# Patient Record
Sex: FEMALE | Race: BLACK OR AFRICAN AMERICAN | NOT HISPANIC OR LATINO | Employment: FULL TIME | RURAL
[De-identification: names, ages, dates, MRNs, and addresses within clinical notes are randomized per-mention and may not be internally consistent; named-entity substitution may affect disease eponyms.]

---

## 2017-05-04 ENCOUNTER — HISTORICAL (OUTPATIENT)
Dept: ADMINISTRATIVE | Facility: HOSPITAL | Age: 22
End: 2017-05-04

## 2017-05-08 LAB
LAB AP CLINICAL INFORMATION: NORMAL
LAB AP DIAGNOSIS - HISTORICAL: NORMAL
LAB AP GROSS PATHOLOGY - HISTORICAL: NORMAL
LAB AP SPECIMEN SUBMITTED - HISTORICAL: NORMAL

## 2017-06-20 ENCOUNTER — HISTORICAL (OUTPATIENT)
Dept: ADMINISTRATIVE | Facility: HOSPITAL | Age: 22
End: 2017-06-20

## 2017-06-22 LAB
LAB AP CLINICAL INFORMATION: NORMAL
LAB AP GENERAL CAT - HISTORICAL: NORMAL
LAB AP INTERPRETATION/RESULT - HISTORICAL: NEGATIVE
LAB AP SPECIMEN ADEQUACY - HISTORICAL: NORMAL
LAB AP SPECIMEN SUBMITTED - HISTORICAL: NORMAL

## 2020-03-04 ENCOUNTER — HISTORICAL (OUTPATIENT)
Dept: ADMINISTRATIVE | Facility: HOSPITAL | Age: 25
End: 2020-03-04

## 2020-03-06 LAB
CHLORIDE SERPL-SCNC: NORMAL MMOL/L/24HR
ESTRADIOL SERPL-MCNC: 76 PG/ML
POTASSIUM SERPL-SCNC: NORMAL MMOL/L/24HR
SODIUM UR-SCNC: NORMAL MMOL/L/24HR
T4 FREE SERPL-MCNC: 0.88 NG/DL (ref 0.76–1.46)

## 2020-03-26 ENCOUNTER — HISTORICAL (OUTPATIENT)
Dept: ADMINISTRATIVE | Facility: HOSPITAL | Age: 25
End: 2020-03-26

## 2020-04-01 ENCOUNTER — HISTORICAL (OUTPATIENT)
Dept: ADMINISTRATIVE | Facility: HOSPITAL | Age: 25
End: 2020-04-01

## 2020-04-01 LAB
BASOPHILS # BLD AUTO: 0.01 X10E3/UL (ref 0–0.2)
BASOPHILS NFR BLD AUTO: 0.3 % (ref 0–1)
CANDIDA SPECIES: POSITIVE
EOSINOPHIL # BLD AUTO: 0.07 X10E3/UL (ref 0–0.5)
EOSINOPHIL NFR BLD AUTO: 2.2 % (ref 1–4)
EOSINOPHIL NFR BLD MANUAL: 2 % (ref 1–4)
ERYTHROCYTE [DISTWIDTH] IN BLOOD BY AUTOMATED COUNT: 14.7 % (ref 11.5–14.5)
GARDNERELLA: NEGATIVE
HCT VFR BLD AUTO: 45.1 % (ref 38–47)
HGB BLD-MCNC: 13.7 G/DL (ref 12–16)
IMM GRANULOCYTES # BLD AUTO: 0 X10E3/UL (ref 0–0.04)
IMM GRANULOCYTES NFR BLD: 0 % (ref 0–0.4)
LYMPHOCYTES # BLD AUTO: 2.01 X10E3/UL (ref 1–4.8)
LYMPHOCYTES NFR BLD AUTO: 62.2 % (ref 27–41)
LYMPHOCYTES NFR BLD MANUAL: 63 % (ref 27–41)
MCH RBC QN AUTO: 25.3 PG (ref 27–31)
MCHC RBC AUTO-ENTMCNC: 30.4 G/DL (ref 32–36)
MCV RBC AUTO: 83.4 FL (ref 80–96)
MONOCYTES # BLD AUTO: 0.46 X10E3/UL (ref 0–0.8)
MONOCYTES NFR BLD AUTO: 14.2 % (ref 2–6)
MONOCYTES NFR BLD MANUAL: 10 % (ref 2–6)
MPC BLD CALC-MCNC: 10.7 FL (ref 9.4–12.4)
NEUTROPHILS # BLD AUTO: 0.68 X10E3/UL (ref 1.8–7.7)
NEUTROPHILS NFR BLD AUTO: 21.1 % (ref 53–65)
NEUTS BAND NFR BLD MANUAL: 1 % (ref 1–5)
NEUTS SEG NFR BLD MANUAL: 24 % (ref 50–62)
NRBC # BLD AUTO: 0 X10E3/UL (ref 0–0)
NRBC, AUTO (.00): 0 /100 (ref 0–0)
PLATELET # BLD AUTO: 278 X10E3/UL (ref 150–400)
PLATELET MORPHOLOGY: ABNORMAL
RBC # BLD AUTO: 5.41 X10E6/UL (ref 4.2–5.4)
RBC MORPH BLD: NORMAL
TRICHOMONAS: NEGATIVE
WBC # BLD AUTO: 3.23 X10E3/UL (ref 4.5–11)

## 2020-05-19 ENCOUNTER — HISTORICAL (OUTPATIENT)
Dept: ADMINISTRATIVE | Facility: HOSPITAL | Age: 25
End: 2020-05-19

## 2020-05-19 LAB
BASOPHILS # BLD AUTO: 0.04 X10E3/UL (ref 0–0.2)
BASOPHILS NFR BLD AUTO: 0.7 % (ref 0–1)
CANDIDA SPECIES: NEGATIVE
EOSINOPHIL # BLD AUTO: 0.34 X10E3/UL (ref 0–0.5)
EOSINOPHIL NFR BLD AUTO: 6 % (ref 1–4)
ERYTHROCYTE [DISTWIDTH] IN BLOOD BY AUTOMATED COUNT: 15.4 % (ref 11.5–14.5)
GARDNERELLA: NEGATIVE
HCT VFR BLD AUTO: 42 % (ref 38–47)
HGB BLD-MCNC: 12.7 G/DL (ref 12–16)
IMM GRANULOCYTES # BLD AUTO: 0.01 X10E3/UL (ref 0–0.04)
IMM GRANULOCYTES NFR BLD: 0.2 % (ref 0–0.4)
LYMPHOCYTES # BLD AUTO: 2.88 X10E3/UL (ref 1–4.8)
LYMPHOCYTES NFR BLD AUTO: 51 % (ref 27–41)
MCH RBC QN AUTO: 25.5 PG (ref 27–31)
MCHC RBC AUTO-ENTMCNC: 30.2 G/DL (ref 32–36)
MCV RBC AUTO: 84.2 FL (ref 80–96)
MONOCYTES # BLD AUTO: 0.46 X10E3/UL (ref 0–0.8)
MONOCYTES NFR BLD AUTO: 8.1 % (ref 2–6)
MPC BLD CALC-MCNC: 10.9 FL (ref 9.4–12.4)
NEUTROPHILS # BLD AUTO: 1.92 X10E3/UL (ref 1.8–7.7)
NEUTROPHILS NFR BLD AUTO: 34 % (ref 53–65)
NRBC # BLD AUTO: 0 X10E3/UL (ref 0–0)
NRBC, AUTO (.00): 0 /100 (ref 0–0)
PLATELET # BLD AUTO: 365 X10E3/UL (ref 150–400)
RBC # BLD AUTO: 4.99 X10E6/UL (ref 4.2–5.4)
TRICHOMONAS: NEGATIVE
WBC # BLD AUTO: 5.65 X10E3/UL (ref 4.5–11)

## 2020-05-21 LAB — HGB S BLD QL SOLY: NEGATIVE

## 2020-05-27 ENCOUNTER — HISTORICAL (OUTPATIENT)
Dept: ADMINISTRATIVE | Facility: HOSPITAL | Age: 25
End: 2020-05-27

## 2020-06-23 ENCOUNTER — HISTORICAL (OUTPATIENT)
Dept: ADMINISTRATIVE | Facility: HOSPITAL | Age: 25
End: 2020-06-23

## 2020-06-23 LAB
ABO: NORMAL
ANTIBODY SCREEN: NORMAL
BASOPHILS # BLD AUTO: 0.05 X10E3/UL (ref 0–0.2)
BASOPHILS NFR BLD AUTO: 0.9 % (ref 0–1)
BUN SERPL-MCNC: 8 MG/DL (ref 7–18)
CALCIUM SERPL-MCNC: 9 MG/DL (ref 8.5–10.1)
CHLORIDE SERPL-SCNC: 107 MMOL/L (ref 98–107)
CO2 SERPL-SCNC: 26 MMOL/L (ref 21–32)
CREAT SERPL-MCNC: 0.63 MG/DL (ref 0.5–1.02)
EOSINOPHIL # BLD AUTO: 0.29 X10E3/UL (ref 0–0.5)
EOSINOPHIL NFR BLD AUTO: 5.1 % (ref 1–4)
ERYTHROCYTE [DISTWIDTH] IN BLOOD BY AUTOMATED COUNT: 15.2 % (ref 11.5–14.5)
GLUCOSE SERPL-MCNC: 73 MG/DL (ref 74–106)
HCT VFR BLD AUTO: 40.5 % (ref 38–47)
HGB BLD-MCNC: 12.5 G/DL (ref 12–16)
IMM GRANULOCYTES # BLD AUTO: 0.01 X10E3/UL (ref 0–0.04)
IMM GRANULOCYTES NFR BLD: 0.2 % (ref 0–0.4)
LYMPHOCYTES # BLD AUTO: 2.24 X10E3/UL (ref 1–4.8)
LYMPHOCYTES NFR BLD AUTO: 39.5 % (ref 27–41)
MCH RBC QN AUTO: 26.2 PG (ref 27–31)
MCHC RBC AUTO-ENTMCNC: 30.9 G/DL (ref 32–36)
MCV RBC AUTO: 84.7 FL (ref 80–96)
MONOCYTES # BLD AUTO: 0.5 X10E3/UL (ref 0–0.8)
MONOCYTES NFR BLD AUTO: 8.8 % (ref 2–6)
MPC BLD CALC-MCNC: 10.9 FL (ref 9.4–12.4)
NEUTROPHILS # BLD AUTO: 2.58 X10E3/UL (ref 1.8–7.7)
NEUTROPHILS NFR BLD AUTO: 45.5 % (ref 53–65)
NRBC # BLD AUTO: 0 X10E3/UL (ref 0–0)
NRBC, AUTO (.00): 0 /100 (ref 0–0)
PLATELET # BLD AUTO: 346 X10E3/UL (ref 150–400)
POTASSIUM SERPL-SCNC: 4.3 MMOL/L (ref 3.5–5.1)
RBC # BLD AUTO: 4.78 X10E6/UL (ref 4.2–5.4)
RH TYPE: NORMAL
SODIUM SERPL-SCNC: 139 MMOL/L (ref 136–145)
WBC # BLD AUTO: 5.67 X10E3/UL (ref 4.5–11)

## 2020-06-25 ENCOUNTER — HISTORICAL (OUTPATIENT)
Dept: ADMINISTRATIVE | Facility: HOSPITAL | Age: 25
End: 2020-06-25

## 2020-06-25 LAB — HCG SERUM QUALITATIVE: NEGATIVE

## 2020-06-26 LAB

## 2020-07-09 ENCOUNTER — HISTORICAL (OUTPATIENT)
Dept: ADMINISTRATIVE | Facility: HOSPITAL | Age: 25
End: 2020-07-09

## 2020-07-09 LAB
BACTERIA #/AREA URNS HPF: ABNORMAL /HPF
BASOPHILS # BLD AUTO: 0.04 X10E3/UL (ref 0–0.2)
BASOPHILS NFR BLD AUTO: 0.7 % (ref 0–1)
BILIRUB UR QL STRIP: NEGATIVE MG/DL
CLARITY UR: ABNORMAL
COLOR UR: YELLOW
EOSINOPHIL # BLD AUTO: 0.24 X10E3/UL (ref 0–0.5)
EOSINOPHIL NFR BLD AUTO: 4.4 % (ref 1–4)
ERYTHROCYTE [DISTWIDTH] IN BLOOD BY AUTOMATED COUNT: 15 % (ref 11.5–14.5)
GLUCOSE UR STRIP-MCNC: NEGATIVE MG/DL
HCT VFR BLD AUTO: 41.1 % (ref 38–47)
HGB BLD-MCNC: 12.5 G/DL (ref 12–16)
IMM GRANULOCYTES # BLD AUTO: 0.01 X10E3/UL (ref 0–0.04)
IMM GRANULOCYTES NFR BLD: 0.2 % (ref 0–0.4)
KETONES UR STRIP-SCNC: NEGATIVE MG/DL
LEUKOCYTE ESTERASE UR QL STRIP: NEGATIVE LEU/UL
LYMPHOCYTES # BLD AUTO: 2.57 X10E3/UL (ref 1–4.8)
LYMPHOCYTES NFR BLD AUTO: 46.6 % (ref 27–41)
MCH RBC QN AUTO: 25.8 PG (ref 27–31)
MCHC RBC AUTO-ENTMCNC: 30.4 G/DL (ref 32–36)
MCV RBC AUTO: 84.9 FL (ref 80–96)
MONOCYTES # BLD AUTO: 0.49 X10E3/UL (ref 0–0.8)
MONOCYTES NFR BLD AUTO: 8.9 % (ref 2–6)
MPC BLD CALC-MCNC: 10.8 FL (ref 9.4–12.4)
NEUTROPHILS # BLD AUTO: 2.16 X10E3/UL (ref 1.8–7.7)
NEUTROPHILS NFR BLD AUTO: 39.2 % (ref 53–65)
NITRITE UR QL STRIP: NEGATIVE
NRBC # BLD AUTO: 0 X10E3/UL (ref 0–0)
NRBC, AUTO (.00): 0 /100 (ref 0–0)
PH UR STRIP: 7.5 PH UNITS (ref 5–8)
PLATELET # BLD AUTO: 360 X10E3/UL (ref 150–400)
PROT UR QL STRIP: NEGATIVE MG/DL
RBC # BLD AUTO: 4.84 X10E6/UL (ref 4.2–5.4)
RBC # UR STRIP: NEGATIVE ERY/UL
RBC #/AREA URNS HPF: ABNORMAL /HPF (ref 0–3)
SP GR UR STRIP: 1.02 (ref 1–1.03)
SQUAMOUS #/AREA URNS LPF: ABNORMAL /LPF
UROBILINOGEN UR STRIP-ACNC: 1 EU/DL
WBC # BLD AUTO: 5.51 X10E3/UL (ref 4.5–11)
WBC #/AREA URNS HPF: ABNORMAL /HPF (ref 0–5)

## 2020-09-23 ENCOUNTER — HISTORICAL (OUTPATIENT)
Dept: ADMINISTRATIVE | Facility: HOSPITAL | Age: 25
End: 2020-09-23

## 2020-09-23 LAB
BACTERIA #/AREA URNS HPF: ABNORMAL /HPF
BASOPHILS # BLD AUTO: 0.04 X10E3/UL (ref 0–0.2)
BASOPHILS NFR BLD AUTO: 0.7 % (ref 0–1)
BILIRUB UR QL STRIP: NEGATIVE MG/DL
CANCER AG125 SERPL-ACNC: 4.3 U/ML (ref 0–35)
CLARITY UR: CLEAR
COLOR UR: YELLOW
EOSINOPHIL # BLD AUTO: 0.22 X10E3/UL (ref 0–0.5)
EOSINOPHIL NFR BLD AUTO: 3.7 % (ref 1–4)
ERYTHROCYTE [DISTWIDTH] IN BLOOD BY AUTOMATED COUNT: 16.4 % (ref 11.5–14.5)
ERYTHROCYTE [SEDIMENTATION RATE] IN BLOOD BY WESTERGREN METHOD: 5 MM/HR (ref 0–20)
GLUCOSE UR STRIP-MCNC: NEGATIVE MG/DL
HCT VFR BLD AUTO: 40.7 % (ref 38–47)
HGB BLD-MCNC: 12.4 G/DL (ref 12–16)
IMM GRANULOCYTES # BLD AUTO: 0.01 X10E3/UL (ref 0–0.04)
IMM GRANULOCYTES NFR BLD: 0.2 % (ref 0–0.4)
KETONES UR STRIP-SCNC: NEGATIVE MG/DL
LEUKOCYTE ESTERASE UR QL STRIP: NEGATIVE LEU/UL
LYMPHOCYTES # BLD AUTO: 2.48 X10E3/UL (ref 1–4.8)
LYMPHOCYTES NFR BLD AUTO: 41.5 % (ref 27–41)
MCH RBC QN AUTO: 25.2 PG (ref 27–31)
MCHC RBC AUTO-ENTMCNC: 30.5 G/DL (ref 32–36)
MCV RBC AUTO: 82.6 FL (ref 80–96)
MONOCYTES # BLD AUTO: 0.49 X10E3/UL (ref 0–0.8)
MONOCYTES NFR BLD AUTO: 8.2 % (ref 2–6)
MPC BLD CALC-MCNC: 10.7 FL (ref 9.4–12.4)
NEUTROPHILS # BLD AUTO: 2.73 X10E3/UL (ref 1.8–7.7)
NEUTROPHILS NFR BLD AUTO: 45.7 % (ref 53–65)
NITRITE UR QL STRIP: NEGATIVE
NRBC # BLD AUTO: 0 X10E3/UL (ref 0–0)
NRBC, AUTO (.00): 0 /100 (ref 0–0)
PH UR STRIP: 7.5 PH UNITS (ref 5–8)
PLATELET # BLD AUTO: 345 X10E3/UL (ref 150–400)
PROT UR QL STRIP: NEGATIVE MG/DL
RBC # BLD AUTO: 4.93 X10E6/UL (ref 4.2–5.4)
RBC # UR STRIP: NEGATIVE ERY/UL
RBC #/AREA URNS HPF: ABNORMAL /HPF (ref 0–3)
SP GR UR STRIP: 1.02 (ref 1–1.03)
SQUAMOUS #/AREA URNS LPF: ABNORMAL /LPF
UROBILINOGEN UR STRIP-ACNC: 0.2 EU/DL
WBC # BLD AUTO: 5.97 X10E3/UL (ref 4.5–11)
WBC #/AREA URNS HPF: ABNORMAL /HPF (ref 0–5)

## 2020-10-29 ENCOUNTER — HISTORICAL (OUTPATIENT)
Dept: ADMINISTRATIVE | Facility: HOSPITAL | Age: 25
End: 2020-10-29

## 2020-10-29 LAB
ANION GAP SERPL CALCULATED.3IONS-SCNC: 6 MMOL/L (ref 7–16)
BASOPHILS # BLD AUTO: 0.05 X10E3/UL (ref 0–0.2)
BASOPHILS NFR BLD AUTO: 0.7 % (ref 0–1)
CHLORIDE SERPL-SCNC: 108 MMOL/L (ref 98–107)
CO2 SERPL-SCNC: 29 MMOL/L (ref 21–32)
EOSINOPHIL # BLD AUTO: 0.24 X10E3/UL (ref 0–0.5)
EOSINOPHIL NFR BLD AUTO: 3.4 % (ref 1–4)
ERYTHROCYTE [DISTWIDTH] IN BLOOD BY AUTOMATED COUNT: 15.9 % (ref 11.5–14.5)
FSH SERPL-ACNC: 5.5 MIU/ML
HCG SERUM QUALITATIVE: NEGATIVE
HCT VFR BLD AUTO: 39.2 % (ref 38–47)
HGB BLD-MCNC: 12.3 G/DL (ref 12–16)
IMM GRANULOCYTES # BLD AUTO: 0.02 X10E3/UL (ref 0–0.04)
IMM GRANULOCYTES NFR BLD: 0.3 % (ref 0–0.4)
LH SERPL-ACNC: 6.3 MIU/ML
LYMPHOCYTES # BLD AUTO: 3.21 X10E3/UL (ref 1–4.8)
LYMPHOCYTES NFR BLD AUTO: 46.1 % (ref 27–41)
MCH RBC QN AUTO: 25.1 PG (ref 27–31)
MCHC RBC AUTO-ENTMCNC: 31.4 G/DL (ref 32–36)
MCV RBC AUTO: 80 FL (ref 80–96)
MONOCYTES # BLD AUTO: 0.5 X10E3/UL (ref 0–0.8)
MONOCYTES NFR BLD AUTO: 7.2 % (ref 2–6)
MPC BLD CALC-MCNC: 10.3 FL (ref 9.4–12.4)
NEUTROPHILS # BLD AUTO: 2.95 X10E3/UL (ref 1.8–7.7)
NEUTROPHILS NFR BLD AUTO: 42.3 % (ref 53–65)
NRBC # BLD AUTO: 0 X10E3/UL (ref 0–0)
NRBC, AUTO (.00): 0 /100 (ref 0–0)
PLATELET # BLD AUTO: 372 X10E3/UL (ref 150–400)
POTASSIUM SERPL-SCNC: 4 MMOL/L (ref 3.5–5.1)
RBC # BLD AUTO: 4.9 X10E6/UL (ref 4.2–5.4)
SODIUM SERPL-SCNC: 139 MMOL/L (ref 136–145)
WBC # BLD AUTO: 6.97 X10E3/UL (ref 4.5–11)

## 2021-03-24 ENCOUNTER — HOSPITAL ENCOUNTER (OUTPATIENT)
Dept: RADIOLOGY | Facility: HOSPITAL | Age: 26
Discharge: HOME | End: 2021-03-24
Attending: OBSTETRICS & GYNECOLOGY
Payer: COMMERCIAL

## 2021-03-24 DIAGNOSIS — N80.9 ENDOMETRIOSIS: ICD-10-CM

## 2021-03-24 PROCEDURE — 77080 DXA BONE DENSITY AXIAL: CPT | Mod: 26,,, | Performed by: RADIOLOGY

## 2021-03-24 PROCEDURE — 77080 DEXA BONE DENSITY SPINE HIP: ICD-10-PCS | Mod: 26,,, | Performed by: RADIOLOGY

## 2021-03-24 PROCEDURE — 77080 DXA BONE DENSITY AXIAL: CPT | Mod: TC

## 2021-04-20 RX ORDER — METRONIDAZOLE 500 MG/1
500 TABLET ORAL EVERY 12 HOURS
COMMUNITY
Start: 2020-03-04 | End: 2021-10-26

## 2021-04-20 RX ORDER — SYRING-NEEDL,DISP,INSUL,0.3 ML 29 G X1/2"
180 SYRINGE, EMPTY DISPOSABLE MISCELLANEOUS ONCE
COMMUNITY
Start: 2020-06-23 | End: 2021-10-26

## 2021-04-20 RX ORDER — PHENAZOPYRIDINE HYDROCHLORIDE 200 MG/1
200 TABLET, FILM COATED ORAL
COMMUNITY
Start: 2020-06-23 | End: 2021-10-26

## 2021-04-20 RX ORDER — FLUCONAZOLE 100 MG/1
100 TABLET ORAL DAILY
COMMUNITY
Start: 2020-04-03 | End: 2021-10-27 | Stop reason: SDUPTHER

## 2021-04-20 RX ORDER — HYDROCHLOROTHIAZIDE 25 MG/1
25 TABLET ORAL DAILY
COMMUNITY
End: 2021-10-26

## 2021-04-20 RX ORDER — DICLOFENAC POTASSIUM 50 MG/1
50 TABLET, FILM COATED ORAL 3 TIMES DAILY
COMMUNITY
Start: 2020-03-04 | End: 2021-10-26

## 2021-04-27 ENCOUNTER — OFFICE VISIT (OUTPATIENT)
Dept: OBSTETRICS AND GYNECOLOGY | Facility: CLINIC | Age: 26
End: 2021-04-27
Payer: COMMERCIAL

## 2021-04-27 VITALS
HEART RATE: 73 BPM | BODY MASS INDEX: 36.15 KG/M2 | DIASTOLIC BLOOD PRESSURE: 90 MMHG | HEIGHT: 65 IN | SYSTOLIC BLOOD PRESSURE: 133 MMHG | WEIGHT: 217 LBS | TEMPERATURE: 98 F | RESPIRATION RATE: 16 BRPM

## 2021-04-27 DIAGNOSIS — I10 ESSENTIAL HYPERTENSION: ICD-10-CM

## 2021-04-27 DIAGNOSIS — N80.30 ENDOMETRIOSIS OF PELVIC PERITONEUM: ICD-10-CM

## 2021-04-27 DIAGNOSIS — E66.9 OBESITY (BMI 35.0-39.9 WITHOUT COMORBIDITY): ICD-10-CM

## 2021-04-27 DIAGNOSIS — N94.6 DYSMENORRHEA: ICD-10-CM

## 2021-04-27 PROCEDURE — 99214 PR OFFICE/OUTPT VISIT, EST, LEVL IV, 30-39 MIN: ICD-10-PCS | Mod: ,,, | Performed by: OBSTETRICS & GYNECOLOGY

## 2021-04-27 PROCEDURE — 99214 OFFICE O/P EST MOD 30 MIN: CPT | Mod: ,,, | Performed by: OBSTETRICS & GYNECOLOGY

## 2021-04-27 RX ORDER — LOSARTAN POTASSIUM 50 MG/1
50 TABLET ORAL DAILY
COMMUNITY
End: 2023-03-01 | Stop reason: SDUPTHER

## 2021-05-10 ENCOUNTER — TELEPHONE (OUTPATIENT)
Dept: OBSTETRICS AND GYNECOLOGY | Facility: CLINIC | Age: 26
End: 2021-05-10

## 2021-06-03 ENCOUNTER — OFFICE VISIT (OUTPATIENT)
Dept: OBSTETRICS AND GYNECOLOGY | Facility: CLINIC | Age: 26
End: 2021-06-03
Payer: COMMERCIAL

## 2021-06-03 VITALS
TEMPERATURE: 98 F | HEIGHT: 63 IN | BODY MASS INDEX: 39.16 KG/M2 | DIASTOLIC BLOOD PRESSURE: 88 MMHG | SYSTOLIC BLOOD PRESSURE: 136 MMHG | HEART RATE: 80 BPM | WEIGHT: 221 LBS | RESPIRATION RATE: 16 BRPM

## 2021-06-03 DIAGNOSIS — F41.9 ANXIETY: ICD-10-CM

## 2021-06-03 DIAGNOSIS — N80.9 ENDOMETRIOSIS DETERMINED BY LAPAROSCOPY: ICD-10-CM

## 2021-06-03 DIAGNOSIS — N85.2 BULKY OR ENLARGED UTERUS: ICD-10-CM

## 2021-06-03 DIAGNOSIS — N93.9 ABNORMAL UTERINE BLEEDING (AUB): ICD-10-CM

## 2021-06-03 DIAGNOSIS — R45.86 MOOD SWINGS: ICD-10-CM

## 2021-06-03 DIAGNOSIS — F41.1 GAD (GENERALIZED ANXIETY DISORDER): Primary | ICD-10-CM

## 2021-06-03 LAB
BASOPHILS # BLD AUTO: 0.05 K/UL (ref 0–0.2)
BASOPHILS NFR BLD AUTO: 0.8 % (ref 0–1)
DIFFERENTIAL METHOD BLD: ABNORMAL
EOSINOPHIL # BLD AUTO: 0.34 K/UL (ref 0–0.5)
EOSINOPHIL NFR BLD AUTO: 5.5 % (ref 1–4)
ERYTHROCYTE [DISTWIDTH] IN BLOOD BY AUTOMATED COUNT: 16.4 % (ref 11.5–14.5)
HCG SERUM QUALITATIVE: NEGATIVE
HCT VFR BLD AUTO: 37.8 % (ref 38–47)
HGB BLD-MCNC: 11.8 G/DL (ref 12–16)
IMM GRANULOCYTES # BLD AUTO: 0.01 K/UL (ref 0–0.04)
IMM GRANULOCYTES NFR BLD: 0.2 % (ref 0–0.4)
LYMPHOCYTES # BLD AUTO: 3.2 K/UL (ref 1–4.8)
LYMPHOCYTES NFR BLD AUTO: 51.4 % (ref 27–41)
MCH RBC QN AUTO: 24.7 PG (ref 27–31)
MCHC RBC AUTO-ENTMCNC: 31.2 G/DL (ref 32–36)
MCV RBC AUTO: 79.2 FL (ref 80–96)
MONOCYTES # BLD AUTO: 0.51 K/UL (ref 0–0.8)
MONOCYTES NFR BLD AUTO: 8.2 % (ref 2–6)
MPC BLD CALC-MCNC: 10.8 FL (ref 9.4–12.4)
NEUTROPHILS # BLD AUTO: 2.12 K/UL (ref 1.8–7.7)
NEUTROPHILS NFR BLD AUTO: 33.9 % (ref 53–65)
NRBC # BLD AUTO: 0 X10E3/UL
NRBC, AUTO (.00): 0 %
PLATELET # BLD AUTO: 375 K/UL (ref 150–400)
RBC # BLD AUTO: 4.77 M/UL (ref 4.2–5.4)
T4 FREE SERPL-MCNC: 0.86 NG/DL (ref 0.76–1.46)
TSH SERPL DL<=0.005 MIU/L-ACNC: 1.88 UIU/ML (ref 0.36–3.74)
WBC # BLD AUTO: 6.23 K/UL (ref 4.5–11)

## 2021-06-03 PROCEDURE — 85025 CBC WITH DIFFERENTIAL: ICD-10-PCS | Mod: ,,, | Performed by: CLINICAL MEDICAL LABORATORY

## 2021-06-03 PROCEDURE — 99215 OFFICE O/P EST HI 40 MIN: CPT | Mod: ,,, | Performed by: OBSTETRICS & GYNECOLOGY

## 2021-06-03 PROCEDURE — 99215 PR OFFICE/OUTPT VISIT, EST, LEVL V, 40-54 MIN: ICD-10-PCS | Mod: ,,, | Performed by: OBSTETRICS & GYNECOLOGY

## 2021-06-03 PROCEDURE — 84703 CHORIONIC GONADOTROPIN ASSAY: CPT | Mod: ,,, | Performed by: CLINICAL MEDICAL LABORATORY

## 2021-06-03 PROCEDURE — 85025 COMPLETE CBC W/AUTO DIFF WBC: CPT | Mod: ,,, | Performed by: CLINICAL MEDICAL LABORATORY

## 2021-06-03 PROCEDURE — 84439 THYROID PANEL: ICD-10-PCS | Mod: ,,, | Performed by: CLINICAL MEDICAL LABORATORY

## 2021-06-03 PROCEDURE — 84443 ASSAY THYROID STIM HORMONE: CPT | Mod: ,,, | Performed by: CLINICAL MEDICAL LABORATORY

## 2021-06-03 PROCEDURE — 84439 ASSAY OF FREE THYROXINE: CPT | Mod: ,,, | Performed by: CLINICAL MEDICAL LABORATORY

## 2021-06-03 PROCEDURE — 36415 COLL VENOUS BLD VENIPUNCTURE: CPT | Mod: ,,, | Performed by: OBSTETRICS & GYNECOLOGY

## 2021-06-03 PROCEDURE — 36415 PR COLLECTION VENOUS BLOOD,VENIPUNCTURE: ICD-10-PCS | Mod: ,,, | Performed by: OBSTETRICS & GYNECOLOGY

## 2021-06-03 PROCEDURE — 84443 THYROID PANEL: ICD-10-PCS | Mod: ,,, | Performed by: CLINICAL MEDICAL LABORATORY

## 2021-06-03 PROCEDURE — 84703 HCG, SERUM, QUALITATIVE: ICD-10-PCS | Mod: ,,, | Performed by: CLINICAL MEDICAL LABORATORY

## 2021-06-03 RX ORDER — ESCITALOPRAM OXALATE 10 MG/1
10 TABLET ORAL DAILY
Qty: 90 TABLET | Refills: 3 | Status: SHIPPED | OUTPATIENT
Start: 2021-06-03 | End: 2021-10-26

## 2021-06-10 ENCOUNTER — TELEPHONE (OUTPATIENT)
Dept: OBSTETRICS AND GYNECOLOGY | Facility: CLINIC | Age: 26
End: 2021-06-10

## 2021-06-24 ENCOUNTER — OFFICE VISIT (OUTPATIENT)
Dept: OBSTETRICS AND GYNECOLOGY | Facility: CLINIC | Age: 26
End: 2021-06-24
Payer: COMMERCIAL

## 2021-06-24 VITALS
HEIGHT: 63 IN | RESPIRATION RATE: 16 BRPM | BODY MASS INDEX: 38.98 KG/M2 | SYSTOLIC BLOOD PRESSURE: 142 MMHG | DIASTOLIC BLOOD PRESSURE: 90 MMHG | WEIGHT: 220 LBS | HEART RATE: 71 BPM | TEMPERATURE: 98 F

## 2021-06-24 DIAGNOSIS — R10.2 CHRONIC PELVIC PAIN SYNDROME IN FEMALE: ICD-10-CM

## 2021-06-24 DIAGNOSIS — I10 ESSENTIAL HYPERTENSION: ICD-10-CM

## 2021-06-24 DIAGNOSIS — N80.30 ENDOMETRIOSIS, PELVIC PERITONEUM: ICD-10-CM

## 2021-06-24 DIAGNOSIS — D50.0 IRON DEFICIENCY ANEMIA DUE TO CHRONIC BLOOD LOSS: Primary | ICD-10-CM

## 2021-06-24 DIAGNOSIS — N94.6 DYSMENORRHEA: ICD-10-CM

## 2021-06-24 DIAGNOSIS — N93.8 DUB (DYSFUNCTIONAL UTERINE BLEEDING): ICD-10-CM

## 2021-06-24 DIAGNOSIS — G89.29 CHRONIC PELVIC PAIN SYNDROME IN FEMALE: ICD-10-CM

## 2021-06-24 DIAGNOSIS — E66.9 OBESITY (BMI 35.0-39.9 WITHOUT COMORBIDITY): ICD-10-CM

## 2021-06-24 PROCEDURE — 36415 COLL VENOUS BLD VENIPUNCTURE: CPT | Mod: ,,, | Performed by: OBSTETRICS & GYNECOLOGY

## 2021-06-24 PROCEDURE — 85660 RBC SICKLE CELL TEST: CPT | Mod: ,,, | Performed by: CLINICAL MEDICAL LABORATORY

## 2021-06-24 PROCEDURE — 99214 OFFICE O/P EST MOD 30 MIN: CPT | Mod: ,,, | Performed by: OBSTETRICS & GYNECOLOGY

## 2021-06-24 PROCEDURE — 36415 PR COLLECTION VENOUS BLOOD,VENIPUNCTURE: ICD-10-PCS | Mod: ,,, | Performed by: OBSTETRICS & GYNECOLOGY

## 2021-06-24 PROCEDURE — 99214 PR OFFICE/OUTPT VISIT, EST, LEVL IV, 30-39 MIN: ICD-10-PCS | Mod: ,,, | Performed by: OBSTETRICS & GYNECOLOGY

## 2021-06-24 PROCEDURE — 85660 SICKLE CELL SCREEN: ICD-10-PCS | Mod: ,,, | Performed by: CLINICAL MEDICAL LABORATORY

## 2021-06-29 LAB — HGB S BLD QL SOLY: NEGATIVE

## 2021-07-30 ENCOUNTER — OFFICE VISIT (OUTPATIENT)
Dept: FAMILY MEDICINE | Facility: CLINIC | Age: 26
End: 2021-07-30
Payer: COMMERCIAL

## 2021-07-30 VITALS
WEIGHT: 210 LBS | OXYGEN SATURATION: 100 % | BODY MASS INDEX: 37.21 KG/M2 | HEIGHT: 63 IN | RESPIRATION RATE: 20 BRPM | HEART RATE: 81 BPM

## 2021-07-30 DIAGNOSIS — Z20.822 COVID-19 RULED OUT BY LABORATORY TESTING: ICD-10-CM

## 2021-07-30 DIAGNOSIS — Z11.52 ENCOUNTER FOR SCREENING FOR COVID-19: Primary | ICD-10-CM

## 2021-07-30 LAB
CTP QC/QA: YES
SARS-COV-2 AG RESP QL IA.RAPID: NEGATIVE

## 2021-07-30 PROCEDURE — 87426 SARSCOV CORONAVIRUS AG IA: CPT | Mod: QW,,, | Performed by: FAMILY MEDICINE

## 2021-07-30 PROCEDURE — 87426 SARS CORONAVIRUS 2 ANTIGEN POCT: ICD-10-PCS | Mod: QW,,, | Performed by: FAMILY MEDICINE

## 2021-07-30 PROCEDURE — 99213 PR OFFICE/OUTPT VISIT, EST, LEVL III, 20-29 MIN: ICD-10-PCS | Mod: ,,, | Performed by: FAMILY MEDICINE

## 2021-07-30 PROCEDURE — 99213 OFFICE O/P EST LOW 20 MIN: CPT | Mod: ,,, | Performed by: FAMILY MEDICINE

## 2021-08-31 ENCOUNTER — OFFICE VISIT (OUTPATIENT)
Dept: FAMILY MEDICINE | Facility: CLINIC | Age: 26
End: 2021-08-31
Payer: COMMERCIAL

## 2021-08-31 VITALS
HEART RATE: 96 BPM | TEMPERATURE: 97 F | BODY MASS INDEX: 37.56 KG/M2 | SYSTOLIC BLOOD PRESSURE: 123 MMHG | RESPIRATION RATE: 19 BRPM | DIASTOLIC BLOOD PRESSURE: 83 MMHG | WEIGHT: 212 LBS | HEIGHT: 63 IN

## 2021-08-31 DIAGNOSIS — I10 HYPERTENSION, UNSPECIFIED TYPE: Primary | ICD-10-CM

## 2021-08-31 DIAGNOSIS — Z13.6 ENCOUNTER FOR SCREENING FOR CARDIOVASCULAR DISORDERS: ICD-10-CM

## 2021-08-31 PROCEDURE — 99395 PR PREVENTIVE VISIT,EST,18-39: ICD-10-PCS | Mod: ,,, | Performed by: NURSE PRACTITIONER

## 2021-08-31 PROCEDURE — 80053 COMPREHEN METABOLIC PANEL: CPT | Mod: ,,, | Performed by: CLINICAL MEDICAL LABORATORY

## 2021-08-31 PROCEDURE — 85025 CBC WITH DIFFERENTIAL: ICD-10-PCS | Mod: ,,, | Performed by: CLINICAL MEDICAL LABORATORY

## 2021-08-31 PROCEDURE — 80061 LIPID PANEL: ICD-10-PCS | Mod: ,,, | Performed by: CLINICAL MEDICAL LABORATORY

## 2021-08-31 PROCEDURE — 80061 LIPID PANEL: CPT | Mod: ,,, | Performed by: CLINICAL MEDICAL LABORATORY

## 2021-08-31 PROCEDURE — 80053 COMPREHENSIVE METABOLIC PANEL: ICD-10-PCS | Mod: ,,, | Performed by: CLINICAL MEDICAL LABORATORY

## 2021-08-31 PROCEDURE — 85025 COMPLETE CBC W/AUTO DIFF WBC: CPT | Mod: ,,, | Performed by: CLINICAL MEDICAL LABORATORY

## 2021-08-31 PROCEDURE — 99395 PREV VISIT EST AGE 18-39: CPT | Mod: ,,, | Performed by: NURSE PRACTITIONER

## 2021-09-01 LAB
ALBUMIN SERPL BCP-MCNC: 3.4 G/DL (ref 3.5–5)
ALBUMIN/GLOB SERPL: 0.9 {RATIO}
ALP SERPL-CCNC: 66 U/L (ref 37–98)
ALT SERPL W P-5'-P-CCNC: 14 U/L (ref 13–56)
ANION GAP SERPL CALCULATED.3IONS-SCNC: 9 MMOL/L (ref 7–16)
AST SERPL W P-5'-P-CCNC: 14 U/L (ref 15–37)
BASOPHILS # BLD AUTO: 0.07 K/UL (ref 0–0.2)
BASOPHILS NFR BLD AUTO: 1.4 % (ref 0–1)
BILIRUB SERPL-MCNC: 0.3 MG/DL (ref 0–1.2)
BUN SERPL-MCNC: 12 MG/DL (ref 7–18)
BUN/CREAT SERPL: 17 (ref 6–20)
CALCIUM SERPL-MCNC: 8.6 MG/DL (ref 8.5–10.1)
CHLORIDE SERPL-SCNC: 109 MMOL/L (ref 98–107)
CHOLEST SERPL-MCNC: 155 MG/DL (ref 0–200)
CHOLEST/HDLC SERPL: 2.4 {RATIO}
CO2 SERPL-SCNC: 26 MMOL/L (ref 21–32)
CREAT SERPL-MCNC: 0.72 MG/DL (ref 0.55–1.02)
DIFFERENTIAL METHOD BLD: ABNORMAL
EOSINOPHIL # BLD AUTO: 0.28 K/UL (ref 0–0.5)
EOSINOPHIL NFR BLD AUTO: 5.6 % (ref 1–4)
ERYTHROCYTE [DISTWIDTH] IN BLOOD BY AUTOMATED COUNT: 16 % (ref 11.5–14.5)
GLOBULIN SER-MCNC: 3.6 G/DL (ref 2–4)
GLUCOSE SERPL-MCNC: 81 MG/DL (ref 74–106)
HCT VFR BLD AUTO: 35.5 % (ref 38–47)
HDLC SERPL-MCNC: 64 MG/DL (ref 40–60)
HGB BLD-MCNC: 10.9 G/DL (ref 12–16)
IMM GRANULOCYTES # BLD AUTO: 0.06 K/UL (ref 0–0.04)
IMM GRANULOCYTES NFR BLD: 1.2 % (ref 0–0.4)
LDLC SERPL CALC-MCNC: 81 MG/DL
LDLC/HDLC SERPL: 1.3 {RATIO}
LYMPHOCYTES # BLD AUTO: 2.43 K/UL (ref 1–4.8)
LYMPHOCYTES NFR BLD AUTO: 48.7 % (ref 27–41)
MCH RBC QN AUTO: 24.6 PG (ref 27–31)
MCHC RBC AUTO-ENTMCNC: 30.7 G/DL (ref 32–36)
MCV RBC AUTO: 80.1 FL (ref 80–96)
MONOCYTES # BLD AUTO: 0.55 K/UL (ref 0–0.8)
MONOCYTES NFR BLD AUTO: 11 % (ref 2–6)
MPC BLD CALC-MCNC: 11.5 FL (ref 9.4–12.4)
NEUTROPHILS # BLD AUTO: 1.6 K/UL (ref 1.8–7.7)
NEUTROPHILS NFR BLD AUTO: 32.1 % (ref 53–65)
NONHDLC SERPL-MCNC: 91 MG/DL
NRBC # BLD AUTO: 0 X10E3/UL
NRBC, AUTO (.00): 0 %
PLATELET # BLD AUTO: 374 K/UL (ref 150–400)
POTASSIUM SERPL-SCNC: 4.4 MMOL/L (ref 3.5–5.1)
PROT SERPL-MCNC: 7 G/DL (ref 6.4–8.2)
RBC # BLD AUTO: 4.43 M/UL (ref 4.2–5.4)
SODIUM SERPL-SCNC: 140 MMOL/L (ref 136–145)
TRIGL SERPL-MCNC: 51 MG/DL (ref 35–150)
VLDLC SERPL-MCNC: 10 MG/DL
WBC # BLD AUTO: 4.99 K/UL (ref 4.5–11)

## 2021-09-09 ENCOUNTER — OFFICE VISIT (OUTPATIENT)
Dept: FAMILY MEDICINE | Facility: CLINIC | Age: 26
End: 2021-09-09
Payer: COMMERCIAL

## 2021-09-09 DIAGNOSIS — R51.9 HEAD ACHE: ICD-10-CM

## 2021-09-09 DIAGNOSIS — R06.02 SHORTNESS OF BREATH: ICD-10-CM

## 2021-09-09 DIAGNOSIS — J32.9 SINUSITIS, UNSPECIFIED CHRONICITY, UNSPECIFIED LOCATION: Primary | ICD-10-CM

## 2021-09-09 DIAGNOSIS — R05.9 COUGH: ICD-10-CM

## 2021-09-09 DIAGNOSIS — R43.9 PROBLEMS WITH SMELL AND TASTE: ICD-10-CM

## 2021-09-09 LAB
CTP QC/QA: YES
FLUAV AG NPH QL: NEGATIVE
FLUBV AG NPH QL: NEGATIVE
SARS-COV-2 AG RESP QL IA.RAPID: NEGATIVE

## 2021-09-09 PROCEDURE — 99212 PR OFFICE/OUTPT VISIT, EST, LEVL II, 10-19 MIN: ICD-10-PCS | Mod: 95,,, | Performed by: NURSE PRACTITIONER

## 2021-09-09 PROCEDURE — 99212 OFFICE O/P EST SF 10 MIN: CPT | Mod: 95,,, | Performed by: NURSE PRACTITIONER

## 2021-09-09 RX ORDER — METHYLPREDNISOLONE 4 MG/1
TABLET ORAL
Qty: 21 TABLET | Refills: 0 | Status: SHIPPED | OUTPATIENT
Start: 2021-09-09 | End: 2021-10-26

## 2021-09-09 RX ORDER — AZITHROMYCIN 250 MG/1
TABLET, FILM COATED ORAL
Qty: 6 TABLET | Refills: 0 | Status: SHIPPED | OUTPATIENT
Start: 2021-09-09 | End: 2021-10-26

## 2021-09-10 LAB — SARS-COV-2 RNA RESP QL NAA+PROBE: NEGATIVE

## 2021-09-10 PROCEDURE — 87635 SARS-COV-2 (COVID-19) QUALITATIVE PCR: ICD-10-PCS | Mod: ,,, | Performed by: CLINICAL MEDICAL LABORATORY

## 2021-09-10 PROCEDURE — 87635 SARS-COV-2 COVID-19 AMP PRB: CPT | Mod: ,,, | Performed by: CLINICAL MEDICAL LABORATORY

## 2021-10-14 ENCOUNTER — OFFICE VISIT (OUTPATIENT)
Dept: FAMILY MEDICINE | Facility: CLINIC | Age: 26
End: 2021-10-14
Payer: COMMERCIAL

## 2021-10-14 VITALS
BODY MASS INDEX: 37.74 KG/M2 | DIASTOLIC BLOOD PRESSURE: 84 MMHG | HEIGHT: 63 IN | WEIGHT: 213 LBS | RESPIRATION RATE: 18 BRPM | SYSTOLIC BLOOD PRESSURE: 121 MMHG | HEART RATE: 80 BPM | TEMPERATURE: 98 F

## 2021-10-14 DIAGNOSIS — N93.9 ABNORMAL UTERINE BLEEDING: Primary | ICD-10-CM

## 2021-10-14 PROCEDURE — 99213 OFFICE O/P EST LOW 20 MIN: CPT | Mod: ,,, | Performed by: NURSE PRACTITIONER

## 2021-10-14 PROCEDURE — 99213 PR OFFICE/OUTPT VISIT, EST, LEVL III, 20-29 MIN: ICD-10-PCS | Mod: ,,, | Performed by: NURSE PRACTITIONER

## 2021-10-15 PROBLEM — N93.9 ABNORMAL UTERINE BLEEDING: Status: ACTIVE | Noted: 2021-10-15

## 2021-10-26 ENCOUNTER — OFFICE VISIT (OUTPATIENT)
Dept: OBSTETRICS AND GYNECOLOGY | Facility: CLINIC | Age: 26
End: 2021-10-26
Payer: COMMERCIAL

## 2021-10-26 VITALS
DIASTOLIC BLOOD PRESSURE: 94 MMHG | HEIGHT: 63 IN | TEMPERATURE: 98 F | WEIGHT: 215 LBS | RESPIRATION RATE: 16 BRPM | HEART RATE: 77 BPM | SYSTOLIC BLOOD PRESSURE: 128 MMHG | BODY MASS INDEX: 38.09 KG/M2

## 2021-10-26 DIAGNOSIS — N92.0 MENORRHAGIA WITH REGULAR CYCLE: ICD-10-CM

## 2021-10-26 DIAGNOSIS — N94.6 DYSMENORRHEA: Primary | ICD-10-CM

## 2021-10-26 DIAGNOSIS — N80.9 ENDOMETRIOSIS: ICD-10-CM

## 2021-10-26 DIAGNOSIS — B37.31 YEAST VAGINITIS: ICD-10-CM

## 2021-10-26 PROCEDURE — 99214 PR OFFICE/OUTPT VISIT, EST, LEVL IV, 30-39 MIN: ICD-10-PCS | Mod: ,,, | Performed by: OBSTETRICS & GYNECOLOGY

## 2021-10-26 PROCEDURE — 99214 OFFICE O/P EST MOD 30 MIN: CPT | Mod: ,,, | Performed by: OBSTETRICS & GYNECOLOGY

## 2021-10-27 ENCOUNTER — TELEPHONE (OUTPATIENT)
Dept: OBSTETRICS AND GYNECOLOGY | Facility: CLINIC | Age: 26
End: 2021-10-27
Payer: COMMERCIAL

## 2021-10-27 LAB
BACTERIA #/AREA URNS HPF: ABNORMAL /HPF
BASOPHILS # BLD AUTO: 0.08 K/UL (ref 0–0.2)
BASOPHILS NFR BLD AUTO: 1.2 % (ref 0–1)
BILIRUB UR QL STRIP: NEGATIVE
CANDIDA SPECIES: POSITIVE
CLARITY UR: ABNORMAL
COLOR UR: YELLOW
DIFFERENTIAL METHOD BLD: ABNORMAL
EOSINOPHIL # BLD AUTO: 0.47 K/UL (ref 0–0.5)
EOSINOPHIL NFR BLD AUTO: 7.1 % (ref 1–4)
ERYTHROCYTE [DISTWIDTH] IN BLOOD BY AUTOMATED COUNT: 16.6 % (ref 11.5–14.5)
ERYTHROCYTE [SEDIMENTATION RATE] IN BLOOD BY WESTERGREN METHOD: 12 MM/HR (ref 0–20)
GARDNERELLA: NEGATIVE
GLUCOSE UR STRIP-MCNC: NEGATIVE MG/DL
HCG SERUM QUALITATIVE: NEGATIVE
HCT VFR BLD AUTO: 36 % (ref 38–47)
HGB BLD-MCNC: 11.3 G/DL (ref 12–16)
IMM GRANULOCYTES # BLD AUTO: 0.01 K/UL (ref 0–0.04)
IMM GRANULOCYTES NFR BLD: 0.2 % (ref 0–0.4)
KETONES UR STRIP-SCNC: ABNORMAL MG/DL
LEUKOCYTE ESTERASE UR QL STRIP: ABNORMAL
LYMPHOCYTES # BLD AUTO: 3.29 K/UL (ref 1–4.8)
LYMPHOCYTES NFR BLD AUTO: 49.8 % (ref 27–41)
MCH RBC QN AUTO: 25.1 PG (ref 27–31)
MCHC RBC AUTO-ENTMCNC: 31.4 G/DL (ref 32–36)
MCV RBC AUTO: 79.8 FL (ref 80–96)
MONOCYTES # BLD AUTO: 0.62 K/UL (ref 0–0.8)
MONOCYTES NFR BLD AUTO: 9.4 % (ref 2–6)
MPC BLD CALC-MCNC: 10.6 FL (ref 9.4–12.4)
MUCOUS THREADS #/AREA URNS HPF: ABNORMAL /HPF
NEUTROPHILS # BLD AUTO: 2.14 K/UL (ref 1.8–7.7)
NEUTROPHILS NFR BLD AUTO: 32.3 % (ref 53–65)
NITRITE UR QL STRIP: NEGATIVE
NRBC # BLD AUTO: 0 X10E3/UL
NRBC, AUTO (.00): 0 %
PH UR STRIP: 7.5 PH UNITS
PLATELET # BLD AUTO: 386 K/UL (ref 150–400)
PROT UR QL STRIP: ABNORMAL
RBC # BLD AUTO: 4.51 M/UL (ref 4.2–5.4)
RBC # UR STRIP: NEGATIVE /UL
RBC #/AREA URNS HPF: ABNORMAL /HPF
SP GR UR STRIP: 1.02
SQUAMOUS #/AREA URNS LPF: ABNORMAL /LPF
TRICHOMONAS: NEGATIVE
UROBILINOGEN UR STRIP-ACNC: 0.2 MG/DL
WBC # BLD AUTO: 6.61 K/UL (ref 4.5–11)
WBC #/AREA URNS HPF: ABNORMAL /HPF
YEAST #/AREA URNS HPF: ABNORMAL /HPF

## 2021-10-27 PROCEDURE — 85025 COMPLETE CBC W/AUTO DIFF WBC: CPT | Mod: ,,, | Performed by: CLINICAL MEDICAL LABORATORY

## 2021-10-27 PROCEDURE — 85025 CBC WITH DIFFERENTIAL: ICD-10-PCS | Mod: ,,, | Performed by: CLINICAL MEDICAL LABORATORY

## 2021-10-27 PROCEDURE — 87510 GARDNER VAG DNA DIR PROBE: CPT | Mod: ,,, | Performed by: CLINICAL MEDICAL LABORATORY

## 2021-10-27 PROCEDURE — 36415 COLL VENOUS BLD VENIPUNCTURE: CPT | Mod: ,,, | Performed by: OBSTETRICS & GYNECOLOGY

## 2021-10-27 PROCEDURE — 87510 BACTERIAL VAGINOSIS: ICD-10-PCS | Mod: ,,, | Performed by: CLINICAL MEDICAL LABORATORY

## 2021-10-27 PROCEDURE — 85651 SEDIMENTATION RATE, AUTOMATED: ICD-10-PCS | Mod: ,,, | Performed by: CLINICAL MEDICAL LABORATORY

## 2021-10-27 PROCEDURE — 36415 PR COLLECTION VENOUS BLOOD,VENIPUNCTURE: ICD-10-PCS | Mod: ,,, | Performed by: OBSTETRICS & GYNECOLOGY

## 2021-10-27 PROCEDURE — 87660 TRICHOMONAS VAGIN DIR PROBE: CPT | Mod: ,,, | Performed by: CLINICAL MEDICAL LABORATORY

## 2021-10-27 PROCEDURE — 87660 BACTERIAL VAGINOSIS: ICD-10-PCS | Mod: ,,, | Performed by: CLINICAL MEDICAL LABORATORY

## 2021-10-27 PROCEDURE — 84703 HCG, SERUM, QUALITATIVE: ICD-10-PCS | Mod: ,,, | Performed by: CLINICAL MEDICAL LABORATORY

## 2021-10-27 PROCEDURE — 85651 RBC SED RATE NONAUTOMATED: CPT | Mod: ,,, | Performed by: CLINICAL MEDICAL LABORATORY

## 2021-10-27 PROCEDURE — 81001 URINALYSIS, REFLEX TO URINE CULTURE: ICD-10-PCS | Mod: ,,, | Performed by: CLINICAL MEDICAL LABORATORY

## 2021-10-27 PROCEDURE — 87480 CANDIDA DNA DIR PROBE: CPT | Mod: ,,, | Performed by: CLINICAL MEDICAL LABORATORY

## 2021-10-27 PROCEDURE — 84703 CHORIONIC GONADOTROPIN ASSAY: CPT | Mod: ,,, | Performed by: CLINICAL MEDICAL LABORATORY

## 2021-10-27 PROCEDURE — 81001 URINALYSIS AUTO W/SCOPE: CPT | Mod: ,,, | Performed by: CLINICAL MEDICAL LABORATORY

## 2021-10-27 PROCEDURE — 87480 BACTERIAL VAGINOSIS: ICD-10-PCS | Mod: ,,, | Performed by: CLINICAL MEDICAL LABORATORY

## 2021-10-27 RX ORDER — FLUCONAZOLE 100 MG/1
100 TABLET ORAL DAILY
Qty: 10 TABLET | Refills: 0 | Status: SHIPPED | OUTPATIENT
Start: 2021-10-27 | End: 2021-11-06

## 2021-10-27 RX ORDER — FLUCONAZOLE 100 MG/1
100 TABLET ORAL DAILY
Qty: 3 TABLET | Refills: 0 | Status: SHIPPED | OUTPATIENT
Start: 2021-10-27 | End: 2021-10-30

## 2021-11-09 ENCOUNTER — PATIENT OUTREACH (OUTPATIENT)
Dept: ADMINISTRATIVE | Facility: HOSPITAL | Age: 26
End: 2021-11-09

## 2021-11-23 ENCOUNTER — HOSPITAL ENCOUNTER (OUTPATIENT)
Dept: RADIOLOGY | Facility: HOSPITAL | Age: 26
Discharge: HOME | End: 2021-11-23
Attending: NURSE PRACTITIONER
Payer: COMMERCIAL

## 2021-11-23 DIAGNOSIS — N93.9 ABNORMAL UTERINE BLEEDING: ICD-10-CM

## 2021-11-23 PROCEDURE — 76856 US EXAM PELVIC COMPLETE: CPT | Mod: 26,,, | Performed by: RADIOLOGY

## 2021-11-23 PROCEDURE — 76856 US PELVIS COMPLETE NON OB: ICD-10-PCS | Mod: 26,,, | Performed by: RADIOLOGY

## 2021-11-23 PROCEDURE — 76856 US EXAM PELVIC COMPLETE: CPT | Mod: TC

## 2021-11-28 ENCOUNTER — PATIENT MESSAGE (OUTPATIENT)
Dept: FAMILY MEDICINE | Facility: CLINIC | Age: 26
End: 2021-11-28
Payer: COMMERCIAL

## 2021-12-01 ENCOUNTER — OFFICE VISIT (OUTPATIENT)
Dept: OBSTETRICS AND GYNECOLOGY | Facility: CLINIC | Age: 26
End: 2021-12-01
Payer: COMMERCIAL

## 2021-12-01 VITALS
TEMPERATURE: 98 F | RESPIRATION RATE: 16 BRPM | DIASTOLIC BLOOD PRESSURE: 95 MMHG | HEIGHT: 63 IN | BODY MASS INDEX: 38.27 KG/M2 | HEART RATE: 88 BPM | SYSTOLIC BLOOD PRESSURE: 118 MMHG | WEIGHT: 216 LBS

## 2021-12-01 DIAGNOSIS — N94.6 DYSMENORRHEA: ICD-10-CM

## 2021-12-01 DIAGNOSIS — R93.89 ENDOMETRIAL STRIPE INCREASED: Primary | ICD-10-CM

## 2021-12-01 DIAGNOSIS — N91.5 HYPOMENORRHEA: ICD-10-CM

## 2021-12-01 DIAGNOSIS — I10 HYPERTENSION, UNSPECIFIED TYPE: ICD-10-CM

## 2021-12-01 DIAGNOSIS — N83.201 RIGHT OVARIAN CYST: ICD-10-CM

## 2021-12-01 PROCEDURE — 99214 OFFICE O/P EST MOD 30 MIN: CPT | Mod: ,,, | Performed by: OBSTETRICS & GYNECOLOGY

## 2021-12-01 PROCEDURE — 99214 PR OFFICE/OUTPT VISIT, EST, LEVL IV, 30-39 MIN: ICD-10-PCS | Mod: ,,, | Performed by: OBSTETRICS & GYNECOLOGY

## 2021-12-06 PROBLEM — Z13.6 ENCOUNTER FOR SCREENING FOR CARDIOVASCULAR DISORDERS: Status: RESOLVED | Noted: 2021-08-31 | Resolved: 2021-12-06

## 2021-12-09 ENCOUNTER — HOSPITAL ENCOUNTER (OUTPATIENT)
Dept: RADIOLOGY | Facility: HOSPITAL | Age: 26
Discharge: HOME | End: 2021-12-09
Attending: OBSTETRICS & GYNECOLOGY
Payer: COMMERCIAL

## 2021-12-09 DIAGNOSIS — R93.89 ENDOMETRIAL STRIPE INCREASED: ICD-10-CM

## 2021-12-09 PROCEDURE — 76856 US PELVIS COMPLETE NON OB: ICD-10-PCS | Mod: 26,,, | Performed by: RADIOLOGY

## 2021-12-09 PROCEDURE — 76856 US EXAM PELVIC COMPLETE: CPT | Mod: 26,,, | Performed by: RADIOLOGY

## 2021-12-09 PROCEDURE — 76856 US EXAM PELVIC COMPLETE: CPT | Mod: TC

## 2022-01-05 ENCOUNTER — OFFICE VISIT (OUTPATIENT)
Dept: FAMILY MEDICINE | Facility: CLINIC | Age: 27
End: 2022-01-05
Payer: COMMERCIAL

## 2022-01-05 VITALS
BODY MASS INDEX: 38.62 KG/M2 | WEIGHT: 218 LBS | DIASTOLIC BLOOD PRESSURE: 82 MMHG | HEART RATE: 90 BPM | TEMPERATURE: 98 F | HEIGHT: 63 IN | OXYGEN SATURATION: 100 % | SYSTOLIC BLOOD PRESSURE: 120 MMHG

## 2022-01-05 DIAGNOSIS — E66.9 OBESITY (BMI 35.0-39.9 WITHOUT COMORBIDITY): ICD-10-CM

## 2022-01-05 DIAGNOSIS — Z20.822 EXPOSURE TO COVID-19 VIRUS: ICD-10-CM

## 2022-01-05 DIAGNOSIS — J06.9 VIRAL UPPER RESPIRATORY TRACT INFECTION: Primary | ICD-10-CM

## 2022-01-05 DIAGNOSIS — R05.9 COUGH: ICD-10-CM

## 2022-01-05 DIAGNOSIS — J02.9 SORE THROAT: ICD-10-CM

## 2022-01-05 LAB
CTP QC/QA: YES
CTP QC/QA: YES
FLUAV AG NPH QL: NEGATIVE
FLUBV AG NPH QL: NEGATIVE
S PYO RRNA THROAT QL PROBE: NEGATIVE
SARS-COV-2 AG RESP QL IA.RAPID: NEGATIVE

## 2022-01-05 PROCEDURE — 1159F PR MEDICATION LIST DOCUMENTED IN MEDICAL RECORD: ICD-10-PCS | Mod: CPTII,,, | Performed by: NURSE PRACTITIONER

## 2022-01-05 PROCEDURE — 3074F SYST BP LT 130 MM HG: CPT | Mod: CPTII,,, | Performed by: NURSE PRACTITIONER

## 2022-01-05 PROCEDURE — 99213 PR OFFICE/OUTPT VISIT, EST, LEVL III, 20-29 MIN: ICD-10-PCS | Mod: ,,, | Performed by: NURSE PRACTITIONER

## 2022-01-05 PROCEDURE — 3079F PR MOST RECENT DIASTOLIC BLOOD PRESSURE 80-89 MM HG: ICD-10-PCS | Mod: CPTII,,, | Performed by: NURSE PRACTITIONER

## 2022-01-05 PROCEDURE — 3008F PR BODY MASS INDEX (BMI) DOCUMENTED: ICD-10-PCS | Mod: CPTII,,, | Performed by: NURSE PRACTITIONER

## 2022-01-05 PROCEDURE — 99213 OFFICE O/P EST LOW 20 MIN: CPT | Mod: ,,, | Performed by: NURSE PRACTITIONER

## 2022-01-05 PROCEDURE — 3008F BODY MASS INDEX DOCD: CPT | Mod: CPTII,,, | Performed by: NURSE PRACTITIONER

## 2022-01-05 PROCEDURE — 3079F DIAST BP 80-89 MM HG: CPT | Mod: CPTII,,, | Performed by: NURSE PRACTITIONER

## 2022-01-05 PROCEDURE — 87428 SARSCOV & INF VIR A&B AG IA: CPT | Mod: QW,,, | Performed by: NURSE PRACTITIONER

## 2022-01-05 PROCEDURE — 87880 POCT RAPID STREP A: ICD-10-PCS | Mod: QW,,, | Performed by: NURSE PRACTITIONER

## 2022-01-05 PROCEDURE — 1159F MED LIST DOCD IN RCRD: CPT | Mod: CPTII,,, | Performed by: NURSE PRACTITIONER

## 2022-01-05 PROCEDURE — 3074F PR MOST RECENT SYSTOLIC BLOOD PRESSURE < 130 MM HG: ICD-10-PCS | Mod: CPTII,,, | Performed by: NURSE PRACTITIONER

## 2022-01-05 PROCEDURE — 87880 STREP A ASSAY W/OPTIC: CPT | Mod: QW,,, | Performed by: NURSE PRACTITIONER

## 2022-01-05 PROCEDURE — 87428 POCT SARS-COV2 (COVID) WITH FLU ANTIGEN: ICD-10-PCS | Mod: QW,,, | Performed by: NURSE PRACTITIONER

## 2022-01-05 RX ORDER — AZITHROMYCIN 250 MG/1
TABLET, FILM COATED ORAL
Qty: 6 TABLET | Refills: 0 | Status: SHIPPED | OUTPATIENT
Start: 2022-01-05 | End: 2022-01-25

## 2022-01-05 RX ORDER — METHYLPREDNISOLONE 4 MG/1
TABLET ORAL
Qty: 21 TABLET | Refills: 0 | Status: SHIPPED | OUTPATIENT
Start: 2022-01-05 | End: 2022-01-25

## 2022-01-05 NOTE — PROGRESS NOTES
Jayde Cole DNP   [address] [place] [address]     PATIENT NAME: Gabriella Ulloa  : 1995  DATE: 22  MRN: [id number]      Billing Provider: Jayde Cole DNP  Level of Service:   Patient PCP Information     Provider PCP Type    Jayde Cole DNP General          Reason for Visit / Chief Complaint: URI (C/o headache, sore throat, sinus pressure, started yesterday )       Update PCP  Update Chief Complaint         History of Present Illness / Problem Focused Workflow     Gabriella Ulloa presents to the clinic with URI (C/o headache, sore throat, sinus pressure, started yesterday )     HPI    Review of Systems     Review of Systems     Medical / Social / Family History     Past Medical History:   Diagnosis Date    Anemia     Anxiety     Breakthrough bleeding on birth control pills 2015    Cancer     CHF (congestive heart failure)     Diabetes mellitus, type 2     Endometriosis of pelvic peritoneum 2020    cul-de-sac and sigmoid colon    Hypertension        Past Surgical History:   Procedure Laterality Date     SECTION      D&C/Hysteroscopy with robotic and operative Laparoscopy  2020    DILATION AND CURETTAGE OF UTERUS  2020       Social History  Ms.  reports that she has never smoked. She has never used smokeless tobacco. She reports that she does not drink alcohol and does not use drugs.    Family History  Ms.'s family history includes Breast cancer in her other; Diabetes in her mother and other; Heart disease in her mother; Hypertension in her other; Liver cancer in her other; Prostate cancer in her maternal grandfather; Stomach cancer in her other.    Medications and Allergies     Medications  No outpatient medications have been marked as taking for the 22 encounter (Office Visit) with Jayde Cole DNP.       Allergies  Review of patient's allergies indicates:  No Known Allergies    Physical Examination      Vitals:    01/05/22 1033   BP: 120/82   Pulse: 90   Temp: 97.8 °F (36.6 °C)     Physical Exam  Vitals and nursing note reviewed.   HENT:      Head: Normocephalic.      Nose: Congestion and rhinorrhea present.      Mouth/Throat:      Mouth: Mucous membranes are moist.      Pharynx: Posterior oropharyngeal erythema present.   Eyes:      Extraocular Movements: Extraocular movements intact.      Conjunctiva/sclera: Conjunctivae normal.      Pupils: Pupils are equal, round, and reactive to light.   Cardiovascular:      Rate and Rhythm: Normal rate and regular rhythm.      Pulses: Normal pulses.      Heart sounds: Normal heart sounds.   Pulmonary:      Effort: Pulmonary effort is normal.      Breath sounds: Normal breath sounds.   Musculoskeletal:      Cervical back: Normal range of motion.   Skin:     General: Skin is warm and dry.      Capillary Refill: Capillary refill takes less than 2 seconds.   Neurological:      General: No focal deficit present.      Mental Status: She is alert and oriented to person, place, and time. Mental status is at baseline.   Psychiatric:         Mood and Affect: Mood normal.         Behavior: Behavior normal.         Thought Content: Thought content normal.         Judgment: Judgment normal.          Assessment and Plan (including Health Maintenance)      Problem List  Smart Sets  Document Outside HM   :    Plan:         Health Maintenance Due   Topic Date Due    Hepatitis C Screening  Never done    COVID-19 Vaccine (1) Never done    HIV Screening  Never done    TETANUS VACCINE  Never done    Pap Smear  06/20/2020    Influenza Vaccine (1) Never done       Problem List Items Addressed This Visit        Pulmonary    Cough       Endocrine    Obesity (BMI 35.0-39.9 without comorbidity)      Other Visit Diagnoses     Sore throat    -  Primary    Relevant Orders    POCT SARS-COV2 (COVID) with Flu Antigen (Completed)    POCT rapid strep A (Completed)    Exposure to COVID-19 virus         Viral upper respiratory tract infection              The patient has no Health Maintenance topics of status Not Due    Future Appointments   Date Time Provider Department Center   1/5/2022  2:00 PM Otis Mccullough MD [facility]            Signature:  Jayde Cole, [facility]      [address] [place] [address]    Date of encounter: 1/5/22

## 2022-01-12 ENCOUNTER — OFFICE VISIT (OUTPATIENT)
Dept: FAMILY MEDICINE | Facility: CLINIC | Age: 27
End: 2022-01-12
Payer: COMMERCIAL

## 2022-01-12 VITALS
BODY MASS INDEX: 38.34 KG/M2 | SYSTOLIC BLOOD PRESSURE: 137 MMHG | RESPIRATION RATE: 18 BRPM | HEART RATE: 82 BPM | TEMPERATURE: 99 F | WEIGHT: 216.38 LBS | OXYGEN SATURATION: 100 % | HEIGHT: 63 IN | DIASTOLIC BLOOD PRESSURE: 81 MMHG

## 2022-01-12 DIAGNOSIS — R05.9 COUGH: ICD-10-CM

## 2022-01-12 DIAGNOSIS — U07.1 COVID-19: Primary | ICD-10-CM

## 2022-01-12 PROCEDURE — 3079F DIAST BP 80-89 MM HG: CPT | Mod: CPTII,,, | Performed by: NURSE PRACTITIONER

## 2022-01-12 PROCEDURE — 3079F PR MOST RECENT DIASTOLIC BLOOD PRESSURE 80-89 MM HG: ICD-10-PCS | Mod: CPTII,,, | Performed by: NURSE PRACTITIONER

## 2022-01-12 PROCEDURE — 99213 PR OFFICE/OUTPT VISIT, EST, LEVL III, 20-29 MIN: ICD-10-PCS | Mod: ,,, | Performed by: NURSE PRACTITIONER

## 2022-01-12 PROCEDURE — 3008F PR BODY MASS INDEX (BMI) DOCUMENTED: ICD-10-PCS | Mod: CPTII,,, | Performed by: NURSE PRACTITIONER

## 2022-01-12 PROCEDURE — 1159F PR MEDICATION LIST DOCUMENTED IN MEDICAL RECORD: ICD-10-PCS | Mod: CPTII,,, | Performed by: NURSE PRACTITIONER

## 2022-01-12 PROCEDURE — 1159F MED LIST DOCD IN RCRD: CPT | Mod: CPTII,,, | Performed by: NURSE PRACTITIONER

## 2022-01-12 PROCEDURE — 99213 OFFICE O/P EST LOW 20 MIN: CPT | Mod: ,,, | Performed by: NURSE PRACTITIONER

## 2022-01-12 PROCEDURE — 3075F PR MOST RECENT SYSTOLIC BLOOD PRESS GE 130-139MM HG: ICD-10-PCS | Mod: CPTII,,, | Performed by: NURSE PRACTITIONER

## 2022-01-12 PROCEDURE — 3008F BODY MASS INDEX DOCD: CPT | Mod: CPTII,,, | Performed by: NURSE PRACTITIONER

## 2022-01-12 PROCEDURE — 3075F SYST BP GE 130 - 139MM HG: CPT | Mod: CPTII,,, | Performed by: NURSE PRACTITIONER

## 2022-01-12 RX ORDER — CEFDINIR 300 MG/1
300 CAPSULE ORAL 2 TIMES DAILY
Qty: 14 CAPSULE | Refills: 0 | Status: SHIPPED | OUTPATIENT
Start: 2022-01-12 | End: 2022-01-19

## 2022-01-12 NOTE — PROGRESS NOTES
Jayde Cole DNP   [address] [place] [address]     PATIENT NAME: Gabriella Ulloa  : 1995  DATE: 22  MRN: [id number]      Billing Provider: Jayde Cole DNP  Level of Service:   Patient PCP Information     Provider PCP Type    Jayde Cole DNP General          Reason for Visit / Chief Complaint: Follow-up and covid        Update PCP  Update Chief Complaint         History of Present Illness / Problem Focused Workflow     Gabriella Ulloa presents to the clinic with Follow-up and covid      HPI    Review of Systems     Review of Systems     Medical / Social / Family History     Past Medical History:   Diagnosis Date    Anemia     Anxiety     Breakthrough bleeding on birth control pills 2015    Cancer     CHF (congestive heart failure)     Diabetes mellitus, type 2     Endometriosis of pelvic peritoneum 2020    cul-de-sac and sigmoid colon    Hypertension        Past Surgical History:   Procedure Laterality Date     SECTION      D&C/Hysteroscopy with robotic and operative Laparoscopy  2020    DILATION AND CURETTAGE OF UTERUS  2020       Social History  Ms.  reports that she has never smoked. She has never used smokeless tobacco. She reports that she does not drink alcohol and does not use drugs.    Family History  Ms.'s family history includes Breast cancer in her other; Diabetes in her mother and other; Heart disease in her mother; Hypertension in her other; Liver cancer in her other; Prostate cancer in her maternal grandfather; Stomach cancer in her other.    Medications and Allergies     Medications  No outpatient medications have been marked as taking for the 22 encounter (Office Visit) with Jayde Cole DNP.       Allergies  Review of patient's allergies indicates:  No Known Allergies    Physical Examination     Vitals:    22 1349   BP: 137/81   Pulse: 82   Resp: 18   Temp: 98.5 °F (36.9 °C)      Physical Exam  Vitals and nursing note reviewed.   HENT:      Head: Normocephalic.      Nose: Congestion and rhinorrhea present.      Mouth/Throat:      Mouth: Mucous membranes are moist.      Pharynx: Posterior oropharyngeal erythema present.   Eyes:      Extraocular Movements: Extraocular movements intact.      Conjunctiva/sclera: Conjunctivae normal.      Pupils: Pupils are equal, round, and reactive to light.   Cardiovascular:      Rate and Rhythm: Normal rate and regular rhythm.      Pulses: Normal pulses.      Heart sounds: Normal heart sounds.   Pulmonary:      Effort: Pulmonary effort is normal.      Breath sounds: Normal breath sounds.   Musculoskeletal:      Cervical back: Normal range of motion.   Skin:     General: Skin is warm and dry.      Capillary Refill: Capillary refill takes less than 2 seconds.   Neurological:      General: No focal deficit present.      Mental Status: She is alert and oriented to person, place, and time. Mental status is at baseline.   Psychiatric:         Mood and Affect: Mood normal.         Behavior: Behavior normal.         Thought Content: Thought content normal.         Judgment: Judgment normal.          Assessment and Plan (including Health Maintenance)      Problem List  Smart Sets  Document Outside HM   :    Plan:         Health Maintenance Due   Topic Date Due    Hepatitis C Screening  Never done    COVID-19 Vaccine (1) Never done    HIV Screening  Never done    TETANUS VACCINE  Never done    Pap Smear  06/20/2020    Influenza Vaccine (1) Never done       Problem List Items Addressed This Visit    None         The patient has no Health Maintenance topics of status Not Due    Future Appointments   Date Time Provider Department Center   2/9/2022  2:30 PM Otis Mccullough MD [facility]            Signature:  Jayde Cole, [facility]      [address] [place] [address]    Date of encounter: 1/12/22

## 2022-01-24 ENCOUNTER — PATIENT MESSAGE (OUTPATIENT)
Dept: FAMILY MEDICINE | Facility: CLINIC | Age: 27
End: 2022-01-24
Payer: COMMERCIAL

## 2022-01-25 ENCOUNTER — OFFICE VISIT (OUTPATIENT)
Dept: OBSTETRICS AND GYNECOLOGY | Facility: CLINIC | Age: 27
End: 2022-01-25
Payer: COMMERCIAL

## 2022-01-25 VITALS
RESPIRATION RATE: 16 BRPM | SYSTOLIC BLOOD PRESSURE: 133 MMHG | HEART RATE: 79 BPM | DIASTOLIC BLOOD PRESSURE: 78 MMHG | TEMPERATURE: 98 F | HEIGHT: 63 IN | BODY MASS INDEX: 38.45 KG/M2 | WEIGHT: 217 LBS

## 2022-01-25 DIAGNOSIS — I10 HYPERTENSION, UNSPECIFIED TYPE: ICD-10-CM

## 2022-01-25 DIAGNOSIS — R93.89 THICKENED ENDOMETRIUM: ICD-10-CM

## 2022-01-25 DIAGNOSIS — N80.30 ENDOMETRIOSIS OF PELVIC PERITONEUM: ICD-10-CM

## 2022-01-25 DIAGNOSIS — N94.6 DYSMENORRHEA: Primary | ICD-10-CM

## 2022-01-25 LAB
BASOPHILS # BLD AUTO: 0.05 K/UL (ref 0–0.2)
BASOPHILS NFR BLD AUTO: 0.9 % (ref 0–1)
CANCER AG125 SERPL-ACNC: 6 U/ML (ref 0–35)
DIFFERENTIAL METHOD BLD: ABNORMAL
EOSINOPHIL # BLD AUTO: 0.15 K/UL (ref 0–0.5)
EOSINOPHIL NFR BLD AUTO: 2.7 % (ref 1–4)
ERYTHROCYTE [DISTWIDTH] IN BLOOD BY AUTOMATED COUNT: 17.9 % (ref 11.5–14.5)
ERYTHROCYTE [SEDIMENTATION RATE] IN BLOOD BY WESTERGREN METHOD: 3 MM/HR (ref 0–20)
HCT VFR BLD AUTO: 39.6 % (ref 38–47)
HGB BLD-MCNC: 12.2 G/DL (ref 12–16)
IMM GRANULOCYTES # BLD AUTO: 0.01 K/UL (ref 0–0.04)
IMM GRANULOCYTES NFR BLD: 0.2 % (ref 0–0.4)
LYMPHOCYTES # BLD AUTO: 2.61 K/UL (ref 1–4.8)
LYMPHOCYTES NFR BLD AUTO: 47.6 % (ref 27–41)
MCH RBC QN AUTO: 24.2 PG (ref 27–31)
MCHC RBC AUTO-ENTMCNC: 30.8 G/DL (ref 32–36)
MCV RBC AUTO: 78.6 FL (ref 80–96)
MONOCYTES # BLD AUTO: 0.51 K/UL (ref 0–0.8)
MONOCYTES NFR BLD AUTO: 9.3 % (ref 2–6)
MPC BLD CALC-MCNC: 10.8 FL (ref 9.4–12.4)
NEUTROPHILS # BLD AUTO: 2.15 K/UL (ref 1.8–7.7)
NEUTROPHILS NFR BLD AUTO: 39.3 % (ref 53–65)
NRBC # BLD AUTO: 0 X10E3/UL
NRBC, AUTO (.00): 0 %
PLATELET # BLD AUTO: 358 K/UL (ref 150–400)
RBC # BLD AUTO: 5.04 M/UL (ref 4.2–5.4)
WBC # BLD AUTO: 5.48 K/UL (ref 4.5–11)

## 2022-01-25 PROCEDURE — 1159F MED LIST DOCD IN RCRD: CPT | Mod: ,,, | Performed by: OBSTETRICS & GYNECOLOGY

## 2022-01-25 PROCEDURE — 3078F PR MOST RECENT DIASTOLIC BLOOD PRESSURE < 80 MM HG: ICD-10-PCS | Mod: ,,, | Performed by: OBSTETRICS & GYNECOLOGY

## 2022-01-25 PROCEDURE — 3075F PR MOST RECENT SYSTOLIC BLOOD PRESS GE 130-139MM HG: ICD-10-PCS | Mod: ,,, | Performed by: OBSTETRICS & GYNECOLOGY

## 2022-01-25 PROCEDURE — 86304 IMMUNOASSAY TUMOR CA 125: CPT | Mod: ,,, | Performed by: CLINICAL MEDICAL LABORATORY

## 2022-01-25 PROCEDURE — 3078F DIAST BP <80 MM HG: CPT | Mod: ,,, | Performed by: OBSTETRICS & GYNECOLOGY

## 2022-01-25 PROCEDURE — 1159F PR MEDICATION LIST DOCUMENTED IN MEDICAL RECORD: ICD-10-PCS | Mod: ,,, | Performed by: OBSTETRICS & GYNECOLOGY

## 2022-01-25 PROCEDURE — 3008F BODY MASS INDEX DOCD: CPT | Mod: ,,, | Performed by: OBSTETRICS & GYNECOLOGY

## 2022-01-25 PROCEDURE — 99214 OFFICE O/P EST MOD 30 MIN: CPT | Mod: ,,, | Performed by: OBSTETRICS & GYNECOLOGY

## 2022-01-25 PROCEDURE — 85025 CBC WITH DIFFERENTIAL: ICD-10-PCS | Mod: ,,, | Performed by: CLINICAL MEDICAL LABORATORY

## 2022-01-25 PROCEDURE — 85651 RBC SED RATE NONAUTOMATED: CPT | Mod: ,,, | Performed by: CLINICAL MEDICAL LABORATORY

## 2022-01-25 PROCEDURE — 36415 PR COLLECTION VENOUS BLOOD,VENIPUNCTURE: ICD-10-PCS | Mod: ,,, | Performed by: OBSTETRICS & GYNECOLOGY

## 2022-01-25 PROCEDURE — 99214 PR OFFICE/OUTPT VISIT, EST, LEVL IV, 30-39 MIN: ICD-10-PCS | Mod: ,,, | Performed by: OBSTETRICS & GYNECOLOGY

## 2022-01-25 PROCEDURE — 3008F PR BODY MASS INDEX (BMI) DOCUMENTED: ICD-10-PCS | Mod: ,,, | Performed by: OBSTETRICS & GYNECOLOGY

## 2022-01-25 PROCEDURE — 85025 COMPLETE CBC W/AUTO DIFF WBC: CPT | Mod: ,,, | Performed by: CLINICAL MEDICAL LABORATORY

## 2022-01-25 PROCEDURE — 1160F RVW MEDS BY RX/DR IN RCRD: CPT | Mod: ,,, | Performed by: OBSTETRICS & GYNECOLOGY

## 2022-01-25 PROCEDURE — 86304 CANCER ANTIGEN 125: ICD-10-PCS | Mod: ,,, | Performed by: CLINICAL MEDICAL LABORATORY

## 2022-01-25 PROCEDURE — 36415 COLL VENOUS BLD VENIPUNCTURE: CPT | Mod: ,,, | Performed by: OBSTETRICS & GYNECOLOGY

## 2022-01-25 PROCEDURE — 1160F PR REVIEW ALL MEDS BY PRESCRIBER/CLIN PHARMACIST DOCUMENTED: ICD-10-PCS | Mod: ,,, | Performed by: OBSTETRICS & GYNECOLOGY

## 2022-01-25 PROCEDURE — 3075F SYST BP GE 130 - 139MM HG: CPT | Mod: ,,, | Performed by: OBSTETRICS & GYNECOLOGY

## 2022-01-25 PROCEDURE — 85651 SEDIMENTATION RATE, AUTOMATED: ICD-10-PCS | Mod: ,,, | Performed by: CLINICAL MEDICAL LABORATORY

## 2022-01-25 RX ORDER — DICLOFENAC POTASSIUM 50 MG/1
50 TABLET, FILM COATED ORAL 4 TIMES DAILY
Qty: 120 TABLET | Refills: 0 | Status: SHIPPED | OUTPATIENT
Start: 2022-01-25 | End: 2022-01-25 | Stop reason: SDUPTHER

## 2022-01-25 RX ORDER — DICLOFENAC POTASSIUM 50 MG/1
50 TABLET, FILM COATED ORAL 4 TIMES DAILY
Qty: 120 TABLET | Refills: 0 | Status: SHIPPED | OUTPATIENT
Start: 2022-01-25 | End: 2022-02-24

## 2022-01-25 RX ORDER — ONDANSETRON 4 MG/1
TABLET, FILM COATED ORAL
COMMUNITY
Start: 2021-12-05 | End: 2022-02-01 | Stop reason: SDUPTHER

## 2022-01-25 NOTE — PROGRESS NOTES
Gabriella Ulloa female  for   Chief Complaint   Patient presents with    Follow-up     Follow up on ultrasound results. Patient stated she have been feeling fatigue and having migraines.           PHI:  26-year-old  3, para 3 Afro-American female presents for follow-up.  The patient has had comparison of 2 ultrasound in November and December that indicated her endometrium slightly thickened at initially 10 mm and then after December ultrasound as her menses was ending 8 mm.  The ovarian cysts have resolved.    In  patient underwent a D&C/hysteroscopy/laparoscopy and was found to have adhesions which were removed and a possibility of sigmoid endometriosis.  She does relate that with bowel movements there is some slight tenderness to her menses.  She does deny dyspareunia.    Her main complaint now is increasing dysmenorrhea on a scale of 10 she rates as a 10 the 1st 4 days of her 7 day cycle.  There is no menorrhagia complaints.  She does use Toradol for relief of symptoms-incomplete.    Her main symptoms she states now is increasing dysmenorrhea over the last past several months.  She does want relief.  She wants conservative therapy since she is still trying to have 1 additional child with her new partner who has 2 children of his own.  She wants per preserve her reproductive ability.      Past Medical History:   Diagnosis Date    Anemia     Anxiety     Breakthrough bleeding on birth control pills 2015    Cancer     CHF (congestive heart failure)     Diabetes mellitus, type 2     Endometriosis of pelvic peritoneum 2020    cul-de-sac and sigmoid colon    Hypertension       Past Surgical History:   Procedure Laterality Date     SECTION      D&C/Hysteroscopy with robotic and operative Laparoscopy  2020    DILATION AND CURETTAGE OF UTERUS  2020      Review of patient's allergies indicates:  No Known Allergies     ROS:Pertinent items are noted in HPI.    Physical  "exam:    /78 (BP Location: Left arm, Patient Position: Sitting)   Pulse 79   Temp 98.1 °F (36.7 °C)   Resp 16   Ht 5' 3" (1.6 m)   Wt 98.4 kg (217 lb)   LMP 01/01/2022   BMI 38.44 kg/m²      General Appearance: healthy    Chest:           Lungs: clear to auscultation bilaterally           Heart: regular rate and rhythm, S1, S2 normal, no murmur, click, rub or gallop    Abdomen:not performed    Pelvic: not performed     Extremity: normal    Skin: normal exam        Assessment:   Problem List Items Addressed This Visit        Cardiac/Vascular    Hypertension       Renal/    Endometriosis of pelvic peritoneum    Relevant Orders        CBC Auto Differential    Sedimentation Rate    US Pelvis Complete Non OB    Dysmenorrhea - Primary    Relevant Orders        CBC Auto Differential    Sedimentation Rate    US Pelvis Complete Non OB      Other Visit Diagnoses     Thickened endometrium        Post menses December 8 mm slight decreased from previous ultrasound in November of endometrial stripe    Relevant Orders        CBC Auto Differential    Sedimentation Rate    US Pelvis Complete Non OB           Plan:  Ultrasound to check endometrial stripe;  in sedimentation rate +CBC today; scheduled for laparoscopy with D&C/hysteroscopy due to dysmenorrhea and thickened endometrial stripe.              Recommend using diclofenac potassium with Tylenol 1 g for dysmenorrhea relief.    "

## 2022-01-27 ENCOUNTER — HOSPITAL ENCOUNTER (OUTPATIENT)
Dept: RADIOLOGY | Facility: HOSPITAL | Age: 27
Discharge: HOME | End: 2022-01-27
Attending: OBSTETRICS & GYNECOLOGY
Payer: COMMERCIAL

## 2022-01-27 DIAGNOSIS — N94.6 DYSMENORRHEA: ICD-10-CM

## 2022-01-27 DIAGNOSIS — N80.30 ENDOMETRIOSIS OF PELVIC PERITONEUM: ICD-10-CM

## 2022-01-27 DIAGNOSIS — R93.89 THICKENED ENDOMETRIUM: ICD-10-CM

## 2022-01-27 PROCEDURE — 76856 US EXAM PELVIC COMPLETE: CPT | Mod: TC

## 2022-01-27 PROCEDURE — 76856 US EXAM PELVIC COMPLETE: CPT | Mod: 26,,, | Performed by: RADIOLOGY

## 2022-01-27 PROCEDURE — 76856 US PELVIS COMPLETE NON OB: ICD-10-PCS | Mod: 26,,, | Performed by: RADIOLOGY

## 2022-02-01 ENCOUNTER — OFFICE VISIT (OUTPATIENT)
Dept: OBSTETRICS AND GYNECOLOGY | Facility: CLINIC | Age: 27
End: 2022-02-01
Payer: COMMERCIAL

## 2022-02-01 VITALS
HEIGHT: 63 IN | SYSTOLIC BLOOD PRESSURE: 125 MMHG | HEART RATE: 78 BPM | TEMPERATURE: 98 F | WEIGHT: 219 LBS | BODY MASS INDEX: 38.8 KG/M2 | DIASTOLIC BLOOD PRESSURE: 88 MMHG | RESPIRATION RATE: 16 BRPM

## 2022-02-01 DIAGNOSIS — R93.89 THICKENED ENDOMETRIUM: ICD-10-CM

## 2022-02-01 DIAGNOSIS — Z87.42 HX OF ENDOMETRIOSIS: ICD-10-CM

## 2022-02-01 DIAGNOSIS — K58.9 IRRITABLE BOWEL SYNDROME, UNSPECIFIED TYPE: ICD-10-CM

## 2022-02-01 DIAGNOSIS — N94.6 DYSMENORRHEA: Primary | ICD-10-CM

## 2022-02-01 PROCEDURE — 3008F PR BODY MASS INDEX (BMI) DOCUMENTED: ICD-10-PCS | Mod: ,,, | Performed by: OBSTETRICS & GYNECOLOGY

## 2022-02-01 PROCEDURE — 3008F BODY MASS INDEX DOCD: CPT | Mod: ,,, | Performed by: OBSTETRICS & GYNECOLOGY

## 2022-02-01 PROCEDURE — 3074F SYST BP LT 130 MM HG: CPT | Mod: ,,, | Performed by: OBSTETRICS & GYNECOLOGY

## 2022-02-01 PROCEDURE — 3074F PR MOST RECENT SYSTOLIC BLOOD PRESSURE < 130 MM HG: ICD-10-PCS | Mod: ,,, | Performed by: OBSTETRICS & GYNECOLOGY

## 2022-02-01 PROCEDURE — 1160F PR REVIEW ALL MEDS BY PRESCRIBER/CLIN PHARMACIST DOCUMENTED: ICD-10-PCS | Mod: ,,, | Performed by: OBSTETRICS & GYNECOLOGY

## 2022-02-01 PROCEDURE — 99214 OFFICE O/P EST MOD 30 MIN: CPT | Mod: ,,, | Performed by: OBSTETRICS & GYNECOLOGY

## 2022-02-01 PROCEDURE — 3079F PR MOST RECENT DIASTOLIC BLOOD PRESSURE 80-89 MM HG: ICD-10-PCS | Mod: ,,, | Performed by: OBSTETRICS & GYNECOLOGY

## 2022-02-01 PROCEDURE — 1159F PR MEDICATION LIST DOCUMENTED IN MEDICAL RECORD: ICD-10-PCS | Mod: ,,, | Performed by: OBSTETRICS & GYNECOLOGY

## 2022-02-01 PROCEDURE — 3079F DIAST BP 80-89 MM HG: CPT | Mod: ,,, | Performed by: OBSTETRICS & GYNECOLOGY

## 2022-02-01 PROCEDURE — 99214 PR OFFICE/OUTPT VISIT, EST, LEVL IV, 30-39 MIN: ICD-10-PCS | Mod: ,,, | Performed by: OBSTETRICS & GYNECOLOGY

## 2022-02-01 PROCEDURE — 1159F MED LIST DOCD IN RCRD: CPT | Mod: ,,, | Performed by: OBSTETRICS & GYNECOLOGY

## 2022-02-01 PROCEDURE — 1160F RVW MEDS BY RX/DR IN RCRD: CPT | Mod: ,,, | Performed by: OBSTETRICS & GYNECOLOGY

## 2022-02-01 RX ORDER — ONDANSETRON 2 MG/ML
4 INJECTION INTRAMUSCULAR; INTRAVENOUS EVERY 12 HOURS PRN
Status: CANCELLED | OUTPATIENT
Start: 2022-02-01

## 2022-02-01 RX ORDER — MUPIROCIN 20 MG/G
OINTMENT TOPICAL
Status: CANCELLED | OUTPATIENT
Start: 2022-02-01

## 2022-02-01 RX ORDER — LIDOCAINE HYDROCHLORIDE 10 MG/ML
1 INJECTION, SOLUTION EPIDURAL; INFILTRATION; INTRACAUDAL; PERINEURAL ONCE
Status: CANCELLED | OUTPATIENT
Start: 2022-02-01 | End: 2022-02-01

## 2022-02-01 RX ORDER — SODIUM CHLORIDE, SODIUM LACTATE, POTASSIUM CHLORIDE, CALCIUM CHLORIDE 600; 310; 30; 20 MG/100ML; MG/100ML; MG/100ML; MG/100ML
INJECTION, SOLUTION INTRAVENOUS CONTINUOUS
Status: CANCELLED | OUTPATIENT
Start: 2022-02-01 | End: 2022-02-02

## 2022-02-01 NOTE — H&P
[facility] - [facility]  Obstetrics & Gynecology  History & Physical    Patient Name: Gabriella Ulloa  MRN: [id number]  Admission Date:  Gyn Surgeon: Otis Mccullough     Subjective:     Chief Complaint/Reason for Admission:  Increase painful periods, pain with defecation during menses and thickened endometrium on ultrasounds- most recent 2022 was 9 mm    History of Present Illness:  Gabriella Ulloa 26 y.o. female is scheduled for dilatation curettage with associated hysteroscopy and then exploratory robotic laparoscopy. Gabriella presents  for her preoperative exam at [facility].  Her gynecologic problem:  Chronic and slightly moderately worsening as regards to dysmenorrhea and pain with defecation during menses.   Her condition has been present for several years and slowly worsening.  Ultrasonography has revealed persistent increased thickening of the endometrium - 9 mm-on 2022.  She has a past history of endometriosis on the sigmoid colon was noted by laparoscopy.  Her last menstrual period was 2022 and is continuing today as noted by her history    Past History:  Past Medical History:   Diagnosis Date    Anemia     Anxiety     Breakthrough bleeding on birth control pills 2015    Cancer     CHF (congestive heart failure)     Diabetes mellitus, type 2     Endometriosis of pelvic peritoneum 2020    cul-de-sac and sigmoid colon    Hypertension     Irritable bowel syndrome Chronic    Increased pain with bowel movements with menses      Past Surgical History:   Procedure Laterality Date     SECTION      D&C/Hysteroscopy with robotic and operative Laparoscopy  2020    DILATION AND CURETTAGE OF UTERUS  2020      Family History   Problem Relation Age of Onset    Prostate cancer Maternal Grandfather     Hypertension Other     Liver cancer Other     Stomach cancer Other     Diabetes Other     Breast cancer Other     Heart disease  "Mother     Diabetes Mother         Allergy;  Review of patient's allergies indicates:  No Known Allergies    Review of Systems - Negative except GI and gyn complaint     Physical Exam:    /88   Pulse 78   Temp 98 °F (36.7 °C)   Resp 16   Ht 5' 3" (1.6 m)   Wt 99.3 kg (219 lb)   LMP 01/29/2022 Comment: STILL ON CYCLE  BMI 38.79 kg/m²     General Appearance: healthy, alert, no distress, smiling    Psych: normal mood, behavior, speech, dress, motor activity, and thought processes    Neuro: alert, oriented x3  speech normal in context and clarity  memory intact grossly  reflexes: full and symmetric    HEENT: Head: Normocephalic, no lesions, without obvious abnormality.  Eye: Normal external eye, conjunctiva, lids cornea, PASQUALE.  Ears: Normal TM's bilaterally. Normal auditory canals and external ears except for some minimal wax in each external auditory canal. Non-tender.  Pharynx: Dental Hygiene adequate. Normal buccal mucosa. Normal pharynx.  Neck / Thyroid: Supple, no masses, nodes, nodules or enlargement.    Chest:            Breast:  breasts appear normal, no suspicious masses, no skin or nipple changes or axillary nodes.             Lungs: clear to auscultation bilaterally            Heart:   regular rate and rhythm, S1, S2 normal, no murmur, click, rub or gallop    Abdomen:soft, non-tender, without masses or organomegaly, normal bowel sounds, without guarding, without rebound and Slight tenderness to deep palpation suprapubically in the midline  Pelvic:            Vulva: normal appearing vulva with no masses, tenderness or lesions, clinically shaved            Vagina:  Bloody menstrual discharge           Cervix: normal appearing cervix without discharge or lesions, multiparous os, bleeding from the cervix due to menses           Uterus::  Anteflexed uterus; tenderness near the left uterosacral ligament reproducing her symptomatology of dysmenorrhea and pain with defecation with no masses appreciated; " uterus slightly enlarged consistent with her parity and slightly tender uterus           Adenexa(e) nontender right adnexa; slightly tender left adnexa with no mass           Rectal: normal rectal, no masses, examination deep reveals no masses but tender on left uterosacral ligament reproduces her symptomatology.     Extremity:normal; no peripheral edema on the lower extremity; DVT is negative bilaterally; no CVA tenderness on back exam; good back alignment    Skin:Skin color, texture, turgor normal. No rashes or lesions      .  Assessment:     Sigmoid colon endometriosis?  Thickened endometrium-hyperplasia?  Increased dysmenorrhea  Problem List Items Addressed This Visit        Renal/    Dysmenorrhea - Primary      Other Visit Diagnoses     Hx of endometriosis        Past laparoscopy revealed lesion on sigmoid colon near the rectosigmoid reflection    Thickened endometrium        Persistent on ultrasounds; 9 mm on 01/27/2021 to    Irritable bowel syndrome, unspecified type        Increased pain a defecation at menses - past history of endometriosis near on the sigmoid does noted by prior laparoscopy          Plan:  D&C/hysteroscopy with exam under anesthesia followed by operative laparoscopy with initial left upper quadrant port due to prior laparoscopy  Consent for this Procedure(s): Plan: D&C/Hysteroscopy    Discussion of Surgical Consent for the procedure of D&C/Hysteroscopy:    This is a minimally invasive outpatient procedure.    Anesthesia: General Anesthesia  After the onset of general anesthesia, the position will be for a pelvic exam.position with surgical skin prep. After a pelvic exam, then a speculum (weighted) will be inserted. The cervix will be visualized and grasped with a tenaculum to hold the cervix for the procedure. Carefully using metal dialator, the cervical opening will be enlarged. Next a  scope will be inserted to view the uterine cavity. Following this phase of surgery, a scraping of the  lining of the uterus (the endometrium) will be performed. This process is called a curettage. After completion, the grasper on the cervix will be removed and a rectal exam will check for any additional findings. The weighted speculum will be removed. After completion of surgery, the position will be flat on your back and then upon awakening, there will be transfer to the PACU (post operative recovery) and then the outpatient room (that you started from on upon admission). T    Risks:  The minimal risks include rare chance for a vaginal,or uterine infection, deeper tissue infection or abscess ( very rare) or small hematoma or bleeding due to puncture of the uterus and tissue around the uterus - very rare. The puncture of the uterus is occurring on average (national) once every 300 - 600 cases. Very rarely such a complication could result in an emergency laparoscopy or laparotomy correct the complication.A bowel or bladder injury could occur as a result of the puncture or uterine perforation. An emergency hysterectomy could be the fuinal emergency therapy for this adverse complication.There is a small risk for a post operative bladder infection. The lower abdomen may be uncomfortable or slight back ache for several days after surgery. A bloody vaginal discharge may be present for 1 - 2 weeks after surgery.    Gabriella Ulloa has voiced understanding and she does consent. Gabriella Ulloa is aware that this conversation is impossible to include all possible items of risk.    This conversion with Dr. Otis Mccullough and Gabriella Ulloa occurred on 02/01/2022 at 6:07 PM as part of the completion of the pre-operative evaluation in preparation for surgery.      Otis Mccullough MD  Uro-Gynecology  [facility]

## 2022-02-01 NOTE — H&P (VIEW-ONLY)
[facility] - OB/GYN  Obstetrics & Gynecology  History & Physical    Patient Name: Gabriella Ulloa  MRN: [id number]  Admission Date:  Gyn Surgeon: Otis Mccullough     Subjective:     Chief Complaint/Reason for Admission:  Increase painful periods, pain with defecation during menses and thickened endometrium on ultrasounds- most recent 2022 was 9 mm    History of Present Illness:  Gabriella Ulloa 26 y.o. female is scheduled for dilatation curettage with associated hysteroscopy and then exploratory robotic laparoscopy. Gabriella presents  for her preoperative exam at [facility].  Her gynecologic problem:  Chronic and slightly moderately worsening as regards to dysmenorrhea and pain with defecation during menses.   Her condition has been present for several years and slowly worsening.  Ultrasonography has revealed persistent increased thickening of the endometrium - 9 mm-on 2022.  She has a past history of endometriosis on the sigmoid colon was noted by laparoscopy.  Her last menstrual period was 2022 and is continuing today as noted by her history    Past History:  Past Medical History:   Diagnosis Date    Anemia     Anxiety     Breakthrough bleeding on birth control pills 2015    Cancer     CHF (congestive heart failure)     Diabetes mellitus, type 2     Endometriosis of pelvic peritoneum 2020    cul-de-sac and sigmoid colon    Hypertension     Irritable bowel syndrome Chronic    Increased pain with bowel movements with menses      Past Surgical History:   Procedure Laterality Date     SECTION      D&C/Hysteroscopy with robotic and operative Laparoscopy  2020    DILATION AND CURETTAGE OF UTERUS  2020      Family History   Problem Relation Age of Onset    Prostate cancer Maternal Grandfather     Hypertension Other     Liver cancer Other     Stomach cancer Other     Diabetes Other     Breast cancer Other     Heart disease  "Mother     Diabetes Mother         Allergy;  Review of patient's allergies indicates:  No Known Allergies    Review of Systems - Negative except GI and gyn complaint     Physical Exam:    /88   Pulse 78   Temp 98 °F (36.7 °C)   Resp 16   Ht 5' 3" (1.6 m)   Wt 99.3 kg (219 lb)   LMP 01/29/2022 Comment: STILL ON CYCLE  BMI 38.79 kg/m²     General Appearance: healthy, alert, no distress, smiling    Psych: normal mood, behavior, speech, dress, motor activity, and thought processes    Neuro: alert, oriented x3  speech normal in context and clarity  memory intact grossly  reflexes: full and symmetric    HEENT: Head: Normocephalic, no lesions, without obvious abnormality.  Eye: Normal external eye, conjunctiva, lids cornea, PASQUALE.  Ears: Normal TM's bilaterally. Normal auditory canals and external ears except for some minimal wax in each external auditory canal. Non-tender.  Pharynx: Dental Hygiene adequate. Normal buccal mucosa. Normal pharynx.  Neck / Thyroid: Supple, no masses, nodes, nodules or enlargement.    Chest:            Breast:  breasts appear normal, no suspicious masses, no skin or nipple changes or axillary nodes.             Lungs: clear to auscultation bilaterally            Heart:   regular rate and rhythm, S1, S2 normal, no murmur, click, rub or gallop    Abdomen:soft, non-tender, without masses or organomegaly, normal bowel sounds, without guarding, without rebound and Slight tenderness to deep palpation suprapubically in the midline  Pelvic:            Vulva: normal appearing vulva with no masses, tenderness or lesions, clinically shaved            Vagina:  Bloody menstrual discharge           Cervix: normal appearing cervix without discharge or lesions, multiparous os, bleeding from the cervix due to menses           Uterus::  Anteflexed uterus; tenderness near the left uterosacral ligament reproducing her symptomatology of dysmenorrhea and pain with defecation with no masses appreciated; " uterus slightly enlarged consistent with her parity and slightly tender uterus           Adenexa(e) nontender right adnexa; slightly tender left adnexa with no mass           Rectal: normal rectal, no masses, examination deep reveals no masses but tender on left uterosacral ligament reproduces her symptomatology.     Extremity:normal; no peripheral edema on the lower extremity; DVT is negative bilaterally; no CVA tenderness on back exam; good back alignment    Skin:Skin color, texture, turgor normal. No rashes or lesions      .  Assessment:     Sigmoid colon endometriosis?  Thickened endometrium-hyperplasia?  Increased dysmenorrhea  Problem List Items Addressed This Visit        Renal/    Dysmenorrhea - Primary      Other Visit Diagnoses     Hx of endometriosis        Past laparoscopy revealed lesion on sigmoid colon near the rectosigmoid reflection    Thickened endometrium        Persistent on ultrasounds; 9 mm on 01/27/2021 to    Irritable bowel syndrome, unspecified type        Increased pain a defecation at menses - past history of endometriosis near on the sigmoid does noted by prior laparoscopy          Plan:  D&C/hysteroscopy with exam under anesthesia followed by operative laparoscopy with initial left upper quadrant port due to prior laparoscopy  Consent for this Procedure(s): Plan: D&C/Hysteroscopy    Discussion of Surgical Consent for the procedure of D&C/Hysteroscopy:    This is a minimally invasive outpatient procedure.    Anesthesia: General Anesthesia  After the onset of general anesthesia, the position will be for a pelvic exam.position with surgical skin prep. After a pelvic exam, then a speculum (weighted) will be inserted. The cervix will be visualized and grasped with a tenaculum to hold the cervix for the procedure. Carefully using metal dialator, the cervical opening will be enlarged. Next a  scope will be inserted to view the uterine cavity. Following this phase of surgery, a scraping of the  lining of the uterus (the endometrium) will be performed. This process is called a curettage. After completion, the grasper on the cervix will be removed and a rectal exam will check for any additional findings. The weighted speculum will be removed. After completion of surgery, the position will be flat on your back and then upon awakening, there will be transfer to the PACU (post operative recovery) and then the outpatient room (that you started from on upon admission). T    Risks:  The minimal risks include rare chance for a vaginal,or uterine infection, deeper tissue infection or abscess ( very rare) or small hematoma or bleeding due to puncture of the uterus and tissue around the uterus - very rare. The puncture of the uterus is occurring on average (national) once every 300 - 600 cases. Very rarely such a complication could result in an emergency laparoscopy or laparotomy correct the complication.A bowel or bladder injury could occur as a result of the puncture or uterine perforation. An emergency hysterectomy could be the fuinal emergency therapy for this adverse complication.There is a small risk for a post operative bladder infection. The lower abdomen may be uncomfortable or slight back ache for several days after surgery. A bloody vaginal discharge may be present for 1 - 2 weeks after surgery.    Gabriella Ulloa has voiced understanding and she does consent. Gabriella Ulloa is aware that this conversation is impossible to include all possible items of risk.    This conversion with Dr. Otis Mccullough and Gabriella Ulloa occurred on 02/01/2022 at 6:07 PM as part of the completion of the pre-operative evaluation in preparation for surgery.      Otis Mccullough MD  Uro-Gynecology  [facility] - OB/GYN

## 2022-02-02 NOTE — PATIENT INSTRUCTIONS
Dr. Otis Mccullough thanks you for this pre-operative visit at [facility].    Please follow the instructions before the planned surgery:  Examination Anesthesia common D&C/hysteroscopy and exploratory robotic laparoscopy with port entry left upper quadrant due to prior laparoscopy    1) Please go the [facility] as arranged by Dr. Otis Mccullough's staff for there following:                                   A) Preoperative scheduled labs                                   B) Additional studies such as chest x-ray / EKG                                       that have been scheduled at [facility] admission office.                                     The location is first floor of the Outpatient building (part of the parking structure).      2) Please:             No meal or fluids to eat/drink after midnight of the night before the planned surgery.                                Do not eat breakfast (if hungry) or drink any fluids (if thirsty) on the the day of surgery!    3) Please:             DO NOT use your normal home medications the day of surgery. (You may bring the medications with you to the hospital.)    4) Please:             DO NOT  bring valuables such as rings, jewelry or valuable personal items with you to the hospital.    5) Please:            Arrive at the [facility] at 5:30 AM unless advised to arrive at a latter hour.    6) If Dr. Otis Mccullough has requested the use of any additional medications or actions he will advise you at this time.    7) If your plans change before surgery please call the office or the hospital as soon as possible so other surgery schedules can be adjusted.    Thank You    Dr. Otis Mccullough    02/01/2022 6:01 PM

## 2022-02-03 ENCOUNTER — CLINICAL SUPPORT (OUTPATIENT)
Dept: FAMILY MEDICINE | Facility: CLINIC | Age: 27
End: 2022-02-03
Payer: COMMERCIAL

## 2022-02-03 DIAGNOSIS — N94.6 DYSMENORRHEA: ICD-10-CM

## 2022-02-03 DIAGNOSIS — K58.9 IRRITABLE BOWEL SYNDROME, UNSPECIFIED TYPE: ICD-10-CM

## 2022-02-03 DIAGNOSIS — Z87.42 HX OF ENDOMETRIOSIS: ICD-10-CM

## 2022-02-03 DIAGNOSIS — R93.89 THICKENED ENDOMETRIUM: ICD-10-CM

## 2022-02-03 DIAGNOSIS — Z01.818 PRE-OP TESTING: ICD-10-CM

## 2022-02-03 LAB
ALBUMIN SERPL BCP-MCNC: 3.7 G/DL (ref 3.5–5)
ALBUMIN/GLOB SERPL: 0.9 {RATIO}
ALP SERPL-CCNC: 76 U/L (ref 37–98)
ALT SERPL W P-5'-P-CCNC: 19 U/L (ref 13–56)
ANION GAP SERPL CALCULATED.3IONS-SCNC: 8 MMOL/L (ref 7–16)
AST SERPL W P-5'-P-CCNC: 14 U/L (ref 15–37)
BACTERIA #/AREA URNS HPF: ABNORMAL /HPF
BASOPHILS # BLD AUTO: 0.04 K/UL (ref 0–0.2)
BASOPHILS NFR BLD AUTO: 0.7 % (ref 0–1)
BILIRUB SERPL-MCNC: 0.4 MG/DL (ref 0–1.2)
BILIRUB UR QL STRIP: NEGATIVE
BUN SERPL-MCNC: 12 MG/DL (ref 7–18)
BUN/CREAT SERPL: 17 (ref 6–20)
CALCIUM SERPL-MCNC: 9.1 MG/DL (ref 8.5–10.1)
CHLORIDE SERPL-SCNC: 106 MMOL/L (ref 98–107)
CLARITY UR: CLEAR
CO2 SERPL-SCNC: 26 MMOL/L (ref 21–32)
COLOR UR: YELLOW
CREAT SERPL-MCNC: 0.71 MG/DL (ref 0.55–1.02)
DIFFERENTIAL METHOD BLD: ABNORMAL
EOSINOPHIL # BLD AUTO: 0.2 K/UL (ref 0–0.5)
EOSINOPHIL NFR BLD AUTO: 3.4 % (ref 1–4)
ERYTHROCYTE [DISTWIDTH] IN BLOOD BY AUTOMATED COUNT: 17.7 % (ref 11.5–14.5)
GLOBULIN SER-MCNC: 4 G/DL (ref 2–4)
GLUCOSE SERPL-MCNC: 84 MG/DL (ref 74–106)
GLUCOSE UR STRIP-MCNC: NEGATIVE MG/DL
HCG SERPL-ACNC: <1 MIU/ML
HCT VFR BLD AUTO: 37.2 % (ref 38–47)
HGB BLD-MCNC: 11.5 G/DL (ref 12–16)
IMM GRANULOCYTES # BLD AUTO: 0.01 K/UL (ref 0–0.04)
IMM GRANULOCYTES NFR BLD: 0.2 % (ref 0–0.4)
KETONES UR STRIP-SCNC: NEGATIVE MG/DL
LEUKOCYTE ESTERASE UR QL STRIP: NEGATIVE
LYMPHOCYTES # BLD AUTO: 3.28 K/UL (ref 1–4.8)
LYMPHOCYTES NFR BLD AUTO: 56 % (ref 27–41)
MCH RBC QN AUTO: 24.6 PG (ref 27–31)
MCHC RBC AUTO-ENTMCNC: 30.9 G/DL (ref 32–36)
MCV RBC AUTO: 79.7 FL (ref 80–96)
MONOCYTES # BLD AUTO: 0.49 K/UL (ref 0–0.8)
MONOCYTES NFR BLD AUTO: 8.4 % (ref 2–6)
MPC BLD CALC-MCNC: 10.1 FL (ref 9.4–12.4)
NEUTROPHILS # BLD AUTO: 1.84 K/UL (ref 1.8–7.7)
NEUTROPHILS NFR BLD AUTO: 31.3 % (ref 53–65)
NITRITE UR QL STRIP: NEGATIVE
NRBC # BLD AUTO: 0 X10E3/UL
NRBC, AUTO (.00): 0 %
PH UR STRIP: 6.5 PH UNITS
PLATELET # BLD AUTO: 405 K/UL (ref 150–400)
POTASSIUM SERPL-SCNC: 4 MMOL/L (ref 3.5–5.1)
PROT SERPL-MCNC: 7.7 G/DL (ref 6.4–8.2)
PROT UR QL STRIP: NEGATIVE
RBC # BLD AUTO: 4.67 M/UL (ref 4.2–5.4)
RBC # UR STRIP: ABNORMAL /UL
RBC #/AREA URNS HPF: ABNORMAL /HPF
SODIUM SERPL-SCNC: 136 MMOL/L (ref 136–145)
SP GR UR STRIP: 1.02
SQUAMOUS #/AREA URNS LPF: ABNORMAL /LPF
UROBILINOGEN UR STRIP-ACNC: 0.2 MG/DL
WBC # BLD AUTO: 5.86 K/UL (ref 4.5–11)
WBC #/AREA URNS HPF: ABNORMAL /HPF

## 2022-02-03 PROCEDURE — 87635 SARS-COV-2 COVID-19 AMP PRB: CPT | Mod: ,,, | Performed by: CLINICAL MEDICAL LABORATORY

## 2022-02-03 PROCEDURE — 80053 COMPREHENSIVE METABOLIC PANEL: ICD-10-PCS | Mod: ,,, | Performed by: CLINICAL MEDICAL LABORATORY

## 2022-02-03 PROCEDURE — 80053 COMPREHEN METABOLIC PANEL: CPT | Mod: ,,, | Performed by: CLINICAL MEDICAL LABORATORY

## 2022-02-03 PROCEDURE — 85025 COMPLETE CBC W/AUTO DIFF WBC: CPT | Mod: ,,, | Performed by: CLINICAL MEDICAL LABORATORY

## 2022-02-03 PROCEDURE — 84702 CHORIONIC GONADOTROPIN TEST: CPT | Mod: ,,, | Performed by: CLINICAL MEDICAL LABORATORY

## 2022-02-03 PROCEDURE — 87635 SARS-COV-2 (COVID-19) QUALITATIVE PCR: ICD-10-PCS | Mod: ,,, | Performed by: CLINICAL MEDICAL LABORATORY

## 2022-02-03 PROCEDURE — 81001 URINALYSIS AUTO W/SCOPE: CPT | Mod: ,,, | Performed by: CLINICAL MEDICAL LABORATORY

## 2022-02-03 PROCEDURE — 81001 URINALYSIS: ICD-10-PCS | Mod: ,,, | Performed by: CLINICAL MEDICAL LABORATORY

## 2022-02-03 PROCEDURE — 85025 CBC WITH DIFFERENTIAL: ICD-10-PCS | Mod: ,,, | Performed by: CLINICAL MEDICAL LABORATORY

## 2022-02-03 PROCEDURE — 84702 HCG, TOTAL, QUANTITATIVE: ICD-10-PCS | Mod: ,,, | Performed by: CLINICAL MEDICAL LABORATORY

## 2022-02-04 LAB — SARS-COV-2 RNA RESP QL NAA+PROBE: NEGATIVE

## 2022-02-08 ENCOUNTER — ANESTHESIA EVENT (OUTPATIENT)
Dept: SURGERY | Facility: HOSPITAL | Age: 27
End: 2022-02-08
Payer: COMMERCIAL

## 2022-02-08 ENCOUNTER — ANESTHESIA (OUTPATIENT)
Dept: SURGERY | Facility: HOSPITAL | Age: 27
End: 2022-02-08
Payer: COMMERCIAL

## 2022-02-08 ENCOUNTER — HOSPITAL ENCOUNTER (OUTPATIENT)
Facility: HOSPITAL | Age: 27
Discharge: HOME | End: 2022-02-08
Attending: OBSTETRICS & GYNECOLOGY | Admitting: OBSTETRICS & GYNECOLOGY
Payer: COMMERCIAL

## 2022-02-08 VITALS
OXYGEN SATURATION: 100 % | SYSTOLIC BLOOD PRESSURE: 111 MMHG | TEMPERATURE: 97 F | HEIGHT: 63 IN | HEART RATE: 99 BPM | BODY MASS INDEX: 38.8 KG/M2 | RESPIRATION RATE: 16 BRPM | DIASTOLIC BLOOD PRESSURE: 56 MMHG | WEIGHT: 219 LBS

## 2022-02-08 DIAGNOSIS — N94.6 DYSMENORRHEA: ICD-10-CM

## 2022-02-08 DIAGNOSIS — N80.30 ENDOMETRIOSIS OF PELVIC PERITONEUM: ICD-10-CM

## 2022-02-08 DIAGNOSIS — R93.89 THICKENED ENDOMETRIUM: ICD-10-CM

## 2022-02-08 DIAGNOSIS — K58.9 IRRITABLE BOWEL SYNDROME, UNSPECIFIED TYPE: ICD-10-CM

## 2022-02-08 DIAGNOSIS — Z87.42 HX OF ENDOMETRIOSIS: ICD-10-CM

## 2022-02-08 DIAGNOSIS — I10 HYPERTENSION: ICD-10-CM

## 2022-02-08 PROCEDURE — 71000016 HC POSTOP RECOV ADDL HR: Performed by: OBSTETRICS & GYNECOLOGY

## 2022-02-08 PROCEDURE — 71000033 HC RECOVERY, INTIAL HOUR: Performed by: OBSTETRICS & GYNECOLOGY

## 2022-02-08 PROCEDURE — 27000260 *HC AIRWAY ORAL: Performed by: ANESTHESIOLOGY

## 2022-02-08 PROCEDURE — 93010 ELECTROCARDIOGRAM REPORT: CPT | Mod: ,,, | Performed by: HOSPITALIST

## 2022-02-08 PROCEDURE — 93005 ELECTROCARDIOGRAM TRACING: CPT

## 2022-02-08 PROCEDURE — 36000709 HC OR TIME LEV III EA ADD 15 MIN: Performed by: OBSTETRICS & GYNECOLOGY

## 2022-02-08 PROCEDURE — 27000716 HC OXISENSOR PROBE, ANY SIZE: Performed by: ANESTHESIOLOGY

## 2022-02-08 PROCEDURE — 27000510 HC BLANKET BAIR HUGGER ANY SIZE: Performed by: ANESTHESIOLOGY

## 2022-02-08 PROCEDURE — D9220A PRA ANESTHESIA: Mod: ,,, | Performed by: NURSE ANESTHETIST, CERTIFIED REGISTERED

## 2022-02-08 PROCEDURE — 27000689 HC BLADE LARYNGOSCOPE ANY SIZE: Performed by: ANESTHESIOLOGY

## 2022-02-08 PROCEDURE — 88342 IMHCHEM/IMCYTCHM 1ST ANTB: CPT | Mod: 26,,, | Performed by: PATHOLOGY

## 2022-02-08 PROCEDURE — 27201423 OPTIME MED/SURG SUP & DEVICES STERILE SUPPLY: Performed by: OBSTETRICS & GYNECOLOGY

## 2022-02-08 PROCEDURE — 49321 LAPAROSCOPY BIOPSY: CPT | Mod: ,,, | Performed by: OBSTETRICS & GYNECOLOGY

## 2022-02-08 PROCEDURE — 27000165 HC TUBE, ETT CUFFED: Performed by: ANESTHESIOLOGY

## 2022-02-08 PROCEDURE — 63600175 PHARM REV CODE 636 W HCPCS: Performed by: OBSTETRICS & GYNECOLOGY

## 2022-02-08 PROCEDURE — 25000003 PHARM REV CODE 250: Performed by: NURSE ANESTHETIST, CERTIFIED REGISTERED

## 2022-02-08 PROCEDURE — 36000708 HC OR TIME LEV III 1ST 15 MIN: Performed by: OBSTETRICS & GYNECOLOGY

## 2022-02-08 PROCEDURE — D9220A PRA ANESTHESIA: Mod: ,,, | Performed by: ANESTHESIOLOGY

## 2022-02-08 PROCEDURE — 71000015 HC POSTOP RECOV 1ST HR: Performed by: OBSTETRICS & GYNECOLOGY

## 2022-02-08 PROCEDURE — 37000009 HC ANESTHESIA EA ADD 15 MINS: Performed by: OBSTETRICS & GYNECOLOGY

## 2022-02-08 PROCEDURE — D9220A PRA ANESTHESIA: ICD-10-PCS | Mod: ,,, | Performed by: NURSE ANESTHETIST, CERTIFIED REGISTERED

## 2022-02-08 PROCEDURE — 63600175 PHARM REV CODE 636 W HCPCS: Performed by: ANESTHESIOLOGY

## 2022-02-08 PROCEDURE — 88305 TISSUE EXAM BY PATHOLOGIST: CPT | Mod: SUR | Performed by: OBSTETRICS & GYNECOLOGY

## 2022-02-08 PROCEDURE — 37000008 HC ANESTHESIA 1ST 15 MINUTES: Performed by: OBSTETRICS & GYNECOLOGY

## 2022-02-08 PROCEDURE — 93010 EKG 12-LEAD: ICD-10-PCS | Mod: ,,, | Performed by: HOSPITALIST

## 2022-02-08 PROCEDURE — 58558 PR HYSTEROSCOPY,W/ENDO BX: ICD-10-PCS | Mod: 51,,, | Performed by: OBSTETRICS & GYNECOLOGY

## 2022-02-08 PROCEDURE — 63600175 PHARM REV CODE 636 W HCPCS: Performed by: NURSE ANESTHETIST, CERTIFIED REGISTERED

## 2022-02-08 PROCEDURE — 58558 HYSTEROSCOPY BIOPSY: CPT | Mod: 51,,, | Performed by: OBSTETRICS & GYNECOLOGY

## 2022-02-08 PROCEDURE — D9220A PRA ANESTHESIA: ICD-10-PCS | Mod: ,,, | Performed by: ANESTHESIOLOGY

## 2022-02-08 PROCEDURE — 25000003 PHARM REV CODE 250: Performed by: OBSTETRICS & GYNECOLOGY

## 2022-02-08 PROCEDURE — 88342 SURGICAL PATHOLOGY: ICD-10-PCS | Mod: 26,,, | Performed by: PATHOLOGY

## 2022-02-08 PROCEDURE — 27000655: Performed by: ANESTHESIOLOGY

## 2022-02-08 PROCEDURE — 88305 TISSUE EXAM BY PATHOLOGIST: CPT | Mod: 26,,, | Performed by: PATHOLOGY

## 2022-02-08 PROCEDURE — 88305 SURGICAL PATHOLOGY: ICD-10-PCS | Mod: 26,,, | Performed by: PATHOLOGY

## 2022-02-08 PROCEDURE — 27000509 HC TUBE SALEM SUMP ANY SIZE: Performed by: ANESTHESIOLOGY

## 2022-02-08 PROCEDURE — 49321 PR LAP,DX SURGICAL ABD W/BIOPSY: ICD-10-PCS | Mod: ,,, | Performed by: OBSTETRICS & GYNECOLOGY

## 2022-02-08 RX ORDER — CEFAZOLIN SODIUM 2 G/50ML
2 SOLUTION INTRAVENOUS
Status: DISCONTINUED | OUTPATIENT
Start: 2022-02-08 | End: 2022-02-08 | Stop reason: HOSPADM

## 2022-02-08 RX ORDER — ONDANSETRON 2 MG/ML
4 INJECTION INTRAMUSCULAR; INTRAVENOUS DAILY PRN
Status: DISCONTINUED | OUTPATIENT
Start: 2022-02-08 | End: 2022-02-08 | Stop reason: HOSPADM

## 2022-02-08 RX ORDER — HYDROMORPHONE HYDROCHLORIDE 2 MG/ML
0.5 INJECTION, SOLUTION INTRAMUSCULAR; INTRAVENOUS; SUBCUTANEOUS EVERY 5 MIN PRN
Status: DISCONTINUED | OUTPATIENT
Start: 2022-02-08 | End: 2022-02-08 | Stop reason: HOSPADM

## 2022-02-08 RX ORDER — OXYCODONE AND ACETAMINOPHEN 10; 325 MG/1; MG/1
1 TABLET ORAL EVERY 4 HOURS PRN
Status: DISCONTINUED | OUTPATIENT
Start: 2022-02-08 | End: 2022-02-08 | Stop reason: HOSPADM

## 2022-02-08 RX ORDER — LIDOCAINE HYDROCHLORIDE 20 MG/ML
INJECTION, SOLUTION EPIDURAL; INFILTRATION; INTRACAUDAL; PERINEURAL
Status: DISCONTINUED | OUTPATIENT
Start: 2022-02-08 | End: 2022-02-08

## 2022-02-08 RX ORDER — SODIUM CHLORIDE, SODIUM LACTATE, POTASSIUM CHLORIDE, CALCIUM CHLORIDE 600; 310; 30; 20 MG/100ML; MG/100ML; MG/100ML; MG/100ML
125 INJECTION, SOLUTION INTRAVENOUS CONTINUOUS
Status: DISCONTINUED | OUTPATIENT
Start: 2022-02-08 | End: 2022-02-08 | Stop reason: HOSPADM

## 2022-02-08 RX ORDER — ONDANSETRON 2 MG/ML
4 INJECTION INTRAMUSCULAR; INTRAVENOUS EVERY 12 HOURS PRN
Status: DISCONTINUED | OUTPATIENT
Start: 2022-02-08 | End: 2022-02-08 | Stop reason: HOSPADM

## 2022-02-08 RX ORDER — CEFAZOLIN SODIUM 1 G/3ML
INJECTION, POWDER, FOR SOLUTION INTRAMUSCULAR; INTRAVENOUS
Status: DISCONTINUED | OUTPATIENT
Start: 2022-02-08 | End: 2022-02-08

## 2022-02-08 RX ORDER — MORPHINE SULFATE 10 MG/ML
4 INJECTION INTRAMUSCULAR; INTRAVENOUS; SUBCUTANEOUS EVERY 5 MIN PRN
Status: DISCONTINUED | OUTPATIENT
Start: 2022-02-08 | End: 2022-02-08 | Stop reason: HOSPADM

## 2022-02-08 RX ORDER — MEPERIDINE HYDROCHLORIDE 25 MG/ML
25 INJECTION INTRAMUSCULAR; INTRAVENOUS; SUBCUTANEOUS EVERY 10 MIN PRN
Status: DISCONTINUED | OUTPATIENT
Start: 2022-02-08 | End: 2022-02-08 | Stop reason: HOSPADM

## 2022-02-08 RX ORDER — TRAMADOL HYDROCHLORIDE 50 MG/1
50 TABLET ORAL EVERY 6 HOURS PRN
Qty: 10 TABLET | Refills: 0 | Status: SHIPPED | OUTPATIENT
Start: 2022-02-08 | End: 2022-08-02

## 2022-02-08 RX ORDER — MUPIROCIN 20 MG/G
OINTMENT TOPICAL
Status: DISCONTINUED | OUTPATIENT
Start: 2022-02-08 | End: 2022-02-08 | Stop reason: HOSPADM

## 2022-02-08 RX ORDER — BUPIVACAINE HYDROCHLORIDE 2.5 MG/ML
INJECTION, SOLUTION EPIDURAL; INFILTRATION; INTRACAUDAL
Status: DISCONTINUED | OUTPATIENT
Start: 2022-02-08 | End: 2022-02-08 | Stop reason: HOSPADM

## 2022-02-08 RX ORDER — DIPHENHYDRAMINE HYDROCHLORIDE 50 MG/ML
25 INJECTION INTRAMUSCULAR; INTRAVENOUS EVERY 4 HOURS PRN
Status: DISCONTINUED | OUTPATIENT
Start: 2022-02-08 | End: 2022-02-08 | Stop reason: HOSPADM

## 2022-02-08 RX ORDER — DEXAMETHASONE SODIUM PHOSPHATE 4 MG/ML
INJECTION, SOLUTION INTRA-ARTICULAR; INTRALESIONAL; INTRAMUSCULAR; INTRAVENOUS; SOFT TISSUE
Status: DISCONTINUED | OUTPATIENT
Start: 2022-02-08 | End: 2022-02-08

## 2022-02-08 RX ORDER — ROCURONIUM BROMIDE 10 MG/ML
INJECTION, SOLUTION INTRAVENOUS
Status: DISCONTINUED | OUTPATIENT
Start: 2022-02-08 | End: 2022-02-08

## 2022-02-08 RX ORDER — MORPHINE SULFATE 8 MG/ML
INJECTION INTRAMUSCULAR; INTRAVENOUS; SUBCUTANEOUS
Status: DISCONTINUED | OUTPATIENT
Start: 2022-02-08 | End: 2022-02-08

## 2022-02-08 RX ORDER — SODIUM CHLORIDE 9 MG/ML
INJECTION, SOLUTION INTRAVENOUS CONTINUOUS
Status: DISCONTINUED | OUTPATIENT
Start: 2022-02-08 | End: 2022-02-08 | Stop reason: HOSPADM

## 2022-02-08 RX ORDER — SODIUM CHLORIDE, SODIUM LACTATE, POTASSIUM CHLORIDE, CALCIUM CHLORIDE 600; 310; 30; 20 MG/100ML; MG/100ML; MG/100ML; MG/100ML
INJECTION, SOLUTION INTRAVENOUS CONTINUOUS
Status: DISCONTINUED | OUTPATIENT
Start: 2022-02-08 | End: 2022-02-08 | Stop reason: HOSPADM

## 2022-02-08 RX ORDER — LIDOCAINE HYDROCHLORIDE 10 MG/ML
1 INJECTION INFILTRATION; PERINEURAL ONCE
Status: DISCONTINUED | OUTPATIENT
Start: 2022-02-08 | End: 2022-02-08 | Stop reason: HOSPADM

## 2022-02-08 RX ORDER — FENTANYL CITRATE 50 UG/ML
INJECTION, SOLUTION INTRAMUSCULAR; INTRAVENOUS
Status: DISCONTINUED | OUTPATIENT
Start: 2022-02-08 | End: 2022-02-08

## 2022-02-08 RX ORDER — MIDAZOLAM HYDROCHLORIDE 1 MG/ML
INJECTION INTRAMUSCULAR; INTRAVENOUS
Status: DISCONTINUED | OUTPATIENT
Start: 2022-02-08 | End: 2022-02-08

## 2022-02-08 RX ORDER — LIDOCAINE HYDROCHLORIDE 10 MG/ML
1 INJECTION, SOLUTION EPIDURAL; INFILTRATION; INTRACAUDAL; PERINEURAL ONCE
Status: CANCELLED | OUTPATIENT
Start: 2022-02-08 | End: 2022-02-08

## 2022-02-08 RX ORDER — DIPHENHYDRAMINE HYDROCHLORIDE 50 MG/ML
25 INJECTION INTRAMUSCULAR; INTRAVENOUS EVERY 6 HOURS PRN
Status: DISCONTINUED | OUTPATIENT
Start: 2022-02-08 | End: 2022-02-08 | Stop reason: HOSPADM

## 2022-02-08 RX ORDER — OXYCODONE HYDROCHLORIDE 5 MG/1
5 TABLET ORAL
Status: DISCONTINUED | OUTPATIENT
Start: 2022-02-08 | End: 2022-02-08 | Stop reason: HOSPADM

## 2022-02-08 RX ORDER — KETOROLAC TROMETHAMINE 30 MG/ML
30 INJECTION, SOLUTION INTRAMUSCULAR; INTRAVENOUS EVERY 6 HOURS PRN
Status: DISCONTINUED | OUTPATIENT
Start: 2022-02-08 | End: 2022-02-08 | Stop reason: HOSPADM

## 2022-02-08 RX ORDER — SODIUM CHLORIDE, SODIUM LACTATE, POTASSIUM CHLORIDE, CALCIUM CHLORIDE 600; 310; 30; 20 MG/100ML; MG/100ML; MG/100ML; MG/100ML
INJECTION, SOLUTION INTRAVENOUS CONTINUOUS
Status: CANCELLED | OUTPATIENT
Start: 2022-02-08

## 2022-02-08 RX ORDER — ONDANSETRON 2 MG/ML
INJECTION INTRAMUSCULAR; INTRAVENOUS
Status: DISCONTINUED | OUTPATIENT
Start: 2022-02-08 | End: 2022-02-08

## 2022-02-08 RX ORDER — DICLOFENAC POTASSIUM 50 MG/1
50 TABLET, FILM COATED ORAL 4 TIMES DAILY
Qty: 80 TABLET | Refills: 0 | Status: SHIPPED | OUTPATIENT
Start: 2022-02-08 | End: 2022-02-28

## 2022-02-08 RX ORDER — PROPOFOL 10 MG/ML
VIAL (ML) INTRAVENOUS
Status: DISCONTINUED | OUTPATIENT
Start: 2022-02-08 | End: 2022-02-08

## 2022-02-08 RX ADMIN — ROCURONIUM BROMIDE 30 MG: 10 INJECTION, SOLUTION INTRAVENOUS at 07:02

## 2022-02-08 RX ADMIN — SODIUM CHLORIDE, POTASSIUM CHLORIDE, SODIUM LACTATE AND CALCIUM CHLORIDE: 600; 310; 30; 20 INJECTION, SOLUTION INTRAVENOUS at 11:02

## 2022-02-08 RX ADMIN — MORPHINE SULFATE 4 MG: 8 INJECTION INTRAVENOUS at 08:02

## 2022-02-08 RX ADMIN — ONDANSETRON 8 MG: 2 INJECTION INTRAMUSCULAR; INTRAVENOUS at 07:02

## 2022-02-08 RX ADMIN — LIDOCAINE HYDROCHLORIDE 40 MG: 20 INJECTION, SOLUTION EPIDURAL; INFILTRATION; INTRACAUDAL; PERINEURAL at 07:02

## 2022-02-08 RX ADMIN — ONDANSETRON 4 MG: 2 INJECTION INTRAMUSCULAR; INTRAVENOUS at 11:02

## 2022-02-08 RX ADMIN — PROPOFOL 150 MG: 10 INJECTION, EMULSION INTRAVENOUS at 07:02

## 2022-02-08 RX ADMIN — MIDAZOLAM 2 MG: 1 INJECTION INTRAMUSCULAR; INTRAVENOUS at 07:02

## 2022-02-08 RX ADMIN — SODIUM CHLORIDE, POTASSIUM CHLORIDE, SODIUM LACTATE AND CALCIUM CHLORIDE: 600; 310; 30; 20 INJECTION, SOLUTION INTRAVENOUS at 07:02

## 2022-02-08 RX ADMIN — ROCURONIUM BROMIDE 20 MG: 10 INJECTION, SOLUTION INTRAVENOUS at 07:02

## 2022-02-08 RX ADMIN — SUGAMMADEX 200 MG: 100 INJECTION, SOLUTION INTRAVENOUS at 08:02

## 2022-02-08 RX ADMIN — CEFAZOLIN 2 G: 1 INJECTION, POWDER, FOR SOLUTION INTRAMUSCULAR; INTRAVENOUS; PARENTERAL at 07:02

## 2022-02-08 RX ADMIN — DEXAMETHASONE SODIUM PHOSPHATE 8 MG: 4 INJECTION, SOLUTION INTRA-ARTICULAR; INTRALESIONAL; INTRAMUSCULAR; INTRAVENOUS; SOFT TISSUE at 07:02

## 2022-02-08 RX ADMIN — FENTANYL CITRATE 100 MCG: 50 INJECTION INTRAMUSCULAR; INTRAVENOUS at 07:02

## 2022-02-08 NOTE — ANESTHESIA POSTPROCEDURE EVALUATION
Anesthesia Post Evaluation    Patient: Gabriella Ulloa    Procedure(s) Performed: Procedure(s) (LRB):  BIOPSY, ENDOMETRIUM (N/A)  HYSTEROSCOPY, WITH DILATION AND CURETTAGE OF UTERUS (N/A)  LAPAROSCOPY, DIAGNOSTIC (N/A)    Final Anesthesia Type: general      Patient location during evaluation: PACU  Patient participation: Yes- Able to Participate  Level of consciousness: awake and sedated  Post-procedure vital signs: reviewed and stable  Pain management: adequate  Airway patency: patent    PONV status at discharge: No PONV  Anesthetic complications: no      Cardiovascular status: blood pressure returned to baseline  Respiratory status: unassisted  Hydration status: euvolemic  Follow-up not needed.          Vitals Value Taken Time   /83 02/08/22 1128   Temp 36.3 °C (97.4 °F) 02/08/22 0858   Pulse 89 02/08/22 1142   Resp 16 02/08/22 1100   SpO2 100 % 02/08/22 1142   Vitals shown include unvalidated device data.      Event Time   Out of Recovery 09:26:29         Pain/Geovanna Score: Geovanna Score: 9 (2/8/2022  9:25 AM)

## 2022-02-08 NOTE — DISCHARGE SUMMARY
[facility] - Periop Services  Discharge Note  Short Stay    Procedure(s) (LRB):  BIOPSY, ENDOMETRIUM (N/A)  HYSTEROSCOPY, WITH DILATION AND CURETTAGE OF UTERUS (N/A)  LAPAROSCOPY, DIAGNOSTIC (N/A)    OUTCOME: Patient tolerated treatment/procedure well without complication and is now ready for discharge.    DISPOSITION: Home or Self Care when ambulatory criteria has been completed.    FINAL DIAGNOSIS:  Endometriosis of pelvic peritoneum    FOLLOWUP: In clinic    DISCHARGE INSTRUCTIONS:  No discharge procedures on file.     TIME SPENT ON DISCHARGE: 10 minutes      Post op instructions for discharge from the hospital ([place]) for Gabriella Ulloa    1) Shower for the next 2 weeks or until vaginal bleeding or spotting stops. Keep the surgical incisions dry.    2) Avoid sexual intercourse for 2 weeks weeks    3) Avoid driving a vehicle for 3 days due to effects of anesthesia    4) Resume other activities as tolerated    5) Resume your normal home diet    6) Resume your home medication that you used prior to admission to the hospital    7) Dr. Otis Mccullough may have prescribed new medication(s) -use as directed    Renewal of Cataflam for pelvic pain and also issuing of tramadol for postop significant pain-10 tablets dispensed she was advised not to drive a car will using tramadol.    8) Please keep your scheduled appointment for post operative checkup            Call the hospital outpatient department ([phone]) for any adverse (abnormal) problems or Dr. Otis Mccullough's office.    Adverse problem:    1) persistent nausea or vomiting  2) worsening abdominal or pelvic pains  3) incisional redness, incisional drainage or incision separation or incisional swelling  4) urinary symptoms  5) worsening vaginal bleeding     Best regards Dr. Otis Mccullough

## 2022-02-08 NOTE — BRIEF OP NOTE
[facility] - Periop Services  Gynecology  Immediate Post Operative Note      Patient Name: Gabriella Ulloa  MRN: [id number]  Admission Date: 2/8/2022    Pre op Diagnosis:  Severe dysmenorrhea and menorrhagia; prior history of endometriosis  Post op Diagnosis:  Severe dysmenorrhea and menorrhagia; probable endometrial scattered micro polyps; no atypical vasculature the endometrium; stage II or stage III pelvic endometriosis with significant endometriosis central cul-de-sac left uterosacral ligament as well as pelvic sidewalls-worse on the left and evidence of small endometriosis the left fallopian tube    Procedure:Exam under anesthesia, dilatation curettage with hysteroscopy followed by operative laparoscopy with biopsies    Surgeon: Dr Otis Mccullough    Assistant:  Ambika Lloyd as none physician assistant      Anesthesia:General    Findings:  Pelvic endometriosis involving the central cul-de-sac near the sigmoid colon and extending significantly onto the left with several large endometriomas as well as pelvic sidewall on the left scar vacation in endometrioma beneath the right ovary.  Endometriosis of left fallopian tube was noted as well.  Overall evaluation was either stage II or 3 pelvic endometriosis.  Large lesions removed by biopsy.    Complication(s): none    Condition:good    Estimated Blood loss: Minimal    Specimen:  Endometrial curettings as well as biopsies of endometriosis of the pelvis were sent to pathology for evaluation

## 2022-02-08 NOTE — OP NOTE
[facility] - Periop Services  Gynecology  Operative Report      Patient Name: Gabriella Ulloa  MRN: [id number]  Admission Date: 2/8/2022    Operative report:    Pre op Diagnosis:  Dysmenorrhea, menorrhagia; prior history of endometriosis; thickened endometrium on ultrasound    Post op Diagnosis:  Postop diagnosis the same; stage II or stage III pelvic endometriosis    Procedure: Dilation and Curettage with Hysteroscopy and Laparoscopy with biopsy of endometriomas    Surgeon: Dr Otis Mccullough     None physician assistant:  Ambika Lloyd    Anesthesia: General    Findings:  Endometriosis significant of the left fallopian tube; endometriosis of the cul-de-sac with emphasis more on the left uterosacral ligament as well as pelvic sidewall scarring and endometrioma beneath right ovary; normal appendix; large uterine cavity with some micro polyps    Complication(s): none    Condition:good    Estimated Blood loss: Minimal    Description::     After the onset of general anesthesia, the patient was positioned into dorsal lithotomy position.    The abdomen was prepped with surgical Chloroprep solution. The vulva was clipped of hair and then prepped with a Betadine solution    The patient was draped for surgery.    Pelvic revealed the following:external genitalia normal, rectovaginal septum normal, uterus normal size, shape, and consistency, vagina normal without discharge and Anteflexed uterus; moderate mobility and multiparous appearing cervix with slight prolapse    A Mancuso catheter was placed into the bladder and the bladder was drained throughout the procedures.      A weighted speculum was inserted. The cervix was grasped with a single tooth tenaculum.   A 12 degree lens hysteroscope was inserted into the endocervical canal. Under direct vision with saline infusion, the hysteroscope was passed into the endometrial canal. The exam revealed large endometrial cavity with some scattered micro polyps but no evidence  of atypical vasculature or any large scale polyps or submucous leiomyoma.     .  The hysteroscope was removed. A curette was inserted and a curettage was performed on the endometrial cavity. The hysteroscope was reinserted and there was no evidence of uterine perforation. The curettage appeared adequate. The hysteroscope was removed.    The uterine cavity before and after the procedures sounded to 10 cm.    Next a Humi canula was inserted into the uterine cavity via the endocervix. 3 cc of normal saline was used to inflate the endometrial canal.  The tenaculum was removed.  The patient was placed into a steep Trendelenburg position.     A Verres needle was inserted into the abdomen at this location left upper quadrant through the previous insertion site from prior laparoscopy. A pneumoperitoneum was established. The final volume was 2 liters of carbon dioxide with loss of liver edge dullness by manual percussion. The Verres needle was removed. The planned surgiport(s)  received injection  of 0. Two 5 percent marcaine without Epinephrine. A knife incision was made and a 5 mm [employer] surgiport was connected to the laparoscope was used to create and sustained a penetration into the abdomen. The trochar was removed.    The laparoscopic exam was re-inserted. The exam revealed:  Normal upper abdominal exam; no abdominal adhesions; normal appendix; pelvic examination revealed a mobile anteflexed uterus significant scarring on the pelvic sidewall on the left with active endometriomas that are purplish in color along the left uterosacral ligament as well as pelvic sidewall beneath left ovary and on along the left fallopian tube; small endometrioma beneath the right ovary and significant central cul-de-sac endometriosis; no lesions anteriorly but scar vacation normal from prior  section.    A secondary puncture was made and direct vision through the previous scar on the left lateral abdomen.  This area was  anesthetized 1st with 0.25% Marcaine.    This was a intuitive Surgiport 8 mm insertion under direct vision.    Biopsy forceps was inserted and there was biopsies the visible endometriomas on the left uterosacral ligament, central cul-de-sac as well as pelvic sidewall on the left and right.  There is no biopsy the endometrium was of the fallopian tube.  The left ovary may have had a small endometrioma or evidence of recent corpus luteum.    There was no active bleeding following the biopsies.  There was copious irrigation and then ultimately 200 cc of normal saline with 6 cc of 4% lidocaine was instilled within the pelvis.    Re-evaluate of the appendix was normal  There was no endometriosis of the appendix.  Following completion of the exam, the laparoscope was removed and the pneumoperitoneum.was removed    The incision were closed. The deeper fascia was closed with inverted  0 Vicryl suture. The upper fat / subcutaneus tissue layer was closed with a 4 0 Monocryl ligature. The skin edges were closed with a subcuticular running  of 4 0 Monocryl l. Tissue glue sealed the skin incision (s).    The Humi canula was removed . A vaginal and rectal exam revealed  no new findings. The Mancuso was also removed after the balloon was deflated.    The patient was placed supine; she was awakened and transferred to the PACU.      Operative report created by Otis Mccullough on 02/08/2022 at 8:56 AM .                .

## 2022-02-08 NOTE — PATIENT INSTRUCTIONS
Post op instructions for discharge from the hospital ([place]) for Gabriella Ulloa    1) Shower for the next 2 weeks or until vaginal bleeding or spotting stops. Keep the surgical incisions dry.    2) Avoid sexual intercourse for 2 weeks weeks    3) Avoid driving a vehicle for 3 days due to effects of anesthesia    4) Resume other activities as tolerated    5) Resume your normal home diet    6) Resume your home medication that you used prior to admission to the hospital    7) Dr. Otis Mccullough may have prescribed new medication(s) -use as directed    Renewal of Cataflam for pelvic pain and also issuing of tramadol for postop significant pain-10 tablets dispensed she was advised not to drive a car will using tramadol.    8) Please keep your scheduled appointment for post operative checkup            Call the hospital outpatient department ([phone]) for any adverse (abnormal) problems or Dr. Otis Mccullough's office.    Adverse problem:    1) persistent nausea or vomiting  2) worsening abdominal or pelvic pains  3) incisional redness, incisional drainage or incision separation or incisional swelling  4) urinary symptoms  5) worsening vaginal bleeding     Best regards Dr. Otis Mccullough

## 2022-02-08 NOTE — ANESTHESIA PREPROCEDURE EVALUATION
02/08/2022  Gabriella Ulloa is a 26 y.o., female.    Anesthesia Evaluation    I have reviewed the Patient Summary Reports.    I have reviewed the Nursing Notes. I have reviewed the NPO Status.   I have reviewed the Medications.     Review of Systems  Anesthesia Hx:  No problems with previous Anesthesia    Social:  Non-Smoker, No Alcohol Use    Hematology/Oncology:     Oncology Normal    -- Anemia:   EENT/Dental:EENT/Dental Normal   Cardiovascular:  Cardiovascular Normal     Pulmonary:  Pulmonary Normal    Renal/:  Renal/ Normal     Hepatic/GI:  Hepatic/GI Normal    Musculoskeletal:  Musculoskeletal Normal    Neurological:  Neurology Normal    Endocrine:  Endocrine Normal    Dermatological:  Skin Normal    Psych:  Psychiatric Normal           Physical Exam  General:  Well nourished    Airway/Jaw/Neck:  Airway Findings: Mouth Opening: Normal Mallampati: II     Eyes/Ears/Nose:  Eyes/Ears/Nose Findings:     Chest/Lungs:  Chest/Lungs Findings: Clear to auscultation     Heart/Vascular:  Heart Findings: Rate: Normal  Rhythm: Regular Rhythm        Mental Status:  Mental Status Findings:  Cooperative, Alert and Oriented         Anesthesia Plan  Type of Anesthesia, risks & benefits discussed:  Anesthesia Type:  general    Patient's Preference:   Plan Factors:          Intra-op Monitoring Plan: standard ASA monitors  Intra-op Monitoring Plan Comments:   Post Op Pain Control Plan: per primary service following discharge from PACU and multimodal analgesia  Post Op Pain Control Plan Comments:     Induction:   IV  Beta Blocker:  Patient is not currently on a Beta-Blocker (No further documentation required).       Informed Consent: Patient understands risks and agrees with Anesthesia plan.  Questions answered. Anesthesia consent signed with patient.  ASA Score: 2     Day of Surgery Review of History & Physical: I have  interviewed and examined the patient. I have reviewed the patient's H&P dated:  There are no significant changes.          Ready For Surgery From Anesthesia Perspective.

## 2022-02-08 NOTE — ANESTHESIA PROCEDURE NOTES
Intubation    Date/Time: 2/8/2022 7:33 AM  Performed by: Caryn Shoemaker CRNA  Authorized by: Yan Huerta MD     Intubation:     Induction:  Intravenous    Intubated:  Postinduction    Mask Ventilation:  Easy mask    Attempts:  1    Attempted By:  CRNA    Method of Intubation:  Direct    Blade:  Foley 2    Laryngeal View Grade: Laryngeal Manipulation      Laryngeal View Grade (2nd Attempt): Laryngeal Manipulation      Laryngeal View Grade (3rd Attempt): Laryngeal Manipulation      Difficult Airway Encountered?: No      Complications:  None    Airway Device:  Oral endotracheal tube    Airway Device Size:  7.0    Style/Cuff Inflation:  Cuffed (inflated to minimal occlusive pressure)    Inflation Amount (mL):  6    Tube secured:  22    Placement Verified By:  Capnometry    Findings Post-Intubation:  BS equal bilateral and atraumatic/condition of teeth unchanged

## 2022-02-08 NOTE — TRANSFER OF CARE
"Anesthesia Transfer of Care Note    Patient: Gabriella Ulloa    Procedure(s) Performed: Procedure(s) (LRB):  XI ROBOTIC LAPAROSCOPY,DIAGNOSTIC (N/A)  HYSTEROSCOPY, WITH DILATION AND CURETTAGE OF UTERUS (N/A)    Patient location: GI    Anesthesia Type: MAC and general    Transport from OR: Transported from OR on 6-10 L/min O2 by face mask with adequate spontaneous ventilation    Post pain: adequate analgesia    Post assessment: no apparent anesthetic complications and tolerated procedure well    Post vital signs: stable    Level of consciousness: responds to stimulation and awake    Nausea/Vomiting: no nausea/vomiting    Complications: none    Transfer of care protocol was followed      Last vitals:   Visit Vitals  /67 (BP Location: Right arm, Patient Position: Lying)   Pulse 73   Temp 36.3 °C (97.4 °F) (Oral)   Resp (!) 23   Ht 5' 3" (1.6 m)   Wt 99.3 kg (219 lb)   LMP 01/29/2022   SpO2 100%   Breastfeeding No   BMI 38.79 kg/m²     "

## 2022-02-09 LAB
DHEA SERPL-MCNC: NORMAL
ESTROGEN SERPL-MCNC: NORMAL PG/ML
INSULIN SERPL-ACNC: NORMAL U[IU]/ML
LAB AP GROSS DESCRIPTION: NORMAL
LAB AP LABORATORY NOTES: NORMAL
T3RU NFR SERPL: NORMAL %

## 2022-02-22 ENCOUNTER — OFFICE VISIT (OUTPATIENT)
Dept: OBSTETRICS AND GYNECOLOGY | Facility: CLINIC | Age: 27
End: 2022-02-22
Payer: COMMERCIAL

## 2022-02-22 VITALS
SYSTOLIC BLOOD PRESSURE: 117 MMHG | DIASTOLIC BLOOD PRESSURE: 79 MMHG | RESPIRATION RATE: 16 BRPM | HEIGHT: 63 IN | BODY MASS INDEX: 38.98 KG/M2 | TEMPERATURE: 99 F | HEART RATE: 78 BPM | WEIGHT: 220 LBS

## 2022-02-22 DIAGNOSIS — E66.9 CLASS 2 OBESITY WITH BODY MASS INDEX (BMI) OF 38.0 TO 38.9 IN ADULT, UNSPECIFIED OBESITY TYPE, UNSPECIFIED WHETHER SERIOUS COMORBIDITY PRESENT: ICD-10-CM

## 2022-02-22 DIAGNOSIS — N80.30 ENDOMETRIOSIS OF PELVIC PERITONEUM: Primary | ICD-10-CM

## 2022-02-22 DIAGNOSIS — Z09 FOLLOW-UP EXAMINATION AFTER GYNECOLOGICAL SURGERY: ICD-10-CM

## 2022-02-22 LAB
AMORPH PHOS CRY #/AREA URNS LPF: ABNORMAL /LPF
BACTERIA #/AREA URNS HPF: ABNORMAL /HPF
BASOPHILS # BLD AUTO: 0.06 K/UL (ref 0–0.2)
BASOPHILS NFR BLD AUTO: 0.8 % (ref 0–1)
BILIRUB UR QL STRIP: NEGATIVE
CLARITY UR: ABNORMAL
COLOR UR: YELLOW
DIFFERENTIAL METHOD BLD: ABNORMAL
EOSINOPHIL # BLD AUTO: 0.21 K/UL (ref 0–0.5)
EOSINOPHIL NFR BLD AUTO: 2.9 % (ref 1–4)
ERYTHROCYTE [DISTWIDTH] IN BLOOD BY AUTOMATED COUNT: 17.3 % (ref 11.5–14.5)
GLUCOSE UR STRIP-MCNC: NEGATIVE MG/DL
HCT VFR BLD AUTO: 37.3 % (ref 38–47)
HGB BLD-MCNC: 11.5 G/DL (ref 12–16)
IMM GRANULOCYTES # BLD AUTO: 0.01 K/UL (ref 0–0.04)
IMM GRANULOCYTES NFR BLD: 0.1 % (ref 0–0.4)
KETONES UR STRIP-SCNC: NEGATIVE MG/DL
LEUKOCYTE ESTERASE UR QL STRIP: ABNORMAL
LYMPHOCYTES # BLD AUTO: 2.77 K/UL (ref 1–4.8)
LYMPHOCYTES NFR BLD AUTO: 38.3 % (ref 27–41)
MCH RBC QN AUTO: 24.7 PG (ref 27–31)
MCHC RBC AUTO-ENTMCNC: 30.8 G/DL (ref 32–36)
MCV RBC AUTO: 80.2 FL (ref 80–96)
MONOCYTES # BLD AUTO: 0.5 K/UL (ref 0–0.8)
MONOCYTES NFR BLD AUTO: 6.9 % (ref 2–6)
MPC BLD CALC-MCNC: 9.9 FL (ref 9.4–12.4)
NEUTROPHILS # BLD AUTO: 3.69 K/UL (ref 1.8–7.7)
NEUTROPHILS NFR BLD AUTO: 51 % (ref 53–65)
NITRITE UR QL STRIP: NEGATIVE
NRBC # BLD AUTO: 0 X10E3/UL
NRBC, AUTO (.00): 0 %
PH UR STRIP: 6 PH UNITS
PLATELET # BLD AUTO: 417 K/UL (ref 150–400)
PROT UR QL STRIP: NEGATIVE
RBC # BLD AUTO: 4.65 M/UL (ref 4.2–5.4)
RBC # UR STRIP: NEGATIVE /UL
RBC #/AREA URNS HPF: ABNORMAL /HPF
SP GR UR STRIP: 1.01
SQUAMOUS #/AREA URNS LPF: ABNORMAL /LPF
UROBILINOGEN UR STRIP-ACNC: 0.2 MG/DL
WBC # BLD AUTO: 7.24 K/UL (ref 4.5–11)
WBC #/AREA URNS HPF: ABNORMAL /HPF

## 2022-02-22 PROCEDURE — 85025 COMPLETE CBC W/AUTO DIFF WBC: CPT | Mod: ,,, | Performed by: CLINICAL MEDICAL LABORATORY

## 2022-02-22 PROCEDURE — 36415 COLL VENOUS BLD VENIPUNCTURE: CPT | Mod: ,,, | Performed by: OBSTETRICS & GYNECOLOGY

## 2022-02-22 PROCEDURE — 36415 PR COLLECTION VENOUS BLOOD,VENIPUNCTURE: ICD-10-PCS | Mod: ,,, | Performed by: OBSTETRICS & GYNECOLOGY

## 2022-02-22 PROCEDURE — 1159F PR MEDICATION LIST DOCUMENTED IN MEDICAL RECORD: ICD-10-PCS | Mod: ,,, | Performed by: OBSTETRICS & GYNECOLOGY

## 2022-02-22 PROCEDURE — 3008F BODY MASS INDEX DOCD: CPT | Mod: ,,, | Performed by: OBSTETRICS & GYNECOLOGY

## 2022-02-22 PROCEDURE — 3008F PR BODY MASS INDEX (BMI) DOCUMENTED: ICD-10-PCS | Mod: ,,, | Performed by: OBSTETRICS & GYNECOLOGY

## 2022-02-22 PROCEDURE — 85025 CBC WITH DIFFERENTIAL: ICD-10-PCS | Mod: ,,, | Performed by: CLINICAL MEDICAL LABORATORY

## 2022-02-22 PROCEDURE — 3078F DIAST BP <80 MM HG: CPT | Mod: ,,, | Performed by: OBSTETRICS & GYNECOLOGY

## 2022-02-22 PROCEDURE — 99214 PR OFFICE/OUTPT VISIT, EST, LEVL IV, 30-39 MIN: ICD-10-PCS | Mod: ,,, | Performed by: OBSTETRICS & GYNECOLOGY

## 2022-02-22 PROCEDURE — 3074F PR MOST RECENT SYSTOLIC BLOOD PRESSURE < 130 MM HG: ICD-10-PCS | Mod: ,,, | Performed by: OBSTETRICS & GYNECOLOGY

## 2022-02-22 PROCEDURE — 1159F MED LIST DOCD IN RCRD: CPT | Mod: ,,, | Performed by: OBSTETRICS & GYNECOLOGY

## 2022-02-22 PROCEDURE — 99214 OFFICE O/P EST MOD 30 MIN: CPT | Mod: ,,, | Performed by: OBSTETRICS & GYNECOLOGY

## 2022-02-22 PROCEDURE — 3078F PR MOST RECENT DIASTOLIC BLOOD PRESSURE < 80 MM HG: ICD-10-PCS | Mod: ,,, | Performed by: OBSTETRICS & GYNECOLOGY

## 2022-02-22 PROCEDURE — 81001 URINALYSIS AUTO W/SCOPE: CPT | Mod: ,,, | Performed by: CLINICAL MEDICAL LABORATORY

## 2022-02-22 PROCEDURE — 3074F SYST BP LT 130 MM HG: CPT | Mod: ,,, | Performed by: OBSTETRICS & GYNECOLOGY

## 2022-02-22 PROCEDURE — 81001 URINALYSIS: ICD-10-PCS | Mod: ,,, | Performed by: CLINICAL MEDICAL LABORATORY

## 2022-02-22 PROCEDURE — 1160F RVW MEDS BY RX/DR IN RCRD: CPT | Mod: ,,, | Performed by: OBSTETRICS & GYNECOLOGY

## 2022-02-22 PROCEDURE — 1160F PR REVIEW ALL MEDS BY PRESCRIBER/CLIN PHARMACIST DOCUMENTED: ICD-10-PCS | Mod: ,,, | Performed by: OBSTETRICS & GYNECOLOGY

## 2022-02-22 NOTE — PROGRESS NOTES
Gabriella Ulloa female  for   Chief Complaint   Patient presents with    Post-op Evaluation     Post op evaluation      OB History        3    Para   3    Term                AB        Living   3       SAB        IAB        Ectopic        Multiple        Live Births                      HPI:  Gabriella Ulloa female presents for her post operative check up/exam.   Her procedure was Dilatation curettage/hysteroscopy with laparoscopy and biopsy. The procedure was performed 2022 at [facility].  Since the surgery she has significant postoperative problems.  Has been no significant bleeding vaginally.  Vaginal bleeding has stopped.    Patient is on no method of contraception.  Last menstrual period was 2022.        Past Medical History:   Diagnosis Date    Anemia     Anxiety     Breakthrough bleeding on birth control pills 2015    Endometriosis of pelvic peritoneum 2020    cul-de-sac and sigmoid colon    Hypertension     Irritable bowel syndrome Chronic    Increased pain with bowel movements with menses      Past Surgical History:   Procedure Laterality Date     SECTION      D&C/Hysteroscopy with robotic and operative Laparoscopy  2020    DIAGNOSTIC LAPAROSCOPY N/A 2022    Procedure: LAPAROSCOPY, DIAGNOSTIC;  Surgeon: Otis Mccullough MD;  Location: [facility] OR;  Service: OB/GYN;  Laterality: N/A;    DILATION AND CURETTAGE OF UTERUS  2020    ENDOMETRIAL BIOPSY N/A 2022    Procedure: BIOPSY, ENDOMETRIUM;  Surgeon: Otis Mccullough MD;  Location: [facility] OR;  Service: OB/GYN;  Laterality: N/A;    HYSTEROSCOPY WITH DILATION AND CURETTAGE OF UTERUS N/A 2022    Procedure: HYSTEROSCOPY, WITH DILATION AND CURETTAGE OF UTERUS;  Surgeon: Otis Mccullough MD;  Location: [facility] OR;  Service: OB/GYN;  Laterality: N/A;      Review of patient's allergies indicates:  No Known Allergies     Review of Systems:Pertinent  "items are noted in HPI.     Physical exam:    /79 (BP Location: Right arm, Patient Position: Sitting)   Pulse 78   Temp 98.5 °F (36.9 °C)   Resp 16   Ht 5' 3" (1.6 m)   Wt 99.8 kg (220 lb)   LMP 01/29/2022   BMI 38.97 kg/m²      General Appearance: healthy      Chest:           Lungs:clear to auscultation bilaterally           Heart: regular rate and rhythm, S1, S2 normal, no murmur, click, rub or gallop    Abdomen: soft, non-tender, without masses or organomegaly, without guarding, without rebound and Laparoscopic incisions left upper quadrant and left lateral quadrant are healing with slight suggestion keloid formation    Pelvic: normal external genitalia, vulva, vagina, cervix, uterus and adnexa -unchanged no evidence of infection    Rectal: rectal exam not indicated    Extremity:extremities normal, atraumatic, no cyanosis or edema and Homans sign is negative, no sign of DVT    Skin: normal exam      Assessment:   Problem List Items Addressed This Visit        Renal/    Endometriosis of pelvic peritoneum - Primary      Other Visit Diagnoses     Follow-up examination after gynecological surgery        Class 2 obesity with body mass index (BMI) of 38.0 to 38.9 in adult, unspecified obesity type, unspecified whether serious comorbidity present               Condition:  Stable    Plan:     CBC: Yes  U/A:  Yes  CMP:No    RTC:  1 month for further conversation on options.    Recommendations:  Options include    no change in just observe  Recommend Lupron therapy  Recommend robotic hips with BSO    Dr. Otis Mccullough does recommend vitamin E on her incisions to help prevent keloid formation.        "

## 2022-02-22 NOTE — PATIENT INSTRUCTIONS
Dr. Otis Mccullough thanks you for this office visit at [facility].    Diagnosis for this visit:   Problem List Items Addressed This Visit          Renal/    Endometriosis of pelvic peritoneum - Primary          Other Visit Diagnoses       Follow-up examination after gynecological surgery        Class 2 obesity with body mass index (BMI) of 38.0 to 38.9 in adult, unspecified obesity type, unspecified whether serious comorbidity present                 The Plan:  Dr. Otis Mccullough discussed options; she revisit next month to discuss the options.  Today a CBC and urinalysis.  Dr. Otis Mccullough recommends use of vitamin E on her incisions to help prevent keloids.      Please practice best food and exercise habits for your age.    Dr. Otis Mccullough recommends avoidance of smoking and illicit medications or habits.    Please call  or schedule for any change in your health.     Please keep the next scheduled appointment or call for any need to change the appointment.     Dr. Otis Mccullough recommends yearly exams for good health maintenance.      Thank you    Dr. Otis Mccullough  02/22/2022 11:30 AM

## 2022-04-16 ENCOUNTER — OFFICE VISIT (OUTPATIENT)
Dept: FAMILY MEDICINE | Facility: CLINIC | Age: 27
End: 2022-04-16
Payer: COMMERCIAL

## 2022-04-16 VITALS
BODY MASS INDEX: 38.98 KG/M2 | WEIGHT: 220 LBS | HEIGHT: 63 IN | HEART RATE: 76 BPM | DIASTOLIC BLOOD PRESSURE: 80 MMHG | TEMPERATURE: 99 F | RESPIRATION RATE: 18 BRPM | SYSTOLIC BLOOD PRESSURE: 140 MMHG | OXYGEN SATURATION: 99 %

## 2022-04-16 DIAGNOSIS — U07.1 COVID-19: Primary | ICD-10-CM

## 2022-04-16 DIAGNOSIS — Z20.828 EXPOSURE TO VIRAL DISEASE: ICD-10-CM

## 2022-04-16 PROCEDURE — 1160F RVW MEDS BY RX/DR IN RCRD: CPT | Mod: ,,, | Performed by: FAMILY MEDICINE

## 2022-04-16 PROCEDURE — 3079F PR MOST RECENT DIASTOLIC BLOOD PRESSURE 80-89 MM HG: ICD-10-PCS | Mod: ,,, | Performed by: FAMILY MEDICINE

## 2022-04-16 PROCEDURE — 99213 OFFICE O/P EST LOW 20 MIN: CPT | Mod: ,,, | Performed by: FAMILY MEDICINE

## 2022-04-16 PROCEDURE — 3008F PR BODY MASS INDEX (BMI) DOCUMENTED: ICD-10-PCS | Mod: ,,, | Performed by: FAMILY MEDICINE

## 2022-04-16 PROCEDURE — 1160F PR REVIEW ALL MEDS BY PRESCRIBER/CLIN PHARMACIST DOCUMENTED: ICD-10-PCS | Mod: ,,, | Performed by: FAMILY MEDICINE

## 2022-04-16 PROCEDURE — 3077F PR MOST RECENT SYSTOLIC BLOOD PRESSURE >= 140 MM HG: ICD-10-PCS | Mod: ,,, | Performed by: FAMILY MEDICINE

## 2022-04-16 PROCEDURE — 4010F PR ACE/ARB THEARPY RXD/TAKEN: ICD-10-PCS | Mod: ,,, | Performed by: FAMILY MEDICINE

## 2022-04-16 PROCEDURE — 1159F MED LIST DOCD IN RCRD: CPT | Mod: ,,, | Performed by: FAMILY MEDICINE

## 2022-04-16 PROCEDURE — 3008F BODY MASS INDEX DOCD: CPT | Mod: ,,, | Performed by: FAMILY MEDICINE

## 2022-04-16 PROCEDURE — 87428 POCT SARS-COV2 (COVID) WITH FLU ANTIGEN: ICD-10-PCS | Mod: QW,,, | Performed by: FAMILY MEDICINE

## 2022-04-16 PROCEDURE — 1159F PR MEDICATION LIST DOCUMENTED IN MEDICAL RECORD: ICD-10-PCS | Mod: ,,, | Performed by: FAMILY MEDICINE

## 2022-04-16 PROCEDURE — 4010F ACE/ARB THERAPY RXD/TAKEN: CPT | Mod: ,,, | Performed by: FAMILY MEDICINE

## 2022-04-16 PROCEDURE — 99051 MED SERV EVE/WKEND/HOLIDAY: CPT | Mod: ,,, | Performed by: FAMILY MEDICINE

## 2022-04-16 PROCEDURE — 3079F DIAST BP 80-89 MM HG: CPT | Mod: ,,, | Performed by: FAMILY MEDICINE

## 2022-04-16 PROCEDURE — 99051 PR MEDICAL SERVICES, EVE/WKEND/HOLIDAY: ICD-10-PCS | Mod: ,,, | Performed by: FAMILY MEDICINE

## 2022-04-16 PROCEDURE — 3077F SYST BP >= 140 MM HG: CPT | Mod: ,,, | Performed by: FAMILY MEDICINE

## 2022-04-16 PROCEDURE — 87428 SARSCOV & INF VIR A&B AG IA: CPT | Mod: QW,,, | Performed by: FAMILY MEDICINE

## 2022-04-16 PROCEDURE — 99213 PR OFFICE/OUTPT VISIT, EST, LEVL III, 20-29 MIN: ICD-10-PCS | Mod: ,,, | Performed by: FAMILY MEDICINE

## 2022-04-16 NOTE — PROGRESS NOTES
Subjective:       Patient ID: Gabriella Ulloa is a 27 y.o. female.    Chief Complaint: COVID-19 Concerns (Exposed to positive. No symptoms.)    HPI  Review of Systems   Constitutional: Negative for activity change, appetite change, chills, diaphoresis, fatigue, fever and unexpected weight change.   HENT: Negative for nasal congestion, dental problem, drooling, ear discharge, ear pain, facial swelling, hearing loss, mouth sores, nosebleeds, postnasal drip, rhinorrhea, sinus pressure/congestion, sneezing, sore throat, tinnitus, trouble swallowing, voice change and goiter.    Eyes: Negative for photophobia, discharge, itching and visual disturbance.   Respiratory: Negative for apnea, cough, choking, chest tightness, shortness of breath, wheezing and stridor.    Cardiovascular: Negative for chest pain, palpitations, leg swelling and claudication.   Gastrointestinal: Negative for abdominal distention, abdominal pain, anal bleeding, blood in stool, change in bowel habit, constipation, diarrhea, nausea, vomiting and change in bowel habit.   Endocrine: Negative for cold intolerance, heat intolerance, polydipsia, polyphagia and polyuria.   Genitourinary: Negative for bladder incontinence, decreased urine volume, difficulty urinating, dysuria, enuresis, flank pain, frequency, hematuria, nocturia, pelvic pain and urgency.   Musculoskeletal: Negative for arthralgias, back pain, gait problem, joint swelling, leg pain, myalgias, neck pain, neck stiffness and joint deformity.   Integumentary:  Negative for pallor, rash, wound, breast mass and breast tenderness.   Allergic/Immunologic: Negative for environmental allergies, food allergies and immunocompromised state.   Neurological: Negative for dizziness, vertigo, tremors, seizures, syncope, facial asymmetry, speech difficulty, weakness, light-headedness, numbness, headaches, disturbances in coordination, memory loss and coordination difficulties.   Hematological: Negative for  adenopathy. Does not bruise/bleed easily.   Psychiatric/Behavioral: Negative for agitation, behavioral problems, confusion, decreased concentration, dysphoric mood, hallucinations, self-injury, sleep disturbance and suicidal ideas. The patient is not nervous/anxious and is not hyperactive.    Breast: Negative for mass and tenderness        Objective:      Physical Exam  Vitals reviewed.   Constitutional:       Appearance: Normal appearance.   HENT:      Head: Normocephalic and atraumatic.      Right Ear: Tympanic membrane, ear canal and external ear normal.      Left Ear: Tympanic membrane, ear canal and external ear normal.      Nose: Nose normal.      Mouth/Throat:      Mouth: Mucous membranes are moist.      Pharynx: Oropharynx is clear.   Eyes:      Extraocular Movements: Extraocular movements intact.      Conjunctiva/sclera: Conjunctivae normal.      Pupils: Pupils are equal, round, and reactive to light.   Cardiovascular:      Rate and Rhythm: Normal rate and regular rhythm.      Pulses: Normal pulses.      Heart sounds: Normal heart sounds.   Pulmonary:      Effort: Pulmonary effort is normal.      Breath sounds: Normal breath sounds.   Abdominal:      General: Bowel sounds are normal.      Palpations: Abdomen is soft.   Musculoskeletal:         General: Normal range of motion.      Cervical back: Normal range of motion and neck supple.   Skin:     General: Skin is warm and dry.   Neurological:      General: No focal deficit present.      Mental Status: She is alert. Mental status is at baseline.   Psychiatric:         Mood and Affect: Mood normal.         Behavior: Behavior normal.         Thought Content: Thought content normal.         Judgment: Judgment normal.         Assessment:       1. COVID-19    2. Exposure to viral disease        Plan:     COVID-19    Exposure to viral disease  -     POCT SARS-COV2 (COVID) with Flu Antigen     Covid negative.

## 2022-06-01 ENCOUNTER — OFFICE VISIT (OUTPATIENT)
Dept: OBSTETRICS AND GYNECOLOGY | Facility: CLINIC | Age: 27
End: 2022-06-01
Payer: COMMERCIAL

## 2022-06-01 VITALS
TEMPERATURE: 99 F | HEART RATE: 84 BPM | RESPIRATION RATE: 16 BRPM | SYSTOLIC BLOOD PRESSURE: 115 MMHG | HEIGHT: 63 IN | BODY MASS INDEX: 39.69 KG/M2 | WEIGHT: 224 LBS | DIASTOLIC BLOOD PRESSURE: 79 MMHG

## 2022-06-01 DIAGNOSIS — N80.30 ENDOMETRIOSIS OF PELVIC PERITONEUM: ICD-10-CM

## 2022-06-01 DIAGNOSIS — N92.0 MENORRHAGIA WITH REGULAR CYCLE: Primary | ICD-10-CM

## 2022-06-01 LAB
ANISOCYTOSIS BLD QL SMEAR: ABNORMAL
BASOPHILS # BLD AUTO: 0.06 K/UL (ref 0–0.2)
BASOPHILS NFR BLD AUTO: 0.8 % (ref 0–1)
BILIRUB UR QL STRIP: NEGATIVE
CLARITY UR: ABNORMAL
COLOR UR: YELLOW
CRENATED CELLS: ABNORMAL
DIFFERENTIAL METHOD BLD: ABNORMAL
EOSINOPHIL # BLD AUTO: 0.29 K/UL (ref 0–0.5)
EOSINOPHIL NFR BLD AUTO: 3.8 % (ref 1–4)
ERYTHROCYTE [DISTWIDTH] IN BLOOD BY AUTOMATED COUNT: 18.2 % (ref 11.5–14.5)
GLUCOSE UR STRIP-MCNC: NEGATIVE MG/DL
HCT VFR BLD AUTO: 34.6 % (ref 38–47)
HGB BLD-MCNC: 10.7 G/DL (ref 12–16)
IMM GRANULOCYTES # BLD AUTO: 0.02 K/UL (ref 0–0.04)
IMM GRANULOCYTES NFR BLD: 0.3 % (ref 0–0.4)
KETONES UR STRIP-SCNC: NEGATIVE MG/DL
LEUKOCYTE ESTERASE UR QL STRIP: NEGATIVE
LYMPHOCYTES # BLD AUTO: 3.42 K/UL (ref 1–4.8)
LYMPHOCYTES NFR BLD AUTO: 45.1 % (ref 27–41)
MCH RBC QN AUTO: 23.1 PG (ref 27–31)
MCHC RBC AUTO-ENTMCNC: 30.9 G/DL (ref 32–36)
MCV RBC AUTO: 74.7 FL (ref 80–96)
MICROCYTES BLD QL SMEAR: ABNORMAL
MONOCYTES # BLD AUTO: 0.87 K/UL (ref 0–0.8)
MONOCYTES NFR BLD AUTO: 11.5 % (ref 2–6)
MPC BLD CALC-MCNC: 10.4 FL (ref 9.4–12.4)
NEUTROPHILS # BLD AUTO: 2.93 K/UL (ref 1.8–7.7)
NEUTROPHILS NFR BLD AUTO: 38.5 % (ref 53–65)
NITRITE UR QL STRIP: NEGATIVE
NRBC # BLD AUTO: 0 X10E3/UL
NRBC, AUTO (.00): 0 %
OVALOCYTES BLD QL SMEAR: ABNORMAL
PH UR STRIP: 6 PH UNITS
PLATELET # BLD AUTO: 399 K/UL (ref 150–400)
PLATELET MORPHOLOGY: ABNORMAL
POLYCHROMASIA BLD QL SMEAR: ABNORMAL
PROT UR QL STRIP: NEGATIVE
RBC # BLD AUTO: 4.63 M/UL (ref 4.2–5.4)
RBC # UR STRIP: NEGATIVE /UL
SP GR UR STRIP: 1.02
UROBILINOGEN UR STRIP-ACNC: 0.2 MG/DL
WBC # BLD AUTO: 7.59 K/UL (ref 4.5–11)

## 2022-06-01 PROCEDURE — 3008F PR BODY MASS INDEX (BMI) DOCUMENTED: ICD-10-PCS | Mod: ,,, | Performed by: OBSTETRICS & GYNECOLOGY

## 2022-06-01 PROCEDURE — 3074F PR MOST RECENT SYSTOLIC BLOOD PRESSURE < 130 MM HG: ICD-10-PCS | Mod: ,,, | Performed by: OBSTETRICS & GYNECOLOGY

## 2022-06-01 PROCEDURE — 81003 URINALYSIS AUTO W/O SCOPE: CPT | Mod: QW,,, | Performed by: CLINICAL MEDICAL LABORATORY

## 2022-06-01 PROCEDURE — 36415 PR COLLECTION VENOUS BLOOD,VENIPUNCTURE: ICD-10-PCS | Mod: ,,, | Performed by: OBSTETRICS & GYNECOLOGY

## 2022-06-01 PROCEDURE — 85025 COMPLETE CBC W/AUTO DIFF WBC: CPT | Mod: ,,, | Performed by: CLINICAL MEDICAL LABORATORY

## 2022-06-01 PROCEDURE — 36415 COLL VENOUS BLD VENIPUNCTURE: CPT | Mod: ,,, | Performed by: OBSTETRICS & GYNECOLOGY

## 2022-06-01 PROCEDURE — 3078F PR MOST RECENT DIASTOLIC BLOOD PRESSURE < 80 MM HG: ICD-10-PCS | Mod: ,,, | Performed by: OBSTETRICS & GYNECOLOGY

## 2022-06-01 PROCEDURE — 99214 OFFICE O/P EST MOD 30 MIN: CPT | Mod: ,,, | Performed by: OBSTETRICS & GYNECOLOGY

## 2022-06-01 PROCEDURE — 3074F SYST BP LT 130 MM HG: CPT | Mod: ,,, | Performed by: OBSTETRICS & GYNECOLOGY

## 2022-06-01 PROCEDURE — 1159F MED LIST DOCD IN RCRD: CPT | Mod: ,,, | Performed by: OBSTETRICS & GYNECOLOGY

## 2022-06-01 PROCEDURE — 1159F PR MEDICATION LIST DOCUMENTED IN MEDICAL RECORD: ICD-10-PCS | Mod: ,,, | Performed by: OBSTETRICS & GYNECOLOGY

## 2022-06-01 PROCEDURE — 3008F BODY MASS INDEX DOCD: CPT | Mod: ,,, | Performed by: OBSTETRICS & GYNECOLOGY

## 2022-06-01 PROCEDURE — 85025 CBC WITH DIFFERENTIAL: ICD-10-PCS | Mod: ,,, | Performed by: CLINICAL MEDICAL LABORATORY

## 2022-06-01 PROCEDURE — 3078F DIAST BP <80 MM HG: CPT | Mod: ,,, | Performed by: OBSTETRICS & GYNECOLOGY

## 2022-06-01 PROCEDURE — 81003 URINALYSIS: ICD-10-PCS | Mod: QW,,, | Performed by: CLINICAL MEDICAL LABORATORY

## 2022-06-01 PROCEDURE — 99214 PR OFFICE/OUTPT VISIT, EST, LEVL IV, 30-39 MIN: ICD-10-PCS | Mod: ,,, | Performed by: OBSTETRICS & GYNECOLOGY

## 2022-06-01 PROCEDURE — 4010F PR ACE/ARB THEARPY RXD/TAKEN: ICD-10-PCS | Mod: ,,, | Performed by: OBSTETRICS & GYNECOLOGY

## 2022-06-01 PROCEDURE — 4010F ACE/ARB THERAPY RXD/TAKEN: CPT | Mod: ,,, | Performed by: OBSTETRICS & GYNECOLOGY

## 2022-06-01 RX ORDER — NORETHINDRONE ACETATE AND ETHINYL ESTRADIOL 1.5-30(21)
1 KIT ORAL DAILY
Qty: 30 TABLET | Refills: 11 | Status: SHIPPED | OUTPATIENT
Start: 2022-06-01 | End: 2022-08-02

## 2022-06-01 NOTE — PATIENT INSTRUCTIONS
Dr. Otis Mccullough thanks you for this office visit at [facility].    Diagnosis for this visit:   Problem List Items Addressed This Visit          Renal/    Endometriosis of pelvic peritoneum          Other Visit Diagnoses       Menorrhagia with regular cycle    -  Primary    Relevant Orders    CBC Auto Differential    Urinalysis             The Plan:  Patient be placed on generic Loestrin 1.5/30 to control hypermenorrhea.  A CBC was ordered at her request urinalysis today.  She is return back in 3 months time.      Please practice best food and exercise habits for your age.    Dr. Otis Mccullough recommends avoidance of smoking and illicit medications or habits.    Please call  or schedule for any change in your health.     Please keep the next scheduled appointment or call for any need to change the appointment.     Dr. Otis Mccullough recommends yearly exams for good health maintenance.      Thank you    Dr. Otis Mccullough  06/01/2022 5:40 PM

## 2022-06-01 NOTE — PROGRESS NOTES
Gabriella Ulloa female  for   Chief Complaint   Patient presents with    Contraception     Wants to get started on birth control and patient stated she is having heavy cycles.          PHI:  Patient presents today as a  3, para 3 27-year-old Afro-American female with a complaint of heavy menstrual cycles and she is requesting oral contraception to control her cycles.      Her last menstrual period was 2022 for total 5 days with very heavy flow the 1st 3 days.  And the dysmenorrhea is not significant since the surgery.  The patient was found have endometriosis by operative laparoscopy in 2022. At that time she did undergo a D&C hysteroscopy as well.  Endometriosis was verified by tissue biopsy.    The last several months she has been attempting pregnancy without results.            Past Medical History:   Diagnosis Date    Anemia     Anxiety     Breakthrough bleeding on birth control pills 2015    Endometriosis of pelvic peritoneum 2020    cul-de-sac and sigmoid colon    Hypertension     Irritable bowel syndrome Chronic    Increased pain with bowel movements with menses      Past Surgical History:   Procedure Laterality Date     SECTION      D&C/Hysteroscopy with robotic and operative Laparoscopy  2020    DIAGNOSTIC LAPAROSCOPY N/A 2022    Procedure: LAPAROSCOPY, DIAGNOSTIC;  Surgeon: Otis Mccullough MD;  Location: [facility] OR;  Service: OB/GYN;  Laterality: N/A;    DILATION AND CURETTAGE OF UTERUS  2020    ENDOMETRIAL BIOPSY N/A 2022    Procedure: BIOPSY, ENDOMETRIUM;  Surgeon: Otis Mccullough MD;  Location: [facility] OR;  Service: OB/GYN;  Laterality: N/A;    HYSTEROSCOPY WITH DILATION AND CURETTAGE OF UTERUS N/A 2022    Procedure: HYSTEROSCOPY, WITH DILATION AND CURETTAGE OF UTERUS;  Surgeon: Otis Mccullough MD;  Location: [facility] OR;  Service: OB/GYN;  Laterality: N/A;      Review of patient's allergies indicates:  No Known  "Allergies     ROS:Pertinent items are noted in HPI.    Physical exam:    /79 (BP Location: Right arm, Patient Position: Sitting)   Pulse 84   Temp 98.5 °F (36.9 °C)   Resp 16   Ht 5' 3" (1.6 m)   Wt 101.6 kg (224 lb)   LMP 05/16/2022   BMI 39.68 kg/m²      General Appearance: healthy, alert, no distress, smiling, BMI of 39.6 a    Chest:           Lungs: clear to auscultation bilaterally           Heart: regular rate and rhythm, S1, S2 normal, no murmur, click, rub or gallop    Abdomen:  Soft and non tender    Pelvic:  Normal vulva; speculum is unremarkable; speculum exam does reveal slight discharge with the musty odor but not significant to suggest bacterial vaginosis; multiparous cervix with slight cervical erosion or eversion; uterus is nontender and appropriate size for multigravida; adnexal exam unremarkable    Rectal exam:  Unremarkable    Extremity: normal    Skin: normal exam        Assessment:   Problem List Items Addressed This Visit        Renal/    Endometriosis of pelvic peritoneum      Other Visit Diagnoses     Menorrhagia with regular cycle    -  Primary           Plan:  CBC             Loestrin 1.5/30 as a contraceptive control hypermenorrhea               Return to clinic in 3 months for follow-up of change of hypermenorrhea on contraception.  Note her blood pressure is normal.    Addendum on 06/02/2022:    Component Ref Range & Units 1 d ago   (6/1/22) 3 mo ago   (2/22/22) 3 mo ago   (2/3/22) 4 mo ago   (1/25/22) 7 mo ago   (10/27/21) 9 mo ago   (8/31/21) 12 mo ago   (6/3/21)           6.23            4.77    Hemoglobin 12.0 - 16.0 g/dL 10.7 Low   11.5 Low   11.5 Low   12.2  11.3 Low   10.9 Low   11.8 Low     Hematocrit 38.0 - 47.0 % 34.6 Low   37.3 Low   37.2 Low   39.6  36.0 Low   35.5 Low   37.8 Low     MCV 80.0 - 96.0 fL 74.7 Low   80.2  79.7 Low   78.6 Low   79.8 Low   80.1  79.2 Low     MCH 27.0 - 31.0 pg 23.1 Low   24.7 Low   24.6 Low   24.2 Low   25.1 Low   24.6 Low   " 24.7 Low       Recommendations:    Initiate oral iron b.i.d. for 1 month; her sickle cell status on chart review is negative.

## 2022-06-02 ENCOUNTER — TELEPHONE (OUTPATIENT)
Dept: OBSTETRICS AND GYNECOLOGY | Facility: CLINIC | Age: 27
End: 2022-06-02
Payer: COMMERCIAL

## 2022-06-02 NOTE — TELEPHONE ENCOUNTER
Per Dr. Mccullough, notified patient via phone re:  Sickle Cell Screen results have returned negative.  Patient voiced understanding.

## 2022-06-28 ENCOUNTER — TELEPHONE (OUTPATIENT)
Dept: OBSTETRICS AND GYNECOLOGY | Facility: CLINIC | Age: 27
End: 2022-06-28
Payer: COMMERCIAL

## 2022-06-28 NOTE — TELEPHONE ENCOUNTER
----- Message from Otis Mccullough MD sent at 6/2/2022  7:22 AM CDT -----  Call patient    Tell her that she is anemic with unchanged from 11.5 grams/deciliter to 10.7.  She has iron deficiency red cell changes.  She needs to take over-the-counter ferrous sulfate 325 mg b.i.d. for 1 month

## 2022-06-28 NOTE — TELEPHONE ENCOUNTER
Per Dr. Mccullough, notified patient via phone re:  Lab results have returned showing persistent anemia.  Dr. Mccullough recommends over the counter iron supplement 325 mg twice a day for one month.  Patient voiced agreement and understanding.      1- Month Follow Up Iron Therapy  8/02/2022 @ 10:30am at [facility]

## 2022-08-02 ENCOUNTER — OFFICE VISIT (OUTPATIENT)
Dept: OBSTETRICS AND GYNECOLOGY | Facility: CLINIC | Age: 27
End: 2022-08-02
Payer: COMMERCIAL

## 2022-08-02 VITALS
DIASTOLIC BLOOD PRESSURE: 86 MMHG | WEIGHT: 225 LBS | RESPIRATION RATE: 16 BRPM | SYSTOLIC BLOOD PRESSURE: 110 MMHG | HEIGHT: 63 IN | HEART RATE: 67 BPM | BODY MASS INDEX: 39.87 KG/M2 | TEMPERATURE: 98 F

## 2022-08-02 DIAGNOSIS — D50.0 IRON DEFICIENCY ANEMIA DUE TO CHRONIC BLOOD LOSS: ICD-10-CM

## 2022-08-02 DIAGNOSIS — N80.03 ADENOMYOSIS: ICD-10-CM

## 2022-08-02 DIAGNOSIS — R10.2 PELVIC PRESSURE IN FEMALE: ICD-10-CM

## 2022-08-02 DIAGNOSIS — N80.9 ENDOMETRIOSIS: ICD-10-CM

## 2022-08-02 DIAGNOSIS — N92.0 MENORRHAGIA WITH REGULAR CYCLE: Primary | ICD-10-CM

## 2022-08-02 LAB
BACTERIA #/AREA URNS HPF: ABNORMAL /HPF
BASOPHILS # BLD AUTO: 0.05 K/UL (ref 0–0.2)
BASOPHILS NFR BLD AUTO: 0.8 % (ref 0–1)
BILIRUB UR QL STRIP: NEGATIVE
CLARITY UR: CLEAR
COLOR UR: ABNORMAL
DIFFERENTIAL METHOD BLD: ABNORMAL
EOSINOPHIL # BLD AUTO: 0.29 K/UL (ref 0–0.5)
EOSINOPHIL NFR BLD AUTO: 4.7 % (ref 1–4)
ERYTHROCYTE [DISTWIDTH] IN BLOOD BY AUTOMATED COUNT: 20.4 % (ref 11.5–14.5)
GLUCOSE UR STRIP-MCNC: NORMAL MG/DL
HCT VFR BLD AUTO: 38.1 % (ref 38–47)
HGB BLD-MCNC: 11.6 G/DL (ref 12–16)
IMM GRANULOCYTES # BLD AUTO: 0.01 K/UL (ref 0–0.04)
IMM GRANULOCYTES NFR BLD: 0.2 % (ref 0–0.4)
KETONES UR STRIP-SCNC: NEGATIVE MG/DL
LEUKOCYTE ESTERASE UR QL STRIP: ABNORMAL
LYMPHOCYTES # BLD AUTO: 2.83 K/UL (ref 1–4.8)
LYMPHOCYTES NFR BLD AUTO: 46.1 % (ref 27–41)
MCH RBC QN AUTO: 23.4 PG (ref 27–31)
MCHC RBC AUTO-ENTMCNC: 30.4 G/DL (ref 32–36)
MCV RBC AUTO: 76.8 FL (ref 80–96)
MONOCYTES # BLD AUTO: 0.52 K/UL (ref 0–0.8)
MONOCYTES NFR BLD AUTO: 8.5 % (ref 2–6)
MPC BLD CALC-MCNC: 10.1 FL (ref 9.4–12.4)
NEUTROPHILS # BLD AUTO: 2.44 K/UL (ref 1.8–7.7)
NEUTROPHILS NFR BLD AUTO: 39.7 % (ref 53–65)
NITRITE UR QL STRIP: NEGATIVE
NRBC # BLD AUTO: 0 X10E3/UL
NRBC, AUTO (.00): 0 %
PH UR STRIP: 6.5 PH UNITS
PLATELET # BLD AUTO: 383 K/UL (ref 150–400)
PROT UR QL STRIP: NEGATIVE
RBC # BLD AUTO: 4.96 M/UL (ref 4.2–5.4)
RBC # UR STRIP: NEGATIVE /UL
SP GR UR STRIP: 1.01
SQUAMOUS #/AREA URNS LPF: ABNORMAL /HPF
UROBILINOGEN UR STRIP-ACNC: NORMAL MG/DL
WBC # BLD AUTO: 6.14 K/UL (ref 4.5–11)
WBC #/AREA URNS HPF: 5 /HPF

## 2022-08-02 PROCEDURE — 1159F MED LIST DOCD IN RCRD: CPT | Mod: CPTII,,, | Performed by: OBSTETRICS & GYNECOLOGY

## 2022-08-02 PROCEDURE — 3074F SYST BP LT 130 MM HG: CPT | Mod: CPTII,,, | Performed by: OBSTETRICS & GYNECOLOGY

## 2022-08-02 PROCEDURE — 3079F DIAST BP 80-89 MM HG: CPT | Mod: CPTII,,, | Performed by: OBSTETRICS & GYNECOLOGY

## 2022-08-02 PROCEDURE — 85025 CBC WITH DIFFERENTIAL: ICD-10-PCS | Mod: ,,, | Performed by: CLINICAL MEDICAL LABORATORY

## 2022-08-02 PROCEDURE — 3008F BODY MASS INDEX DOCD: CPT | Mod: CPTII,,, | Performed by: OBSTETRICS & GYNECOLOGY

## 2022-08-02 PROCEDURE — 3008F PR BODY MASS INDEX (BMI) DOCUMENTED: ICD-10-PCS | Mod: CPTII,,, | Performed by: OBSTETRICS & GYNECOLOGY

## 2022-08-02 PROCEDURE — 1160F PR REVIEW ALL MEDS BY PRESCRIBER/CLIN PHARMACIST DOCUMENTED: ICD-10-PCS | Mod: CPTII,,, | Performed by: OBSTETRICS & GYNECOLOGY

## 2022-08-02 PROCEDURE — 1160F RVW MEDS BY RX/DR IN RCRD: CPT | Mod: CPTII,,, | Performed by: OBSTETRICS & GYNECOLOGY

## 2022-08-02 PROCEDURE — 4010F ACE/ARB THERAPY RXD/TAKEN: CPT | Mod: CPTII,,, | Performed by: OBSTETRICS & GYNECOLOGY

## 2022-08-02 PROCEDURE — 3074F PR MOST RECENT SYSTOLIC BLOOD PRESSURE < 130 MM HG: ICD-10-PCS | Mod: CPTII,,, | Performed by: OBSTETRICS & GYNECOLOGY

## 2022-08-02 PROCEDURE — 81001 URINALYSIS AUTO W/SCOPE: CPT | Mod: ,,, | Performed by: CLINICAL MEDICAL LABORATORY

## 2022-08-02 PROCEDURE — 99214 OFFICE O/P EST MOD 30 MIN: CPT | Mod: ,,, | Performed by: OBSTETRICS & GYNECOLOGY

## 2022-08-02 PROCEDURE — 3079F PR MOST RECENT DIASTOLIC BLOOD PRESSURE 80-89 MM HG: ICD-10-PCS | Mod: CPTII,,, | Performed by: OBSTETRICS & GYNECOLOGY

## 2022-08-02 PROCEDURE — 36415 PR COLLECTION VENOUS BLOOD,VENIPUNCTURE: ICD-10-PCS | Mod: ,,, | Performed by: OBSTETRICS & GYNECOLOGY

## 2022-08-02 PROCEDURE — 85025 COMPLETE CBC W/AUTO DIFF WBC: CPT | Mod: ,,, | Performed by: CLINICAL MEDICAL LABORATORY

## 2022-08-02 PROCEDURE — 99214 PR OFFICE/OUTPT VISIT, EST, LEVL IV, 30-39 MIN: ICD-10-PCS | Mod: ,,, | Performed by: OBSTETRICS & GYNECOLOGY

## 2022-08-02 PROCEDURE — 1159F PR MEDICATION LIST DOCUMENTED IN MEDICAL RECORD: ICD-10-PCS | Mod: CPTII,,, | Performed by: OBSTETRICS & GYNECOLOGY

## 2022-08-02 PROCEDURE — 81001 URINALYSIS, REFLEX TO URINE CULTURE: ICD-10-PCS | Mod: ,,, | Performed by: CLINICAL MEDICAL LABORATORY

## 2022-08-02 PROCEDURE — 36415 COLL VENOUS BLD VENIPUNCTURE: CPT | Mod: ,,, | Performed by: OBSTETRICS & GYNECOLOGY

## 2022-08-02 PROCEDURE — 4010F PR ACE/ARB THEARPY RXD/TAKEN: ICD-10-PCS | Mod: CPTII,,, | Performed by: OBSTETRICS & GYNECOLOGY

## 2022-08-02 RX ORDER — CYCLOBENZAPRINE HCL 5 MG
5 TABLET ORAL 3 TIMES DAILY PRN
COMMUNITY
Start: 2022-07-19 | End: 2023-03-01

## 2022-08-02 NOTE — PATIENT INSTRUCTIONS
Dr. Otis Mccullough thanks you for this office visit at [facility].    Diagnosis for this visit:   Problem List Items Addressed This Visit    None       Visit Diagnoses       Menorrhagia with regular cycle    -  Primary    Relevant Orders    Urinalysis, Reflex to Urine Culture    CBC Auto Differential    Iron deficiency anemia due to chronic blood loss        Relevant Orders    Urinalysis, Reflex to Urine Culture    CBC Auto Differential    Pelvic pressure in female        Relevant Orders    Urinalysis, Reflex to Urine Culture    CBC Auto Differential    Adenomyosis        suspected    Relevant Orders    Urinalysis, Reflex to Urine Culture    CBC Auto Differential    Endometriosis        Relevant Orders    Urinalysis, Reflex to Urine Culture    CBC Auto Differential             The Plan: reopeat pelvic ultrasound; cbc and U/A with reflex urine culture      Please practice best food and exercise habits for your age.    Dr. Otis Mccullough recommends avoidance of smoking and illicit medications or habits.    Please call  or schedule for any change in your health.     Please keep the next scheduled appointment or call for any need to change the appointment.     Dr. Otis Mccullough recommends yearly exams for good health maintenance.      Thank you    Dr. Otis Mccullough  08/02/2022 12:14 PM

## 2022-08-02 NOTE — PROGRESS NOTES
Gabriella Ulloa female  for   Chief Complaint   Patient presents with    Follow-up     Follow up on iron therapy      PELVIC PRESSURE     Patient stated she is feeling pelvic pressure and said she can feel her uterus shift at times      OB History        3    Para   3    Term                AB        Living   3       SAB        IAB        Ectopic        Multiple        Live Births                      HPI:She is here for follow up on iron therapy for her anemia secondary top menorrhagia; she is not taking the OCP therapy for cycle control of her hypermenorrhea. She is now complaining of pelvic pressure - past week and no UTI symptoms.    She has diagnosed endometriosis.    LMP was 2022 - flow for 7 days. She still is cramping.    Past Medical History:   Diagnosis Date    Anemia     Anxiety     Breakthrough bleeding on birth control pills 2015    Endometriosis of pelvic peritoneum 2020    cul-de-sac and sigmoid colon    Hypertension     Irritable bowel syndrome Chronic    Increased pain with bowel movements with menses      Past Surgical History:   Procedure Laterality Date     SECTION      D&C/Hysteroscopy with robotic and operative Laparoscopy  2020    DIAGNOSTIC LAPAROSCOPY N/A 2022    Procedure: LAPAROSCOPY, DIAGNOSTIC;  Surgeon: Otis Mccullough MD;  Location: [facility] OR;  Service: OB/GYN;  Laterality: N/A;    DILATION AND CURETTAGE OF UTERUS  2020    ENDOMETRIAL BIOPSY N/A 2022    Procedure: BIOPSY, ENDOMETRIUM;  Surgeon: Otis Mccullough MD;  Location: [facility] OR;  Service: OB/GYN;  Laterality: N/A;    HYSTEROSCOPY WITH DILATION AND CURETTAGE OF UTERUS N/A 2022    Procedure: HYSTEROSCOPY, WITH DILATION AND CURETTAGE OF UTERUS;  Surgeon: Otis Mccullough MD;  Location: [facility] OR;  Service: OB/GYN;  Laterality: N/A;      Review of patient's allergies indicates:  No Known Allergies     Review of Systems:Pertinent items are noted in  "HPI.     Physical exam:    /86 (BP Location: Left arm, Patient Position: Sitting)   Pulse 67   Temp 98.4 °F (36.9 °C)   Resp 16   Ht 5' 3" (1.6 m)   Wt 102.1 kg (225 lb)   LMP 07/07/2022   BMI 39.86 kg/m²      General Appearance: healthy, no distress, smiling, BMI 39    HEENT: Normal exam    Lymphatic: no palpable lymphadenopathy, no hepatosplenomegaly    Chest:           Breasts:dweferred             Lungs:clear to auscultation bilaterally           Heart: regular rate and rhythm, S1, S2 normal, no murmur, click, rub or gallop    Abdomen: soft, non-tender, without masses or organomegaly, normal bowel sounds, without guarding, without rebound and obese    Pelvic:                    Vulva: normal appearing vulva with no masses, tenderness or lesions                    Cervix: normal appearing cervix without discharge or lesions, multiparous os                    Uterus uterus is normal size, shape, consistency and nontender, anteverted, mobile                    Annexa(e): normal adnexa in size, nontender and no masses                   Rectal: normal rectal, no masses, guaiac negative stool obtained.     Extremity: normal    Skin: normal exam        Assessment:   Problem List Items Addressed This Visit    None     Visit Diagnoses     Menorrhagia with regular cycle    -  Primary    Iron deficiency anemia due to chronic blood loss        Pelvic pressure in female        Adenomyosis        suspected    Endometriosis               Plan: CBC; repeat the ultrasound of the pelvis            She should start OCP therapy.             RTC in 4 weeks                "

## 2022-08-08 ENCOUNTER — HOSPITAL ENCOUNTER (OUTPATIENT)
Dept: RADIOLOGY | Facility: HOSPITAL | Age: 27
Discharge: HOME | End: 2022-08-08
Attending: OBSTETRICS & GYNECOLOGY
Payer: COMMERCIAL

## 2022-08-08 DIAGNOSIS — N92.0 MENORRHAGIA WITH REGULAR CYCLE: ICD-10-CM

## 2022-08-08 DIAGNOSIS — N80.9 ENDOMETRIOSIS: ICD-10-CM

## 2022-08-08 DIAGNOSIS — R10.2 PELVIC PRESSURE IN FEMALE: ICD-10-CM

## 2022-08-08 PROCEDURE — 76856 US EXAM PELVIC COMPLETE: CPT | Mod: 26,,, | Performed by: RADIOLOGY

## 2022-08-08 PROCEDURE — 76856 US PELVIS COMPLETE NON OB: ICD-10-PCS | Mod: 26,,, | Performed by: RADIOLOGY

## 2022-08-08 PROCEDURE — 76856 US EXAM PELVIC COMPLETE: CPT | Mod: TC

## 2022-09-21 ENCOUNTER — OFFICE VISIT (OUTPATIENT)
Dept: OBSTETRICS AND GYNECOLOGY | Facility: CLINIC | Age: 27
End: 2022-09-21
Payer: COMMERCIAL

## 2022-09-21 VITALS
BODY MASS INDEX: 40.04 KG/M2 | HEART RATE: 80 BPM | DIASTOLIC BLOOD PRESSURE: 85 MMHG | HEIGHT: 63 IN | WEIGHT: 226 LBS | TEMPERATURE: 98 F | RESPIRATION RATE: 16 BRPM | SYSTOLIC BLOOD PRESSURE: 111 MMHG

## 2022-09-21 DIAGNOSIS — I10 HYPERTENSION, UNSPECIFIED TYPE: ICD-10-CM

## 2022-09-21 DIAGNOSIS — B96.89 BACTERIAL VAGINOSIS: ICD-10-CM

## 2022-09-21 DIAGNOSIS — N80.30 ENDOMETRIOSIS OF PELVIC PERITONEUM: Primary | ICD-10-CM

## 2022-09-21 DIAGNOSIS — N89.8 VAGINAL LEUKORRHEA: ICD-10-CM

## 2022-09-21 DIAGNOSIS — N76.0 BACTERIAL VAGINOSIS: ICD-10-CM

## 2022-09-21 LAB
BASOPHILS # BLD AUTO: 0.05 K/UL (ref 0–0.2)
BASOPHILS NFR BLD AUTO: 0.9 % (ref 0–1)
BILIRUB UR QL STRIP: NEGATIVE
CANCER AG125 SERPL-ACNC: 6 U/ML (ref 0–35)
CANDIDA SPECIES: NEGATIVE
CLARITY UR: NORMAL
COLOR UR: YELLOW
DIFFERENTIAL METHOD BLD: ABNORMAL
EOSINOPHIL # BLD AUTO: 0.22 K/UL (ref 0–0.5)
EOSINOPHIL NFR BLD AUTO: 4.1 % (ref 1–4)
ERYTHROCYTE [DISTWIDTH] IN BLOOD BY AUTOMATED COUNT: 20.5 % (ref 11.5–14.5)
ERYTHROCYTE [SEDIMENTATION RATE] IN BLOOD BY WESTERGREN METHOD: 8 MM/HR (ref 0–20)
GARDNERELLA: POSITIVE
GLUCOSE UR STRIP-MCNC: NORMAL MG/DL
HCT VFR BLD AUTO: 39.6 % (ref 38–47)
HGB BLD-MCNC: 12 G/DL (ref 12–16)
IMM GRANULOCYTES # BLD AUTO: 0.01 K/UL (ref 0–0.04)
IMM GRANULOCYTES NFR BLD: 0.2 % (ref 0–0.4)
KETONES UR STRIP-SCNC: NEGATIVE MG/DL
LEUKOCYTE ESTERASE UR QL STRIP: NEGATIVE
LYMPHOCYTES # BLD AUTO: 2.31 K/UL (ref 1–4.8)
LYMPHOCYTES NFR BLD AUTO: 42.9 % (ref 27–41)
MCH RBC QN AUTO: 24 PG (ref 27–31)
MCHC RBC AUTO-ENTMCNC: 30.3 G/DL (ref 32–36)
MCV RBC AUTO: 79 FL (ref 80–96)
MONOCYTES # BLD AUTO: 0.41 K/UL (ref 0–0.8)
MONOCYTES NFR BLD AUTO: 7.6 % (ref 2–6)
MPC BLD CALC-MCNC: 10.3 FL (ref 9.4–12.4)
NEUTROPHILS # BLD AUTO: 2.38 K/UL (ref 1.8–7.7)
NEUTROPHILS NFR BLD AUTO: 44.3 % (ref 53–65)
NITRITE UR QL STRIP: NEGATIVE
NRBC # BLD AUTO: 0 X10E3/UL
NRBC, AUTO (.00): 0 %
PH UR STRIP: 6.5 PH UNITS
PLATELET # BLD AUTO: 402 K/UL (ref 150–400)
PROT UR QL STRIP: NEGATIVE
RBC # BLD AUTO: 5.01 M/UL (ref 4.2–5.4)
RBC # UR STRIP: NEGATIVE /UL
SP GR UR STRIP: 1.03
TRICHOMONAS: NEGATIVE
UROBILINOGEN UR STRIP-ACNC: NORMAL MG/DL
WBC # BLD AUTO: 5.38 K/UL (ref 4.5–11)

## 2022-09-21 PROCEDURE — 87510 BACTERIAL VAGINOSIS: ICD-10-PCS | Mod: ,,, | Performed by: CLINICAL MEDICAL LABORATORY

## 2022-09-21 PROCEDURE — 86304 IMMUNOASSAY TUMOR CA 125: CPT | Mod: ,,, | Performed by: CLINICAL MEDICAL LABORATORY

## 2022-09-21 PROCEDURE — 3008F PR BODY MASS INDEX (BMI) DOCUMENTED: ICD-10-PCS | Mod: CPTII,,, | Performed by: OBSTETRICS & GYNECOLOGY

## 2022-09-21 PROCEDURE — 4010F PR ACE/ARB THEARPY RXD/TAKEN: ICD-10-PCS | Mod: CPTII,,, | Performed by: OBSTETRICS & GYNECOLOGY

## 2022-09-21 PROCEDURE — 87480 BACTERIAL VAGINOSIS: ICD-10-PCS | Mod: ,,, | Performed by: CLINICAL MEDICAL LABORATORY

## 2022-09-21 PROCEDURE — 85025 CBC WITH DIFFERENTIAL: ICD-10-PCS | Mod: ,,, | Performed by: CLINICAL MEDICAL LABORATORY

## 2022-09-21 PROCEDURE — 81003 URINALYSIS: ICD-10-PCS | Mod: QW,,, | Performed by: CLINICAL MEDICAL LABORATORY

## 2022-09-21 PROCEDURE — 87480 CANDIDA DNA DIR PROBE: CPT | Mod: ,,, | Performed by: CLINICAL MEDICAL LABORATORY

## 2022-09-21 PROCEDURE — 87660 BACTERIAL VAGINOSIS: ICD-10-PCS | Mod: ,,, | Performed by: CLINICAL MEDICAL LABORATORY

## 2022-09-21 PROCEDURE — 4010F ACE/ARB THERAPY RXD/TAKEN: CPT | Mod: CPTII,,, | Performed by: OBSTETRICS & GYNECOLOGY

## 2022-09-21 PROCEDURE — 3074F PR MOST RECENT SYSTOLIC BLOOD PRESSURE < 130 MM HG: ICD-10-PCS | Mod: CPTII,,, | Performed by: OBSTETRICS & GYNECOLOGY

## 2022-09-21 PROCEDURE — 85025 COMPLETE CBC W/AUTO DIFF WBC: CPT | Mod: ,,, | Performed by: CLINICAL MEDICAL LABORATORY

## 2022-09-21 PROCEDURE — 1160F PR REVIEW ALL MEDS BY PRESCRIBER/CLIN PHARMACIST DOCUMENTED: ICD-10-PCS | Mod: CPTII,,, | Performed by: OBSTETRICS & GYNECOLOGY

## 2022-09-21 PROCEDURE — 36415 COLL VENOUS BLD VENIPUNCTURE: CPT | Mod: ,,, | Performed by: OBSTETRICS & GYNECOLOGY

## 2022-09-21 PROCEDURE — 81003 URINALYSIS AUTO W/O SCOPE: CPT | Mod: QW,,, | Performed by: CLINICAL MEDICAL LABORATORY

## 2022-09-21 PROCEDURE — 85651 RBC SED RATE NONAUTOMATED: CPT | Mod: ,,, | Performed by: CLINICAL MEDICAL LABORATORY

## 2022-09-21 PROCEDURE — 99214 PR OFFICE/OUTPT VISIT, EST, LEVL IV, 30-39 MIN: ICD-10-PCS | Mod: ,,, | Performed by: OBSTETRICS & GYNECOLOGY

## 2022-09-21 PROCEDURE — 87660 TRICHOMONAS VAGIN DIR PROBE: CPT | Mod: ,,, | Performed by: CLINICAL MEDICAL LABORATORY

## 2022-09-21 PROCEDURE — 87510 GARDNER VAG DNA DIR PROBE: CPT | Mod: ,,, | Performed by: CLINICAL MEDICAL LABORATORY

## 2022-09-21 PROCEDURE — 85651 SEDIMENTATION RATE, AUTOMATED: ICD-10-PCS | Mod: ,,, | Performed by: CLINICAL MEDICAL LABORATORY

## 2022-09-21 PROCEDURE — 3079F PR MOST RECENT DIASTOLIC BLOOD PRESSURE 80-89 MM HG: ICD-10-PCS | Mod: CPTII,,, | Performed by: OBSTETRICS & GYNECOLOGY

## 2022-09-21 PROCEDURE — 86304 CANCER ANTIGEN 125: ICD-10-PCS | Mod: ,,, | Performed by: CLINICAL MEDICAL LABORATORY

## 2022-09-21 PROCEDURE — 3008F BODY MASS INDEX DOCD: CPT | Mod: CPTII,,, | Performed by: OBSTETRICS & GYNECOLOGY

## 2022-09-21 PROCEDURE — 1160F RVW MEDS BY RX/DR IN RCRD: CPT | Mod: CPTII,,, | Performed by: OBSTETRICS & GYNECOLOGY

## 2022-09-21 PROCEDURE — 99214 OFFICE O/P EST MOD 30 MIN: CPT | Mod: ,,, | Performed by: OBSTETRICS & GYNECOLOGY

## 2022-09-21 PROCEDURE — 1159F MED LIST DOCD IN RCRD: CPT | Mod: CPTII,,, | Performed by: OBSTETRICS & GYNECOLOGY

## 2022-09-21 PROCEDURE — 36415 PR COLLECTION VENOUS BLOOD,VENIPUNCTURE: ICD-10-PCS | Mod: ,,, | Performed by: OBSTETRICS & GYNECOLOGY

## 2022-09-21 PROCEDURE — 3079F DIAST BP 80-89 MM HG: CPT | Mod: CPTII,,, | Performed by: OBSTETRICS & GYNECOLOGY

## 2022-09-21 PROCEDURE — 3074F SYST BP LT 130 MM HG: CPT | Mod: CPTII,,, | Performed by: OBSTETRICS & GYNECOLOGY

## 2022-09-21 PROCEDURE — 1159F PR MEDICATION LIST DOCUMENTED IN MEDICAL RECORD: ICD-10-PCS | Mod: CPTII,,, | Performed by: OBSTETRICS & GYNECOLOGY

## 2022-09-21 NOTE — PROGRESS NOTES
Gabriella Ulloa female  for   Chief Complaint   Patient presents with    Follow-up     Follow up on ultrasound results and pelvic pressure. Patient states she is having incision sharp pain from her surgery in February.           PHI:  27-year-old  3, para 3 Afro-American female returns for follow-up; patient is known pelvic endometriosis including lesions in the cul-de-sac pelvic sidewall and on the sigmoid colon.  Patient has severe dysmenorrhea.  She has menorrhagia.  Most recent menses was 2022.  Patient states the dysmenorrhea and the cycle with hypermenorrhea disabled her.  She denies dyspareunia.    She was at advised at her last appointment to use contraception for cycle control and attempt to decrease dysmenorrhea.  She never did fill the prescription.    Her pelvic ultrasound on 2022 was unremarkable.  This is typical findings of someone with peritoneal endometriosis.  She does relate that she does have painful bowel movements when in menses which is consistent with the sigmoid endometriosis.    She is relating for the past several days she has a left upper quadrant discomfort in this area of the left upper quadrant Surgiport.  Past Medical History:   Diagnosis Date    Anemia     Anxiety     Breakthrough bleeding on birth control pills 2015    Endometriosis of pelvic peritoneum 2020    cul-de-sac and sigmoid colon    Hypertension     Irritable bowel syndrome Chronic    Increased pain with bowel movements with menses      Past Surgical History:   Procedure Laterality Date     SECTION      D&C/Hysteroscopy with robotic and operative Laparoscopy  2020    DIAGNOSTIC LAPAROSCOPY N/A 2022    Procedure: LAPAROSCOPY, DIAGNOSTIC;  Surgeon: Otis Mccullough MD;  Location: [facility];  Service: OB/GYN;  Laterality: N/A;    DILATION AND CURETTAGE OF UTERUS  2020    ENDOMETRIAL BIOPSY N/A 2022    Procedure: BIOPSY, ENDOMETRIUM;  Surgeon: Otis Mccullough MD;   "Location: [facility] OR;  Service: OB/GYN;  Laterality: N/A;    HYSTEROSCOPY WITH DILATION AND CURETTAGE OF UTERUS N/A 2/8/2022    Procedure: HYSTEROSCOPY, WITH DILATION AND CURETTAGE OF UTERUS;  Surgeon: Otis Mccullough MD;  Location: [facility];  Service: OB/GYN;  Laterality: N/A;      Review of patient's allergies indicates:  No Known Allergies     ROS:Pertinent items are noted in HPI.    Physical exam:    /85 (BP Location: Left arm, Patient Position: Sitting)   Pulse 80   Temp 98.4 °F (36.9 °C)   Resp 16   Ht 5' 3" (1.6 m)   Wt 102.5 kg (226 lb)   LMP 08/31/2022   BMI 40.03 kg/m²      General Appearance: healthy, alert, no distress, smiling, BMI is 40.03    Chest:           Lungs: clear to auscultation bilaterally           Heart: regular rate and rhythm, S1, S2 normal, no murmur, click, rub or gallop    Abdomen:  Soft with normal bowel sounds; moderately obese; slightly tender left upper quadrant city shin without any evidence of ventral hernia.  Patient has lower suprapubic discomfort as well as adnexal region discomfort bilaterally.  There is no rebound tenderness.      Pelvic:  Normal vulva; speculum exam reveals of white slightly foam to discharge but no real odor noted.  Cervix is large.  The uterus is slightly tender around the uterosacral ligaments and the adnexa are tender.  Rectal exam agrees.    Extremity: normal    Skin: normal exam        Assessment:   Problem List Items Addressed This Visit          Cardiac/Vascular    Hypertension    Relevant Orders    CBC Auto Differential (Completed)    Sedimentation Rate (Completed)     (Completed)    Urinalysis (Completed)       Renal/    Endometriosis of pelvic peritoneum - Primary    Relevant Orders    CBC Auto Differential (Completed)    Sedimentation Rate (Completed)     (Completed)    Urinalysis (Completed)     Other Visit Diagnoses       Vaginal leukorrhea        White slightly foamy discharge without odor; affirm study " confirms BV.    Relevant Orders    CBC Auto Differential (Completed)    Sedimentation Rate (Completed)     (Completed)    Urinalysis (Completed)    Bacterial Vaginosis (Completed)    Bacterial vaginosis        Confirmed by affirm study date of service             Plan:  Affirm study             CBC, sed rate and  evaluate endometriosis             Recommendation is no change and observed; patient is a candidate for Lupron therapy; recommend robotic hysterectomy with possible BSO; recommend repeat initiation of contraception for cycle control hopefully decreased discomfort of menses - patient is undecided on options                 Addendum on 09/25/2022:  Dr. Otis Mccullough will treat with Melecio.

## 2022-09-21 NOTE — PATIENT INSTRUCTIONS
Dr. Otis Mccullough thanks you for this office visit at [facility].    Diagnosis for this visit:   Problem List Items Addressed This Visit          Cardiac/Vascular    Hypertension       Renal/    Endometriosis of pelvic peritoneum - Primary     Other Visit Diagnoses       Vaginal leukorrhea        White slightly foamy discharge without odor             The Plan:  Follow-up as needed      Please practice best food and exercise habits for your age.    Dr. Otis Mccullough recommends avoidance of smoking and illicit medications or habits.    Please call  or schedule for any change in your health.     Please keep the next scheduled appointment or call for any need to change the appointment.     Dr. Otis Mccullough recommends yearly exams for good health maintenance.      Thank you    Dr. Otis Mccullough  09/21/2022 9:29 AM

## 2022-09-26 ENCOUNTER — TELEPHONE (OUTPATIENT)
Dept: OBSTETRICS AND GYNECOLOGY | Facility: CLINIC | Age: 27
End: 2022-09-26
Payer: COMMERCIAL

## 2022-09-26 RX ORDER — METRONIDAZOLE 500 MG/1
500 TABLET ORAL 3 TIMES DAILY
Qty: 42 TABLET | Refills: 0 | Status: SHIPPED | OUTPATIENT
Start: 2022-09-26 | End: 2022-10-10

## 2022-09-26 NOTE — TELEPHONE ENCOUNTER
----- Message from Otis Mccullough MD sent at 9/26/2022  1:49 PM CDT -----  Call patient    Advise her that she has bacterial vaginosis on affirm study Dr. Mccullough has sent to her pharmacy Flagyl therapy.      9/26/2022  Per Dr. Mccullough, notified patient via phone re:  Affirm results have returned positive for BV.  Treatment with Flagyl three times a day for 14 days given.  Prescription has been sent to her pharmacy.  Patient to return to clinic, after completion of medication, for Follow Up and Repeat Affirm to test for cure.  Patient voiced agreement and understanding.    Follow Up and Repeat Affirm  10/11/2022 @ 10:15am at [facility]

## 2022-10-11 ENCOUNTER — OFFICE VISIT (OUTPATIENT)
Dept: OBSTETRICS AND GYNECOLOGY | Facility: CLINIC | Age: 27
End: 2022-10-11
Payer: COMMERCIAL

## 2022-10-11 VITALS
DIASTOLIC BLOOD PRESSURE: 83 MMHG | HEIGHT: 63 IN | HEART RATE: 81 BPM | TEMPERATURE: 98 F | SYSTOLIC BLOOD PRESSURE: 120 MMHG | BODY MASS INDEX: 39.51 KG/M2 | RESPIRATION RATE: 16 BRPM | WEIGHT: 223 LBS

## 2022-10-11 DIAGNOSIS — N80.30 ENDOMETRIOSIS OF PELVIC PERITONEUM: ICD-10-CM

## 2022-10-11 DIAGNOSIS — N76.0 BACTERIAL VAGINOSIS: Primary | ICD-10-CM

## 2022-10-11 DIAGNOSIS — B96.89 BACTERIAL VAGINOSIS: Primary | ICD-10-CM

## 2022-10-11 DIAGNOSIS — N94.6 DYSMENORRHEA: ICD-10-CM

## 2022-10-11 LAB
CANDIDA SPECIES: POSITIVE
GARDNERELLA: NEGATIVE
TRICHOMONAS: NEGATIVE

## 2022-10-11 PROCEDURE — 87510 GARDNER VAG DNA DIR PROBE: CPT | Mod: ,,, | Performed by: CLINICAL MEDICAL LABORATORY

## 2022-10-11 PROCEDURE — 99214 PR OFFICE/OUTPT VISIT, EST, LEVL IV, 30-39 MIN: ICD-10-PCS | Mod: ,,, | Performed by: OBSTETRICS & GYNECOLOGY

## 2022-10-11 PROCEDURE — 87480 BACTERIAL VAGINOSIS: ICD-10-PCS | Mod: ,,, | Performed by: CLINICAL MEDICAL LABORATORY

## 2022-10-11 PROCEDURE — 3008F BODY MASS INDEX DOCD: CPT | Mod: CPTII,,, | Performed by: OBSTETRICS & GYNECOLOGY

## 2022-10-11 PROCEDURE — 99214 OFFICE O/P EST MOD 30 MIN: CPT | Mod: ,,, | Performed by: OBSTETRICS & GYNECOLOGY

## 2022-10-11 PROCEDURE — 3008F PR BODY MASS INDEX (BMI) DOCUMENTED: ICD-10-PCS | Mod: CPTII,,, | Performed by: OBSTETRICS & GYNECOLOGY

## 2022-10-11 PROCEDURE — 87510 BACTERIAL VAGINOSIS: ICD-10-PCS | Mod: ,,, | Performed by: CLINICAL MEDICAL LABORATORY

## 2022-10-11 PROCEDURE — 1159F PR MEDICATION LIST DOCUMENTED IN MEDICAL RECORD: ICD-10-PCS | Mod: CPTII,,, | Performed by: OBSTETRICS & GYNECOLOGY

## 2022-10-11 PROCEDURE — 87660 BACTERIAL VAGINOSIS: ICD-10-PCS | Mod: ,,, | Performed by: CLINICAL MEDICAL LABORATORY

## 2022-10-11 PROCEDURE — 3079F DIAST BP 80-89 MM HG: CPT | Mod: CPTII,,, | Performed by: OBSTETRICS & GYNECOLOGY

## 2022-10-11 PROCEDURE — 4010F PR ACE/ARB THEARPY RXD/TAKEN: ICD-10-PCS | Mod: CPTII,,, | Performed by: OBSTETRICS & GYNECOLOGY

## 2022-10-11 PROCEDURE — 1160F PR REVIEW ALL MEDS BY PRESCRIBER/CLIN PHARMACIST DOCUMENTED: ICD-10-PCS | Mod: CPTII,,, | Performed by: OBSTETRICS & GYNECOLOGY

## 2022-10-11 PROCEDURE — 1160F RVW MEDS BY RX/DR IN RCRD: CPT | Mod: CPTII,,, | Performed by: OBSTETRICS & GYNECOLOGY

## 2022-10-11 PROCEDURE — 87480 CANDIDA DNA DIR PROBE: CPT | Mod: ,,, | Performed by: CLINICAL MEDICAL LABORATORY

## 2022-10-11 PROCEDURE — 4010F ACE/ARB THERAPY RXD/TAKEN: CPT | Mod: CPTII,,, | Performed by: OBSTETRICS & GYNECOLOGY

## 2022-10-11 PROCEDURE — 87660 TRICHOMONAS VAGIN DIR PROBE: CPT | Mod: ,,, | Performed by: CLINICAL MEDICAL LABORATORY

## 2022-10-11 PROCEDURE — 1159F MED LIST DOCD IN RCRD: CPT | Mod: CPTII,,, | Performed by: OBSTETRICS & GYNECOLOGY

## 2022-10-11 PROCEDURE — 3079F PR MOST RECENT DIASTOLIC BLOOD PRESSURE 80-89 MM HG: ICD-10-PCS | Mod: CPTII,,, | Performed by: OBSTETRICS & GYNECOLOGY

## 2022-10-11 PROCEDURE — 3074F PR MOST RECENT SYSTOLIC BLOOD PRESSURE < 130 MM HG: ICD-10-PCS | Mod: CPTII,,, | Performed by: OBSTETRICS & GYNECOLOGY

## 2022-10-11 PROCEDURE — 3074F SYST BP LT 130 MM HG: CPT | Mod: CPTII,,, | Performed by: OBSTETRICS & GYNECOLOGY

## 2022-10-11 RX ORDER — FLUCONAZOLE 100 MG/1
100 TABLET ORAL DAILY
Qty: 10 TABLET | Refills: 0 | Status: SHIPPED | OUTPATIENT
Start: 2022-10-11 | End: 2022-10-21

## 2022-10-11 NOTE — PROGRESS NOTES
"Gabriella Artemio female  for   Chief Complaint   Patient presents with    Follow-up     Follow up BV tx with Flagyl TID x14 days          PHI:  19-year-old  3, para 3 returns for follow-up.  She was found have bacterial vaginosis at her last visit was treated of for 2 weeks of Flagyl t.i.d..  Patient vaginal discharge with vaginal odor has resolved.  She is currently sexually inactive.    She has a past diagnosis of pelvic endometriosis as well as dysmenorrhea.  This menses was painful.  She is on no method of contraception.  Her last menstrual period was 2022.  She is trying to conceive.        Past Medical History:   Diagnosis Date    Anemia     Anxiety     Breakthrough bleeding on birth control pills 2015    Endometriosis of pelvic peritoneum 2020    cul-de-sac and sigmoid colon    Hypertension     Irritable bowel syndrome Chronic    Increased pain with bowel movements with menses      Past Surgical History:   Procedure Laterality Date     SECTION      D&C/Hysteroscopy with robotic and operative Laparoscopy  2020    DIAGNOSTIC LAPAROSCOPY N/A 2022    Procedure: LAPAROSCOPY, DIAGNOSTIC;  Surgeon: Otis Mccullough MD;  Location: [facility] OR;  Service: OB/GYN;  Laterality: N/A;    DILATION AND CURETTAGE OF UTERUS  2020    ENDOMETRIAL BIOPSY N/A 2022    Procedure: BIOPSY, ENDOMETRIUM;  Surgeon: Otis Mccullough MD;  Location: [facility] OR;  Service: OB/GYN;  Laterality: N/A;    HYSTEROSCOPY WITH DILATION AND CURETTAGE OF UTERUS N/A 2022    Procedure: HYSTEROSCOPY, WITH DILATION AND CURETTAGE OF UTERUS;  Surgeon: Otis Mccullough MD;  Location: [facility] OR;  Service: OB/GYN;  Laterality: N/A;      Review of patient's allergies indicates:  No Known Allergies     ROS:Pertinent items are noted in HPI.    Physical exam:    /83 (BP Location: Right arm, Patient Position: Sitting)   Pulse 81   Temp 98.4 °F (36.9 °C) (Temporal)   Resp 16   Ht 5' 3" (1.6 m)   " Wt 101.2 kg (223 lb)   BMI 39.50 kg/m²      General Appearance: healthy, alert, no distress    Chest:           Lungs: clear to auscultation bilaterally and normal percussion bilaterally           Heart: regular rate and rhythm, S1, S2 normal, no murmur, click, rub or gallop    Abdomen:  Unremarkable    Pelvic:  Vulva unremarkable; no vaginal discharge    Extremity: normal, extremities warm, no clubbing, no cyanosis, no edema, non-tender    Skin: normal exam        Assessment:   Problem List Items Addressed This Visit          Renal/    Endometriosis of pelvic peritoneum    Dysmenorrhea     Other Visit Diagnoses       Bacterial vaginosis    -  Primary    Currently asymptomatic after Flagyl therapy             Plan:  Affirm; monitor menstrual cycle; recommend avoidance of intercourse for several weeks and then condoms for wall to evaluate whether bacterial vaginosis is finally resolved.    Addendum on 10/11/2022:  Patient is asymptomatic and has a negative affirm study for bacterial vaginosis.  She was positive for yeast.  She will be sent prescription for Diflucan 100 mg daily for 10 days.

## 2022-10-11 NOTE — PATIENT INSTRUCTIONS
Dr. Otis Mccullough thanks you for this office visit at [facility].    Diagnosis for this visit:   Problem List Items Addressed This Visit          Renal/    Endometriosis of pelvic peritoneum    Dysmenorrhea     Other Visit Diagnoses       Bacterial vaginosis    -  Primary    Currently asymptomatic after Flagyl therapy             The Plan: Patient has clinically no evidence  of bacterial vaginosis.  She was advised to use use condoms or abstinence for several weeks to  whether bacterial vaginosis is finally resolved.    Please practice best food and exercise habits for your age.    Dr. Otis Mccullough recommends avoidance of smoking and illicit medications or habits.    Please call  or schedule for any change in your health.     Please keep the next scheduled appointment or call for any need to change the appointment.     Dr. Otis Mccullough recommends yearly exams for good health maintenance.      Thank you    Dr. Otis Mccullough  10/11/2022 11:51 AM

## 2022-10-13 ENCOUNTER — TELEPHONE (OUTPATIENT)
Dept: OBSTETRICS AND GYNECOLOGY | Facility: CLINIC | Age: 27
End: 2022-10-13
Payer: COMMERCIAL

## 2022-10-26 ENCOUNTER — TELEPHONE (OUTPATIENT)
Dept: OBSTETRICS AND GYNECOLOGY | Facility: CLINIC | Age: 27
End: 2022-10-26
Payer: COMMERCIAL

## 2022-11-07 ENCOUNTER — TELEPHONE (OUTPATIENT)
Dept: OBSTETRICS AND GYNECOLOGY | Facility: CLINIC | Age: 27
End: 2022-11-07
Payer: COMMERCIAL

## 2022-11-07 NOTE — TELEPHONE ENCOUNTER
Returned patient's call re:  patient was given prescription for birth control pill, in June 2022, but stated that she did not have it filled and the pharmacy cancelled it.  She stated that she now wants to have it filled.  She has been seen by Dr. Mccullough, recently, in September an October 2022.  Prescription to go to [facility] ([place], [place]).    11/08/2022:  Dr. Otis Mccullough has reviewed her past prescription for Loestrin 1.5-30.  According to the above note she never did fill this prescription and the pharmacy has discontinued.    Dr. Otis Mccullough will prescribe Loestrin 1.5-30 for 3 months with 1 refill and she should make another appointment for a follow-up in approximately 1 or 2 months.    The patient was advised not to trust the pill for contraception for 1 month and start the pill on the ending of a Sunday of her menses.

## 2022-11-08 RX ORDER — NORETHINDRONE ACETATE AND ETHINYL ESTRADIOL 1.5-30(21)
1 KIT ORAL DAILY
Qty: 90 TABLET | Refills: 1 | Status: SHIPPED | OUTPATIENT
Start: 2022-11-08 | End: 2023-05-22

## 2022-11-15 ENCOUNTER — OFFICE VISIT (OUTPATIENT)
Dept: FAMILY MEDICINE | Facility: CLINIC | Age: 27
End: 2022-11-15
Payer: COMMERCIAL

## 2022-11-15 VITALS
DIASTOLIC BLOOD PRESSURE: 82 MMHG | BODY MASS INDEX: 40.79 KG/M2 | OXYGEN SATURATION: 98 % | TEMPERATURE: 98 F | HEART RATE: 84 BPM | WEIGHT: 230.19 LBS | SYSTOLIC BLOOD PRESSURE: 135 MMHG | HEIGHT: 63 IN

## 2022-11-15 DIAGNOSIS — A08.4 VIRAL GASTROENTERITIS: Primary | ICD-10-CM

## 2022-11-15 DIAGNOSIS — R11.2 NAUSEA AND VOMITING, UNSPECIFIED VOMITING TYPE: ICD-10-CM

## 2022-11-15 DIAGNOSIS — R19.7 DIARRHEA, UNSPECIFIED TYPE: ICD-10-CM

## 2022-11-15 PROBLEM — E66.9 OBESE: Status: ACTIVE | Noted: 2022-11-15

## 2022-11-15 PROBLEM — G47.30 SLEEP APNEA: Status: ACTIVE | Noted: 2022-11-15

## 2022-11-15 PROBLEM — G47.10 HYPERSOMNIA: Status: ACTIVE | Noted: 2022-11-15

## 2022-11-15 PROCEDURE — 4010F PR ACE/ARB THEARPY RXD/TAKEN: ICD-10-PCS | Mod: CPTII,,, | Performed by: NURSE PRACTITIONER

## 2022-11-15 PROCEDURE — 99213 PR OFFICE/OUTPT VISIT, EST, LEVL III, 20-29 MIN: ICD-10-PCS | Mod: ,,, | Performed by: NURSE PRACTITIONER

## 2022-11-15 PROCEDURE — 3008F BODY MASS INDEX DOCD: CPT | Mod: CPTII,,, | Performed by: NURSE PRACTITIONER

## 2022-11-15 PROCEDURE — 3075F SYST BP GE 130 - 139MM HG: CPT | Mod: CPTII,,, | Performed by: NURSE PRACTITIONER

## 2022-11-15 PROCEDURE — 3008F PR BODY MASS INDEX (BMI) DOCUMENTED: ICD-10-PCS | Mod: CPTII,,, | Performed by: NURSE PRACTITIONER

## 2022-11-15 PROCEDURE — 4010F ACE/ARB THERAPY RXD/TAKEN: CPT | Mod: CPTII,,, | Performed by: NURSE PRACTITIONER

## 2022-11-15 PROCEDURE — 3075F PR MOST RECENT SYSTOLIC BLOOD PRESS GE 130-139MM HG: ICD-10-PCS | Mod: CPTII,,, | Performed by: NURSE PRACTITIONER

## 2022-11-15 PROCEDURE — 1159F PR MEDICATION LIST DOCUMENTED IN MEDICAL RECORD: ICD-10-PCS | Mod: CPTII,,, | Performed by: NURSE PRACTITIONER

## 2022-11-15 PROCEDURE — 99213 OFFICE O/P EST LOW 20 MIN: CPT | Mod: ,,, | Performed by: NURSE PRACTITIONER

## 2022-11-15 PROCEDURE — 1159F MED LIST DOCD IN RCRD: CPT | Mod: CPTII,,, | Performed by: NURSE PRACTITIONER

## 2022-11-15 PROCEDURE — 3079F PR MOST RECENT DIASTOLIC BLOOD PRESSURE 80-89 MM HG: ICD-10-PCS | Mod: CPTII,,, | Performed by: NURSE PRACTITIONER

## 2022-11-15 PROCEDURE — 3079F DIAST BP 80-89 MM HG: CPT | Mod: CPTII,,, | Performed by: NURSE PRACTITIONER

## 2022-11-15 RX ORDER — ONDANSETRON 4 MG/1
4 TABLET, ORALLY DISINTEGRATING ORAL 2 TIMES DAILY
Qty: 10 TABLET | Refills: 0 | Status: SHIPPED | OUTPATIENT
Start: 2022-11-15 | End: 2023-03-01

## 2022-11-15 RX ORDER — DICYCLOMINE HYDROCHLORIDE 20 MG/1
20 TABLET ORAL EVERY 6 HOURS
Qty: 60 TABLET | Refills: 0 | Status: SHIPPED | OUTPATIENT
Start: 2022-11-15 | End: 2022-12-15

## 2022-11-15 NOTE — PROGRESS NOTES
Jayde Cole DNP   [address] [place] [address]     PATIENT NAME: Gabriella Ulloa  : 1995  DATE: 11/15/22  MRN: [id number]      Billing Provider: Jayde Cole DNP  Level of Service:   Patient PCP Information       Provider PCP Type    Jayde Cole DNP General            Reason for Visit / Chief Complaint: Diarrhea and Headache       Update PCP  Update Chief Complaint         History of Present Illness / Problem Focused Workflow     Gabriella Ulloa presents to the clinic with Diarrhea and Headache     Diarrhea   Associated symptoms include headaches.   Headache     Review of Systems     Review of Systems   Gastrointestinal:  Positive for diarrhea.   Neurological:  Positive for headaches.      Medical / Social / Family History     Past Medical History:   Diagnosis Date    Anemia     Anxiety     Breakthrough bleeding on birth control pills 2015    Endometriosis of pelvic peritoneum 2020    cul-de-sac and sigmoid colon    Hypertension     Irritable bowel syndrome Chronic    Increased pain with bowel movements with menses       Past Surgical History:   Procedure Laterality Date     SECTION      D&C/Hysteroscopy with robotic and operative Laparoscopy  2020    DIAGNOSTIC LAPAROSCOPY N/A 2022    Procedure: LAPAROSCOPY, DIAGNOSTIC;  Surgeon: Otis Mccullough MD;  Location: [facility] OR;  Service: OB/GYN;  Laterality: N/A;    DILATION AND CURETTAGE OF UTERUS  2020    ENDOMETRIAL BIOPSY N/A 2022    Procedure: BIOPSY, ENDOMETRIUM;  Surgeon: Otis Mccullough MD;  Location: [facility] OR;  Service: OB/GYN;  Laterality: N/A;    HYSTEROSCOPY WITH DILATION AND CURETTAGE OF UTERUS N/A 2022    Procedure: HYSTEROSCOPY, WITH DILATION AND CURETTAGE OF UTERUS;  Surgeon: Otis Mccullough MD;  Location: [facility];  Service: OB/GYN;  Laterality: N/A;       Social History  Ms.  reports that she has never smoked. She has never used smokeless  "tobacco. She reports that she does not drink alcohol and does not use drugs.    Family History  Ms.'s family history includes Breast cancer in an other family member; Diabetes in her mother and another family member; Heart disease in her mother; Hypertension in an other family member; Liver cancer in an other family member; Prostate cancer in her maternal grandfather; Stomach cancer in an other family member.    Medications and Allergies     Medications  No outpatient medications have been marked as taking for the 11/15/22 encounter (Office Visit) with Jayde Cole DNP.       Allergies  Review of patient's allergies indicates:  No Known Allergies    Physical Examination   /82   Pulse 84   Temp 98 °F (36.7 °C)   Ht 5' 3" (1.6 m)   Wt 104.4 kg (230 lb 3.2 oz)   SpO2 98%   BMI 40.78 kg/m²    Physical Exam  Vitals and nursing note reviewed.   HENT:      Head: Normocephalic.      Nose: Nose normal.      Mouth/Throat:      Mouth: Mucous membranes are moist.   Eyes:      Extraocular Movements: Extraocular movements intact.      Conjunctiva/sclera: Conjunctivae normal.      Pupils: Pupils are equal, round, and reactive to light.   Cardiovascular:      Rate and Rhythm: Normal rate and regular rhythm.      Pulses: Normal pulses.      Heart sounds: Normal heart sounds.   Pulmonary:      Effort: Pulmonary effort is normal.      Breath sounds: Normal breath sounds.   Abdominal:      Tenderness: There is abdominal tenderness.   Musculoskeletal:      Cervical back: Normal range of motion.   Skin:     General: Skin is warm and dry.      Capillary Refill: Capillary refill takes less than 2 seconds.   Neurological:      General: No focal deficit present.      Mental Status: She is alert and oriented to person, place, and time. Mental status is at baseline.   Psychiatric:         Mood and Affect: Mood normal.         Behavior: Behavior normal.         Thought Content: Thought content normal.         Judgment: Judgment " normal.        Assessment and Plan (including Health Maintenance)      Problem List  Smart Sets  Document Outside HM   :    Plan:         Health Maintenance Due   Topic Date Due    Hepatitis C Screening  Never done    HIV Screening  Never done    TETANUS VACCINE  Never done    COVID-19 Vaccine (3 - Booster for Moderna series) 11/26/2021       Problem List Items Addressed This Visit          ID    Viral gastroenteritis - Primary       GI    Diarrhea    Nausea and vomiting       Health Maintenance Topics with due status: Not Due       Topic Last Completion Date    Pap Smear 11/09/2021       Future Appointments   Date Time Provider Department Center   1/11/2023 10:15 AM Otis Mccullough MD [facility]            Signature:  Jayde Cole, RERE      [address] [place] [address]    Date of encounter: 11/15/22

## 2022-11-15 NOTE — LETTER
November 15, 2022      [facility]  [address] [address]  Phone: [phone]  Fax: [phone]       Patient: Gabriella Ulloa   YOB: 1995  Date of Visit: 11/15/2022    To Whom It May Concern:    Elle Ulloa  was at [facility] on 11/15/2022. The patient may return to work/school on 11/17/2022 with no restrictions. If you have any questions or concerns, or if I can be of further assistance, please do not hesitate to contact me.    Sincerely,    Jayde Cole, DNP

## 2022-11-16 ENCOUNTER — CLINICAL SUPPORT (OUTPATIENT)
Dept: FAMILY MEDICINE | Facility: CLINIC | Age: 27
End: 2022-11-16
Payer: COMMERCIAL

## 2022-11-16 PROCEDURE — 90471 FLU VACCINE (QUAD) GREATER THAN OR EQUAL TO 3YO PRESERVATIVE FREE IM: ICD-10-PCS | Mod: ,,, | Performed by: NURSE PRACTITIONER

## 2022-11-16 PROCEDURE — 90471 IMMUNIZATION ADMIN: CPT | Mod: ,,, | Performed by: NURSE PRACTITIONER

## 2022-11-16 PROCEDURE — 90686 FLU VACCINE (QUAD) GREATER THAN OR EQUAL TO 3YO PRESERVATIVE FREE IM: ICD-10-PCS | Mod: ,,, | Performed by: NURSE PRACTITIONER

## 2022-11-16 PROCEDURE — 90686 IIV4 VACC NO PRSV 0.5 ML IM: CPT | Mod: ,,, | Performed by: NURSE PRACTITIONER

## 2023-01-11 ENCOUNTER — OFFICE VISIT (OUTPATIENT)
Dept: OBSTETRICS AND GYNECOLOGY | Facility: CLINIC | Age: 28
End: 2023-01-11
Payer: COMMERCIAL

## 2023-01-11 VITALS
TEMPERATURE: 98 F | WEIGHT: 231 LBS | RESPIRATION RATE: 16 BRPM | DIASTOLIC BLOOD PRESSURE: 88 MMHG | HEART RATE: 76 BPM | BODY MASS INDEX: 40.93 KG/M2 | HEIGHT: 63 IN | SYSTOLIC BLOOD PRESSURE: 113 MMHG

## 2023-01-11 DIAGNOSIS — Z30.41 ENCOUNTER FOR SURVEILLANCE OF CONTRACEPTIVE PILLS: ICD-10-CM

## 2023-01-11 DIAGNOSIS — N93.9 ABNORMAL UTERINE BLEEDING: ICD-10-CM

## 2023-01-11 DIAGNOSIS — N80.30 ENDOMETRIOSIS OF PELVIC PERITONEUM: Primary | ICD-10-CM

## 2023-01-11 DIAGNOSIS — E66.9 OBESITY (BMI 35.0-39.9 WITHOUT COMORBIDITY): ICD-10-CM

## 2023-01-11 DIAGNOSIS — N94.6 DYSMENORRHEA: ICD-10-CM

## 2023-01-11 LAB
BASOPHILS # BLD AUTO: 0.04 K/UL (ref 0–0.2)
BASOPHILS NFR BLD AUTO: 0.7 % (ref 0–1)
BILIRUB UR QL STRIP: NEGATIVE
CANDIDA SPECIES: NEGATIVE
CLARITY UR: CLEAR
COLOR UR: NORMAL
DIFFERENTIAL METHOD BLD: ABNORMAL
EOSINOPHIL # BLD AUTO: 0.13 K/UL (ref 0–0.5)
EOSINOPHIL NFR BLD AUTO: 2.4 % (ref 1–4)
ERYTHROCYTE [DISTWIDTH] IN BLOOD BY AUTOMATED COUNT: 17.6 % (ref 11.5–14.5)
GARDNERELLA: POSITIVE
GLUCOSE UR STRIP-MCNC: NORMAL MG/DL
HCG SERPL-ACNC: <1 MIU/ML
HCT VFR BLD AUTO: 38.3 % (ref 38–47)
HGB BLD-MCNC: 11.6 G/DL (ref 12–16)
IMM GRANULOCYTES # BLD AUTO: 0.01 K/UL (ref 0–0.04)
IMM GRANULOCYTES NFR BLD: 0.2 % (ref 0–0.4)
KETONES UR STRIP-SCNC: NEGATIVE MG/DL
LEUKOCYTE ESTERASE UR QL STRIP: NEGATIVE
LYMPHOCYTES # BLD AUTO: 2.19 K/UL (ref 1–4.8)
LYMPHOCYTES NFR BLD AUTO: 40.1 % (ref 27–41)
MCH RBC QN AUTO: 24.2 PG (ref 27–31)
MCHC RBC AUTO-ENTMCNC: 30.3 G/DL (ref 32–36)
MCV RBC AUTO: 79.8 FL (ref 80–96)
MONOCYTES # BLD AUTO: 0.45 K/UL (ref 0–0.8)
MONOCYTES NFR BLD AUTO: 8.2 % (ref 2–6)
MPC BLD CALC-MCNC: 9.6 FL (ref 9.4–12.4)
NEUTROPHILS # BLD AUTO: 2.64 K/UL (ref 1.8–7.7)
NEUTROPHILS NFR BLD AUTO: 48.4 % (ref 53–65)
NITRITE UR QL STRIP: NEGATIVE
NRBC # BLD AUTO: 0 X10E3/UL
NRBC, AUTO (.00): 0 %
PH UR STRIP: 6.5 PH UNITS
PLATELET # BLD AUTO: 387 K/UL (ref 150–400)
PROT UR QL STRIP: NEGATIVE
RBC # BLD AUTO: 4.8 M/UL (ref 4.2–5.4)
RBC # UR STRIP: NEGATIVE /UL
SP GR UR STRIP: 1.01
TRICHOMONAS: NEGATIVE
UROBILINOGEN UR STRIP-ACNC: NORMAL MG/DL
WBC # BLD AUTO: 5.46 K/UL (ref 4.5–11)

## 2023-01-11 PROCEDURE — 1160F PR REVIEW ALL MEDS BY PRESCRIBER/CLIN PHARMACIST DOCUMENTED: ICD-10-PCS | Mod: CPTII,,, | Performed by: OBSTETRICS & GYNECOLOGY

## 2023-01-11 PROCEDURE — 87660 BACTERIAL VAGINOSIS: ICD-10-PCS | Mod: ,,, | Performed by: CLINICAL MEDICAL LABORATORY

## 2023-01-11 PROCEDURE — 3008F PR BODY MASS INDEX (BMI) DOCUMENTED: ICD-10-PCS | Mod: CPTII,,, | Performed by: OBSTETRICS & GYNECOLOGY

## 2023-01-11 PROCEDURE — 84702 HCG, TOTAL, QUANTITATIVE: ICD-10-PCS | Mod: ,,, | Performed by: CLINICAL MEDICAL LABORATORY

## 2023-01-11 PROCEDURE — 4010F PR ACE/ARB THEARPY RXD/TAKEN: ICD-10-PCS | Mod: CPTII,,, | Performed by: OBSTETRICS & GYNECOLOGY

## 2023-01-11 PROCEDURE — 87510 GARDNER VAG DNA DIR PROBE: CPT | Mod: ,,, | Performed by: CLINICAL MEDICAL LABORATORY

## 2023-01-11 PROCEDURE — 4010F ACE/ARB THERAPY RXD/TAKEN: CPT | Mod: CPTII,,, | Performed by: OBSTETRICS & GYNECOLOGY

## 2023-01-11 PROCEDURE — 36415 COLL VENOUS BLD VENIPUNCTURE: CPT | Mod: ,,, | Performed by: OBSTETRICS & GYNECOLOGY

## 2023-01-11 PROCEDURE — 87660 TRICHOMONAS VAGIN DIR PROBE: CPT | Mod: ,,, | Performed by: CLINICAL MEDICAL LABORATORY

## 2023-01-11 PROCEDURE — 87510 BACTERIAL VAGINOSIS: ICD-10-PCS | Mod: ,,, | Performed by: CLINICAL MEDICAL LABORATORY

## 2023-01-11 PROCEDURE — 3074F SYST BP LT 130 MM HG: CPT | Mod: CPTII,,, | Performed by: OBSTETRICS & GYNECOLOGY

## 2023-01-11 PROCEDURE — 3008F BODY MASS INDEX DOCD: CPT | Mod: CPTII,,, | Performed by: OBSTETRICS & GYNECOLOGY

## 2023-01-11 PROCEDURE — 1159F MED LIST DOCD IN RCRD: CPT | Mod: CPTII,,, | Performed by: OBSTETRICS & GYNECOLOGY

## 2023-01-11 PROCEDURE — 3079F DIAST BP 80-89 MM HG: CPT | Mod: CPTII,,, | Performed by: OBSTETRICS & GYNECOLOGY

## 2023-01-11 PROCEDURE — 87480 BACTERIAL VAGINOSIS: ICD-10-PCS | Mod: ,,, | Performed by: CLINICAL MEDICAL LABORATORY

## 2023-01-11 PROCEDURE — 3074F PR MOST RECENT SYSTOLIC BLOOD PRESSURE < 130 MM HG: ICD-10-PCS | Mod: CPTII,,, | Performed by: OBSTETRICS & GYNECOLOGY

## 2023-01-11 PROCEDURE — 36415 PR COLLECTION VENOUS BLOOD,VENIPUNCTURE: ICD-10-PCS | Mod: ,,, | Performed by: OBSTETRICS & GYNECOLOGY

## 2023-01-11 PROCEDURE — 81003 URINALYSIS AUTO W/O SCOPE: CPT | Mod: QW,,, | Performed by: CLINICAL MEDICAL LABORATORY

## 2023-01-11 PROCEDURE — 85025 CBC WITH DIFFERENTIAL: ICD-10-PCS | Mod: ,,, | Performed by: CLINICAL MEDICAL LABORATORY

## 2023-01-11 PROCEDURE — 1159F PR MEDICATION LIST DOCUMENTED IN MEDICAL RECORD: ICD-10-PCS | Mod: CPTII,,, | Performed by: OBSTETRICS & GYNECOLOGY

## 2023-01-11 PROCEDURE — 85025 COMPLETE CBC W/AUTO DIFF WBC: CPT | Mod: ,,, | Performed by: CLINICAL MEDICAL LABORATORY

## 2023-01-11 PROCEDURE — 81003 URINALYSIS: ICD-10-PCS | Mod: QW,,, | Performed by: CLINICAL MEDICAL LABORATORY

## 2023-01-11 PROCEDURE — 3079F PR MOST RECENT DIASTOLIC BLOOD PRESSURE 80-89 MM HG: ICD-10-PCS | Mod: CPTII,,, | Performed by: OBSTETRICS & GYNECOLOGY

## 2023-01-11 PROCEDURE — 84702 CHORIONIC GONADOTROPIN TEST: CPT | Mod: ,,, | Performed by: CLINICAL MEDICAL LABORATORY

## 2023-01-11 PROCEDURE — 1160F RVW MEDS BY RX/DR IN RCRD: CPT | Mod: CPTII,,, | Performed by: OBSTETRICS & GYNECOLOGY

## 2023-01-11 PROCEDURE — 87480 CANDIDA DNA DIR PROBE: CPT | Mod: ,,, | Performed by: CLINICAL MEDICAL LABORATORY

## 2023-01-11 PROCEDURE — 99215 PR OFFICE/OUTPT VISIT, EST, LEVL V, 40-54 MIN: ICD-10-PCS | Mod: ,,, | Performed by: OBSTETRICS & GYNECOLOGY

## 2023-01-11 PROCEDURE — 99215 OFFICE O/P EST HI 40 MIN: CPT | Mod: ,,, | Performed by: OBSTETRICS & GYNECOLOGY

## 2023-01-11 RX ORDER — CLOTRIMAZOLE AND BETAMETHASONE DIPROPIONATE 10; .64 MG/G; MG/G
CREAM TOPICAL 2 TIMES DAILY
COMMUNITY
Start: 2022-12-30 | End: 2023-03-01

## 2023-01-11 NOTE — PATIENT INSTRUCTIONS
Dr. Otis Mccullough thanks you for this office visit at [facility].    Diagnosis for this visit:   Problem List Items Addressed This Visit          Renal/    Endometriosis of pelvic peritoneum - Primary    Relevant Orders    CBC Auto Differential    Urinalysis    Bacterial Vaginosis    Dysmenorrhea    Relevant Orders    CBC Auto Differential    Urinalysis    Bacterial Vaginosis    Abnormal uterine bleeding    Relevant Orders    CBC Auto Differential    Urinalysis    Bacterial Vaginosis       Endocrine    Obesity (BMI 35.0-39.9 without comorbidity)    Relevant Orders    CBC Auto Differential    Urinalysis    Bacterial Vaginosis     Other Visit Diagnoses       Encounter for surveillance of contraceptive pills                 The Plan: Quantitative hCG; affirm study; CBC; scheduled for pelvic ultrasound; patient relates that she is interested in a tubal sterilization.  She is aware that this option would not change the symptomatology of endometriosis or possibly make it worse if she is normal menstrual cycles.  Long-acting contraception was discussed such as Mirena and the Nexplanon and the patient definitely does not want these options.  Patient is considering stopping the birth control pill at the next cycle.    Dr. Otis Mccullough did advise the best option would be a DVH; patient is still interested in tubal sterilization as another option.  If she is interested she can call back for scheduling.    Follow-up the patient in about 6 weeks.      Please practice best food and exercise habits for your age.    Dr. Otis Mccullough recommends avoidance of smoking and illicit medications or habits.    Please call  or schedule for any change in your health.     Please keep the next scheduled appointment or call for any need to change the appointment.     Dr. Otis Mccullough recommends yearly exams for good health maintenance.      Thank you    Dr. Otis Mccullough  01/11/2023 11:07 AM

## 2023-01-11 NOTE — PROGRESS NOTES
Gabriella Ulloa female  for   Chief Complaint   Patient presents with    Follow-up     Repeat affirm study      Threatened Miscarriage     Patient states she may have had a miscarriage in 2022.       OB History          3    Para   3    Term                AB        Living   3         SAB        IAB        Ectopic        Multiple        Live Births                      HPI:  Patient is here for follow-up of history of yeast infections and also endometriosis.  She currently is 27 years of age  3, para 3 Afro-American female.    Patient is on oral contraception and she admits she has not missed any contraceptive pill.      As regardsto the cramps of endometriosis, patient feels that the pill has not been helpful and she plans to discontinue next month oral contraception.      Her most recent normal menstrual period was 2022.  Subsequently in December she developed breast tenderness and had a faint home positive urine test.  Patient then had loss of symptoms of pregnancy.  She had loss of nausea.  She experienced what she thought was a miscarriage around her expected menses and 2022.  Subsequently, she has no gynecologic complaints referable to a miscarriage.    Past Medical History:   Diagnosis Date    Anemia     Anxiety     Breakthrough bleeding on birth control pills 2015    Endometriosis of pelvic peritoneum 2020    cul-de-sac and sigmoid colon    Hypertension     Irritable bowel syndrome Chronic    Increased pain with bowel movements with menses      Past Surgical History:   Procedure Laterality Date     SECTION      D&C/Hysteroscopy with robotic and operative Laparoscopy  2020    DIAGNOSTIC LAPAROSCOPY N/A 2022    Procedure: LAPAROSCOPY, DIAGNOSTIC;  Surgeon: Otis Mccullough MD;  Location: [facility];  Service: OB/GYN;  Laterality: N/A;    DILATION AND CURETTAGE OF UTERUS  2020    ENDOMETRIAL BIOPSY N/A 2022    Procedure:  "BIOPSY, ENDOMETRIUM;  Surgeon: Otis Mccullough MD;  Location: [facility] OR;  Service: OB/GYN;  Laterality: N/A;    HYSTEROSCOPY WITH DILATION AND CURETTAGE OF UTERUS N/A 2/8/2022    Procedure: HYSTEROSCOPY, WITH DILATION AND CURETTAGE OF UTERUS;  Surgeon: Otis Mccullough MD;  Location: [facility] OR;  Service: OB/GYN;  Laterality: N/A;      Review of patient's allergies indicates:  No Known Allergies     Review of Systems:Pertinent items are noted in HPI.     Physical exam:    /88 (BP Location: Right arm, Patient Position: Sitting)   Pulse 76   Temp 98.4 °F (36.9 °C)   Resp 16   Ht 5' 3" (1.6 m)   Wt 104.8 kg (231 lb)   LMP 12/18/2022   BMI 40.92 kg/m²      General Appearance: healthy, alert, no distress, smiling    HEENT: Normal exam    Lymphatic: no palpable lymphadenopathy, no hepatosplenomegaly    Chest:           Breasts:No dimpling, nipple retraction or discharge. No masses or nodes., Normal to inspection, Normal breast tissue bilaterally, and no evidence on examination of either large breasts of Bland gland proliferation.           Lungs:clear to auscultation bilaterally           Heart: regular rate and rhythm, S1, S2 normal, no murmur, click, rub or gallop    Abdomen: soft, non-tender, without masses or organomegaly, without guarding, without rebound, and obese and nontender    Pelvic:                    Vulva: normal appearing vulva with no masses, tenderness or lesions; speculum exam reveals minimal slight nonodorous discharge and no clumping this.                   Cervix: normal appearing cervix without discharge or lesions, multiparous os, no significant vaginal discharge and nontender cervix that is firm with no discoloration or congested bluish discoloration                    Uterus:  Uterus is anteflexed and not enlarged be under parity and nontender.  Uterus is not boggy in sensation.                     Annexa(e): normal adnexa in size, nontender and no masses                   " Rectal: normal rectal, no masses.     Extremity: normal    Skin: normal exam    Psych and neurologic exam: WNL      Assessment:   Problem List Items Addressed This Visit          Renal/    Endometriosis of pelvic peritoneum - Primary    Dysmenorrhea    Abnormal uterine bleeding       Endocrine    Obesity (BMI 35.0-39.9 without comorbidity)        Plan:  Quantitative hCG; affirm study; CBC; scheduled for pelvic ultrasound; patient relates that she is interested in a tubal sterilization.  She is aware that this option would not change the symptomatology of endometriosis or possibly make it worse if she is normal menstrual cycles.  Long-acting contraception was discussed such as Mirena and the Nexplanon and the patient definitely does not want these options.  Patient is considering stopping the birth control pill at the next cycle.    Dr. Otis Mccullough did advise the best option would be a DVH; patient is still interested in tubal sterilization as another option.  If she is interested she can call back for scheduling.    Follow-up the patient in about 6 weeks.    Addendum on 01/12/2023:  Patient was found have bacterial vaginosis.  She will be treated with Flagyl.    Addendum on 01/17/2023:  Patient wants to scheduled for tubal sterilization.  This will be scheduled in February of 2023.

## 2023-01-12 ENCOUNTER — TELEPHONE (OUTPATIENT)
Dept: OBSTETRICS AND GYNECOLOGY | Facility: CLINIC | Age: 28
End: 2023-01-12
Payer: COMMERCIAL

## 2023-01-12 RX ORDER — METRONIDAZOLE 500 MG/1
500 TABLET ORAL 3 TIMES DAILY
Qty: 42 TABLET | Refills: 0 | Status: SHIPPED | OUTPATIENT
Start: 2023-01-12 | End: 2023-01-26

## 2023-01-13 NOTE — TELEPHONE ENCOUNTER
----- Message from Otis Mccullough MD   Addendum on 01/12/2023:  Patient was found have bacterial vaginosis.  She will be treated with Flagyl.    Flagyl was called into her pharmacy.    Per Dr. Mccullough informed pt of lab results and recommended treatment. Voiced understanding

## 2023-01-17 ENCOUNTER — OFFICE VISIT (OUTPATIENT)
Dept: FAMILY MEDICINE | Facility: CLINIC | Age: 28
End: 2023-01-17
Payer: COMMERCIAL

## 2023-01-17 VITALS
SYSTOLIC BLOOD PRESSURE: 117 MMHG | HEIGHT: 63 IN | WEIGHT: 228.63 LBS | TEMPERATURE: 98 F | HEART RATE: 80 BPM | DIASTOLIC BLOOD PRESSURE: 73 MMHG | RESPIRATION RATE: 18 BRPM | BODY MASS INDEX: 40.51 KG/M2

## 2023-01-17 DIAGNOSIS — J02.9 SORE THROAT: ICD-10-CM

## 2023-01-17 DIAGNOSIS — J01.90 ACUTE NON-RECURRENT SINUSITIS, UNSPECIFIED LOCATION: Primary | ICD-10-CM

## 2023-01-17 DIAGNOSIS — R05.1 ACUTE COUGH: ICD-10-CM

## 2023-01-17 PROCEDURE — 3078F PR MOST RECENT DIASTOLIC BLOOD PRESSURE < 80 MM HG: ICD-10-PCS | Mod: CPTII,,, | Performed by: NURSE PRACTITIONER

## 2023-01-17 PROCEDURE — 1159F MED LIST DOCD IN RCRD: CPT | Mod: CPTII,,, | Performed by: NURSE PRACTITIONER

## 2023-01-17 PROCEDURE — 3008F BODY MASS INDEX DOCD: CPT | Mod: CPTII,,, | Performed by: NURSE PRACTITIONER

## 2023-01-17 PROCEDURE — 87880 POCT RAPID STREP A: ICD-10-PCS | Mod: QW,,, | Performed by: NURSE PRACTITIONER

## 2023-01-17 PROCEDURE — 87428 POCT SARS-COV2 (COVID) WITH FLU ANTIGEN: ICD-10-PCS | Mod: QW,,, | Performed by: NURSE PRACTITIONER

## 2023-01-17 PROCEDURE — 99213 PR OFFICE/OUTPT VISIT, EST, LEVL III, 20-29 MIN: ICD-10-PCS | Mod: ,,, | Performed by: NURSE PRACTITIONER

## 2023-01-17 PROCEDURE — 87428 SARSCOV & INF VIR A&B AG IA: CPT | Mod: QW,,, | Performed by: NURSE PRACTITIONER

## 2023-01-17 PROCEDURE — 3074F SYST BP LT 130 MM HG: CPT | Mod: CPTII,,, | Performed by: NURSE PRACTITIONER

## 2023-01-17 PROCEDURE — 99213 OFFICE O/P EST LOW 20 MIN: CPT | Mod: ,,, | Performed by: NURSE PRACTITIONER

## 2023-01-17 PROCEDURE — 4010F PR ACE/ARB THEARPY RXD/TAKEN: ICD-10-PCS | Mod: CPTII,,, | Performed by: NURSE PRACTITIONER

## 2023-01-17 PROCEDURE — 1159F PR MEDICATION LIST DOCUMENTED IN MEDICAL RECORD: ICD-10-PCS | Mod: CPTII,,, | Performed by: NURSE PRACTITIONER

## 2023-01-17 PROCEDURE — 3074F PR MOST RECENT SYSTOLIC BLOOD PRESSURE < 130 MM HG: ICD-10-PCS | Mod: CPTII,,, | Performed by: NURSE PRACTITIONER

## 2023-01-17 PROCEDURE — 87880 STREP A ASSAY W/OPTIC: CPT | Mod: QW,,, | Performed by: NURSE PRACTITIONER

## 2023-01-17 PROCEDURE — 3078F DIAST BP <80 MM HG: CPT | Mod: CPTII,,, | Performed by: NURSE PRACTITIONER

## 2023-01-17 PROCEDURE — 4010F ACE/ARB THERAPY RXD/TAKEN: CPT | Mod: CPTII,,, | Performed by: NURSE PRACTITIONER

## 2023-01-17 PROCEDURE — 3008F PR BODY MASS INDEX (BMI) DOCUMENTED: ICD-10-PCS | Mod: CPTII,,, | Performed by: NURSE PRACTITIONER

## 2023-01-17 RX ORDER — AZITHROMYCIN 250 MG/1
250 TABLET, FILM COATED ORAL
COMMUNITY
Start: 2023-01-16 | End: 2023-03-01

## 2023-01-17 RX ORDER — CEFDINIR 300 MG/1
300 CAPSULE ORAL 2 TIMES DAILY
Qty: 14 CAPSULE | Refills: 0 | Status: SHIPPED | OUTPATIENT
Start: 2023-01-17 | End: 2023-01-24

## 2023-01-17 RX ORDER — FLUCONAZOLE 100 MG/1
100 TABLET ORAL
COMMUNITY
Start: 2023-01-13 | End: 2023-03-01

## 2023-01-17 RX ORDER — METHYLPREDNISOLONE 4 MG/1
TABLET ORAL
Qty: 21 TABLET | Refills: 0 | Status: SHIPPED | OUTPATIENT
Start: 2023-01-17 | End: 2023-03-01

## 2023-01-17 NOTE — PROGRESS NOTES
Jayde Cole DNP   [address] [place] [address]     PATIENT NAME: Gabriella Ulloa  : 1995  DATE: 23  MRN: [id number]      Billing Provider: Jayde Cole DNP  Level of Service:   Patient PCP Information       Provider PCP Type    Jayde Cole DNP General            Reason for Visit / Chief Complaint: Sore Throat, Otalgia, Nasal Congestion, watery eyes, and sneezing       Update PCP  Update Chief Complaint         History of Present Illness / Problem Focused Workflow     Gabriella Ulloa presents to the clinic with Sore Throat, Otalgia, Nasal Congestion, watery eyes, and sneezing     Sore Throat   Associated symptoms include ear pain.   Otalgia   Associated symptoms include a sore throat.     Review of Systems     Review of Systems   HENT:  Positive for ear pain and sore throat.       Medical / Social / Family History     Past Medical History:   Diagnosis Date    Anemia     Anxiety     Breakthrough bleeding on birth control pills 2015    Endometriosis of pelvic peritoneum 2020    cul-de-sac and sigmoid colon    Hypertension     Irritable bowel syndrome Chronic    Increased pain with bowel movements with menses       Past Surgical History:   Procedure Laterality Date     SECTION      D&C/Hysteroscopy with robotic and operative Laparoscopy  2020    DIAGNOSTIC LAPAROSCOPY N/A 2022    Procedure: LAPAROSCOPY, DIAGNOSTIC;  Surgeon: Otis Mccullough MD;  Location: [facility] OR;  Service: OB/GYN;  Laterality: N/A;    DILATION AND CURETTAGE OF UTERUS  2020    ENDOMETRIAL BIOPSY N/A 2022    Procedure: BIOPSY, ENDOMETRIUM;  Surgeon: Otis Mccullough MD;  Location: [facility] OR;  Service: OB/GYN;  Laterality: N/A;    HYSTEROSCOPY WITH DILATION AND CURETTAGE OF UTERUS N/A 2022    Procedure: HYSTEROSCOPY, WITH DILATION AND CURETTAGE OF UTERUS;  Surgeon: Otis Mccullough MD;  Location: [facility] OR;  Service: OB/GYN;   "Laterality: N/A;       Social History  Ms.  reports that she has never smoked. She has never used smokeless tobacco. She reports that she does not drink alcohol and does not use drugs.    Family History  Ms.'s family history includes Breast cancer in an other family member; Diabetes in her mother and another family member; Heart disease in her mother; Hypertension in an other family member; Liver cancer in an other family member; Prostate cancer in her maternal grandfather; Stomach cancer in an other family member.    Medications and Allergies     Medications  Outpatient Medications Marked as Taking for the 1/17/23 encounter (Office Visit) with Jayde Cole DNP   Medication Sig Dispense Refill    azithromycin (Z-LEONARD) 250 MG tablet Take 250 mg by mouth.      losartan (COZAAR) 50 MG tablet Take 50 mg by mouth once daily.      metroNIDAZOLE (FLAGYL) 500 MG tablet Take 1 tablet (500 mg total) by mouth 3 (three) times daily. for 14 days 42 tablet 0       Allergies  Review of patient's allergies indicates:  No Known Allergies    Physical Examination   /73 (BP Location: Left arm, Patient Position: Sitting, BP Method: Large (Automatic))   Pulse 80   Temp 98.2 °F (36.8 °C) (Oral)   Resp 18   Ht 5' 3" (1.6 m)   Wt 103.7 kg (228 lb 9.6 oz)   LMP 12/18/2022   BMI 40.49 kg/m²    Physical Exam  Vitals and nursing note reviewed.   Constitutional:       Appearance: She is ill-appearing.   HENT:      Head: Normocephalic.      Nose: Congestion and rhinorrhea present.      Mouth/Throat:      Mouth: Mucous membranes are moist.      Pharynx: Posterior oropharyngeal erythema present.   Eyes:      Extraocular Movements: Extraocular movements intact.      Conjunctiva/sclera: Conjunctivae normal.      Pupils: Pupils are equal, round, and reactive to light.   Cardiovascular:      Rate and Rhythm: Normal rate and regular rhythm.      Pulses: Normal pulses.      Heart sounds: Normal heart sounds.   Pulmonary:      Effort: " Pulmonary effort is normal.      Breath sounds: Normal breath sounds.   Musculoskeletal:      Cervical back: Normal range of motion.   Lymphadenopathy:      Cervical: Cervical adenopathy present.   Skin:     General: Skin is warm and dry.      Capillary Refill: Capillary refill takes less than 2 seconds.   Neurological:      General: No focal deficit present.      Mental Status: She is alert and oriented to person, place, and time. Mental status is at baseline.   Psychiatric:         Mood and Affect: Mood normal.         Behavior: Behavior normal.         Thought Content: Thought content normal.         Judgment: Judgment normal.        Assessment and Plan (including Health Maintenance)      Problem List  Smart Sets  Document Outside HM   :    Plan:         Health Maintenance Due   Topic Date Due    Hepatitis C Screening  Never done    HIV Screening  Never done    TETANUS VACCINE  Never done    COVID-19 Vaccine (3 - Booster for Moderna series) 11/26/2021       Problem List Items Addressed This Visit          ENT    Sinusitis - Primary    Sore throat    Relevant Orders    POCT SARS-COV2 (COVID) with Flu Antigen (Completed)    POCT rapid strep A (Completed)       Pulmonary    Cough       Health Maintenance Topics with due status: Not Due       Topic Last Completion Date    Pap Smear 11/09/2021       Future Appointments   Date Time Provider Department Center   1/19/2023  1:30 PM [facility]   1/27/2023 10:00 AM JAYLIN Carpenter [facility]   2/22/2023 10:00 AM Otis Mccullough MD [facility]   3/9/2023 11:15 AM Otis Mccullough MD [facility]            Signature:  Jayde Cole, RERE      [address] [place] [address]    Date of encounter: 1/17/23

## 2023-01-17 NOTE — LETTER
January 17, 2023      [facility]  [address] [address]  Phone: [phone]  Fax: [phone]       Patient: Gabriella Ulloa   YOB: 1995  Date of Visit: 01/17/2023    To Whom It May Concern:    Elle Ulloa  was at  on 01/17/2023. The patient may return to work/school on 1/19/2022 with no restrictions. If you have any questions or concerns, or if I can be of further assistance, please do not hesitate to contact me.    Sincerely,    Jayde Cole, DNP

## 2023-01-19 ENCOUNTER — HOSPITAL ENCOUNTER (OUTPATIENT)
Dept: RADIOLOGY | Facility: HOSPITAL | Age: 28
Discharge: HOME | End: 2023-01-19
Attending: OBSTETRICS & GYNECOLOGY
Payer: COMMERCIAL

## 2023-01-19 DIAGNOSIS — N80.30 ENDOMETRIOSIS OF PELVIC PERITONEUM: ICD-10-CM

## 2023-01-19 DIAGNOSIS — N93.9 ABNORMAL UTERINE BLEEDING: ICD-10-CM

## 2023-01-19 DIAGNOSIS — N94.6 DYSMENORRHEA: ICD-10-CM

## 2023-01-19 PROCEDURE — 76856 US EXAM PELVIC COMPLETE: CPT | Mod: 26,,, | Performed by: RADIOLOGY

## 2023-01-19 PROCEDURE — 76856 US EXAM PELVIC COMPLETE: CPT | Mod: TC

## 2023-01-19 PROCEDURE — 76856 US PELVIS COMPLETE NON OB: ICD-10-PCS | Mod: 26,,, | Performed by: RADIOLOGY

## 2023-01-27 ENCOUNTER — OFFICE VISIT (OUTPATIENT)
Dept: GASTROENTEROLOGY | Facility: CLINIC | Age: 28
End: 2023-01-27
Payer: COMMERCIAL

## 2023-01-27 VITALS
OXYGEN SATURATION: 98 % | WEIGHT: 230 LBS | DIASTOLIC BLOOD PRESSURE: 85 MMHG | HEIGHT: 63 IN | SYSTOLIC BLOOD PRESSURE: 146 MMHG | HEART RATE: 86 BPM | BODY MASS INDEX: 40.75 KG/M2

## 2023-01-27 DIAGNOSIS — D64.9 ANEMIA, UNSPECIFIED TYPE: ICD-10-CM

## 2023-01-27 DIAGNOSIS — K21.9 GASTROESOPHAGEAL REFLUX DISEASE, UNSPECIFIED WHETHER ESOPHAGITIS PRESENT: Primary | ICD-10-CM

## 2023-01-27 PROCEDURE — 99214 OFFICE O/P EST MOD 30 MIN: CPT | Mod: PBBFAC | Performed by: NURSE PRACTITIONER

## 2023-01-27 PROCEDURE — 1159F PR MEDICATION LIST DOCUMENTED IN MEDICAL RECORD: ICD-10-PCS | Mod: CPTII,,, | Performed by: NURSE PRACTITIONER

## 2023-01-27 PROCEDURE — 3077F SYST BP >= 140 MM HG: CPT | Mod: CPTII,,, | Performed by: NURSE PRACTITIONER

## 2023-01-27 PROCEDURE — 99204 PR OFFICE/OUTPT VISIT, NEW, LEVL IV, 45-59 MIN: ICD-10-PCS | Mod: S$PBB,,, | Performed by: NURSE PRACTITIONER

## 2023-01-27 PROCEDURE — 3079F PR MOST RECENT DIASTOLIC BLOOD PRESSURE 80-89 MM HG: ICD-10-PCS | Mod: CPTII,,, | Performed by: NURSE PRACTITIONER

## 2023-01-27 PROCEDURE — 3008F PR BODY MASS INDEX (BMI) DOCUMENTED: ICD-10-PCS | Mod: CPTII,,, | Performed by: NURSE PRACTITIONER

## 2023-01-27 PROCEDURE — 3008F BODY MASS INDEX DOCD: CPT | Mod: CPTII,,, | Performed by: NURSE PRACTITIONER

## 2023-01-27 PROCEDURE — 1159F MED LIST DOCD IN RCRD: CPT | Mod: CPTII,,, | Performed by: NURSE PRACTITIONER

## 2023-01-27 PROCEDURE — 4010F ACE/ARB THERAPY RXD/TAKEN: CPT | Mod: CPTII,,, | Performed by: NURSE PRACTITIONER

## 2023-01-27 PROCEDURE — 99204 OFFICE O/P NEW MOD 45 MIN: CPT | Mod: S$PBB,,, | Performed by: NURSE PRACTITIONER

## 2023-01-27 PROCEDURE — 4010F PR ACE/ARB THEARPY RXD/TAKEN: ICD-10-PCS | Mod: CPTII,,, | Performed by: NURSE PRACTITIONER

## 2023-01-27 PROCEDURE — 3079F DIAST BP 80-89 MM HG: CPT | Mod: CPTII,,, | Performed by: NURSE PRACTITIONER

## 2023-01-27 PROCEDURE — 3077F PR MOST RECENT SYSTOLIC BLOOD PRESSURE >= 140 MM HG: ICD-10-PCS | Mod: CPTII,,, | Performed by: NURSE PRACTITIONER

## 2023-01-27 RX ORDER — PANTOPRAZOLE SODIUM 40 MG/1
40 TABLET, DELAYED RELEASE ORAL DAILY
Qty: 30 TABLET | Refills: 2 | Status: SHIPPED | OUTPATIENT
Start: 2023-01-27 | End: 2023-06-22 | Stop reason: SDUPTHER

## 2023-01-27 NOTE — PROGRESS NOTES
Gabriella Ulloa is a 27 y.o. female here for Gastroesophageal Reflux        PCP: Jayde Cole  Referring Provider: No referring provider defined for this encounter.     HPI:  Presents for report of reflux and nausea. States that eating increases the reflux. Reports that she is also having nausea and vomiting. Is avoiding eating due to GI upset. Appetite is decreased. Does have epigastric pain. Denies dysphagia. No weight loss. She states that she has chronic anemia due to heavy menstrual cycles.    Gastroesophageal Reflux  She complains of abdominal pain and nausea. She reports no chest pain. Pertinent negatives include no fatigue.       ROS:  Review of Systems   Constitutional:  Positive for appetite change. Negative for fatigue, fever and unexpected weight change.   HENT:  Negative for trouble swallowing.    Respiratory:  Negative for shortness of breath.    Cardiovascular:  Negative for chest pain.   Gastrointestinal:  Positive for abdominal pain, nausea and vomiting. Negative for blood in stool, change in bowel habit, constipation, diarrhea, reflux and change in bowel habit.   Musculoskeletal:  Negative for gait problem.   Integumentary:  Negative for pallor.   Hematological:  Does not bruise/bleed easily.   Psychiatric/Behavioral:  The patient is not nervous/anxious.         PMHX:  has a past medical history of Anemia, Anxiety, Breakthrough bleeding on birth control pills (2015), Endometriosis of pelvic peritoneum (2020), Hypertension, and Irritable bowel syndrome (Chronic).    PSHX:  has a past surgical history that includes  section; D&C/Hysteroscopy with robotic and operative Laparoscopy (2020); Dilation and curettage of uterus (2020); Endometrial biopsy (N/A, 2022); Hysteroscopy with dilation and curettage of uterus (N/A, 2022); and Diagnostic laparoscopy (N/A, 2022).    PFHX: family history includes Breast cancer in an other family member; Diabetes in  "her mother and another family member; Heart disease in her mother; Hypertension in an other family member; Liver cancer in an other family member; Prostate cancer in her maternal grandfather; Stomach cancer in an other family member.    PSlHX:  reports that she has never smoked. She has never used smokeless tobacco. She reports that she does not drink alcohol and does not use drugs.        Review of patient's allergies indicates:  No Known Allergies    Medication List with Changes/Refills   New Medications    PANTOPRAZOLE (PROTONIX) 40 MG TABLET    Take 1 tablet (40 mg total) by mouth once daily.   Current Medications    AZITHROMYCIN (Z-LEONARD) 250 MG TABLET    Take 250 mg by mouth.    CLOTRIMAZOLE-BETAMETHASONE 1-0.05% (LOTRISONE) CREAM    Apply topically 2 (two) times daily.    CYCLOBENZAPRINE (FLEXERIL) 5 MG TABLET    Take 5 mg by mouth 3 (three) times daily as needed.    FLUCONAZOLE (DIFLUCAN) 100 MG TABLET    Take 100 mg by mouth.    LOSARTAN (COZAAR) 50 MG TABLET    Take 50 mg by mouth once daily.    METHYLPREDNISOLONE (MEDROL DOSEPACK) 4 MG TABLET    use as directed    NORETHINDRONE-ETHINYL ESTRADIOL-IRON (MICROGESTIN FE1.5/30) 1.5 MG-30 MCG (21)/75 MG (7) TABLET    Take 1 tablet by mouth once daily.    ONDANSETRON (ZOFRAN-ODT) 4 MG TBDL    Take 1 tablet (4 mg total) by mouth 2 (two) times daily.        Objective Findings:  Vital Signs:  BP (!) 146/85   Pulse 86   Ht 5' 3" (1.6 m)   Wt 104.3 kg (230 lb)   SpO2 98%   BMI 40.74 kg/m²  Body mass index is 40.74 kg/m².    Physical Exam:  Physical Exam  Vitals and nursing note reviewed.   Constitutional:       General: She is not in acute distress.     Appearance: Normal appearance. She is obese.   HENT:      Mouth/Throat:      Mouth: Mucous membranes are moist.   Cardiovascular:      Rate and Rhythm: Normal rate.   Pulmonary:      Effort: Pulmonary effort is normal.      Breath sounds: No wheezing, rhonchi or rales.   Abdominal:      General: Bowel sounds are " normal. There is no distension.      Palpations: Abdomen is soft. There is no mass.      Tenderness: There is generalized abdominal tenderness.   Skin:     General: Skin is warm and dry.      Coloration: Skin is not jaundiced or pale.      Findings: No bruising.   Neurological:      Mental Status: She is alert and oriented to person, place, and time.   Psychiatric:         Mood and Affect: Mood normal.        Labs:  Lab Results   Component Value Date    WBC 6.06 01/27/2023    HGB 12.2 01/27/2023    HCT 40.0 01/27/2023    MCV 79.2 (L) 01/27/2023    RDW 17.8 (H) 01/27/2023     01/27/2023    LYMPH 39.8 01/27/2023    LYMPH 2.41 01/27/2023    MONO 9.4 (H) 01/27/2023    EOS 0.37 01/27/2023    BASO 0.06 01/27/2023     Lab Results   Component Value Date     01/27/2023    K 4.6 01/27/2023     (H) 01/27/2023    CO2 32 01/27/2023    GLU 83 01/27/2023    BUN 6 (L) 01/27/2023    CREATININE 0.69 01/27/2023    CALCIUM 9.2 01/27/2023    PROT 7.7 02/03/2022    ALBUMIN 3.7 02/03/2022    BILITOT 0.4 02/03/2022    ALKPHOS 76 02/03/2022    AST 14 (L) 02/03/2022    ALT 19 02/03/2022         Imaging: US Pelvis Complete Non OB    Result Date: 1/19/2023  EXAMINATION: US PELVIS COMPLETE NON OB CLINICAL HISTORY: .  Endometriosis of pelvic peritoneum, unspecified COMPARISON: None TECHNIQUE: Real-time ultrasound images are captured and archived. FINDINGS: The anteverted uterus measures 84 x 50 by 63 mm.  Endometrial echo measures 6.5 mm.  No uterine mass is seen. Right ovary measures 28 x 23 x 16 mm; left ovary measures 31 x 25 by 22 mm.  No adnexal or ovarian mass is seen.  There is color Doppler flow to either ovary. There is no free fluid in the posterior cul-de-sac. Bladder is not well distended and therefore not well evaluated     No abnormality seen Electronically signed by: Markos Singh Date:    01/19/2023 Time:    14:18        Assessment:  Gabriella Ulloa is a 27 y.o. female here with:  1. Gastroesophageal  reflux disease, unspecified whether esophagitis present    2. Anemia, unspecified type          Recommendations:  1. CBC,BMP, Iron studies, Ferritin  2. Protonix 40 mg daily  3. EGD  4. Avoid spicy, greasy foods  Avoid caffeine, citric acid, chocolate, peppermint, and carbonated drinks  Do not lay down within 3 hours of eating  Exercise 150 minutes per week  Increase fluid to 64 ounces daily  Avoid antiinflammatory medications such as motrin, advil, aleve, ibuprofen, and BC powder      Follow up in about 2 months (around 3/27/2023).      Order summary:  Orders Placed This Encounter    CBC Auto Differential    Basic Metabolic Panel    Iron and TIBC    Ferritin    pantoprazole (PROTONIX) 40 MG tablet    EGD       Thank you for allowing me to participate in the care of Gabriella Artemio.      HERNESTO MccordC

## 2023-02-02 ENCOUNTER — ANESTHESIA EVENT (OUTPATIENT)
Dept: GASTROENTEROLOGY | Facility: HOSPITAL | Age: 28
End: 2023-02-02
Payer: COMMERCIAL

## 2023-02-02 ENCOUNTER — HOSPITAL ENCOUNTER (OUTPATIENT)
Dept: GASTROENTEROLOGY | Facility: HOSPITAL | Age: 28
Discharge: HOME | End: 2023-02-02
Attending: NURSE PRACTITIONER
Payer: COMMERCIAL

## 2023-02-02 ENCOUNTER — ANESTHESIA (OUTPATIENT)
Dept: GASTROENTEROLOGY | Facility: HOSPITAL | Age: 28
End: 2023-02-02
Payer: COMMERCIAL

## 2023-02-02 VITALS
TEMPERATURE: 98 F | RESPIRATION RATE: 26 BRPM | HEART RATE: 88 BPM | OXYGEN SATURATION: 100 % | DIASTOLIC BLOOD PRESSURE: 71 MMHG | SYSTOLIC BLOOD PRESSURE: 112 MMHG

## 2023-02-02 DIAGNOSIS — K21.9 GASTROESOPHAGEAL REFLUX DISEASE, UNSPECIFIED WHETHER ESOPHAGITIS PRESENT: ICD-10-CM

## 2023-02-02 DIAGNOSIS — E66.01 CLASS 3 SEVERE OBESITY DUE TO EXCESS CALORIES WITHOUT SERIOUS COMORBIDITY WITH BODY MASS INDEX (BMI) OF 40.0 TO 44.9 IN ADULT: Primary | ICD-10-CM

## 2023-02-02 LAB — POC URINE HCG: NEGATIVE

## 2023-02-02 PROCEDURE — 88305 TISSUE EXAM BY PATHOLOGIST: CPT | Mod: 26,,, | Performed by: PATHOLOGY

## 2023-02-02 PROCEDURE — 88342 SURGICAL PATHOLOGY: ICD-10-PCS | Mod: 26,,, | Performed by: PATHOLOGY

## 2023-02-02 PROCEDURE — 63600175 PHARM REV CODE 636 W HCPCS: Performed by: NURSE ANESTHETIST, CERTIFIED REGISTERED

## 2023-02-02 PROCEDURE — D9220A PRA ANESTHESIA: Mod: ,,, | Performed by: NURSE ANESTHETIST, CERTIFIED REGISTERED

## 2023-02-02 PROCEDURE — 27201423 OPTIME MED/SURG SUP & DEVICES STERILE SUPPLY

## 2023-02-02 PROCEDURE — 25000003 PHARM REV CODE 250: Performed by: NURSE ANESTHETIST, CERTIFIED REGISTERED

## 2023-02-02 PROCEDURE — 88305 SURGICAL PATHOLOGY: ICD-10-PCS | Mod: 26,,, | Performed by: PATHOLOGY

## 2023-02-02 PROCEDURE — 43239 EGD BIOPSY SINGLE/MULTIPLE: CPT | Performed by: INTERNAL MEDICINE

## 2023-02-02 PROCEDURE — 43239 EGD BIOPSY SINGLE/MULTIPLE: CPT | Mod: ,,, | Performed by: INTERNAL MEDICINE

## 2023-02-02 PROCEDURE — 88305 TISSUE EXAM BY PATHOLOGIST: CPT | Mod: TC,SUR | Performed by: INTERNAL MEDICINE

## 2023-02-02 PROCEDURE — 37000009 HC ANESTHESIA EA ADD 15 MINS

## 2023-02-02 PROCEDURE — 88342 IMHCHEM/IMCYTCHM 1ST ANTB: CPT | Mod: 26,,, | Performed by: PATHOLOGY

## 2023-02-02 PROCEDURE — D9220A PRA ANESTHESIA: ICD-10-PCS | Mod: ,,, | Performed by: NURSE ANESTHETIST, CERTIFIED REGISTERED

## 2023-02-02 PROCEDURE — 43239 PR EGD, FLEX, W/BIOPSY, SGL/MULTI: ICD-10-PCS | Mod: ,,, | Performed by: INTERNAL MEDICINE

## 2023-02-02 PROCEDURE — 37000008 HC ANESTHESIA 1ST 15 MINUTES

## 2023-02-02 RX ORDER — LIDOCAINE HYDROCHLORIDE 20 MG/ML
INJECTION, SOLUTION EPIDURAL; INFILTRATION; INTRACAUDAL; PERINEURAL
Status: DISCONTINUED | OUTPATIENT
Start: 2023-02-02 | End: 2023-02-02

## 2023-02-02 RX ORDER — METRONIDAZOLE 250 MG/1
500 TABLET ORAL 3 TIMES DAILY
COMMUNITY
End: 2023-03-01

## 2023-02-02 RX ORDER — PROPOFOL 10 MG/ML
VIAL (ML) INTRAVENOUS
Status: DISCONTINUED | OUTPATIENT
Start: 2023-02-02 | End: 2023-02-02

## 2023-02-02 RX ORDER — SODIUM CHLORIDE 0.9 % (FLUSH) 0.9 %
10 SYRINGE (ML) INJECTION
Status: CANCELLED | OUTPATIENT
Start: 2023-02-02

## 2023-02-02 RX ADMIN — LIDOCAINE HYDROCHLORIDE 60 MG: 20 INJECTION, SOLUTION INTRAVENOUS at 12:02

## 2023-02-02 RX ADMIN — SODIUM CHLORIDE: 9 INJECTION, SOLUTION INTRAVENOUS at 12:02

## 2023-02-02 RX ADMIN — PROPOFOL 140 MG: 10 INJECTION, EMULSION INTRAVENOUS at 12:02

## 2023-02-02 NOTE — DISCHARGE INSTRUCTIONS
Procedure Date  2/2/23     Impression  Overall Impression: Mucosa of the esophagus, stomach and duodenum is grossly normal. The z-line is at 35cm and biopsies were obtained from the distal esophagus and stomach.     Recommendation    Await pathology results      Avoid nsaids; continue ppi or H2RA as needed.     Indication  Gastroesophageal reflux disease, unspecified whether esophagitis present

## 2023-02-02 NOTE — H&P
[facility]  Gastroenterology  H&P    Patient Name: Gabriella Ulloa  MRN: [id number]  Admission Date: 2023  Code Status: No Order    Attending Provider: JAYLIN Carpenter   Primary Care Physician: Jayde Cole DNP  Principal Problem:<principal problem not specified>    Subjective:     History of Present Illness: Pt c/o gerd; for egd.    Past Medical History:   Diagnosis Date    Anemia     Anxiety     Breakthrough bleeding on birth control pills 2015    Endometriosis of pelvic peritoneum 2020    cul-de-sac and sigmoid colon    Hypertension     Irritable bowel syndrome Chronic    Increased pain with bowel movements with menses       Past Surgical History:   Procedure Laterality Date     SECTION      D&C/Hysteroscopy with robotic and operative Laparoscopy  2020    DIAGNOSTIC LAPAROSCOPY N/A 2022    Procedure: LAPAROSCOPY, DIAGNOSTIC;  Surgeon: Otis Mccullough MD;  Location: [facility];  Service: OB/GYN;  Laterality: N/A;    DILATION AND CURETTAGE OF UTERUS  2020    ENDOMETRIAL BIOPSY N/A 2022    Procedure: BIOPSY, ENDOMETRIUM;  Surgeon: Otis Mccullough MD;  Location: [facility];  Service: OB/GYN;  Laterality: N/A;    HYSTEROSCOPY WITH DILATION AND CURETTAGE OF UTERUS N/A 2022    Procedure: HYSTEROSCOPY, WITH DILATION AND CURETTAGE OF UTERUS;  Surgeon: Otis Mcculolugh MD;  Location: [facility];  Service: OB/GYN;  Laterality: N/A;       Review of patient's allergies indicates:  No Known Allergies  Family History       Problem Relation (Age of Onset)    Breast cancer Other    Diabetes Other, Mother    Heart disease Mother    Hypertension Other    Liver cancer Other    Prostate cancer Maternal Grandfather    Stomach cancer Other          Tobacco Use    Smoking status: Never    Smokeless tobacco: Never   Substance and Sexual Activity    Alcohol use: Never    Drug use: Never    Sexual activity: Yes     Partners: Male     Birth control/protection:  Abstinence, Condom     Review of Systems   Respiratory: Negative.     Cardiovascular: Negative.    Gastrointestinal: Negative.    Objective:     Vital Signs (Most Recent):  Pulse: 81 (02/02/23 1139)  Resp: (!) 24 (02/02/23 1139)  BP: 118/68 (02/02/23 1139)  SpO2: 100 % (02/02/23 1139)   Vital Signs (24h Range):  Pulse:  [81] 81  Resp:  [24] 24  SpO2:  [100 %] 100 %  BP: (118)/(68) 118/68        There is no height or weight on file to calculate BMI.    No intake or output data in the 24 hours ending 02/02/23 1221    Lines/Drains/Airways       Peripheral Intravenous Line  Duration                  Peripheral IV - Single Lumen 02/02/23 1142 22 G Right Antecubital <1 day                    Physical Exam  Vitals reviewed.   Constitutional:       General: She is not in acute distress.     Appearance: Normal appearance. She is well-developed. She is not ill-appearing.   HENT:      Head: Normocephalic and atraumatic.      Nose: Nose normal.   Eyes:      Pupils: Pupils are equal, round, and reactive to light.   Cardiovascular:      Rate and Rhythm: Normal rate and regular rhythm.   Pulmonary:      Effort: Pulmonary effort is normal.      Breath sounds: Normal breath sounds. No wheezing.   Abdominal:      General: Abdomen is flat. Bowel sounds are normal. There is no distension.      Palpations: Abdomen is soft.      Tenderness: There is no abdominal tenderness. There is no guarding.   Skin:     General: Skin is warm and dry.      Coloration: Skin is not jaundiced.   Neurological:      Mental Status: She is alert.   Psychiatric:         Attention and Perception: Attention normal.         Mood and Affect: Affect normal.         Speech: Speech normal.         Behavior: Behavior is cooperative.      Comments: Pt was calm while speaking.       Significant Labs:  CBC: No results for input(s): WBC, HGB, HCT, PLT in the last 48 hours.  CMP: No results for input(s): GLU, CALCIUM, ALBUMIN, PROT, NA, K, CO2, CL, BUN, CREATININE,  ALKPHOS, ALT, AST, BILITOT in the last 48 hours.    Significant Imaging:  Imaging results within the past 24 hours have been reviewed.    Assessment/Plan:     There are no hospital problems to display for this patient.        Imp: chronic gerd  Plan: egd    Bronson Wesley MD  Gastroenterology  [facility] - End[facility]

## 2023-02-02 NOTE — TRANSFER OF CARE
Anesthesia Transfer of Care Note    Patient: Gabriella Ulloa    Procedure(s) Performed: * EGD with biopsy*    Patient location: GI    Anesthesia Type: general    Transport from OR: Transported from OR on room air with adequate spontaneous ventilation. Continuous ECG monitoring in transport. Continuous SpO2 monitoring in transport    Post pain: adequate analgesia    Post assessment: no apparent anesthetic complications    Post vital signs: stable    Level of consciousness: sedated and responds to stimulation    Nausea/Vomiting: no nausea/vomiting    Complications: none    Transfer of care protocol was followedComments: Good SV continue, NAD, VSS, RTRN      Last vitals:   Visit Vitals  /67   Pulse 84   Temp 36.7 °C (98 °F)   Resp 20   SpO2 100%   Breastfeeding No

## 2023-02-02 NOTE — ANESTHESIA PREPROCEDURE EVALUATION
2023  Gabriella Ulloa is a 27 y.o., female.    Past Medical History:   Diagnosis Date    Anemia     Anxiety     Breakthrough bleeding on birth control pills 2015    Endometriosis of pelvic peritoneum 2020    cul-de-sac and sigmoid colon    Hypertension     Irritable bowel syndrome Chronic    Increased pain with bowel movements with menses       Past Surgical History:   Procedure Laterality Date     SECTION      D&C/Hysteroscopy with robotic and operative Laparoscopy  2020    DIAGNOSTIC LAPAROSCOPY N/A 2022    Procedure: LAPAROSCOPY, DIAGNOSTIC;  Surgeon: Otis Mccullough MD;  Location: [facility] OR;  Service: OB/GYN;  Laterality: N/A;    DILATION AND CURETTAGE OF UTERUS  2020    ENDOMETRIAL BIOPSY N/A 2022    Procedure: BIOPSY, ENDOMETRIUM;  Surgeon: Otis Mccullough MD;  Location: [facility] OR;  Service: OB/GYN;  Laterality: N/A;    HYSTEROSCOPY WITH DILATION AND CURETTAGE OF UTERUS N/A 2022    Procedure: HYSTEROSCOPY, WITH DILATION AND CURETTAGE OF UTERUS;  Surgeon: Otis Mccullough MD;  Location: [facility] OR;  Service: OB/GYN;  Laterality: N/A;       Family History   Problem Relation Age of Onset    Prostate cancer Maternal Grandfather     Hypertension Other     Liver cancer Other     Stomach cancer Other     Diabetes Other     Breast cancer Other     Heart disease Mother     Diabetes Mother        Social History     Socioeconomic History    Marital status: Single   Tobacco Use    Smoking status: Never    Smokeless tobacco: Never   Substance and Sexual Activity    Alcohol use: Never    Drug use: Never    Sexual activity: Yes     Partners: Male     Birth control/protection: Abstinence, Condom       Current Outpatient Medications   Medication Sig Dispense Refill    azithromycin (Z-LEONARD) 250 MG tablet Take 250 mg by mouth.       clotrimazole-betamethasone 1-0.05% (LOTRISONE) cream Apply topically 2 (two) times daily.      cyclobenzaprine (FLEXERIL) 5 MG tablet Take 5 mg by mouth 3 (three) times daily as needed.      fluconazole (DIFLUCAN) 100 MG tablet Take 100 mg by mouth.      losartan (COZAAR) 50 MG tablet Take 50 mg by mouth once daily.      methylPREDNISolone (MEDROL DOSEPACK) 4 mg tablet use as directed 21 tablet 0    norethindrone-ethinyl estradiol-iron (MICROGESTIN FE1.5/30) 1.5 mg-30 mcg (21)/75 mg (7) tablet Take 1 tablet by mouth once daily. (Patient not taking: Reported on 1/17/2023) 90 tablet 1    ondansetron (ZOFRAN-ODT) 4 MG TbDL Take 1 tablet (4 mg total) by mouth 2 (two) times daily. (Patient not taking: Reported on 1/17/2023) 10 tablet 0    pantoprazole (PROTONIX) 40 MG tablet Take 1 tablet (40 mg total) by mouth once daily. 30 tablet 2     No current facility-administered medications for this encounter.       Review of patient's allergies indicates:  No Known Allergies    Pre-op Assessment    I have reviewed the Patient Summary Reports.     I have reviewed the Nursing Notes. I have reviewed the NPO Status.   I have reviewed the Medications.     Review of Systems  Anesthesia Hx:  No problems with previous Anesthesia    Cardiovascular:   Hypertension    Pulmonary:   Sleep Apnea    Hepatic/GI:   GERD    Endocrine:  Morbid Obesity / BMI > 40      Physical Exam  General: Well nourished, Alert, Oriented and Cooperative    Airway:  Mallampati: II   Mouth Opening: Normal  Neck ROM: Normal ROM    Dental:  Intact    Chest/Lungs:  Normal Respiratory Rate    Heart:  Rate: Normal        Anesthesia Plan  Type of Anesthesia, risks & benefits discussed:    Anesthesia Type: Gen Natural Airway, MAC  Intra-op Monitoring Plan: Standard ASA Monitors  Post Op Pain Control Plan: multimodal analgesia and IV/PO Opioids PRN  Induction:  IV  Informed Consent: Informed consent signed with the Patient and all parties understand the risks and  agree with anesthesia plan.  All questions answered. Patient consented to blood products? Yes  ASA Score: 3  Day of Surgery Review of History & Physical: I have interviewed and examined the patient. I have reviewed the patient's H&P dated: There are no significant changes.     Ready For Surgery From Anesthesia Perspective.     .

## 2023-02-02 NOTE — ANESTHESIA POSTPROCEDURE EVALUATION
Anesthesia Post Evaluation    Patient: Gabriella Ulloa    Procedure(s) Performed: *EGD *    Final Anesthesia Type: general      Patient location during evaluation: GI PACU  Patient participation: Yes- Able to Participate  Level of consciousness: awake and alert  Post-procedure vital signs: reviewed and stable  Pain management: adequate  Airway patency: patent    PONV status at discharge: No PONV  Anesthetic complications: no      Cardiovascular status: blood pressure returned to baseline and hemodynamically stable  Respiratory status: spontaneous ventilation  Hydration status: euvolemic  Follow-up not needed.  Comments: Pt voices appreciation for care          Vitals Value Taken Time   /82 02/02/23 1252   Temp 98 02/02/23 1254   Pulse 74 02/02/23 1254   Resp 16 02/02/23 1254   SpO2 93 % 02/02/23 1254   Vitals shown include unvalidated device data.      No case tracking events are documented in the log.      Pain/Geovanna Score: Geovanna Score: 8 (2/2/2023 12:32 PM)

## 2023-02-03 LAB
ESTROGEN SERPL-MCNC: NORMAL PG/ML
INSULIN SERPL-ACNC: NORMAL U[IU]/ML
LAB AP GROSS DESCRIPTION: NORMAL
LAB AP LABORATORY NOTES: NORMAL
T3RU NFR SERPL: NORMAL %

## 2023-02-09 ENCOUNTER — TELEPHONE (OUTPATIENT)
Dept: GASTROENTEROLOGY | Facility: CLINIC | Age: 28
End: 2023-02-09
Payer: COMMERCIAL

## 2023-02-09 NOTE — TELEPHONE ENCOUNTER
Your egd bx shows minimal chronic gastritis, which is inflammation in the stomach lining without H. Pylori(bacteria infection). Dr. Wesley recommends for you to take protonix as needed. If you have any questions or concerns please give us a call at our office.      ----- Message from Bronson Wesley MD sent at 2/6/2023  5:51 PM CST -----  EGD bx shows  minimal chronic gastritis without H.pylori.

## 2023-03-01 ENCOUNTER — OFFICE VISIT (OUTPATIENT)
Dept: FAMILY MEDICINE | Facility: CLINIC | Age: 28
End: 2023-03-01
Payer: COMMERCIAL

## 2023-03-01 VITALS
DIASTOLIC BLOOD PRESSURE: 85 MMHG | WEIGHT: 227.63 LBS | HEIGHT: 63 IN | SYSTOLIC BLOOD PRESSURE: 130 MMHG | BODY MASS INDEX: 40.33 KG/M2 | HEART RATE: 71 BPM | OXYGEN SATURATION: 100 %

## 2023-03-01 DIAGNOSIS — E61.1 IRON DEFICIENCY: ICD-10-CM

## 2023-03-01 DIAGNOSIS — Z13.6 ENCOUNTER FOR SCREENING FOR CARDIOVASCULAR DISORDERS: ICD-10-CM

## 2023-03-01 DIAGNOSIS — R51.9 ACUTE INTRACTABLE HEADACHE, UNSPECIFIED HEADACHE TYPE: ICD-10-CM

## 2023-03-01 DIAGNOSIS — I10 HYPERTENSION, UNSPECIFIED TYPE: Primary | ICD-10-CM

## 2023-03-01 DIAGNOSIS — Z23 NEED FOR VACCINATION: ICD-10-CM

## 2023-03-01 DIAGNOSIS — R73.9 HYPERGLYCEMIA: ICD-10-CM

## 2023-03-01 DIAGNOSIS — Z79.899 LONG TERM CURRENT USE OF THERAPEUTIC DRUG: ICD-10-CM

## 2023-03-01 DIAGNOSIS — Z11.59 SCREENING FOR VIRAL DISEASE: ICD-10-CM

## 2023-03-01 LAB
25(OH)D3 SERPL-MCNC: 11.7 NG/ML
ALBUMIN SERPL BCP-MCNC: 3.3 G/DL (ref 3.5–5)
ALBUMIN/GLOB SERPL: 0.9 {RATIO}
ALP SERPL-CCNC: 69 U/L (ref 37–98)
ALT SERPL W P-5'-P-CCNC: 46 U/L (ref 13–56)
ANION GAP SERPL CALCULATED.3IONS-SCNC: 6 MMOL/L (ref 7–16)
AST SERPL W P-5'-P-CCNC: 34 U/L (ref 15–37)
BASOPHILS # BLD AUTO: 0.05 K/UL (ref 0–0.2)
BASOPHILS NFR BLD AUTO: 1 % (ref 0–1)
BILIRUB SERPL-MCNC: 0.4 MG/DL (ref ?–1.2)
BUN SERPL-MCNC: 8 MG/DL (ref 7–18)
BUN/CREAT SERPL: 13 (ref 6–20)
CALCIUM SERPL-MCNC: 9.1 MG/DL (ref 8.5–10.1)
CHLORIDE SERPL-SCNC: 109 MMOL/L (ref 98–107)
CHOLEST SERPL-MCNC: 185 MG/DL (ref 0–200)
CHOLEST/HDLC SERPL: 2.7 {RATIO}
CO2 SERPL-SCNC: 27 MMOL/L (ref 21–32)
CREAT SERPL-MCNC: 0.64 MG/DL (ref 0.55–1.02)
DIFFERENTIAL METHOD BLD: ABNORMAL
EGFR (NO RACE VARIABLE) (RUSH/TITUS): 124 ML/MIN/1.73M²
EOSINOPHIL # BLD AUTO: 0.2 K/UL (ref 0–0.5)
EOSINOPHIL NFR BLD AUTO: 3.9 % (ref 1–4)
ERYTHROCYTE [DISTWIDTH] IN BLOOD BY AUTOMATED COUNT: 17.8 % (ref 11.5–14.5)
EST. AVERAGE GLUCOSE BLD GHB EST-MCNC: 84 MG/DL
FOLATE SERPL-MCNC: 14.2 NG/ML (ref 3.1–17.5)
GLOBULIN SER-MCNC: 3.8 G/DL (ref 2–4)
GLUCOSE SERPL-MCNC: 83 MG/DL (ref 74–106)
HBA1C MFR BLD HPLC: 5.1 % (ref 4.5–6.6)
HCT VFR BLD AUTO: 39 % (ref 38–47)
HCV AB SER QL: NORMAL
HDLC SERPL-MCNC: 69 MG/DL (ref 40–60)
HGB BLD-MCNC: 11.8 G/DL (ref 12–16)
HIV 1+O+2 AB SERPL QL: NORMAL
IMM GRANULOCYTES # BLD AUTO: 0.01 K/UL (ref 0–0.04)
IMM GRANULOCYTES NFR BLD: 0.2 % (ref 0–0.4)
IRON SATN MFR SERPL: 8 % (ref 14–50)
IRON SERPL-MCNC: 29 ΜG/DL (ref 50–170)
LDLC SERPL CALC-MCNC: 102 MG/DL
LDLC/HDLC SERPL: 1.5 {RATIO}
LYMPHOCYTES # BLD AUTO: 2.36 K/UL (ref 1–4.8)
LYMPHOCYTES NFR BLD AUTO: 46.4 % (ref 27–41)
MCH RBC QN AUTO: 24.1 PG (ref 27–31)
MCHC RBC AUTO-ENTMCNC: 30.3 G/DL (ref 32–36)
MCV RBC AUTO: 79.8 FL (ref 80–96)
MONOCYTES # BLD AUTO: 0.53 K/UL (ref 0–0.8)
MONOCYTES NFR BLD AUTO: 10.4 % (ref 2–6)
MPC BLD CALC-MCNC: 10.3 FL (ref 9.4–12.4)
NEUTROPHILS # BLD AUTO: 1.94 K/UL (ref 1.8–7.7)
NEUTROPHILS NFR BLD AUTO: 38.1 % (ref 53–65)
NONHDLC SERPL-MCNC: 116 MG/DL
NRBC # BLD AUTO: 0 X10E3/UL
NRBC, AUTO (.00): 0 %
PLATELET # BLD AUTO: 366 K/UL (ref 150–400)
POTASSIUM SERPL-SCNC: 4.1 MMOL/L (ref 3.5–5.1)
PROT SERPL-MCNC: 7.1 G/DL (ref 6.4–8.2)
RBC # BLD AUTO: 4.89 M/UL (ref 4.2–5.4)
SODIUM SERPL-SCNC: 138 MMOL/L (ref 136–145)
T4 FREE SERPL-MCNC: 0.94 NG/DL (ref 0.76–1.46)
TIBC SERPL-MCNC: 386 ΜG/DL (ref 250–450)
TRIGL SERPL-MCNC: 68 MG/DL (ref 35–150)
TSH SERPL DL<=0.005 MIU/L-ACNC: 0.88 UIU/ML (ref 0.36–3.74)
VIT B12 SERPL-MCNC: >6000 PG/ML (ref 193–986)
VLDLC SERPL-MCNC: 14 MG/DL
WBC # BLD AUTO: 5.09 K/UL (ref 4.5–11)

## 2023-03-01 PROCEDURE — 80050 GENERAL HEALTH PANEL: CPT | Mod: ,,, | Performed by: CLINICAL MEDICAL LABORATORY

## 2023-03-01 PROCEDURE — 82746 VITAMIN B12/FOLATE, SERUM PANEL: ICD-10-PCS | Mod: ,,, | Performed by: CLINICAL MEDICAL LABORATORY

## 2023-03-01 PROCEDURE — 83550 IRON AND TIBC: ICD-10-PCS | Mod: ,,, | Performed by: CLINICAL MEDICAL LABORATORY

## 2023-03-01 PROCEDURE — 90471 IMMUNIZATION ADMIN: CPT | Mod: ,,, | Performed by: NURSE PRACTITIONER

## 2023-03-01 PROCEDURE — 82746 ASSAY OF FOLIC ACID SERUM: CPT | Mod: ,,, | Performed by: CLINICAL MEDICAL LABORATORY

## 2023-03-01 PROCEDURE — 99214 PR OFFICE/OUTPT VISIT, EST, LEVL IV, 30-39 MIN: ICD-10-PCS | Mod: 25,,, | Performed by: NURSE PRACTITIONER

## 2023-03-01 PROCEDURE — 84439 THYROID PANEL: ICD-10-PCS | Mod: ,,, | Performed by: CLINICAL MEDICAL LABORATORY

## 2023-03-01 PROCEDURE — 99214 OFFICE O/P EST MOD 30 MIN: CPT | Mod: 25,,, | Performed by: NURSE PRACTITIONER

## 2023-03-01 PROCEDURE — 1159F MED LIST DOCD IN RCRD: CPT | Mod: CPTII,,, | Performed by: NURSE PRACTITIONER

## 2023-03-01 PROCEDURE — 83036 HEMOGLOBIN A1C: ICD-10-PCS | Mod: ,,, | Performed by: CLINICAL MEDICAL LABORATORY

## 2023-03-01 PROCEDURE — 84439 ASSAY OF FREE THYROXINE: CPT | Mod: ,,, | Performed by: CLINICAL MEDICAL LABORATORY

## 2023-03-01 PROCEDURE — 83036 HEMOGLOBIN GLYCOSYLATED A1C: CPT | Mod: ,,, | Performed by: CLINICAL MEDICAL LABORATORY

## 2023-03-01 PROCEDURE — 3079F DIAST BP 80-89 MM HG: CPT | Mod: CPTII,,, | Performed by: NURSE PRACTITIONER

## 2023-03-01 PROCEDURE — 3075F SYST BP GE 130 - 139MM HG: CPT | Mod: CPTII,,, | Performed by: NURSE PRACTITIONER

## 2023-03-01 PROCEDURE — 80061 LIPID PANEL: CPT | Mod: ,,, | Performed by: CLINICAL MEDICAL LABORATORY

## 2023-03-01 PROCEDURE — 87389 HIV 1 / 2 ANTIBODY: ICD-10-PCS | Mod: ,,, | Performed by: CLINICAL MEDICAL LABORATORY

## 2023-03-01 PROCEDURE — 3008F PR BODY MASS INDEX (BMI) DOCUMENTED: ICD-10-PCS | Mod: CPTII,,, | Performed by: NURSE PRACTITIONER

## 2023-03-01 PROCEDURE — 87389 HIV-1 AG W/HIV-1&-2 AB AG IA: CPT | Mod: ,,, | Performed by: CLINICAL MEDICAL LABORATORY

## 2023-03-01 PROCEDURE — 1159F PR MEDICATION LIST DOCUMENTED IN MEDICAL RECORD: ICD-10-PCS | Mod: CPTII,,, | Performed by: NURSE PRACTITIONER

## 2023-03-01 PROCEDURE — 82607 VITAMIN B-12: CPT | Mod: ,,, | Performed by: CLINICAL MEDICAL LABORATORY

## 2023-03-01 PROCEDURE — 83550 IRON BINDING TEST: CPT | Mod: ,,, | Performed by: CLINICAL MEDICAL LABORATORY

## 2023-03-01 PROCEDURE — 86803 HEPATITIS C AB TEST: CPT | Mod: ,,, | Performed by: CLINICAL MEDICAL LABORATORY

## 2023-03-01 PROCEDURE — 90715 TDAP VACCINE 7 YRS/> IM: CPT | Mod: ,,, | Performed by: NURSE PRACTITIONER

## 2023-03-01 PROCEDURE — 82306 VITAMIN D 25 HYDROXY: CPT | Mod: ,,, | Performed by: CLINICAL MEDICAL LABORATORY

## 2023-03-01 PROCEDURE — 90715 TDAP VACCINE GREATER THAN OR EQUAL TO 7YO IM: ICD-10-PCS | Mod: ,,, | Performed by: NURSE PRACTITIONER

## 2023-03-01 PROCEDURE — 90471 TDAP VACCINE GREATER THAN OR EQUAL TO 7YO IM: ICD-10-PCS | Mod: ,,, | Performed by: NURSE PRACTITIONER

## 2023-03-01 PROCEDURE — 80061 LIPID PANEL: ICD-10-PCS | Mod: ,,, | Performed by: CLINICAL MEDICAL LABORATORY

## 2023-03-01 PROCEDURE — 3075F PR MOST RECENT SYSTOLIC BLOOD PRESS GE 130-139MM HG: ICD-10-PCS | Mod: CPTII,,, | Performed by: NURSE PRACTITIONER

## 2023-03-01 PROCEDURE — 86803 HEPATITIS C ANTIBODY: ICD-10-PCS | Mod: ,,, | Performed by: CLINICAL MEDICAL LABORATORY

## 2023-03-01 PROCEDURE — 4010F ACE/ARB THERAPY RXD/TAKEN: CPT | Mod: CPTII,,, | Performed by: NURSE PRACTITIONER

## 2023-03-01 PROCEDURE — 82607 VITAMIN B12/FOLATE, SERUM PANEL: ICD-10-PCS | Mod: ,,, | Performed by: CLINICAL MEDICAL LABORATORY

## 2023-03-01 PROCEDURE — 83540 IRON AND TIBC: ICD-10-PCS | Mod: ,,, | Performed by: CLINICAL MEDICAL LABORATORY

## 2023-03-01 PROCEDURE — 80050 PR  GENERAL HEALTH PANEL: ICD-10-PCS | Mod: ,,, | Performed by: CLINICAL MEDICAL LABORATORY

## 2023-03-01 PROCEDURE — 83540 ASSAY OF IRON: CPT | Mod: ,,, | Performed by: CLINICAL MEDICAL LABORATORY

## 2023-03-01 PROCEDURE — 4010F PR ACE/ARB THEARPY RXD/TAKEN: ICD-10-PCS | Mod: CPTII,,, | Performed by: NURSE PRACTITIONER

## 2023-03-01 PROCEDURE — 3079F PR MOST RECENT DIASTOLIC BLOOD PRESSURE 80-89 MM HG: ICD-10-PCS | Mod: CPTII,,, | Performed by: NURSE PRACTITIONER

## 2023-03-01 PROCEDURE — 3008F BODY MASS INDEX DOCD: CPT | Mod: CPTII,,, | Performed by: NURSE PRACTITIONER

## 2023-03-01 PROCEDURE — 82306 VITAMIN D: ICD-10-PCS | Mod: ,,, | Performed by: CLINICAL MEDICAL LABORATORY

## 2023-03-01 RX ORDER — TOPIRAMATE 25 MG/1
25 TABLET ORAL NIGHTLY
Qty: 30 TABLET | Refills: 5 | Status: SHIPPED | OUTPATIENT
Start: 2023-03-01 | End: 2023-05-22

## 2023-03-01 RX ORDER — LOSARTAN POTASSIUM 50 MG/1
50 TABLET ORAL DAILY
Qty: 90 TABLET | Refills: 3 | Status: SHIPPED | OUTPATIENT
Start: 2023-03-01 | End: 2024-04-01

## 2023-03-01 NOTE — PROGRESS NOTES
Jayde Cole DNP   [address] [place] [address]     PATIENT NAME: Gabriella Ulloa  : 1995  DATE: 3/1/23  MRN: [id number]      Billing Provider: Jayde Cole DNP  Level of Service:   Patient PCP Information       Provider PCP Type    Jayde Cole DNP General            Reason for Visit / Chief Complaint: Follow-up and Hypertension       Update PCP  Update Chief Complaint         History of Present Illness / Problem Focused Workflow     Gabriella Ulloa presents to the clinic with Follow-up and Hypertension     Follow-up    Hypertension    Review of Systems     Review of Systems     Medical / Social / Family History     Past Medical History:   Diagnosis Date    Anemia     Anxiety     Breakthrough bleeding on birth control pills 2015    Endometriosis of pelvic peritoneum 2020    cul-de-sac and sigmoid colon    Hypertension     Irritable bowel syndrome Chronic    Increased pain with bowel movements with menses       Past Surgical History:   Procedure Laterality Date     SECTION      D&C/Hysteroscopy with robotic and operative Laparoscopy  2020    DIAGNOSTIC LAPAROSCOPY N/A 2022    Procedure: LAPAROSCOPY, DIAGNOSTIC;  Surgeon: Otis Mccullough MD;  Location: [facility];  Service: OB/GYN;  Laterality: N/A;    DILATION AND CURETTAGE OF UTERUS  2020    ENDOMETRIAL BIOPSY N/A 2022    Procedure: BIOPSY, ENDOMETRIUM;  Surgeon: Otis Mccullough MD;  Location: [facility];  Service: OB/GYN;  Laterality: N/A;    HYSTEROSCOPY WITH DILATION AND CURETTAGE OF UTERUS N/A 2022    Procedure: HYSTEROSCOPY, WITH DILATION AND CURETTAGE OF UTERUS;  Surgeon: Otis Mccullough MD;  Location: [facility];  Service: OB/GYN;  Laterality: N/A;       Social History  Ms.  reports that she has never smoked. She has never used smokeless tobacco. She reports that she does not drink alcohol and does not use drugs.    Family History  Ms.'s family  "history includes Breast cancer in an other family member; Diabetes in her mother and another family member; Heart disease in her mother; Hypertension in an other family member; Liver cancer in an other family member; Prostate cancer in her maternal grandfather; Stomach cancer in an other family member.    Medications and Allergies     Medications  No outpatient medications have been marked as taking for the 3/1/23 encounter (Office Visit) with Jayde Cole DNP.       Allergies  Review of patient's allergies indicates:  No Known Allergies    Physical Examination   /85   Pulse 71   Ht 5' 3" (1.6 m)   Wt 103.2 kg (227 lb 9.6 oz)   SpO2 100%   BMI 40.32 kg/m²    Physical Exam  Vitals and nursing note reviewed.   Constitutional:       Appearance: Normal appearance. She is obese.   HENT:      Head: Normocephalic.      Mouth/Throat:      Mouth: Mucous membranes are moist.   Eyes:      Pupils: Pupils are equal, round, and reactive to light.   Cardiovascular:      Rate and Rhythm: Normal rate and regular rhythm.      Pulses: Normal pulses.      Heart sounds: Normal heart sounds.   Pulmonary:      Effort: Pulmonary effort is normal.      Breath sounds: Normal breath sounds.   Abdominal:      General: Abdomen is flat. Bowel sounds are normal.      Palpations: Abdomen is soft.   Musculoskeletal:         General: Normal range of motion.      Cervical back: Normal range of motion and neck supple.   Skin:     General: Skin is warm and dry.      Capillary Refill: Capillary refill takes less than 2 seconds.   Neurological:      General: No focal deficit present.      Mental Status: She is alert and oriented to person, place, and time. Mental status is at baseline.   Psychiatric:         Mood and Affect: Mood normal.         Behavior: Behavior normal.         Thought Content: Thought content normal.         Judgment: Judgment normal.        Assessment and Plan (including Health Maintenance)      Problem List  Smart " Sets  Document Outside HM   :    Plan:         Health Maintenance Due   Topic Date Due    Hepatitis C Screening  Never done    HIV Screening  Never done       Problem List Items Addressed This Visit          Neuro    Acute intractable headache       Cardiac/Vascular    Hypertension - Primary    Relevant Orders    CBC Auto Differential    Comprehensive Metabolic Panel    Encounter for screening for cardiovascular disorders    Relevant Orders    Lipid Panel       ID    Screening for viral disease    Relevant Orders    Hepatitis C Antibody    HIV 1/2 Ag/Ab (4th Gen)    Need for vaccination    Relevant Orders    Tdap Vaccine (Completed)       Oncology    Iron deficiency    Relevant Orders    Iron and TIBC       Endocrine    Hyperglycemia    Relevant Orders    Hemoglobin A1C       Palliative Care    Long term current use of therapeutic drug    Relevant Orders    Vitamin D    Vitamin B12 & Folate    Thyroid Panel       Health Maintenance Topics with due status: Not Due       Topic Last Completion Date    Pap Smear 11/09/2021    TETANUS VACCINE 03/01/2023       Future Appointments   Date Time Provider Department Center   3/27/2023 10[facility]00 [facility]   12/4/2023  8:00 AM Jayde Cole DNP [facility]            Signature:  Jayde Cole DNP      [address] [place] [address]    Date of encounter: 3/1/23

## 2023-03-02 RX ORDER — ERGOCALCIFEROL 1.25 MG/1
50000 CAPSULE ORAL
Qty: 12 CAPSULE | Refills: 3 | Status: SHIPPED | OUTPATIENT
Start: 2023-03-02 | End: 2023-04-05 | Stop reason: SDUPTHER

## 2023-03-02 NOTE — TELEPHONE ENCOUNTER
----- Message from Jayde Cole DNP sent at 3/2/2023 10:12 AM CST -----  Need iron tid and greens x3 months. Recheck in 1 month  Also vit d very low. Send in [id number] weekly.

## 2023-03-03 ENCOUNTER — PATIENT MESSAGE (OUTPATIENT)
Dept: FAMILY MEDICINE | Facility: CLINIC | Age: 28
End: 2023-03-03
Payer: COMMERCIAL

## 2023-03-06 RX ORDER — ERGOCALCIFEROL 1.25 MG/1
50000 CAPSULE ORAL
Qty: 12 CAPSULE | Refills: 3 | Status: CANCELLED | OUTPATIENT
Start: 2023-03-06

## 2023-03-27 ENCOUNTER — OFFICE VISIT (OUTPATIENT)
Dept: GASTROENTEROLOGY | Facility: CLINIC | Age: 28
End: 2023-03-27
Payer: COMMERCIAL

## 2023-03-27 VITALS
HEART RATE: 83 BPM | BODY MASS INDEX: 40.54 KG/M2 | DIASTOLIC BLOOD PRESSURE: 56 MMHG | HEIGHT: 63 IN | OXYGEN SATURATION: 98 % | WEIGHT: 228.81 LBS | SYSTOLIC BLOOD PRESSURE: 134 MMHG

## 2023-03-27 DIAGNOSIS — D50.9 IRON DEFICIENCY ANEMIA, UNSPECIFIED IRON DEFICIENCY ANEMIA TYPE: ICD-10-CM

## 2023-03-27 DIAGNOSIS — K59.00 CONSTIPATION, UNSPECIFIED CONSTIPATION TYPE: ICD-10-CM

## 2023-03-27 DIAGNOSIS — K21.9 GASTROESOPHAGEAL REFLUX DISEASE, UNSPECIFIED WHETHER ESOPHAGITIS PRESENT: Primary | ICD-10-CM

## 2023-03-27 PROCEDURE — 3075F SYST BP GE 130 - 139MM HG: CPT | Mod: CPTII,,, | Performed by: NURSE PRACTITIONER

## 2023-03-27 PROCEDURE — 3008F BODY MASS INDEX DOCD: CPT | Mod: CPTII,,, | Performed by: NURSE PRACTITIONER

## 2023-03-27 PROCEDURE — 3078F PR MOST RECENT DIASTOLIC BLOOD PRESSURE < 80 MM HG: ICD-10-PCS | Mod: CPTII,,, | Performed by: NURSE PRACTITIONER

## 2023-03-27 PROCEDURE — 3044F PR MOST RECENT HEMOGLOBIN A1C LEVEL <7.0%: ICD-10-PCS | Mod: CPTII,,, | Performed by: NURSE PRACTITIONER

## 2023-03-27 PROCEDURE — 99214 OFFICE O/P EST MOD 30 MIN: CPT | Mod: S$PBB,,, | Performed by: NURSE PRACTITIONER

## 2023-03-27 PROCEDURE — 3008F PR BODY MASS INDEX (BMI) DOCUMENTED: ICD-10-PCS | Mod: CPTII,,, | Performed by: NURSE PRACTITIONER

## 2023-03-27 PROCEDURE — 4010F ACE/ARB THERAPY RXD/TAKEN: CPT | Mod: CPTII,,, | Performed by: NURSE PRACTITIONER

## 2023-03-27 PROCEDURE — 3075F PR MOST RECENT SYSTOLIC BLOOD PRESS GE 130-139MM HG: ICD-10-PCS | Mod: CPTII,,, | Performed by: NURSE PRACTITIONER

## 2023-03-27 PROCEDURE — 99214 PR OFFICE/OUTPT VISIT, EST, LEVL IV, 30-39 MIN: ICD-10-PCS | Mod: S$PBB,,, | Performed by: NURSE PRACTITIONER

## 2023-03-27 PROCEDURE — 99214 OFFICE O/P EST MOD 30 MIN: CPT | Mod: PBBFAC | Performed by: NURSE PRACTITIONER

## 2023-03-27 PROCEDURE — 3044F HG A1C LEVEL LT 7.0%: CPT | Mod: CPTII,,, | Performed by: NURSE PRACTITIONER

## 2023-03-27 PROCEDURE — 4010F PR ACE/ARB THEARPY RXD/TAKEN: ICD-10-PCS | Mod: CPTII,,, | Performed by: NURSE PRACTITIONER

## 2023-03-27 PROCEDURE — 1159F PR MEDICATION LIST DOCUMENTED IN MEDICAL RECORD: ICD-10-PCS | Mod: CPTII,,, | Performed by: NURSE PRACTITIONER

## 2023-03-27 PROCEDURE — 1159F MED LIST DOCD IN RCRD: CPT | Mod: CPTII,,, | Performed by: NURSE PRACTITIONER

## 2023-03-27 PROCEDURE — 3078F DIAST BP <80 MM HG: CPT | Mod: CPTII,,, | Performed by: NURSE PRACTITIONER

## 2023-03-27 RX ORDER — FERROUS SULFATE 325(65) MG
325 TABLET ORAL DAILY
Qty: 30 TABLET | Refills: 2 | Status: SHIPPED | OUTPATIENT
Start: 2023-03-27 | End: 2023-06-25

## 2023-03-27 NOTE — PROGRESS NOTES
Gabriella Ulloa is a 28 y.o. female here for Follow-up        PCP: Jayde Cole  Referring Provider: No referring provider defined for this encounter.     HPI:  Presents for follow-up after EGD on 2023.  EGD report reviewed, normal to minimal gastritis, H pylori is negative.  Review of labs from 2023 from primary care reveals iron-deficiency.  Iron 29, saturation 8, TIBC is 386.  B12 is greater than 6000.  HIV negative, hepatitis-C negative.  The patient does have a history of colon cancer in her father.  Her father was diagnosed  in his early 50s, and is now .  She denies hematochezia or melena.  Does have a history of endometriosis.  States that she is no longer having heavy menstrual cycles. States that her last heavy cycle was in 2022. She does have chronic constipation.  She states that she is been using fiber gummies and that this has been helpful for her constipation.  She is previously failed multiple laxatives including Miralax and stool softeners.        ROS:  Review of Systems   Constitutional:  Negative for appetite change, fatigue, fever and unexpected weight change.   HENT:  Negative for trouble swallowing.    Respiratory:  Negative for shortness of breath.    Cardiovascular:  Negative for chest pain.   Gastrointestinal:  Positive for constipation. Negative for abdominal pain, blood in stool, change in bowel habit, diarrhea, nausea, vomiting, reflux and change in bowel habit.   Musculoskeletal:  Negative for gait problem.   Integumentary:  Negative for pallor.   Psychiatric/Behavioral:  The patient is not nervous/anxious.         PMHX:  has a past medical history of Anemia, Anxiety, Breakthrough bleeding on birth control pills (2015), Endometriosis of pelvic peritoneum (2020), Hypertension, and Irritable bowel syndrome (Chronic).    PSHX:  has a past surgical history that includes  section; D&C/Hysteroscopy with robotic and operative  "Laparoscopy (06/22/2020); Dilation and curettage of uterus (06/25/2020); Endometrial biopsy (N/A, 2/8/2022); Hysteroscopy with dilation and curettage of uterus (N/A, 2/8/2022); and Diagnostic laparoscopy (N/A, 2/8/2022).    PFHX: family history includes Breast cancer in an other family member; Diabetes in her mother and another family member; Heart disease in her mother; Hypertension in an other family member; Liver cancer in an other family member; Prostate cancer in her maternal grandfather; Stomach cancer in an other family member.    PSlHX:  reports that she has never smoked. She has never used smokeless tobacco. She reports that she does not drink alcohol and does not use drugs.        Review of patient's allergies indicates:  No Known Allergies    Medication List with Changes/Refills   Current Medications    ERGOCALCIFEROL (ERGOCALCIFEROL) 50,000 UNIT CAP    Take 1 capsule (50,000 Units total) by mouth every 7 days.    LOSARTAN (COZAAR) 50 MG TABLET    Take 1 tablet (50 mg total) by mouth once daily.    NORETHINDRONE-ETHINYL ESTRADIOL-IRON (MICROGESTIN FE1.5/30) 1.5 MG-30 MCG (21)/75 MG (7) TABLET    Take 1 tablet by mouth once daily.    PANTOPRAZOLE (PROTONIX) 40 MG TABLET    Take 1 tablet (40 mg total) by mouth once daily.    TOPIRAMATE (TOPAMAX) 25 MG TABLET    Take 1 tablet (25 mg total) by mouth every evening.        Objective Findings:  Vital Signs:  BP (!) 134/56   Pulse 83   Ht 5' 3" (1.6 m)   Wt 103.8 kg (228 lb 12.8 oz)   SpO2 98%   BMI 40.53 kg/m²  Body mass index is 40.53 kg/m².    Physical Exam:  Physical Exam  Vitals and nursing note reviewed.   Constitutional:       General: She is not in acute distress.     Appearance: Normal appearance.   HENT:      Mouth/Throat:      Mouth: Mucous membranes are moist.   Cardiovascular:      Rate and Rhythm: Normal rate.   Pulmonary:      Effort: Pulmonary effort is normal.      Breath sounds: No wheezing, rhonchi or rales.   Abdominal:      General: " Bowel sounds are normal. There is no distension.      Palpations: Abdomen is soft. There is no mass.      Tenderness: There is no abdominal tenderness. There is no guarding.   Musculoskeletal:      Right lower leg: No edema.      Left lower leg: No edema.   Skin:     General: Skin is warm and dry.      Coloration: Skin is not jaundiced or pale.   Neurological:      Mental Status: She is alert and oriented to person, place, and time.   Psychiatric:         Mood and Affect: Mood normal.        Labs:  Lab Results   Component Value Date    WBC 5.09 03/01/2023    HGB 11.8 (L) 03/01/2023    HCT 39.0 03/01/2023    MCV 79.8 (L) 03/01/2023    RDW 17.8 (H) 03/01/2023     03/01/2023    LYMPH 46.4 (H) 03/01/2023    LYMPH 2.36 03/01/2023    MONO 10.4 (H) 03/01/2023    EOS 0.20 03/01/2023    BASO 0.05 03/01/2023     Lab Results   Component Value Date     03/01/2023    K 4.1 03/01/2023     (H) 03/01/2023    CO2 27 03/01/2023    GLU 83 03/01/2023    BUN 8 03/01/2023    CREATININE 0.64 03/01/2023    CALCIUM 9.1 03/01/2023    PROT 7.1 03/01/2023    ALBUMIN 3.3 (L) 03/01/2023    BILITOT 0.4 03/01/2023    ALKPHOS 69 03/01/2023    AST 34 03/01/2023    ALT 46 03/01/2023         Imaging: No results found.      Assessment:  Gabriella Ulloa is a 28 y.o. female here with:  1. Gastroesophageal reflux disease, unspecified whether esophagitis present    2. Iron deficiency anemia, unspecified iron deficiency anemia type          Recommendations:  1. Protonix 40 mg daily  2. Avoid spicy, greasy foods  Avoid caffeine, citric acid, chocolate, peppermint, and carbonated drinks  Do not lay down within 3 hours of eating  Exercise 150 minutes per week  Increase fluid to 64 ounces daily  Avoid antiinflammatory medications such as motrin, advil, aleve, ibuprofen, and BC powder  3. Oral iron 325 mg daily  4. Schedule colonoscopy    Follow up in about 3 months (around 6/27/2023).      Order summary:  Orders Placed This Encounter     Colonoscopy       Thank you for allowing me to participate in the care of Gabriella Ulloa.      HERNESTO MccordC

## 2023-04-05 ENCOUNTER — OFFICE VISIT (OUTPATIENT)
Dept: FAMILY MEDICINE | Facility: CLINIC | Age: 28
End: 2023-04-05
Payer: COMMERCIAL

## 2023-04-05 VITALS
TEMPERATURE: 99 F | RESPIRATION RATE: 18 BRPM | HEART RATE: 83 BPM | SYSTOLIC BLOOD PRESSURE: 123 MMHG | HEIGHT: 63 IN | BODY MASS INDEX: 41.18 KG/M2 | WEIGHT: 232.38 LBS | DIASTOLIC BLOOD PRESSURE: 80 MMHG | OXYGEN SATURATION: 100 %

## 2023-04-05 DIAGNOSIS — M25.552 LEFT HIP PAIN: Primary | ICD-10-CM

## 2023-04-05 DIAGNOSIS — M54.32 SCIATICA OF LEFT SIDE: ICD-10-CM

## 2023-04-05 DIAGNOSIS — E66.9 OBESITY (BMI 35.0-39.9 WITHOUT COMORBIDITY): ICD-10-CM

## 2023-04-05 PROCEDURE — 4010F PR ACE/ARB THEARPY RXD/TAKEN: ICD-10-PCS | Mod: CPTII,,, | Performed by: NURSE PRACTITIONER

## 2023-04-05 PROCEDURE — 4010F ACE/ARB THERAPY RXD/TAKEN: CPT | Mod: CPTII,,, | Performed by: NURSE PRACTITIONER

## 2023-04-05 PROCEDURE — 3079F DIAST BP 80-89 MM HG: CPT | Mod: CPTII,,, | Performed by: NURSE PRACTITIONER

## 2023-04-05 PROCEDURE — 99212 OFFICE O/P EST SF 10 MIN: CPT | Mod: ,,, | Performed by: NURSE PRACTITIONER

## 2023-04-05 PROCEDURE — 3008F PR BODY MASS INDEX (BMI) DOCUMENTED: ICD-10-PCS | Mod: CPTII,,, | Performed by: NURSE PRACTITIONER

## 2023-04-05 PROCEDURE — 3074F SYST BP LT 130 MM HG: CPT | Mod: CPTII,,, | Performed by: NURSE PRACTITIONER

## 2023-04-05 PROCEDURE — 3074F PR MOST RECENT SYSTOLIC BLOOD PRESSURE < 130 MM HG: ICD-10-PCS | Mod: CPTII,,, | Performed by: NURSE PRACTITIONER

## 2023-04-05 PROCEDURE — 3044F PR MOST RECENT HEMOGLOBIN A1C LEVEL <7.0%: ICD-10-PCS | Mod: CPTII,,, | Performed by: NURSE PRACTITIONER

## 2023-04-05 PROCEDURE — 1159F MED LIST DOCD IN RCRD: CPT | Mod: CPTII,,, | Performed by: NURSE PRACTITIONER

## 2023-04-05 PROCEDURE — 1159F PR MEDICATION LIST DOCUMENTED IN MEDICAL RECORD: ICD-10-PCS | Mod: CPTII,,, | Performed by: NURSE PRACTITIONER

## 2023-04-05 PROCEDURE — 3008F BODY MASS INDEX DOCD: CPT | Mod: CPTII,,, | Performed by: NURSE PRACTITIONER

## 2023-04-05 PROCEDURE — 99212 PR OFFICE/OUTPT VISIT, EST, LEVL II, 10-19 MIN: ICD-10-PCS | Mod: ,,, | Performed by: NURSE PRACTITIONER

## 2023-04-05 PROCEDURE — 3044F HG A1C LEVEL LT 7.0%: CPT | Mod: CPTII,,, | Performed by: NURSE PRACTITIONER

## 2023-04-05 PROCEDURE — 3079F PR MOST RECENT DIASTOLIC BLOOD PRESSURE 80-89 MM HG: ICD-10-PCS | Mod: CPTII,,, | Performed by: NURSE PRACTITIONER

## 2023-04-05 RX ORDER — METHYLPREDNISOLONE 4 MG/1
TABLET ORAL
Qty: 21 TABLET | Refills: 0 | Status: SHIPPED | OUTPATIENT
Start: 2023-04-05 | End: 2023-04-05

## 2023-04-05 RX ORDER — TIZANIDINE 4 MG/1
4 TABLET ORAL NIGHTLY
Qty: 20 TABLET | Refills: 0 | Status: SHIPPED | OUTPATIENT
Start: 2023-04-05 | End: 2023-06-22

## 2023-04-05 RX ORDER — ERGOCALCIFEROL 1.25 MG/1
50000 CAPSULE ORAL
Qty: 12 CAPSULE | Refills: 3 | Status: SHIPPED | OUTPATIENT
Start: 2023-04-05 | End: 2023-09-11 | Stop reason: SDUPTHER

## 2023-04-05 NOTE — PROGRESS NOTES
Jayde Cole DNP   [address] [place] [address]     PATIENT NAME: Gabriella Ulloa  : 1995  DATE: 23  MRN: [id number]      Billing Provider: Jayde Cole DNP  Level of Service:   Patient PCP Information       Provider PCP Type    Jayde Cole DNP General            Reason for Visit / Chief Complaint: pain down left leg        Update PCP  Update Chief Complaint         History of Present Illness / Problem Focused Workflow     Gabriella Ulloa presents to the clinic with pain down left leg      HPI    Review of Systems     Review of Systems     Medical / Social / Family History     Past Medical History:   Diagnosis Date    Anemia     Anxiety     Breakthrough bleeding on birth control pills 2015    Endometriosis of pelvic peritoneum 2020    cul-de-sac and sigmoid colon    Hypertension     Irritable bowel syndrome Chronic    Increased pain with bowel movements with menses       Past Surgical History:   Procedure Laterality Date     SECTION      D&C/Hysteroscopy with robotic and operative Laparoscopy  2020    DIAGNOSTIC LAPAROSCOPY N/A 2022    Procedure: LAPAROSCOPY, DIAGNOSTIC;  Surgeon: Otis Mccullough MD;  Location: [facility];  Service: OB/GYN;  Laterality: N/A;    DILATION AND CURETTAGE OF UTERUS  2020    ENDOMETRIAL BIOPSY N/A 2022    Procedure: BIOPSY, ENDOMETRIUM;  Surgeon: Otis Mccullough MD;  Location: [facility];  Service: OB/GYN;  Laterality: N/A;    HYSTEROSCOPY WITH DILATION AND CURETTAGE OF UTERUS N/A 2022    Procedure: HYSTEROSCOPY, WITH DILATION AND CURETTAGE OF UTERUS;  Surgeon: Otis Mccullough MD;  Location: [facility];  Service: OB/GYN;  Laterality: N/A;       Social History  Ms.  reports that she has never smoked. She has never used smokeless tobacco. She reports that she does not drink alcohol and does not use drugs.    Family History  Ms.'s family history includes Breast cancer in an other family  "member; Diabetes in her mother and another family member; Heart disease in her mother; Hypertension in an other family member; Liver cancer in an other family member; Prostate cancer in her maternal grandfather; Stomach cancer in an other family member.    Medications and Allergies     Medications  Outpatient Medications Marked as Taking for the 4/5/23 encounter (Office Visit) with Jayde Cole DNP   Medication Sig Dispense Refill    ferrous sulfate (IRON, FERROUS SULFATE,) 325 mg (65 mg iron) Tab tablet Take 1 tablet (325 mg total) by mouth once daily. 30 tablet 2    losartan (COZAAR) 50 MG tablet Take 1 tablet (50 mg total) by mouth once daily. 90 tablet 3    pantoprazole (PROTONIX) 40 MG tablet Take 1 tablet (40 mg total) by mouth once daily. 30 tablet 2    topiramate (TOPAMAX) 25 MG tablet Take 1 tablet (25 mg total) by mouth every evening. 30 tablet 5       Allergies  Review of patient's allergies indicates:  No Known Allergies    Physical Examination   /80 (BP Location: Left arm, Patient Position: Sitting, BP Method: Large (Automatic))   Pulse 83   Temp 98.9 °F (37.2 °C) (Oral)   Resp 18   Ht 5' 3" (1.6 m)   Wt 105.4 kg (232 lb 6.4 oz)   LMP 03/11/2023 (Exact Date)   SpO2 100%   BMI 41.17 kg/m²    Physical Exam  Vitals and nursing note reviewed.   Constitutional:       Appearance: She is obese.   HENT:      Head: Normocephalic.      Nose: Nose normal.      Mouth/Throat:      Mouth: Mucous membranes are moist.   Eyes:      Extraocular Movements: Extraocular movements intact.      Conjunctiva/sclera: Conjunctivae normal.      Pupils: Pupils are equal, round, and reactive to light.   Cardiovascular:      Rate and Rhythm: Normal rate and regular rhythm.      Pulses: Normal pulses.      Heart sounds: Normal heart sounds.   Pulmonary:      Effort: Pulmonary effort is normal.      Breath sounds: Normal breath sounds.   Musculoskeletal:         General: Tenderness present.      Cervical back: Normal " range of motion.      Comments: Left hip pain radiating down leg.   Skin:     General: Skin is warm and dry.      Capillary Refill: Capillary refill takes less than 2 seconds.   Neurological:      General: No focal deficit present.      Mental Status: She is alert and oriented to person, place, and time. Mental status is at baseline.   Psychiatric:         Mood and Affect: Mood normal.         Behavior: Behavior normal.         Thought Content: Thought content normal.         Judgment: Judgment normal.        Assessment and Plan (including Health Maintenance)      Problem List  Smart Sets  Document Outside HM   :    Plan:   Demonstrated exercise techniques tolerated well.   Denies any pain upon dc.   Has diclofenac at home as needed      There are no preventive care reminders to display for this patient.    Problem List Items Addressed This Visit          Endocrine    Obesity (BMI 35.0-39.9 without comorbidity)       Orthopedic    Left hip pain - Primary    Sciatica of left side       Health Maintenance Topics with due status: Not Due       Topic Last Completion Date    Pap Smear 11/09/2021    TETANUS VACCINE 03/01/2023       Future Appointments   Date Time Provider Department Center   4/12/2023  8:50 AM Sanjay Palmer MD [facility] E[facility]   4/20/2023 12:30 PM [facility] GI ROOM 02 [facility]O [facility]   6/27/2023  9:30 AM JAYLIN Carpenter [facility] GASTR [facility]   12/4/2023  8:00 AM Jayde Cole DNP [facility]            Signature:  Jayde Cole DNP      [address] [place] [address]    Date of encounter: 4/5/23

## 2023-04-12 ENCOUNTER — OFFICE VISIT (OUTPATIENT)
Dept: OTOLARYNGOLOGY | Facility: CLINIC | Age: 28
End: 2023-04-12
Payer: COMMERCIAL

## 2023-04-12 VITALS — BODY MASS INDEX: 40.75 KG/M2 | HEIGHT: 63 IN | WEIGHT: 230 LBS

## 2023-04-12 DIAGNOSIS — J31.0 CHRONIC RHINITIS: Primary | ICD-10-CM

## 2023-04-12 DIAGNOSIS — L30.9 DERMATITIS: ICD-10-CM

## 2023-04-12 PROCEDURE — 99204 OFFICE O/P NEW MOD 45 MIN: CPT | Mod: S$PBB,,, | Performed by: OTOLARYNGOLOGY

## 2023-04-12 PROCEDURE — 3008F BODY MASS INDEX DOCD: CPT | Mod: CPTII,,, | Performed by: OTOLARYNGOLOGY

## 2023-04-12 PROCEDURE — 3044F PR MOST RECENT HEMOGLOBIN A1C LEVEL <7.0%: ICD-10-PCS | Mod: CPTII,,, | Performed by: OTOLARYNGOLOGY

## 2023-04-12 PROCEDURE — 4010F PR ACE/ARB THEARPY RXD/TAKEN: ICD-10-PCS | Mod: CPTII,,, | Performed by: OTOLARYNGOLOGY

## 2023-04-12 PROCEDURE — 99213 OFFICE O/P EST LOW 20 MIN: CPT | Mod: PBBFAC | Performed by: OTOLARYNGOLOGY

## 2023-04-12 PROCEDURE — 1159F MED LIST DOCD IN RCRD: CPT | Mod: CPTII,,, | Performed by: OTOLARYNGOLOGY

## 2023-04-12 PROCEDURE — 3044F HG A1C LEVEL LT 7.0%: CPT | Mod: CPTII,,, | Performed by: OTOLARYNGOLOGY

## 2023-04-12 PROCEDURE — 4010F ACE/ARB THERAPY RXD/TAKEN: CPT | Mod: CPTII,,, | Performed by: OTOLARYNGOLOGY

## 2023-04-12 PROCEDURE — 1160F RVW MEDS BY RX/DR IN RCRD: CPT | Mod: CPTII,,, | Performed by: OTOLARYNGOLOGY

## 2023-04-12 PROCEDURE — 1160F PR REVIEW ALL MEDS BY PRESCRIBER/CLIN PHARMACIST DOCUMENTED: ICD-10-PCS | Mod: CPTII,,, | Performed by: OTOLARYNGOLOGY

## 2023-04-12 PROCEDURE — 1159F PR MEDICATION LIST DOCUMENTED IN MEDICAL RECORD: ICD-10-PCS | Mod: CPTII,,, | Performed by: OTOLARYNGOLOGY

## 2023-04-12 PROCEDURE — 99204 PR OFFICE/OUTPT VISIT, NEW, LEVL IV, 45-59 MIN: ICD-10-PCS | Mod: S$PBB,,, | Performed by: OTOLARYNGOLOGY

## 2023-04-12 PROCEDURE — 3008F PR BODY MASS INDEX (BMI) DOCUMENTED: ICD-10-PCS | Mod: CPTII,,, | Performed by: OTOLARYNGOLOGY

## 2023-04-12 NOTE — PROGRESS NOTES
Subjective:       Patient ID: Gabriella Ulloa is a 28 y.o. female.    Chief Complaint: Other (Allergic reaction on fingertips and under nails in December 2022, with swollen, red, painful and peeling of skin. Currently has fungus under all toes, noticed on 4/11/23. Pt states she has had all nails done and reaction started afterwards. )    HPI  Review of Systems   Constitutional:  Negative for chills, fatigue and fever.   HENT:  Negative for ear discharge, ear pain, rhinorrhea and sore throat.    Skin:         Healed blisters to bilateral finger tips following acrylic nail application.     Objective:      Physical Exam  Constitutional:       Appearance: Normal appearance.   HENT:      Head: Normocephalic.      Right Ear: External ear normal.      Left Ear: External ear normal.      Nose: Nose normal.      Mouth/Throat:      Mouth: Mucous membranes are moist.      Pharynx: Oropharynx is clear.   Eyes:      Pupils: Pupils are equal, round, and reactive to light.   Pulmonary:      Effort: Pulmonary effort is normal.   Skin:     General: Skin is warm and dry.   Neurological:      Mental Status: She is alert and oriented to person, place, and time.   Psychiatric:         Mood and Affect: Mood normal.       Assessment:       1. Chronic rhinitis    2. Dermatitis      Discussed in great detail probably from nails will need to stop nail treatment for now  Plan:   RAST  Follow up after above.

## 2023-04-16 ENCOUNTER — PATIENT MESSAGE (OUTPATIENT)
Dept: FAMILY MEDICINE | Facility: CLINIC | Age: 28
End: 2023-04-16
Payer: COMMERCIAL

## 2023-04-16 DIAGNOSIS — R51.9 ACUTE INTRACTABLE HEADACHE, UNSPECIFIED HEADACHE TYPE: Primary | ICD-10-CM

## 2023-04-17 ENCOUNTER — OFFICE VISIT (OUTPATIENT)
Dept: FAMILY MEDICINE | Facility: CLINIC | Age: 28
End: 2023-04-17
Payer: COMMERCIAL

## 2023-04-17 VITALS
TEMPERATURE: 97 F | BODY MASS INDEX: 40.4 KG/M2 | SYSTOLIC BLOOD PRESSURE: 120 MMHG | WEIGHT: 228 LBS | DIASTOLIC BLOOD PRESSURE: 79 MMHG | HEIGHT: 63 IN | OXYGEN SATURATION: 100 % | HEART RATE: 71 BPM

## 2023-04-17 DIAGNOSIS — E66.9 OBESITY (BMI 35.0-39.9 WITHOUT COMORBIDITY): ICD-10-CM

## 2023-04-17 DIAGNOSIS — J01.90 ACUTE NON-RECURRENT SINUSITIS, UNSPECIFIED LOCATION: Primary | ICD-10-CM

## 2023-04-17 DIAGNOSIS — R05.1 ACUTE COUGH: ICD-10-CM

## 2023-04-17 DIAGNOSIS — Z20.828 EXPOSURE TO INFLUENZA: ICD-10-CM

## 2023-04-17 PROCEDURE — 3074F PR MOST RECENT SYSTOLIC BLOOD PRESSURE < 130 MM HG: ICD-10-PCS | Mod: CPTII,,, | Performed by: NURSE PRACTITIONER

## 2023-04-17 PROCEDURE — 4010F ACE/ARB THERAPY RXD/TAKEN: CPT | Mod: CPTII,,, | Performed by: NURSE PRACTITIONER

## 2023-04-17 PROCEDURE — 3078F PR MOST RECENT DIASTOLIC BLOOD PRESSURE < 80 MM HG: ICD-10-PCS | Mod: CPTII,,, | Performed by: NURSE PRACTITIONER

## 2023-04-17 PROCEDURE — 3078F DIAST BP <80 MM HG: CPT | Mod: CPTII,,, | Performed by: NURSE PRACTITIONER

## 2023-04-17 PROCEDURE — 3008F PR BODY MASS INDEX (BMI) DOCUMENTED: ICD-10-PCS | Mod: CPTII,,, | Performed by: NURSE PRACTITIONER

## 2023-04-17 PROCEDURE — 87428 SARSCOV & INF VIR A&B AG IA: CPT | Mod: QW,,, | Performed by: NURSE PRACTITIONER

## 2023-04-17 PROCEDURE — 4010F PR ACE/ARB THEARPY RXD/TAKEN: ICD-10-PCS | Mod: CPTII,,, | Performed by: NURSE PRACTITIONER

## 2023-04-17 PROCEDURE — 87428 POCT SARS-COV2 (COVID) WITH FLU ANTIGEN: ICD-10-PCS | Mod: QW,,, | Performed by: NURSE PRACTITIONER

## 2023-04-17 PROCEDURE — 3044F HG A1C LEVEL LT 7.0%: CPT | Mod: CPTII,,, | Performed by: NURSE PRACTITIONER

## 2023-04-17 PROCEDURE — 96372 PR INJECTION,THERAP/PROPH/DIAG2ST, IM OR SUBCUT: ICD-10-PCS | Mod: ,,, | Performed by: NURSE PRACTITIONER

## 2023-04-17 PROCEDURE — 1159F MED LIST DOCD IN RCRD: CPT | Mod: CPTII,,, | Performed by: NURSE PRACTITIONER

## 2023-04-17 PROCEDURE — 1159F PR MEDICATION LIST DOCUMENTED IN MEDICAL RECORD: ICD-10-PCS | Mod: CPTII,,, | Performed by: NURSE PRACTITIONER

## 2023-04-17 PROCEDURE — 99213 OFFICE O/P EST LOW 20 MIN: CPT | Mod: 25,,, | Performed by: NURSE PRACTITIONER

## 2023-04-17 PROCEDURE — 3044F PR MOST RECENT HEMOGLOBIN A1C LEVEL <7.0%: ICD-10-PCS | Mod: CPTII,,, | Performed by: NURSE PRACTITIONER

## 2023-04-17 PROCEDURE — 3074F SYST BP LT 130 MM HG: CPT | Mod: CPTII,,, | Performed by: NURSE PRACTITIONER

## 2023-04-17 PROCEDURE — 96372 THER/PROPH/DIAG INJ SC/IM: CPT | Mod: ,,, | Performed by: NURSE PRACTITIONER

## 2023-04-17 PROCEDURE — 99213 PR OFFICE/OUTPT VISIT, EST, LEVL III, 20-29 MIN: ICD-10-PCS | Mod: 25,,, | Performed by: NURSE PRACTITIONER

## 2023-04-17 PROCEDURE — 3008F BODY MASS INDEX DOCD: CPT | Mod: CPTII,,, | Performed by: NURSE PRACTITIONER

## 2023-04-17 RX ORDER — FLUTICASONE PROPIONATE 50 MCG
1 SPRAY, SUSPENSION (ML) NASAL DAILY
Qty: 11.1 ML | Refills: 0 | Status: SHIPPED | OUTPATIENT
Start: 2023-04-17

## 2023-04-17 RX ORDER — CEFTRIAXONE 1 G/1
1 INJECTION, POWDER, FOR SOLUTION INTRAMUSCULAR; INTRAVENOUS
Status: COMPLETED | OUTPATIENT
Start: 2023-04-17 | End: 2023-04-17

## 2023-04-17 RX ORDER — CETIRIZINE HYDROCHLORIDE 10 MG/1
10 TABLET ORAL DAILY
Qty: 30 TABLET | Refills: 0 | Status: SHIPPED | OUTPATIENT
Start: 2023-04-17

## 2023-04-17 RX ORDER — AZITHROMYCIN 250 MG/1
TABLET, FILM COATED ORAL
Qty: 6 TABLET | Refills: 0 | Status: SHIPPED | OUTPATIENT
Start: 2023-04-17 | End: 2023-05-22

## 2023-04-17 RX ORDER — BETAMETHASONE SODIUM PHOSPHATE AND BETAMETHASONE ACETATE 3; 3 MG/ML; MG/ML
6 INJECTION, SUSPENSION INTRA-ARTICULAR; INTRALESIONAL; INTRAMUSCULAR; SOFT TISSUE
Status: COMPLETED | OUTPATIENT
Start: 2023-04-17 | End: 2023-04-17

## 2023-04-17 RX ORDER — METHYLPREDNISOLONE 4 MG/1
TABLET ORAL
Qty: 21 TABLET | Refills: 0 | Status: SHIPPED | OUTPATIENT
Start: 2023-04-17 | End: 2023-05-22

## 2023-04-17 RX ADMIN — CEFTRIAXONE 1 G: 1 INJECTION, POWDER, FOR SOLUTION INTRAMUSCULAR; INTRAVENOUS at 09:04

## 2023-04-17 RX ADMIN — BETAMETHASONE SODIUM PHOSPHATE AND BETAMETHASONE ACETATE 6 MG: 3; 3 INJECTION, SUSPENSION INTRA-ARTICULAR; INTRALESIONAL; INTRAMUSCULAR; SOFT TISSUE at 09:04

## 2023-04-17 NOTE — PROGRESS NOTES
Jayde Cole DNP   [address] [place] [address]     PATIENT NAME: Gabriella Ulloa  : 1995  DATE: 23  MRN: [id number]      Billing Provider: Jayde Cole DNP  Level of Service:   Patient PCP Information       Provider PCP Type    Jayde Cole DNP General            Reason for Visit / Chief Complaint: Cough and Nasal Congestion       Update PCP  Update Chief Complaint         History of Present Illness / Problem Focused Workflow     Gabriella Ulloa presents to the clinic with Cough and Nasal Congestion     Cough    Review of Systems     Review of Systems   Respiratory:  Positive for cough.       Medical / Social / Family History     Past Medical History:   Diagnosis Date    Anemia     Anxiety     Breakthrough bleeding on birth control pills 2015    Endometriosis of pelvic peritoneum 2020    cul-de-sac and sigmoid colon    Hypertension     Irritable bowel syndrome Chronic    Increased pain with bowel movements with menses       Past Surgical History:   Procedure Laterality Date     SECTION      D&C/Hysteroscopy with robotic and operative Laparoscopy  2020    DIAGNOSTIC LAPAROSCOPY N/A 2022    Procedure: LAPAROSCOPY, DIAGNOSTIC;  Surgeon: Otis Mccullough MD;  Location: [facility];  Service: OB/GYN;  Laterality: N/A;    DILATION AND CURETTAGE OF UTERUS  2020    ENDOMETRIAL BIOPSY N/A 2022    Procedure: BIOPSY, ENDOMETRIUM;  Surgeon: Otis Mccullough MD;  Location: [facility];  Service: OB/GYN;  Laterality: N/A;    HYSTEROSCOPY WITH DILATION AND CURETTAGE OF UTERUS N/A 2022    Procedure: HYSTEROSCOPY, WITH DILATION AND CURETTAGE OF UTERUS;  Surgeon: Otis Mccullough MD;  Location: [facility];  Service: OB/GYN;  Laterality: N/A;       Social History  Ms.  reports that she has never smoked. She has never used smokeless tobacco. She reports that she does not drink alcohol and does not use drugs.    Family  "History  Ms.'s family history includes Breast cancer in an other family member; Diabetes in her mother and another family member; Heart disease in her mother; Hypertension in an other family member; Liver cancer in an other family member; Prostate cancer in her maternal grandfather; Stomach cancer in an other family member.    Medications and Allergies     Medications  No outpatient medications have been marked as taking for the 4/17/23 encounter (Office Visit) with Jayde Cole DNP.     Current Facility-Administered Medications for the 4/17/23 encounter (Office Visit) with Jayde Cole DNP   Medication Dose Route Frequency Provider Last Rate Last Admin    [COMPLETED] betamethasone acetate-betamethasone sodium phosphate injection 6 mg  6 mg Intramuscular 1 time in Clinic/HOD Jayde Cole DNP   6 mg at 04/17/23 0952    [COMPLETED] cefTRIAXone injection 1 g  1 g Intramuscular 1 time in Clinic/HOD Jayde Cole DNP   1 g at 04/17/23 0952       Allergies  Review of patient's allergies indicates:  No Known Allergies    Physical Examination   /79   Pulse 71   Temp 97.3 °F (36.3 °C)   Ht 5' 3" (1.6 m)   Wt 103.4 kg (228 lb)   LMP 03/11/2023 (Exact Date)   SpO2 100%   BMI 40.39 kg/m²    Physical Exam  Vitals and nursing note reviewed.   Constitutional:       Appearance: She is ill-appearing.   HENT:      Head: Normocephalic.      Ears:      Comments: Dull tM     Nose: Congestion and rhinorrhea present.      Mouth/Throat:      Mouth: Mucous membranes are moist.      Pharynx: Posterior oropharyngeal erythema present.   Eyes:      Extraocular Movements: Extraocular movements intact.      Conjunctiva/sclera: Conjunctivae normal.      Pupils: Pupils are equal, round, and reactive to light.   Cardiovascular:      Rate and Rhythm: Normal rate and regular rhythm.      Pulses: Normal pulses.      Heart sounds: Normal heart sounds.   Pulmonary:      Effort: Pulmonary effort is normal.      Breath " sounds: Normal breath sounds.   Musculoskeletal:      Cervical back: Normal range of motion.   Lymphadenopathy:      Cervical: Cervical adenopathy present.   Skin:     General: Skin is warm and dry.      Capillary Refill: Capillary refill takes less than 2 seconds.   Neurological:      General: No focal deficit present.      Mental Status: She is alert and oriented to person, place, and time. Mental status is at baseline.   Psychiatric:         Mood and Affect: Mood normal.         Behavior: Behavior normal.         Thought Content: Thought content normal.         Judgment: Judgment normal.        Assessment and Plan (including Health Maintenance)      Problem List  Smart Sets  Document Outside HM   :    Plan:         There are no preventive care reminders to display for this patient.    Problem List Items Addressed This Visit          ENT    Sinusitis - Primary       Pulmonary    Cough       ID    Exposure to influenza       Endocrine    Obesity (BMI 35.0-39.9 without comorbidity)       Health Maintenance Topics with due status: Not Due       Topic Last Completion Date    Pap Smear 11/09/2021    TETANUS VACCINE 03/01/2023       Future Appointments   Date Time Provider Department Center   4/20/2023 12:30 PM [facility] GI ROOM 02 [facility]   6/27/2023  9:30 AM JAYLIN Carpenter [facility] GASTR [place] [facility]   12/4/2023  8:00 AM Jayde Cole DNP [facility]            Signature:  Jayde Cole DNP      [address] [place] [address]    Date of encounter: 4/17/23

## 2023-04-17 NOTE — LETTER
April 17, 2023      [facility]  [address] [address]  Phone: [phone]  Fax: [phone]       Patient: Gabriella Ulloa   YOB: 1995  Date of Visit: 04/17/2023    To Whom It May Concern:    Elle Ulloa  was at [facility] on 04/17/2023. The patient may return to work/school on 4/19/2023 with no restrictions. If you have any questions or concerns, or if I can be of further assistance, please do not hesitate to contact me.    Sincerely,    Jayde Cole, DNP

## 2023-04-18 DIAGNOSIS — J31.0 CHRONIC RHINITIS: Primary | ICD-10-CM

## 2023-04-18 RX ORDER — TOPIRAMATE 50 MG/1
50 TABLET, FILM COATED ORAL 2 TIMES DAILY
Qty: 180 TABLET | Refills: 1 | Status: SHIPPED | OUTPATIENT
Start: 2023-04-18 | End: 2023-05-22

## 2023-04-18 RX ORDER — SERTRALINE HYDROCHLORIDE 25 MG/1
25 TABLET, FILM COATED ORAL DAILY
Qty: 30 TABLET | Refills: 11 | Status: SHIPPED | OUTPATIENT
Start: 2023-04-18 | End: 2023-06-22

## 2023-04-20 ENCOUNTER — ANESTHESIA (OUTPATIENT)
Dept: GASTROENTEROLOGY | Facility: HOSPITAL | Age: 28
End: 2023-04-20
Payer: COMMERCIAL

## 2023-04-20 ENCOUNTER — ANESTHESIA EVENT (OUTPATIENT)
Dept: GASTROENTEROLOGY | Facility: HOSPITAL | Age: 28
End: 2023-04-20
Payer: COMMERCIAL

## 2023-04-20 ENCOUNTER — OFFICE VISIT (OUTPATIENT)
Dept: FAMILY MEDICINE | Facility: CLINIC | Age: 28
End: 2023-04-20
Payer: COMMERCIAL

## 2023-04-20 ENCOUNTER — HOSPITAL ENCOUNTER (OUTPATIENT)
Dept: GASTROENTEROLOGY | Facility: HOSPITAL | Age: 28
Discharge: HOME | End: 2023-04-20
Attending: NURSE PRACTITIONER
Payer: COMMERCIAL

## 2023-04-20 VITALS
TEMPERATURE: 97 F | RESPIRATION RATE: 12 BRPM | TEMPERATURE: 97 F | HEART RATE: 66 BPM | DIASTOLIC BLOOD PRESSURE: 65 MMHG | OXYGEN SATURATION: 100 % | SYSTOLIC BLOOD PRESSURE: 108 MMHG | OXYGEN SATURATION: 100 % | HEART RATE: 77 BPM | DIASTOLIC BLOOD PRESSURE: 85 MMHG | SYSTOLIC BLOOD PRESSURE: 125 MMHG

## 2023-04-20 VITALS
WEIGHT: 225 LBS | HEIGHT: 63 IN | TEMPERATURE: 98 F | DIASTOLIC BLOOD PRESSURE: 76 MMHG | OXYGEN SATURATION: 99 % | BODY MASS INDEX: 39.87 KG/M2 | HEART RATE: 67 BPM | SYSTOLIC BLOOD PRESSURE: 112 MMHG

## 2023-04-20 DIAGNOSIS — R09.81 NASAL CONGESTION: ICD-10-CM

## 2023-04-20 DIAGNOSIS — D50.9 IRON DEFICIENCY ANEMIA, UNSPECIFIED IRON DEFICIENCY ANEMIA TYPE: ICD-10-CM

## 2023-04-20 DIAGNOSIS — R05.9 COUGH, UNSPECIFIED TYPE: ICD-10-CM

## 2023-04-20 DIAGNOSIS — Z20.828 EXPOSURE TO THE FLU: Primary | ICD-10-CM

## 2023-04-20 DIAGNOSIS — K57.30 DIVERTICULOSIS OF COLON: Primary | ICD-10-CM

## 2023-04-20 PROCEDURE — 4010F ACE/ARB THERAPY RXD/TAKEN: CPT | Mod: CPTII,,, | Performed by: NURSE PRACTITIONER

## 2023-04-20 PROCEDURE — 3008F PR BODY MASS INDEX (BMI) DOCUMENTED: ICD-10-PCS | Mod: CPTII,,, | Performed by: NURSE PRACTITIONER

## 2023-04-20 PROCEDURE — 99212 PR OFFICE/OUTPT VISIT, EST, LEVL II, 10-19 MIN: ICD-10-PCS | Mod: ,,, | Performed by: NURSE PRACTITIONER

## 2023-04-20 PROCEDURE — 25000003 PHARM REV CODE 250: Performed by: NURSE ANESTHETIST, CERTIFIED REGISTERED

## 2023-04-20 PROCEDURE — 45378 PR COLONOSCOPY,DIAGNOSTIC: ICD-10-PCS | Mod: ,,, | Performed by: INTERNAL MEDICINE

## 2023-04-20 PROCEDURE — 37000009 HC ANESTHESIA EA ADD 15 MINS

## 2023-04-20 PROCEDURE — 63600175 PHARM REV CODE 636 W HCPCS: Performed by: NURSE ANESTHETIST, CERTIFIED REGISTERED

## 2023-04-20 PROCEDURE — 3074F SYST BP LT 130 MM HG: CPT | Mod: CPTII,,, | Performed by: NURSE PRACTITIONER

## 2023-04-20 PROCEDURE — 45378 DIAGNOSTIC COLONOSCOPY: CPT | Performed by: INTERNAL MEDICINE

## 2023-04-20 PROCEDURE — 45378 DIAGNOSTIC COLONOSCOPY: CPT | Mod: ,,, | Performed by: INTERNAL MEDICINE

## 2023-04-20 PROCEDURE — 3008F BODY MASS INDEX DOCD: CPT | Mod: CPTII,,, | Performed by: NURSE PRACTITIONER

## 2023-04-20 PROCEDURE — 99212 OFFICE O/P EST SF 10 MIN: CPT | Mod: ,,, | Performed by: NURSE PRACTITIONER

## 2023-04-20 PROCEDURE — 27000716 HC OXISENSOR PROBE, ANY SIZE: Performed by: NURSE ANESTHETIST, CERTIFIED REGISTERED

## 2023-04-20 PROCEDURE — 3044F HG A1C LEVEL LT 7.0%: CPT | Mod: CPTII,,, | Performed by: NURSE PRACTITIONER

## 2023-04-20 PROCEDURE — 3074F PR MOST RECENT SYSTOLIC BLOOD PRESSURE < 130 MM HG: ICD-10-PCS | Mod: CPTII,,, | Performed by: NURSE PRACTITIONER

## 2023-04-20 PROCEDURE — 3078F DIAST BP <80 MM HG: CPT | Mod: CPTII,,, | Performed by: NURSE PRACTITIONER

## 2023-04-20 PROCEDURE — 37000008 HC ANESTHESIA 1ST 15 MINUTES

## 2023-04-20 PROCEDURE — D9220A PRA ANESTHESIA: Mod: ,,, | Performed by: NURSE ANESTHETIST, CERTIFIED REGISTERED

## 2023-04-20 PROCEDURE — D9220A PRA ANESTHESIA: ICD-10-PCS | Mod: ,,, | Performed by: NURSE ANESTHETIST, CERTIFIED REGISTERED

## 2023-04-20 PROCEDURE — 3078F PR MOST RECENT DIASTOLIC BLOOD PRESSURE < 80 MM HG: ICD-10-PCS | Mod: CPTII,,, | Performed by: NURSE PRACTITIONER

## 2023-04-20 PROCEDURE — 3044F PR MOST RECENT HEMOGLOBIN A1C LEVEL <7.0%: ICD-10-PCS | Mod: CPTII,,, | Performed by: NURSE PRACTITIONER

## 2023-04-20 PROCEDURE — 4010F PR ACE/ARB THEARPY RXD/TAKEN: ICD-10-PCS | Mod: CPTII,,, | Performed by: NURSE PRACTITIONER

## 2023-04-20 PROCEDURE — 27000284 HC CANNULA NASAL: Performed by: NURSE ANESTHETIST, CERTIFIED REGISTERED

## 2023-04-20 RX ORDER — LIDOCAINE HYDROCHLORIDE 20 MG/ML
INJECTION, SOLUTION EPIDURAL; INFILTRATION; INTRACAUDAL; PERINEURAL
Status: DISCONTINUED | OUTPATIENT
Start: 2023-04-20 | End: 2023-04-20

## 2023-04-20 RX ORDER — SODIUM CHLORIDE 0.9 % (FLUSH) 0.9 %
10 SYRINGE (ML) INJECTION
Status: CANCELLED | OUTPATIENT
Start: 2023-04-20

## 2023-04-20 RX ORDER — PROPOFOL 10 MG/ML
VIAL (ML) INTRAVENOUS CONTINUOUS PRN
Status: DISCONTINUED | OUTPATIENT
Start: 2023-04-20 | End: 2023-04-20

## 2023-04-20 RX ADMIN — LIDOCAINE HYDROCHLORIDE 40 MG: 20 INJECTION, SOLUTION INTRAVENOUS at 02:04

## 2023-04-20 RX ADMIN — SODIUM CHLORIDE: 9 INJECTION, SOLUTION INTRAVENOUS at 01:04

## 2023-04-20 RX ADMIN — PROPOFOL 250 MCG/KG/MIN: 10 INJECTION, EMULSION INTRAVENOUS at 02:04

## 2023-04-20 NOTE — ANESTHESIA POSTPROCEDURE EVALUATION
Anesthesia Post Evaluation    Patient: Gabriella Ulloa    Procedure(s) Performed: * No procedures listed *    Final Anesthesia Type: general      Patient location during evaluation: GI PACU  Patient participation: Yes- Able to Participate  Level of consciousness: awake and alert  Post-procedure vital signs: reviewed and stable  Pain management: adequate  Airway patency: patent    PONV status at discharge: No PONV  Anesthetic complications: no      Cardiovascular status: blood pressure returned to baseline and hemodynamically stable  Respiratory status: spontaneous ventilation  Hydration status: euvolemic  Follow-up not needed.  Comments: Pt voices appreciation for care          Vitals Value Taken Time   /85 04/20/23 1440   Temp 36.1 °C (97 °F) 04/20/23 1413   Pulse 68 04/20/23 1442   Resp 12 04/20/23 1442   SpO2 100 % 04/20/23 1440   Vitals shown include unvalidated device data.      Event Time   Out of Recovery 15:03:58         Pain/Geovanna Score: Geovanna Score: 9 (4/20/2023  2:16 PM)

## 2023-04-20 NOTE — PROGRESS NOTES
Jayde Cole DNP   [address] [place] [address]     PATIENT NAME: Gabriella Ulloa  : 1995  DATE: 23  MRN: [id number]      Billing Provider: Jayde Cole DNP  Level of Service:   Patient PCP Information       Provider PCP Type    Jayde Cole DNP General            Reason for Visit / Chief Complaint: Cough and Nasal Congestion       Update PCP  Update Chief Complaint         History of Present Illness / Problem Focused Workflow     Gabriella Ulloa presents to the clinic with Cough and Nasal Congestion     Cough      Review of Systems     Review of Systems   Respiratory:  Positive for cough.       Medical / Social / Family History     Past Medical History:   Diagnosis Date    Anemia     Anxiety     Breakthrough bleeding on birth control pills 2015    Endometriosis of pelvic peritoneum 2020    cul-de-sac and sigmoid colon    Hypertension     Irritable bowel syndrome Chronic    Increased pain with bowel movements with menses       Past Surgical History:   Procedure Laterality Date     SECTION      D&C/Hysteroscopy with robotic and operative Laparoscopy  2020    DIAGNOSTIC LAPAROSCOPY N/A 2022    Procedure: LAPAROSCOPY, DIAGNOSTIC;  Surgeon: Otis Mccullough MD;  Location: [facility] OR;  Service: OB/GYN;  Laterality: N/A;    DILATION AND CURETTAGE OF UTERUS  2020    ENDOMETRIAL BIOPSY N/A 2022    Procedure: BIOPSY, ENDOMETRIUM;  Surgeon: Otis Mccullough MD;  Location: [facility] OR;  Service: OB/GYN;  Laterality: N/A;    HYSTEROSCOPY WITH DILATION AND CURETTAGE OF UTERUS N/A 2022    Procedure: HYSTEROSCOPY, WITH DILATION AND CURETTAGE OF UTERUS;  Surgeon: Otis Mccullough MD;  Location: [facility] OR;  Service: OB/GYN;  Laterality: N/A;       Social History  Ms.  reports that she has never smoked. She has never used smokeless tobacco. She reports that she does not drink alcohol and does not use drugs.    Family History  Ms.'s  "family history includes Breast cancer in an other family member; Diabetes in her mother and another family member; Heart disease in her mother; Hypertension in an other family member; Liver cancer in an other family member; Prostate cancer in her maternal grandfather; Stomach cancer in an other family member.    Medications and Allergies     Medications  No outpatient medications have been marked as taking for the 4/20/23 encounter (Office Visit) with Jayde Cole DNP.       Allergies  Review of patient's allergies indicates:  No Known Allergies    Physical Examination   /76   Pulse 67   Temp 97.7 °F (36.5 °C)   Ht 5' 3" (1.6 m)   Wt 102.1 kg (225 lb)   LMP 03/11/2023 (Exact Date)   SpO2 99%   BMI 39.86 kg/m²    Physical Exam  Vitals and nursing note reviewed.   HENT:      Head: Normocephalic.      Right Ear: Tympanic membrane normal.      Left Ear: Tympanic membrane normal.      Nose: Congestion and rhinorrhea present.      Mouth/Throat:      Mouth: Mucous membranes are moist.   Eyes:      Extraocular Movements: Extraocular movements intact.      Conjunctiva/sclera: Conjunctivae normal.      Pupils: Pupils are equal, round, and reactive to light.   Cardiovascular:      Rate and Rhythm: Normal rate and regular rhythm.      Pulses: Normal pulses.      Heart sounds: Normal heart sounds.   Pulmonary:      Effort: Pulmonary effort is normal.      Breath sounds: Normal breath sounds.   Musculoskeletal:      Cervical back: Normal range of motion.   Lymphadenopathy:      Cervical: Cervical adenopathy present.   Skin:     General: Skin is warm and dry.      Capillary Refill: Capillary refill takes less than 2 seconds.   Neurological:      General: No focal deficit present.      Mental Status: She is alert and oriented to person, place, and time. Mental status is at baseline.   Psychiatric:         Mood and Affect: Mood normal.         Behavior: Behavior normal.         Thought Content: Thought content " normal.         Judgment: Judgment normal.        Assessment and Plan (including Health Maintenance)      Problem List  Smart Sets  Document Outside HM   :    Plan:     Finish meds -   Use neti rinse to flush sinus otc      There are no preventive care reminders to display for this patient.    Problem List Items Addressed This Visit          ENT    Nasal congestion       Pulmonary    Cough    Relevant Orders    POCT Influenza A/B       ID    Exposure to the flu - Primary       Health Maintenance Topics with due status: Not Due       Topic Last Completion Date    Pap Smear 11/09/2021    TETANUS VACCINE 03/01/2023       Future Appointments   Date Time Provider Department Center   4/20/2023 12:30 PM [facility] GI ROOM 02 [facility] ENDO [facility]   5/22/2023  2:30 PM Markos Genao MD [facility] NEURO [facility]   6/27/2023  9:30 AM JAYLIN Carpenter [facility] GASTR [facility]   10/18/2023  8:10 AM Sanjay Palmer MD [facility] ENT [facility]   12/4/2023  8:00 AM Jayde Cole DNP [facility] HECTOR [place] [place]            Signature:  Jayde Cole DNP      [address] [place] [address]    Date of encounter: 4/20/23

## 2023-04-20 NOTE — DISCHARGE INSTRUCTIONS
Procedure Date  4/20/23     Impression  Overall Impression: No polyps were seen. No bleeding was seen. Scattered diverticula are present in the transverse, descending and sigmoid colon.     Recommendation    Repeat screening colonoscopy in 10 years      High fiber diet.     Resume otc iron supplement two 325mg tablets per day for at least 4 weeks. F/U iron studies and cbc with PCP    Discharge:   Disp: DC to home in stable condition. Resume diet. No driving x 24 hours. F/U with PCP as scheduled.  Dx: iron deficiency anemia, colon diverticulosis    No driving today, no operating heavy machinery, no signing any legal documents until tomorrow.    Drink lots of fluids, resume regular diet.  Take your normal medications.

## 2023-04-20 NOTE — TRANSFER OF CARE
Anesthesia Transfer of Care Note    Patient: Gabriella Ulloa    Procedure(s) Performed: * No procedures listed *    Patient location: GI    Anesthesia Type: general    Transport from OR: Transported from OR on room air with adequate spontaneous ventilation. Continuous ECG monitoring in transport. Continuous SpO2 monitoring in transport    Post pain: adequate analgesia    Post assessment: no apparent anesthetic complications    Post vital signs: stable    Level of consciousness: sedated and responds to stimulation    Nausea/Vomiting: no nausea/vomiting    Complications: none    Transfer of care protocol was followedComments: Good SV continue, NAD, VSS, RTRN      Last vitals:   Visit Vitals  /61 (BP Location: Left arm, Patient Position: Lying)   Pulse 76   Temp 36.1 °C (97 °F) (Skin)   Resp 20   LMP 03/11/2023 (Exact Date)   SpO2 100%   Breastfeeding No

## 2023-04-20 NOTE — H&P
[facility]  Gastroenterology  H&P    Patient Name: Gabriella Ulloa  MRN: [id number]  Admission Date: 2023  Code Status: No Order    Attending Provider: JAYLIN Carpenter   Primary Care Physician: Jayde Cole DNP  Principal Problem:<principal problem not specified>    Subjective:     History of Present Illness: Pt has iron deficiency anemia and family hx of colon polyps in her mother.    Past Medical History:   Diagnosis Date    Anemia     Anxiety     Breakthrough bleeding on birth control pills 2015    Endometriosis of pelvic peritoneum 2020    cul-de-sac and sigmoid colon    Hypertension     Irritable bowel syndrome Chronic    Increased pain with bowel movements with menses       Past Surgical History:   Procedure Laterality Date     SECTION      D&C/Hysteroscopy with robotic and operative Laparoscopy  2020    DIAGNOSTIC LAPAROSCOPY N/A 2022    Procedure: LAPAROSCOPY, DIAGNOSTIC;  Surgeon: Otis Mccullough MD;  Location: [facility];  Service: OB/GYN;  Laterality: N/A;    DILATION AND CURETTAGE OF UTERUS  2020    ENDOMETRIAL BIOPSY N/A 2022    Procedure: BIOPSY, ENDOMETRIUM;  Surgeon: Otis Mccullough MD;  [facility] OR;  Service: OB/GYN;  Laterality: N/A;    HYSTEROSCOPY WITH DILATION AND CURETTAGE OF UTERUS N/A 2022    Procedure: HYSTEROSCOPY, WITH DILATION AND CURETTAGE OF UTERUS;  Surgeon: Otis Mccullough MD;  Location: [facility];  Service: OB/GYN;  Laterality: N/A;       Review of patient's allergies indicates:  No Known Allergies  Family History       Problem Relation (Age of Onset)    Breast cancer Other    Diabetes Other, Mother    Heart disease Mother    Hypertension Other    Liver cancer Other    Prostate cancer Maternal Grandfather    Stomach cancer Other          Tobacco Use    Smoking status: Never    Smokeless tobacco: Never   Substance and Sexual Activity    Alcohol use: Never    Drug use: Never    Sexual activity: Yes      Partners: Male     Birth control/protection: Abstinence, Condom     Review of Systems   Respiratory: Negative.     Cardiovascular: Negative.    Gastrointestinal: Negative.    Objective:     Vital Signs (Most Recent):  Pulse: 66 (04/20/23 1238)  Resp: 14 (04/20/23 1238)  BP: 124/75 (04/20/23 1238)  SpO2: 99 % (04/20/23 1238) Vital Signs (24h Range):  Temp:  [97.7 °F (36.5 °C)] 97.7 °F (36.5 °C)  Pulse:  [66-67] 66  Resp:  [14] 14  SpO2:  [99 %] 99 %  BP: (112-124)/(75-76) 124/75        There is no height or weight on file to calculate BMI.    No intake or output data in the 24 hours ending 04/20/23 1400    Lines/Drains/Airways       Peripheral Intravenous Line  Duration                  Peripheral IV - Single Lumen 04/20/23 1238 22 G Anterior;Proximal;Right Forearm <1 day                    Physical Exam  Vitals reviewed.   Constitutional:       General: She is not in acute distress.     Appearance: Normal appearance. She is well-developed. She is obese. She is not ill-appearing.   HENT:      Head: Normocephalic and atraumatic.      Nose: Nose normal.   Eyes:      Pupils: Pupils are equal, round, and reactive to light.   Cardiovascular:      Rate and Rhythm: Normal rate and regular rhythm.   Pulmonary:      Effort: Pulmonary effort is normal.      Breath sounds: Normal breath sounds. No wheezing.   Abdominal:      General: Abdomen is flat. Bowel sounds are normal. There is no distension.      Palpations: Abdomen is soft.      Tenderness: There is no abdominal tenderness. There is no guarding.   Skin:     General: Skin is warm and dry.      Coloration: Skin is not jaundiced.   Neurological:      Mental Status: She is alert.   Psychiatric:         Attention and Perception: Attention normal.         Mood and Affect: Affect normal.         Speech: Speech normal.         Behavior: Behavior is cooperative.      Comments: Pt was calm while speaking.       Significant Labs:  CBC: No results for input(s): WBC, HGB, HCT,  PLT in the last 48 hours.  CMP: No results for input(s): GLU, CALCIUM, ALBUMIN, PROT, NA, K, CO2, CL, BUN, CREATININE, ALKPHOS, ALT, AST, BILITOT in the last 48 hours.    Significant Imaging:  Imaging results within the past 24 hours have been reviewed.    Assessment/Plan:     There are no hospital problems to display for this patient.        Imp: iron def anemia; family hx of colon polyps  Plan: colonoscopy    Bronson Wesley MD  Gastroenterology  [facility] End[facility]

## 2023-04-20 NOTE — ANESTHESIA PREPROCEDURE EVALUATION
2023  Gabriella Ulloa is a 28 y.o., female.    Past Medical History:   Diagnosis Date    Anemia     Anxiety     Breakthrough bleeding on birth control pills 2015    Endometriosis of pelvic peritoneum 2020    cul-de-sac and sigmoid colon    Hypertension     Irritable bowel syndrome Chronic    Increased pain with bowel movements with menses       Past Surgical History:   Procedure Laterality Date     SECTION      D&C/Hysteroscopy with robotic and operative Laparoscopy  2020    DIAGNOSTIC LAPAROSCOPY N/A 2022    Procedure: LAPAROSCOPY, DIAGNOSTIC;  Surgeon: Otis Mccullough MD;  Location: [facility] OR;  Service: OB/GYN;  Laterality: N/A;    DILATION AND CURETTAGE OF UTERUS  2020    ENDOMETRIAL BIOPSY N/A 2022    Procedure: BIOPSY, ENDOMETRIUM;  Surgeon: Otis Mccullough MD;  Location: [facility] OR;  Service: OB/GYN;  Laterality: N/A;    HYSTEROSCOPY WITH DILATION AND CURETTAGE OF UTERUS N/A 2022    Procedure: HYSTEROSCOPY, WITH DILATION AND CURETTAGE OF UTERUS;  Surgeon: Otis Mccullough MD;  Location: [facility] OR;  Service: OB/GYN;  Laterality: N/A;       Family History   Problem Relation Age of Onset    Prostate cancer Maternal Grandfather     Hypertension Other     Liver cancer Other     Stomach cancer Other     Diabetes Other     Breast cancer Other     Heart disease Mother     Diabetes Mother        Social History     Socioeconomic History    Marital status: Single   Tobacco Use    Smoking status: Never    Smokeless tobacco: Never   Substance and Sexual Activity    Alcohol use: Never    Drug use: Never    Sexual activity: Yes     Partners: Male     Birth control/protection: Abstinence, Condom       Current Outpatient Medications   Medication Sig Dispense Refill    azithromycin (Z-LEONARD) 250 MG tablet Take 2 tablets by mouth on day 1;  Take 1 tablet by mouth on days 2-5 6 tablet 0    cetirizine (ZYRTEC) 10 MG tablet Take 1 tablet (10 mg total) by mouth once daily. 30 tablet 0    ergocalciferol (ERGOCALCIFEROL) 50,000 unit Cap Take 1 capsule (50,000 Units total) by mouth every 7 days. 12 capsule 3    ferrous sulfate (IRON, FERROUS SULFATE,) 325 mg (65 mg iron) Tab tablet Take 1 tablet (325 mg total) by mouth once daily. 30 tablet 2    fluticasone propionate (FLONASE) 50 mcg/actuation nasal spray 1 spray (50 mcg total) by Each Nostril route once daily. 11.1 mL 0    losartan (COZAAR) 50 MG tablet Take 1 tablet (50 mg total) by mouth once daily. 90 tablet 3    methylPREDNISolone (MEDROL DOSEPACK) 4 mg tablet use as directed 21 tablet 0    norethindrone-ethinyl estradiol-iron (MICROGESTIN FE1.5/30) 1.5 mg-30 mcg (21)/75 mg (7) tablet Take 1 tablet by mouth once daily. (Patient not taking: Reported on 1/17/2023) 90 tablet 1    pantoprazole (PROTONIX) 40 MG tablet Take 1 tablet (40 mg total) by mouth once daily. 30 tablet 2    sertraline (ZOLOFT) 25 MG tablet Take 1 tablet (25 mg total) by mouth once daily. 30 tablet 11    tiZANidine (ZANAFLEX) 4 MG tablet Take 1 tablet (4 mg total) by mouth every evening. 20 tablet 0    topiramate (TOPAMAX) 25 MG tablet Take 1 tablet (25 mg total) by mouth every evening. 30 tablet 5    topiramate (TOPAMAX) 50 MG tablet Take 1 tablet (50 mg total) by mouth 2 (two) times daily. 180 tablet 1     No current facility-administered medications for this encounter.       Review of patient's allergies indicates:  No Known Allergies    Pre-op Assessment    I have reviewed the Patient Summary Reports.     I have reviewed the Nursing Notes. I have reviewed the NPO Status.   I have reviewed the Medications.     Review of Systems  Anesthesia Hx:  No problems with previous Anesthesia  Denies Family Hx of Anesthesia complications.   Denies Personal Hx of Anesthesia complications.   Hematology/Oncology:     Oncology Normal      EENT/Dental:EENT/Dental Normal   Cardiovascular:   Hypertension hyperlipidemia    Pulmonary:   Sleep Apnea    Renal/:  Renal/ Normal     Hepatic/GI:   Bowel Prep. GERD    Musculoskeletal:  Musculoskeletal Normal    Neurological:   Neuromuscular Disease, Headaches    Endocrine:  Obesity / BMI > 30  Dermatological:  Skin Normal    Psych:  Psychiatric Normal           Physical Exam  General: Well nourished, Alert, Oriented and Cooperative    Airway:  Mouth Opening: Normal  Neck ROM: Normal ROM    Chest/Lungs:  Normal Respiratory Rate    Heart:  Rate: Normal        Anesthesia Plan  Type of Anesthesia, risks & benefits discussed:    Anesthesia Type: Gen Natural Airway, MAC  Intra-op Monitoring Plan: Standard ASA Monitors  Post Op Pain Control Plan: multimodal analgesia and IV/PO Opioids PRN  Induction:  IV  Informed Consent: Informed consent signed with the Patient and all parties understand the risks and agree with anesthesia plan.  All questions answered. Patient consented to blood products? Yes  ASA Score: 3  Day of Surgery Review of History & Physical: I have interviewed and examined the patient. I have reviewed the patient's H&P dated: There are no significant changes.     Ready For Surgery From Anesthesia Perspective.     .

## 2023-05-22 ENCOUNTER — OFFICE VISIT (OUTPATIENT)
Dept: NEUROLOGY | Facility: CLINIC | Age: 28
End: 2023-05-22
Payer: COMMERCIAL

## 2023-05-22 VITALS
DIASTOLIC BLOOD PRESSURE: 55 MMHG | HEIGHT: 63 IN | RESPIRATION RATE: 18 BRPM | SYSTOLIC BLOOD PRESSURE: 111 MMHG | BODY MASS INDEX: 39.87 KG/M2 | WEIGHT: 225 LBS | OXYGEN SATURATION: 99 % | HEART RATE: 84 BPM

## 2023-05-22 DIAGNOSIS — R51.9 ACUTE INTRACTABLE HEADACHE, UNSPECIFIED HEADACHE TYPE: Primary | ICD-10-CM

## 2023-05-22 PROCEDURE — 3078F DIAST BP <80 MM HG: CPT | Mod: CPTII,,, | Performed by: PSYCHIATRY & NEUROLOGY

## 2023-05-22 PROCEDURE — 99215 OFFICE O/P EST HI 40 MIN: CPT | Mod: PBBFAC | Performed by: PSYCHIATRY & NEUROLOGY

## 2023-05-22 PROCEDURE — 99204 PR OFFICE/OUTPT VISIT, NEW, LEVL IV, 45-59 MIN: ICD-10-PCS | Mod: S$PBB,,, | Performed by: PSYCHIATRY & NEUROLOGY

## 2023-05-22 PROCEDURE — 3078F PR MOST RECENT DIASTOLIC BLOOD PRESSURE < 80 MM HG: ICD-10-PCS | Mod: CPTII,,, | Performed by: PSYCHIATRY & NEUROLOGY

## 2023-05-22 PROCEDURE — 99204 OFFICE O/P NEW MOD 45 MIN: CPT | Mod: S$PBB,,, | Performed by: PSYCHIATRY & NEUROLOGY

## 2023-05-22 PROCEDURE — 3044F PR MOST RECENT HEMOGLOBIN A1C LEVEL <7.0%: ICD-10-PCS | Mod: CPTII,,, | Performed by: PSYCHIATRY & NEUROLOGY

## 2023-05-22 PROCEDURE — 4010F PR ACE/ARB THEARPY RXD/TAKEN: ICD-10-PCS | Mod: CPTII,,, | Performed by: PSYCHIATRY & NEUROLOGY

## 2023-05-22 PROCEDURE — 3008F BODY MASS INDEX DOCD: CPT | Mod: CPTII,,, | Performed by: PSYCHIATRY & NEUROLOGY

## 2023-05-22 PROCEDURE — 3074F PR MOST RECENT SYSTOLIC BLOOD PRESSURE < 130 MM HG: ICD-10-PCS | Mod: CPTII,,, | Performed by: PSYCHIATRY & NEUROLOGY

## 2023-05-22 PROCEDURE — 3074F SYST BP LT 130 MM HG: CPT | Mod: CPTII,,, | Performed by: PSYCHIATRY & NEUROLOGY

## 2023-05-22 PROCEDURE — 1159F PR MEDICATION LIST DOCUMENTED IN MEDICAL RECORD: ICD-10-PCS | Mod: CPTII,,, | Performed by: PSYCHIATRY & NEUROLOGY

## 2023-05-22 PROCEDURE — 3044F HG A1C LEVEL LT 7.0%: CPT | Mod: CPTII,,, | Performed by: PSYCHIATRY & NEUROLOGY

## 2023-05-22 PROCEDURE — 1159F MED LIST DOCD IN RCRD: CPT | Mod: CPTII,,, | Performed by: PSYCHIATRY & NEUROLOGY

## 2023-05-22 PROCEDURE — 4010F ACE/ARB THERAPY RXD/TAKEN: CPT | Mod: CPTII,,, | Performed by: PSYCHIATRY & NEUROLOGY

## 2023-05-22 PROCEDURE — 3008F PR BODY MASS INDEX (BMI) DOCUMENTED: ICD-10-PCS | Mod: CPTII,,, | Performed by: PSYCHIATRY & NEUROLOGY

## 2023-05-22 RX ORDER — AMITRIPTYLINE HYDROCHLORIDE 25 MG/1
25 TABLET, FILM COATED ORAL NIGHTLY PRN
Qty: 90 TABLET | Refills: 3 | Status: SHIPPED | OUTPATIENT
Start: 2023-05-22 | End: 2023-08-21 | Stop reason: SDUPTHER

## 2023-05-22 RX ORDER — RIZATRIPTAN BENZOATE 10 MG/1
10 TABLET ORAL
Qty: 6 TABLET | Refills: 3 | Status: SHIPPED | OUTPATIENT
Start: 2023-05-22 | End: 2023-09-28 | Stop reason: SDUPTHER

## 2023-05-22 NOTE — PATIENT INSTRUCTIONS
MRI brain w/contrast   Trial Elavil 25 mg hour or so before bedtime  Avoid any triggers and avoid daytime naps   Cont Maxalt onset of migraine  F/u 4 weeks

## 2023-05-22 NOTE — PROGRESS NOTES
Subjective:       Patient ID: Gabriella Ulloa is a 28 y.o. female     Chief Complaint:    Chief Complaint   Patient presents with    Headache        Allergies:  Patient has no known allergies.    Current Medications:    Outpatient Encounter Medications as of 5/22/2023   Medication Sig Dispense Refill    cetirizine (ZYRTEC) 10 MG tablet Take 1 tablet (10 mg total) by mouth once daily. 30 tablet 0    ergocalciferol (ERGOCALCIFEROL) 50,000 unit Cap Take 1 capsule (50,000 Units total) by mouth every 7 days. 12 capsule 3    ferrous sulfate (IRON, FERROUS SULFATE,) 325 mg (65 mg iron) Tab tablet Take 1 tablet (325 mg total) by mouth once daily. 30 tablet 2    fluticasone propionate (FLONASE) 50 mcg/actuation nasal spray 1 spray (50 mcg total) by Each Nostril route once daily. 11.1 mL 0    losartan (COZAAR) 50 MG tablet Take 1 tablet (50 mg total) by mouth once daily. 90 tablet 3    sertraline (ZOLOFT) 25 MG tablet Take 1 tablet (25 mg total) by mouth once daily. 30 tablet 11    tiZANidine (ZANAFLEX) 4 MG tablet Take 1 tablet (4 mg total) by mouth every evening. 20 tablet 0    [DISCONTINUED] topiramate (TOPAMAX) 25 MG tablet Take 1 tablet (25 mg total) by mouth every evening. 30 tablet 5    [DISCONTINUED] topiramate (TOPAMAX) 50 MG tablet Take 1 tablet (50 mg total) by mouth 2 (two) times daily. 180 tablet 1    pantoprazole (PROTONIX) 40 MG tablet Take 1 tablet (40 mg total) by mouth once daily. 30 tablet 2    [DISCONTINUED] azithromycin (Z-LEONARD) 250 MG tablet Take 2 tablets by mouth on day 1; Take 1 tablet by mouth on days 2-5 6 tablet 0    [DISCONTINUED] methylPREDNISolone (MEDROL DOSEPACK) 4 mg tablet use as directed 21 tablet 0    [DISCONTINUED] norethindrone-ethinyl estradiol-iron (MICROGESTIN FE1.5/30) 1.5 mg-30 mcg (21)/75 mg (7) tablet Take 1 tablet by mouth once daily. (Patient not taking: Reported on 1/17/2023) 90 tablet 1     No facility-administered encounter medications on file as of 5/22/2023.        History of Present Illness  27 yo BF referred for eval of HEADACHE's poss migraines that began couple years ago   Migraines described as allover and photosensitive and N/V  Freq weekly   Duration lasts hours   Also chronic HEADACHE's all over head and every day often  after poor sleep which is common for her, however she naps during day which throws her regular sleep patterns off  NO triggers she can recall ?  TOPAMAX has not reduced her freq but Maxalt if taken properly does abort her HA's       Past Medical History:   Diagnosis Date    Anemia     Anxiety     Breakthrough bleeding on birth control pills 2015    Endometriosis of pelvic peritoneum 2020    cul-de-sac and sigmoid colon    Hypertension     Irritable bowel syndrome Chronic    Increased pain with bowel movements with menses       Past Surgical History:   Procedure Laterality Date     SECTION      D&C/Hysteroscopy with robotic and operative Laparoscopy  2020    DIAGNOSTIC LAPAROSCOPY N/A 2022    Procedure: LAPAROSCOPY, DIAGNOSTIC;  Surgeon: Otis Mccullough MD;  Location: [facility] OR;  Service: OB/GYN;  Laterality: N/A;    DILATION AND CURETTAGE OF UTERUS  2020    ENDOMETRIAL BIOPSY N/A 2022    Procedure: BIOPSY, ENDOMETRIUM;  Surgeon: Otis Mccullough MD;  Location: [facility] OR;  Service: OB/GYN;  Laterality: N/A;    HYSTEROSCOPY WITH DILATION AND CURETTAGE OF UTERUS N/A 2022    Procedure: HYSTEROSCOPY, WITH DILATION AND CURETTAGE OF UTERUS;  Surgeon: Otis Mccullough MD;  Location: [facility] OR;  Service: OB/GYN;  Laterality: N/A;       Social History  Ms. Ulloa  reports that she has never smoked. She has never used smokeless tobacco. She reports that she does not drink alcohol and does not use drugs.    Family History  Ms.'s Ulloa family history includes Breast cancer in an other family member; Diabetes in her mother and another family member; Heart disease in her mother; Hypertension in an other  "family member; Liver cancer in an other family member; Prostate cancer in her maternal grandfather; Stomach cancer in an other family member.    Review of Systems  Review of Systems   Neurological:  Positive for headaches.   Psychiatric/Behavioral:  The patient is nervous/anxious and has insomnia.    All other systems reviewed and are negative.   Objective:   BP (!) 111/55 (BP Location: Right arm, Patient Position: Sitting, BP Method: Large (Manual))   Pulse 84   Resp 18   Ht 5' 3" (1.6 m)   Wt 102.1 kg (225 lb)   LMP 05/03/2023   SpO2 99%   BMI 39.86 kg/m²    NEUROLOGICAL EXAMINATION:     MENTAL STATUS   Oriented to person, place, and time.   Level of consciousness: alert  Knowledge: good.     CRANIAL NERVES   Cranial nerves II through XII intact.     MOTOR EXAM     Strength   Strength 5/5 throughout.     GAIT AND COORDINATION     Gait  Gait: normal     Physical Exam  Vitals reviewed.   Constitutional:       Appearance: She is normal weight.   Neurological:      General: No focal deficit present.      Mental Status: She is alert and oriented to person, place, and time. Mental status is at baseline.      Cranial Nerves: Cranial nerves 2-12 are intact.      Motor: Motor strength is normal.      Gait: Gait is intact.        Assessment:     Acute intractable headache, unspecified headache type  Comments:  poss secondary to sleep deprivation   Orders:  -     Ambulatory referral/consult to Neurology         Primary Diagnosis and ICD10  Acute intractable headache, unspecified headache type [R51.9]    Plan:     Patient Instructions   MRI brain w/contrast   Trial Elavil 25 mg hour or so before bedtime  Avoid any triggers   Cont Maxalt onset of migraine  F/u 4 weeks    Medications Discontinued During This Encounter   Medication Reason    azithromycin (Z-LEONARD) 250 MG tablet     methylPREDNISolone (MEDROL DOSEPACK) 4 mg tablet     norethindrone-ethinyl estradiol-iron (MICROGESTIN FE1.5/30) 1.5 mg-30 mcg (21)/75 mg (7) " tablet     topiramate (TOPAMAX) 25 MG tablet     topiramate (TOPAMAX) 50 MG tablet        Requested Prescriptions      No prescriptions requested or ordered in this encounter

## 2023-06-01 ENCOUNTER — HOSPITAL ENCOUNTER (OUTPATIENT)
Dept: RADIOLOGY | Facility: HOSPITAL | Age: 28
Discharge: HOME | End: 2023-06-01
Attending: PSYCHIATRY & NEUROLOGY
Payer: COMMERCIAL

## 2023-06-01 ENCOUNTER — CLINICAL SUPPORT (OUTPATIENT)
Dept: OTOLARYNGOLOGY | Facility: CLINIC | Age: 28
End: 2023-06-01
Payer: COMMERCIAL

## 2023-06-01 DIAGNOSIS — J31.0 CHRONIC RHINITIS: Primary | ICD-10-CM

## 2023-06-01 DIAGNOSIS — J30.1 ALLERGIC REACTION TO GRASS POLLEN: ICD-10-CM

## 2023-06-01 DIAGNOSIS — J30.1 ALLERGIC RHINITIS DUE TO GRASS POLLEN: ICD-10-CM

## 2023-06-01 DIAGNOSIS — J30.89 OTHER ALLERGIC RHINITIS: ICD-10-CM

## 2023-06-01 DIAGNOSIS — R51.9 ACUTE INTRACTABLE HEADACHE, UNSPECIFIED HEADACHE TYPE: ICD-10-CM

## 2023-06-01 DIAGNOSIS — J30.1 NON-SEASONAL ALLERGIC RHINITIS DUE TO POLLEN: ICD-10-CM

## 2023-06-01 PROCEDURE — 70553 MRI BRAIN STEM W/O & W/DYE: CPT | Mod: TC

## 2023-06-01 PROCEDURE — 25500020 PHARM REV CODE 255: Performed by: PSYCHIATRY & NEUROLOGY

## 2023-06-01 PROCEDURE — 95165 ANTIGEN THERAPY SERVICES: CPT | Mod: PBBFAC | Performed by: OTOLARYNGOLOGY

## 2023-06-01 PROCEDURE — 70553 MRI BRAIN W WO CONTRAST: ICD-10-PCS | Mod: 26,,, | Performed by: RADIOLOGY

## 2023-06-01 PROCEDURE — 95165 ANTIGEN THERAPY SERVICES: CPT | Mod: S$PBB,,, | Performed by: OTOLARYNGOLOGY

## 2023-06-01 PROCEDURE — 95165 PR PROFES SVC,IMMUNOTHER,SINGLE/MULT AGS: ICD-10-PCS | Mod: S$PBB,,, | Performed by: OTOLARYNGOLOGY

## 2023-06-01 PROCEDURE — 70553 MRI BRAIN STEM W/O & W/DYE: CPT | Mod: 26,,, | Performed by: RADIOLOGY

## 2023-06-01 PROCEDURE — 95115 IMMUNOTHERAPY ONE INJECTION: CPT | Mod: PBBFAC | Performed by: OTOLARYNGOLOGY

## 2023-06-01 PROCEDURE — A9577 INJ MULTIHANCE: HCPCS | Performed by: PSYCHIATRY & NEUROLOGY

## 2023-06-01 RX ORDER — EPINEPHRINE 0.3 MG/.3ML
1 INJECTION SUBCUTANEOUS ONCE
Qty: 0.3 ML | Refills: 0 | Status: SHIPPED | OUTPATIENT
Start: 2023-06-01 | End: 2024-02-20

## 2023-06-01 RX ADMIN — GADOBENATE DIMEGLUMINE 20 ML: 529 INJECTION, SOLUTION INTRAVENOUS at 10:06

## 2023-06-01 NOTE — PROGRESS NOTES
Established patient with Dr. Palmer. See previous note for written order. Allergy injections per Dr. Palmer.   Vial test administered from new vial A.  10 minute vial test mm reactions  Administered right arm at 10:17 AM  3mm observed.  First dose of 0.02ml given.  20 minute mm reaction  10:17 AM   right arm; observation 5mm.

## 2023-06-05 PROBLEM — Z13.6 ENCOUNTER FOR SCREENING FOR CARDIOVASCULAR DISORDERS: Status: RESOLVED | Noted: 2021-08-31 | Resolved: 2023-06-05

## 2023-06-07 ENCOUNTER — OFFICE VISIT (OUTPATIENT)
Dept: NEUROLOGY | Facility: CLINIC | Age: 28
End: 2023-06-07
Payer: COMMERCIAL

## 2023-06-07 ENCOUNTER — CLINICAL SUPPORT (OUTPATIENT)
Dept: OTOLARYNGOLOGY | Facility: CLINIC | Age: 28
End: 2023-06-07
Payer: COMMERCIAL

## 2023-06-07 VITALS
HEART RATE: 98 BPM | SYSTOLIC BLOOD PRESSURE: 118 MMHG | HEIGHT: 63 IN | WEIGHT: 225 LBS | OXYGEN SATURATION: 99 % | BODY MASS INDEX: 39.87 KG/M2 | RESPIRATION RATE: 18 BRPM | DIASTOLIC BLOOD PRESSURE: 78 MMHG

## 2023-06-07 DIAGNOSIS — J30.89 OTHER ALLERGIC RHINITIS: ICD-10-CM

## 2023-06-07 DIAGNOSIS — J30.1 ALLERGIC RHINITIS DUE TO GRASS POLLEN: ICD-10-CM

## 2023-06-07 DIAGNOSIS — J30.1 ALLERGIC REACTION TO GRASS POLLEN: ICD-10-CM

## 2023-06-07 DIAGNOSIS — R51.9 ACUTE INTRACTABLE HEADACHE, UNSPECIFIED HEADACHE TYPE: Primary | ICD-10-CM

## 2023-06-07 DIAGNOSIS — J30.1 NON-SEASONAL ALLERGIC RHINITIS DUE TO POLLEN: Primary | ICD-10-CM

## 2023-06-07 DIAGNOSIS — J31.0 CHRONIC RHINITIS: ICD-10-CM

## 2023-06-07 PROCEDURE — 1160F PR REVIEW ALL MEDS BY PRESCRIBER/CLIN PHARMACIST DOCUMENTED: ICD-10-PCS | Mod: CPTII,,, | Performed by: PSYCHIATRY & NEUROLOGY

## 2023-06-07 PROCEDURE — 3078F PR MOST RECENT DIASTOLIC BLOOD PRESSURE < 80 MM HG: ICD-10-PCS | Mod: CPTII,,, | Performed by: PSYCHIATRY & NEUROLOGY

## 2023-06-07 PROCEDURE — 1160F RVW MEDS BY RX/DR IN RCRD: CPT | Mod: CPTII,,, | Performed by: PSYCHIATRY & NEUROLOGY

## 2023-06-07 PROCEDURE — 3044F PR MOST RECENT HEMOGLOBIN A1C LEVEL <7.0%: ICD-10-PCS | Mod: CPTII,,, | Performed by: PSYCHIATRY & NEUROLOGY

## 2023-06-07 PROCEDURE — 99215 OFFICE O/P EST HI 40 MIN: CPT | Mod: PBBFAC | Performed by: PSYCHIATRY & NEUROLOGY

## 2023-06-07 PROCEDURE — 3074F SYST BP LT 130 MM HG: CPT | Mod: CPTII,,, | Performed by: PSYCHIATRY & NEUROLOGY

## 2023-06-07 PROCEDURE — 1159F MED LIST DOCD IN RCRD: CPT | Mod: CPTII,,, | Performed by: PSYCHIATRY & NEUROLOGY

## 2023-06-07 PROCEDURE — 3074F PR MOST RECENT SYSTOLIC BLOOD PRESSURE < 130 MM HG: ICD-10-PCS | Mod: CPTII,,, | Performed by: PSYCHIATRY & NEUROLOGY

## 2023-06-07 PROCEDURE — 3008F PR BODY MASS INDEX (BMI) DOCUMENTED: ICD-10-PCS | Mod: CPTII,,, | Performed by: PSYCHIATRY & NEUROLOGY

## 2023-06-07 PROCEDURE — 3044F HG A1C LEVEL LT 7.0%: CPT | Mod: CPTII,,, | Performed by: PSYCHIATRY & NEUROLOGY

## 2023-06-07 PROCEDURE — 4010F PR ACE/ARB THEARPY RXD/TAKEN: ICD-10-PCS | Mod: CPTII,,, | Performed by: PSYCHIATRY & NEUROLOGY

## 2023-06-07 PROCEDURE — 1159F PR MEDICATION LIST DOCUMENTED IN MEDICAL RECORD: ICD-10-PCS | Mod: CPTII,,, | Performed by: PSYCHIATRY & NEUROLOGY

## 2023-06-07 PROCEDURE — 95115 IMMUNOTHERAPY ONE INJECTION: CPT | Mod: PBBFAC | Performed by: OTOLARYNGOLOGY

## 2023-06-07 PROCEDURE — 4010F ACE/ARB THERAPY RXD/TAKEN: CPT | Mod: CPTII,,, | Performed by: PSYCHIATRY & NEUROLOGY

## 2023-06-07 PROCEDURE — 99214 OFFICE O/P EST MOD 30 MIN: CPT | Mod: S$PBB,,, | Performed by: PSYCHIATRY & NEUROLOGY

## 2023-06-07 PROCEDURE — 3008F BODY MASS INDEX DOCD: CPT | Mod: CPTII,,, | Performed by: PSYCHIATRY & NEUROLOGY

## 2023-06-07 PROCEDURE — 99214 PR OFFICE/OUTPT VISIT, EST, LEVL IV, 30-39 MIN: ICD-10-PCS | Mod: S$PBB,,, | Performed by: PSYCHIATRY & NEUROLOGY

## 2023-06-07 PROCEDURE — 3078F DIAST BP <80 MM HG: CPT | Mod: CPTII,,, | Performed by: PSYCHIATRY & NEUROLOGY

## 2023-06-07 NOTE — PATIENT INSTRUCTIONS
Continue Maxalt at onset of migraine   Continue Elavil 25 mg at bedtime if tolerated and improving chronic headaches and sleep.    Refrain from daytime napping   Improve proper sleep hygiene  Increase physical activity as tolerated  Follow-up 3 months

## 2023-06-07 NOTE — PROGRESS NOTES
Established patient with Dr. Palmer. See previous note for written order. Allergy injections per Dr. Palmer.   20 minute mm reaction  3:01 PM   right arm; observation 3mm

## 2023-06-07 NOTE — PROGRESS NOTES
Subjective:       Patient ID: Gabriella Ulloa is a 28 y.o. female     Chief Complaint:    Chief Complaint   Patient presents with    Follow-up     Migraines        Allergies:  Grass pollen-bermuda, standard and Grass pollen-natalia, standard    Current Medications:    Outpatient Encounter Medications as of 6/7/2023   Medication Sig Dispense Refill    amitriptyline (ELAVIL) 25 MG tablet Take 1 tablet (25 mg total) by mouth nightly as needed for Insomnia. 90 tablet 3    cetirizine (ZYRTEC) 10 MG tablet Take 1 tablet (10 mg total) by mouth once daily. 30 tablet 0    ergocalciferol (ERGOCALCIFEROL) 50,000 unit Cap Take 1 capsule (50,000 Units total) by mouth every 7 days. 12 capsule 3    ferrous sulfate (IRON, FERROUS SULFATE,) 325 mg (65 mg iron) Tab tablet Take 1 tablet (325 mg total) by mouth once daily. 30 tablet 2    fluticasone propionate (FLONASE) 50 mcg/actuation nasal spray 1 spray (50 mcg total) by Each Nostril route once daily. 11.1 mL 0    losartan (COZAAR) 50 MG tablet Take 1 tablet (50 mg total) by mouth once daily. 90 tablet 3    rizatriptan (MAXALT) 10 MG tablet Take 1 tablet (10 mg total) by mouth as needed for Migraine (take at onset of migraine). 6 tablet 3    sertraline (ZOLOFT) 25 MG tablet Take 1 tablet (25 mg total) by mouth once daily. 30 tablet 11    tiZANidine (ZANAFLEX) 4 MG tablet Take 1 tablet (4 mg total) by mouth every evening. 20 tablet 0    EPINEPHrine (EPIPEN) 0.3 mg/0.3 mL AtIn Inject 0.3 mLs (0.3 mg total) into the muscle once. for 1 dose 0.3 mL 0    pantoprazole (PROTONIX) 40 MG tablet Take 1 tablet (40 mg total) by mouth once daily. 30 tablet 2     No facility-administered encounter medications on file as of 6/7/2023.       History of Present Illness  28-year-old black female in clinic for follow-up evaluation of chronic migraines which are well abortive when she takes her Maxalt properly at the exact onset of headache.  Patient has known to have poor sleep hygiene and  routinely napping during the daytime which was throwing off her natural normal circadian rhythms for proper sleep which can be exacerbating these headaches.  Recent MRI of the brain showed no acute abnormality and was essentially normal.  Has only had two migraines in last month   Elavil helps her sleep and chronic daily HEADACHE's   Maxalt 10 mg onset works as abortive        Past Medical History:   Diagnosis Date    Anemia     Anxiety     Breakthrough bleeding on birth control pills 2015    Endometriosis of pelvic peritoneum 2020    cul-de-sac and sigmoid colon    Hypertension     Irritable bowel syndrome Chronic    Increased pain with bowel movements with menses       Past Surgical History:   Procedure Laterality Date     SECTION      D&C/Hysteroscopy with robotic and operative Laparoscopy  2020    DIAGNOSTIC LAPAROSCOPY N/A 2022    Procedure: LAPAROSCOPY, DIAGNOSTIC;  Surgeon: Otis Mccullough MD;  Location: [facility];  Service: OB/GYN;  Laterality: N/A;    DILATION AND CURETTAGE OF UTERUS  2020    ENDOMETRIAL BIOPSY N/A 2022    Procedure: BIOPSY, ENDOMETRIUM;  Surgeon: Otis Mccullough MD;  Location: [facility] OR;  Service: OB/GYN;  Laterality: N/A;    HYSTEROSCOPY WITH DILATION AND CURETTAGE OF UTERUS N/A 2022    Procedure: HYSTEROSCOPY, WITH DILATION AND CURETTAGE OF UTERUS;  Surgeon: Otis Mccullough MD;  Location: [facility];  Service: OB/GYN;  Laterality: N/A;       Social History  Ms. Ulloa  reports that she has never smoked. She has never used smokeless tobacco. She reports that she does not drink alcohol and does not use drugs.    Family History  Ms.'s Ulloa family history includes Breast cancer in an other family member; Diabetes in her mother and another family member; Heart disease in her mother; Hypertension in an other family member; Liver cancer in an other family member; Prostate cancer in her maternal grandfather; Stomach cancer in an other  "family member.    Review of Systems  Review of Systems   Neurological:  Positive for headaches.   Psychiatric/Behavioral:  Positive for depression.    All other systems reviewed and are negative.   Objective:   /78 (BP Location: Left arm, Patient Position: Sitting, BP Method: Large (Manual))   Pulse 98   Resp 18   Ht 5' 3" (1.6 m)   Wt 102.1 kg (225 lb)   LMP 05/03/2023   SpO2 99%   BMI 39.86 kg/m²    NEUROLOGICAL EXAMINATION:     MENTAL STATUS   Oriented to person, place, and time.   Level of consciousness: alert  Knowledge: good.     CRANIAL NERVES   Cranial nerves II through XII intact.     MOTOR EXAM     Strength   Strength 5/5 throughout.     GAIT AND COORDINATION     Gait  Gait: normal     Physical Exam  Vitals reviewed.   Constitutional:       Appearance: She is normal weight.   Neurological:      General: No focal deficit present.      Mental Status: She is alert and oriented to person, place, and time. Mental status is at baseline.      Cranial Nerves: Cranial nerves 2-12 are intact.      Motor: Motor strength is normal.      Gait: Gait is intact.        Assessment:     Acute intractable headache, unspecified headache type         Primary Diagnosis and ICD10  Acute intractable headache, unspecified headache type [R51.9]    Plan:     Patient Instructions   Continue Maxalt at onset of migraine   Continue Elavil 25 mg at bedtime if tolerated and improving chronic headaches and sleep.    Refrain from daytime napping   Improve proper sleep hygiene  Increase physical activity as tolerated  Follow-up 6 months    There are no discontinued medications.    Requested Prescriptions      No prescriptions requested or ordered in this encounter       "

## 2023-06-14 ENCOUNTER — CLINICAL SUPPORT (OUTPATIENT)
Dept: OTOLARYNGOLOGY | Facility: CLINIC | Age: 28
End: 2023-06-14
Payer: COMMERCIAL

## 2023-06-14 DIAGNOSIS — J30.1 NON-SEASONAL ALLERGIC RHINITIS DUE TO POLLEN: Primary | ICD-10-CM

## 2023-06-14 DIAGNOSIS — J30.1 ALLERGIC RHINITIS DUE TO GRASS POLLEN: ICD-10-CM

## 2023-06-14 DIAGNOSIS — J30.1 ALLERGIC REACTION TO GRASS POLLEN: ICD-10-CM

## 2023-06-14 DIAGNOSIS — J31.0 CHRONIC RHINITIS: ICD-10-CM

## 2023-06-14 DIAGNOSIS — J30.89 OTHER ALLERGIC RHINITIS: ICD-10-CM

## 2023-06-14 PROCEDURE — 95115 IMMUNOTHERAPY ONE INJECTION: CPT | Mod: PBBFAC | Performed by: OTOLARYNGOLOGY

## 2023-06-14 NOTE — PROGRESS NOTES
Established patient with Dr. Palmer. See previous note for written order. Allergy injections per Dr. Palmer.   20 minute mm reaction  8:51 AM   right arm; observation 3mm

## 2023-06-21 ENCOUNTER — CLINICAL SUPPORT (OUTPATIENT)
Dept: OTOLARYNGOLOGY | Facility: CLINIC | Age: 28
End: 2023-06-21
Payer: COMMERCIAL

## 2023-06-21 DIAGNOSIS — J30.1 ALLERGIC REACTION TO GRASS POLLEN: ICD-10-CM

## 2023-06-21 DIAGNOSIS — J31.0 CHRONIC RHINITIS: ICD-10-CM

## 2023-06-21 DIAGNOSIS — J30.89 OTHER ALLERGIC RHINITIS: ICD-10-CM

## 2023-06-21 DIAGNOSIS — J30.1 NON-SEASONAL ALLERGIC RHINITIS DUE TO POLLEN: Primary | ICD-10-CM

## 2023-06-21 DIAGNOSIS — J30.1 ALLERGIC RHINITIS DUE TO GRASS POLLEN: ICD-10-CM

## 2023-06-21 PROCEDURE — 95115 IMMUNOTHERAPY ONE INJECTION: CPT | Mod: PBBFAC | Performed by: OTOLARYNGOLOGY

## 2023-06-21 NOTE — PROGRESS NOTES
Established patient with Dr. Palmer. See previous note for written order. Allergy injections per Dr. Palmer.   20 minute mm reaction  3:45 PM   right arm; observation 5mm

## 2023-06-22 ENCOUNTER — OFFICE VISIT (OUTPATIENT)
Dept: FAMILY MEDICINE | Facility: CLINIC | Age: 28
End: 2023-06-22
Payer: COMMERCIAL

## 2023-06-22 VITALS
RESPIRATION RATE: 18 BRPM | HEART RATE: 78 BPM | BODY MASS INDEX: 41.64 KG/M2 | OXYGEN SATURATION: 99 % | WEIGHT: 235 LBS | TEMPERATURE: 98 F | HEIGHT: 63 IN | SYSTOLIC BLOOD PRESSURE: 121 MMHG | DIASTOLIC BLOOD PRESSURE: 79 MMHG

## 2023-06-22 DIAGNOSIS — I10 PRIMARY HYPERTENSION: Primary | ICD-10-CM

## 2023-06-22 DIAGNOSIS — N94.6 DYSMENORRHEA: ICD-10-CM

## 2023-06-22 DIAGNOSIS — R73.9 HYPERGLYCEMIA: ICD-10-CM

## 2023-06-22 DIAGNOSIS — Z13.6 ENCOUNTER FOR SCREENING FOR CARDIOVASCULAR DISORDERS: ICD-10-CM

## 2023-06-22 DIAGNOSIS — E61.1 IRON DEFICIENCY: ICD-10-CM

## 2023-06-22 DIAGNOSIS — N80.30 ENDOMETRIOSIS OF PELVIC PERITONEUM: ICD-10-CM

## 2023-06-22 DIAGNOSIS — K21.9 GASTROESOPHAGEAL REFLUX DISEASE, UNSPECIFIED WHETHER ESOPHAGITIS PRESENT: ICD-10-CM

## 2023-06-22 DIAGNOSIS — N93.9 ABNORMAL UTERINE BLEEDING: ICD-10-CM

## 2023-06-22 LAB
ALBUMIN SERPL BCP-MCNC: 3.3 G/DL (ref 3.5–5)
ALBUMIN/GLOB SERPL: 0.8 {RATIO}
ALP SERPL-CCNC: 78 U/L (ref 37–98)
ALT SERPL W P-5'-P-CCNC: 25 U/L (ref 13–56)
ANION GAP SERPL CALCULATED.3IONS-SCNC: 7 MMOL/L (ref 7–16)
AST SERPL W P-5'-P-CCNC: 13 U/L (ref 15–37)
BASOPHILS # BLD AUTO: 0.05 K/UL (ref 0–0.2)
BASOPHILS NFR BLD AUTO: 0.8 % (ref 0–1)
BILIRUB SERPL-MCNC: 0.4 MG/DL (ref ?–1.2)
BUN SERPL-MCNC: 8 MG/DL (ref 7–18)
BUN/CREAT SERPL: 11 (ref 6–20)
CALCIUM SERPL-MCNC: 9.7 MG/DL (ref 8.5–10.1)
CHLORIDE SERPL-SCNC: 107 MMOL/L (ref 98–107)
CHOLEST SERPL-MCNC: 171 MG/DL (ref 0–200)
CHOLEST/HDLC SERPL: 2.8 {RATIO}
CO2 SERPL-SCNC: 28 MMOL/L (ref 21–32)
CREAT SERPL-MCNC: 0.75 MG/DL (ref 0.55–1.02)
DIFFERENTIAL METHOD BLD: ABNORMAL
EGFR (NO RACE VARIABLE) (RUSH/TITUS): 111 ML/MIN/1.73M2
EOSINOPHIL # BLD AUTO: 0.25 K/UL (ref 0–0.5)
EOSINOPHIL NFR BLD AUTO: 4 % (ref 1–4)
ERYTHROCYTE [DISTWIDTH] IN BLOOD BY AUTOMATED COUNT: 16.1 % (ref 11.5–14.5)
GLOBULIN SER-MCNC: 4.1 G/DL (ref 2–4)
GLUCOSE SERPL-MCNC: 76 MG/DL (ref 74–106)
HCT VFR BLD AUTO: 38.8 % (ref 38–47)
HDLC SERPL-MCNC: 62 MG/DL (ref 40–60)
HGB BLD-MCNC: 11.5 G/DL (ref 12–16)
IMM GRANULOCYTES # BLD AUTO: 0.01 K/UL (ref 0–0.04)
IMM GRANULOCYTES NFR BLD: 0.2 % (ref 0–0.4)
IRON SATN MFR SERPL: 18 % (ref 14–50)
IRON SERPL-MCNC: 73 ΜG/DL (ref 50–170)
LDLC SERPL CALC-MCNC: 89 MG/DL
LDLC/HDLC SERPL: 1.4 {RATIO}
LYMPHOCYTES # BLD AUTO: 2.75 K/UL (ref 1–4.8)
LYMPHOCYTES NFR BLD AUTO: 43.7 % (ref 27–41)
MCH RBC QN AUTO: 24 PG (ref 27–31)
MCHC RBC AUTO-ENTMCNC: 29.6 G/DL (ref 32–36)
MCV RBC AUTO: 80.8 FL (ref 80–96)
MONOCYTES # BLD AUTO: 0.46 K/UL (ref 0–0.8)
MONOCYTES NFR BLD AUTO: 7.3 % (ref 2–6)
MPC BLD CALC-MCNC: 10.1 FL (ref 9.4–12.4)
NEUTROPHILS # BLD AUTO: 2.77 K/UL (ref 1.8–7.7)
NEUTROPHILS NFR BLD AUTO: 44 % (ref 53–65)
NONHDLC SERPL-MCNC: 109 MG/DL
NRBC # BLD AUTO: 0 X10E3/UL
NRBC, AUTO (.00): 0 %
PLATELET # BLD AUTO: 374 K/UL (ref 150–400)
POTASSIUM SERPL-SCNC: 3.9 MMOL/L (ref 3.5–5.1)
PROT SERPL-MCNC: 7.4 G/DL (ref 6.4–8.2)
RBC # BLD AUTO: 4.8 M/UL (ref 4.2–5.4)
SODIUM SERPL-SCNC: 138 MMOL/L (ref 136–145)
TIBC SERPL-MCNC: 406 ΜG/DL (ref 250–450)
TRIGL SERPL-MCNC: 99 MG/DL (ref 35–150)
VLDLC SERPL-MCNC: 20 MG/DL
WBC # BLD AUTO: 6.29 K/UL (ref 4.5–11)

## 2023-06-22 PROCEDURE — 1159F PR MEDICATION LIST DOCUMENTED IN MEDICAL RECORD: ICD-10-PCS | Mod: CPTII,,, | Performed by: NURSE PRACTITIONER

## 2023-06-22 PROCEDURE — 3074F SYST BP LT 130 MM HG: CPT | Mod: CPTII,,, | Performed by: NURSE PRACTITIONER

## 2023-06-22 PROCEDURE — 3044F HG A1C LEVEL LT 7.0%: CPT | Mod: CPTII,,, | Performed by: NURSE PRACTITIONER

## 2023-06-22 PROCEDURE — 83550 IRON AND TIBC: ICD-10-PCS | Mod: ,,, | Performed by: CLINICAL MEDICAL LABORATORY

## 2023-06-22 PROCEDURE — 99214 OFFICE O/P EST MOD 30 MIN: CPT | Mod: ,,, | Performed by: NURSE PRACTITIONER

## 2023-06-22 PROCEDURE — 80061 LIPID PANEL: ICD-10-PCS | Mod: ,,, | Performed by: CLINICAL MEDICAL LABORATORY

## 2023-06-22 PROCEDURE — 85025 CBC WITH DIFFERENTIAL: ICD-10-PCS | Mod: ,,, | Performed by: CLINICAL MEDICAL LABORATORY

## 2023-06-22 PROCEDURE — 4010F ACE/ARB THERAPY RXD/TAKEN: CPT | Mod: CPTII,,, | Performed by: NURSE PRACTITIONER

## 2023-06-22 PROCEDURE — 83550 IRON BINDING TEST: CPT | Mod: ,,, | Performed by: CLINICAL MEDICAL LABORATORY

## 2023-06-22 PROCEDURE — 99214 PR OFFICE/OUTPT VISIT, EST, LEVL IV, 30-39 MIN: ICD-10-PCS | Mod: ,,, | Performed by: NURSE PRACTITIONER

## 2023-06-22 PROCEDURE — 3044F PR MOST RECENT HEMOGLOBIN A1C LEVEL <7.0%: ICD-10-PCS | Mod: CPTII,,, | Performed by: NURSE PRACTITIONER

## 2023-06-22 PROCEDURE — 80053 COMPREHENSIVE METABOLIC PANEL: ICD-10-PCS | Mod: ,,, | Performed by: CLINICAL MEDICAL LABORATORY

## 2023-06-22 PROCEDURE — 80061 LIPID PANEL: CPT | Mod: ,,, | Performed by: CLINICAL MEDICAL LABORATORY

## 2023-06-22 PROCEDURE — 83540 IRON AND TIBC: ICD-10-PCS | Mod: ,,, | Performed by: CLINICAL MEDICAL LABORATORY

## 2023-06-22 PROCEDURE — 3078F DIAST BP <80 MM HG: CPT | Mod: CPTII,,, | Performed by: NURSE PRACTITIONER

## 2023-06-22 PROCEDURE — 85025 COMPLETE CBC W/AUTO DIFF WBC: CPT | Mod: ,,, | Performed by: CLINICAL MEDICAL LABORATORY

## 2023-06-22 PROCEDURE — 82570 MICROALBUMIN / CREATININE RATIO URINE: ICD-10-PCS | Mod: ,,, | Performed by: CLINICAL MEDICAL LABORATORY

## 2023-06-22 PROCEDURE — 3008F BODY MASS INDEX DOCD: CPT | Mod: CPTII,,, | Performed by: NURSE PRACTITIONER

## 2023-06-22 PROCEDURE — 82043 UR ALBUMIN QUANTITATIVE: CPT | Mod: ,,, | Performed by: CLINICAL MEDICAL LABORATORY

## 2023-06-22 PROCEDURE — 82570 ASSAY OF URINE CREATININE: CPT | Mod: ,,, | Performed by: CLINICAL MEDICAL LABORATORY

## 2023-06-22 PROCEDURE — 3008F PR BODY MASS INDEX (BMI) DOCUMENTED: ICD-10-PCS | Mod: CPTII,,, | Performed by: NURSE PRACTITIONER

## 2023-06-22 PROCEDURE — 1159F MED LIST DOCD IN RCRD: CPT | Mod: CPTII,,, | Performed by: NURSE PRACTITIONER

## 2023-06-22 PROCEDURE — 3078F PR MOST RECENT DIASTOLIC BLOOD PRESSURE < 80 MM HG: ICD-10-PCS | Mod: CPTII,,, | Performed by: NURSE PRACTITIONER

## 2023-06-22 PROCEDURE — 83540 ASSAY OF IRON: CPT | Mod: ,,, | Performed by: CLINICAL MEDICAL LABORATORY

## 2023-06-22 PROCEDURE — 80053 COMPREHEN METABOLIC PANEL: CPT | Mod: ,,, | Performed by: CLINICAL MEDICAL LABORATORY

## 2023-06-22 PROCEDURE — 82043 MICROALBUMIN / CREATININE RATIO URINE: ICD-10-PCS | Mod: ,,, | Performed by: CLINICAL MEDICAL LABORATORY

## 2023-06-22 PROCEDURE — 3074F PR MOST RECENT SYSTOLIC BLOOD PRESSURE < 130 MM HG: ICD-10-PCS | Mod: CPTII,,, | Performed by: NURSE PRACTITIONER

## 2023-06-22 PROCEDURE — 4010F PR ACE/ARB THEARPY RXD/TAKEN: ICD-10-PCS | Mod: CPTII,,, | Performed by: NURSE PRACTITIONER

## 2023-06-22 RX ORDER — PANTOPRAZOLE SODIUM 40 MG/1
40 TABLET, DELAYED RELEASE ORAL DAILY
Qty: 30 TABLET | Refills: 6 | Status: SHIPPED | OUTPATIENT
Start: 2023-06-22 | End: 2024-02-20

## 2023-06-22 NOTE — PROGRESS NOTES
Jayde Cole DNP   [address] [place] [address]     PATIENT NAME: Gabriella Ulloa  : 1995  DATE: 23  MRN: [id number]      Billing Provider: Jayde Cole DNP  Level of Service:   Patient PCP Information       Provider PCP Type    Jayde Cole DNP General            Reason for Visit / Chief Complaint: Follow-up       Update PCP  Update Chief Complaint         History of Present Illness / Problem Focused Workflow     Gabriella Ulloa presents to the clinic with Follow-up     HPI    Review of Systems     Review of Systems     Medical / Social / Family History     Past Medical History:   Diagnosis Date    Anemia     Anxiety     Breakthrough bleeding on birth control pills 2015    Endometriosis of pelvic peritoneum 2020    cul-de-sac and sigmoid colon    Hypertension     Irritable bowel syndrome Chronic    Increased pain with bowel movements with menses       Past Surgical History:   Procedure Laterality Date     SECTION      D&C/Hysteroscopy with robotic and operative Laparoscopy  2020    DIAGNOSTIC LAPAROSCOPY N/A 2022    Procedure: LAPAROSCOPY, DIAGNOSTIC;  Surgeon: Otis Mccullough MD;  Location: [facility];  Service: OB/GYN;  Laterality: N/A;    DILATION AND CURETTAGE OF UTERUS  2020    ENDOMETRIAL BIOPSY N/A 2022    Procedure: BIOPSY, ENDOMETRIUM;  Surgeon: Otis Mccullough MD;  Location: [facility];  Service: OB/GYN;  Laterality: N/A;    HYSTEROSCOPY WITH DILATION AND CURETTAGE OF UTERUS N/A 2022    Procedure: HYSTEROSCOPY, WITH DILATION AND CURETTAGE OF UTERUS;  Surgeon: Otis Mccullough MD;  Location: [facility];  Service: OB/GYN;  Laterality: N/A;       Social History  Ms.  reports that she has never smoked. She has never used smokeless tobacco. She reports that she does not drink alcohol and does not use drugs.    Family History  Ms.'s family history includes Breast cancer in an other family member;  "Diabetes in her mother and another family member; Heart disease in her mother; Hypertension in an other family member; Liver cancer in an other family member; Prostate cancer in her maternal grandfather; Stomach cancer in an other family member.    Medications and Allergies     Medications  No outpatient medications have been marked as taking for the 6/22/23 encounter (Office Visit) with Jayde Cole DNP.     Current Facility-Administered Medications for the 6/22/23 encounter (Office Visit) with Jayde Cole DNP   Medication Dose Route Frequency Provider Last Rate Last Admin    [COMPLETED] Allergy Mix   Subcutaneous 1 time in Clinic/HOD Sanjay Palmer MD   0.1 mL at 06/21/23 1546       Allergies  Review of patient's allergies indicates:   Allergen Reactions    Grass pollen-bermuda, standard     Grass pollen-natalia, standard        Physical Examination   /79 (BP Location: Left arm, Patient Position: Sitting, BP Method: Large (Automatic))   Pulse 78   Temp 98.4 °F (36.9 °C) (Oral)   Resp 18   Ht 5' 3" (1.6 m)   Wt 106.6 kg (235 lb)   SpO2 99%   BMI 41.63 kg/m²    Physical Exam  Vitals and nursing note reviewed.   Constitutional:       Appearance: Normal appearance. She is obese.   HENT:      Head: Normocephalic.      Mouth/Throat:      Mouth: Mucous membranes are moist.   Eyes:      Pupils: Pupils are equal, round, and reactive to light.   Cardiovascular:      Rate and Rhythm: Normal rate and regular rhythm.      Pulses: Normal pulses.      Heart sounds: Normal heart sounds.   Pulmonary:      Effort: Pulmonary effort is normal.      Breath sounds: Normal breath sounds.   Abdominal:      General: Abdomen is flat. Bowel sounds are normal.      Palpations: Abdomen is soft.   Musculoskeletal:         General: Normal range of motion.      Cervical back: Normal range of motion and neck supple.   Skin:     General: Skin is warm and dry.      Capillary Refill: Capillary refill takes less than 2 " seconds.   Neurological:      General: No focal deficit present.      Mental Status: She is alert and oriented to person, place, and time. Mental status is at baseline.   Psychiatric:         Mood and Affect: Mood normal.         Behavior: Behavior normal.         Thought Content: Thought content normal.         Judgment: Judgment normal.        Assessment and Plan (including Health Maintenance)      Problem List  Smart Sets  Document Outside HM   :    Plan:     Here for fu appt  Continue iron   Labs today   Pap 11/2021  Requesting to see a different ob gyn for another eval   Tdap 3/2023    There are no preventive care reminders to display for this patient.    Problem List Items Addressed This Visit          Cardiac/Vascular    Hypertension - Primary    Relevant Orders    Comprehensive Metabolic Panel    CBC Auto Differential    Microalbumin/Creatinine Ratio, Urine       Renal/    Endometriosis of pelvic peritoneum    Relevant Orders    Ambulatory referral/consult to Obstetrics / Gynecology    Dysmenorrhea    Overview     worsening         Relevant Orders    Ambulatory referral/consult to Obstetrics / Gynecology    Abnormal uterine bleeding    Relevant Orders    Ambulatory referral/consult to Obstetrics / Gynecology       Oncology    Iron deficiency    Relevant Orders    Iron and TIBC       Endocrine    Hyperglycemia     Other Visit Diagnoses       Encounter for screening for cardiovascular disorders        Relevant Orders    Lipid Panel            Health Maintenance Topics with due status: Not Due       Topic Last Completion Date    Pap Smear 11/09/2021    TETANUS VACCINE 03/01/2023       Future Appointments   Date Time Provider Department Center   6/22/2023 11:30 AM Jayde Cole DNP [facility]   7/3/2023  2:00 PM JAYLIN Carpenter [facility]   9/12/2023  2:00 PM Markos Genao MD [facility] NEURO [facility]   10/18/2023  8:10 AM Sanjay Palmer MD [facility] ENT [facility]             Signature:  Jayde Cole, DNP      [address] [place] [address]    Date of encounter: 6/22/23

## 2023-06-23 LAB
CREAT UR-MCNC: 84 MG/DL (ref 28–219)
MICROALBUMIN UR-MCNC: <0.5 MG/DL (ref 0–2.8)
MICROALBUMIN/CREAT RATIO PNL UR: <6 MG/G (ref 0–30)

## 2023-06-28 ENCOUNTER — CLINICAL SUPPORT (OUTPATIENT)
Dept: FAMILY MEDICINE | Facility: CLINIC | Age: 28
End: 2023-06-28
Payer: COMMERCIAL

## 2023-06-28 DIAGNOSIS — J31.0 CHRONIC RHINITIS: ICD-10-CM

## 2023-06-28 DIAGNOSIS — R30.0 DYSURIA: Primary | ICD-10-CM

## 2023-06-28 LAB
BILIRUB SERPL-MCNC: ABNORMAL MG/DL
BLOOD URINE, POC: ABNORMAL
COLOR, POC UA: YELLOW
GLUCOSE UR QL STRIP: ABNORMAL
KETONES UR QL STRIP: ABNORMAL
LEUKOCYTE ESTERASE URINE, POC: ABNORMAL
NITRITE, POC UA: ABNORMAL
PH, POC UA: 6.5
PROTEIN, POC: ABNORMAL
SPECIFIC GRAVITY, POC UA: 1.03
UROBILINOGEN, POC UA: 0.2

## 2023-06-28 PROCEDURE — 87086 CULTURE, URINE: ICD-10-PCS | Mod: ,,, | Performed by: CLINICAL MEDICAL LABORATORY

## 2023-06-28 PROCEDURE — 81003 URINALYSIS AUTO W/O SCOPE: CPT | Mod: QW,,, | Performed by: NURSE PRACTITIONER

## 2023-06-28 PROCEDURE — 81003 POCT URINALYSIS W/O SCOPE: ICD-10-PCS | Mod: QW,,, | Performed by: NURSE PRACTITIONER

## 2023-06-28 PROCEDURE — 87086 URINE CULTURE/COLONY COUNT: CPT | Mod: ,,, | Performed by: CLINICAL MEDICAL LABORATORY

## 2023-06-28 RX ORDER — METRONIDAZOLE 500 MG/1
500 TABLET ORAL 3 TIMES DAILY
Qty: 42 TABLET | Refills: 0 | OUTPATIENT
Start: 2023-06-28 | End: 2023-07-12

## 2023-06-28 RX ORDER — FLUCONAZOLE 100 MG/1
100 TABLET ORAL DAILY
Qty: 5 TABLET | Refills: 0 | Status: SHIPPED | OUTPATIENT
Start: 2023-06-28 | End: 2023-07-03

## 2023-06-28 RX ORDER — CEFUROXIME AXETIL 500 MG/1
500 TABLET ORAL EVERY 12 HOURS
Qty: 14 TABLET | Refills: 0 | Status: SHIPPED | OUTPATIENT
Start: 2023-06-28 | End: 2023-07-01 | Stop reason: SDUPTHER

## 2023-06-30 LAB — UA COMPLETE W REFLEX CULTURE PNL UR: NORMAL

## 2023-07-03 RX ORDER — CEFUROXIME AXETIL 500 MG/1
500 TABLET ORAL EVERY 12 HOURS
Qty: 14 TABLET | Refills: 0 | Status: SHIPPED | OUTPATIENT
Start: 2023-07-03 | End: 2023-07-10

## 2023-07-03 RX ORDER — SULFAMETHOXAZOLE AND TRIMETHOPRIM 800; 160 MG/1; MG/1
1 TABLET ORAL 2 TIMES DAILY
Qty: 14 TABLET | Refills: 0 | Status: SHIPPED | OUTPATIENT
Start: 2023-07-03 | End: 2023-07-10

## 2023-07-05 ENCOUNTER — CLINICAL SUPPORT (OUTPATIENT)
Dept: FAMILY MEDICINE | Facility: CLINIC | Age: 28
End: 2023-07-05
Payer: COMMERCIAL

## 2023-07-05 DIAGNOSIS — J31.0 CHRONIC RHINITIS: Primary | ICD-10-CM

## 2023-07-12 ENCOUNTER — OFFICE VISIT (OUTPATIENT)
Dept: FAMILY MEDICINE | Facility: CLINIC | Age: 28
End: 2023-07-12
Payer: COMMERCIAL

## 2023-07-12 VITALS
BODY MASS INDEX: 40.04 KG/M2 | SYSTOLIC BLOOD PRESSURE: 132 MMHG | HEART RATE: 83 BPM | OXYGEN SATURATION: 100 % | DIASTOLIC BLOOD PRESSURE: 80 MMHG | WEIGHT: 226 LBS | TEMPERATURE: 98 F | RESPIRATION RATE: 16 BRPM | HEIGHT: 63 IN

## 2023-07-12 DIAGNOSIS — R00.2 HEART PALPITATIONS: Primary | ICD-10-CM

## 2023-07-12 DIAGNOSIS — J31.0 CHRONIC RHINITIS: ICD-10-CM

## 2023-07-12 PROBLEM — R09.81 NASAL CONGESTION: Status: RESOLVED | Noted: 2023-04-20 | Resolved: 2023-07-12

## 2023-07-12 PROBLEM — U07.1 COVID-19: Status: RESOLVED | Noted: 2022-01-12 | Resolved: 2023-07-12

## 2023-07-12 PROBLEM — J06.9 VIRAL UPPER RESPIRATORY TRACT INFECTION: Status: RESOLVED | Noted: 2022-01-05 | Resolved: 2023-07-12

## 2023-07-12 PROBLEM — J32.9 SINUSITIS: Status: RESOLVED | Noted: 2021-09-09 | Resolved: 2023-07-12

## 2023-07-12 PROBLEM — Z20.828 EXPOSURE TO THE FLU: Status: RESOLVED | Noted: 2023-04-17 | Resolved: 2023-07-12

## 2023-07-12 PROBLEM — Z20.822 EXPOSURE TO COVID-19 VIRUS: Status: RESOLVED | Noted: 2022-01-05 | Resolved: 2023-07-12

## 2023-07-12 PROBLEM — J02.9 SORE THROAT: Status: RESOLVED | Noted: 2022-01-05 | Resolved: 2023-07-12

## 2023-07-12 PROBLEM — R05.9 COUGH: Status: RESOLVED | Noted: 2021-09-09 | Resolved: 2023-07-12

## 2023-07-12 PROCEDURE — 3044F HG A1C LEVEL LT 7.0%: CPT | Mod: CPTII,,, | Performed by: NURSE PRACTITIONER

## 2023-07-12 PROCEDURE — 3061F PR NEG MICROALBUMINURIA RESULT DOCUMENTED/REVIEW: ICD-10-PCS | Mod: CPTII,,, | Performed by: NURSE PRACTITIONER

## 2023-07-12 PROCEDURE — 3066F NEPHROPATHY DOC TX: CPT | Mod: CPTII,,, | Performed by: NURSE PRACTITIONER

## 2023-07-12 PROCEDURE — 3066F PR DOCUMENTATION OF TREATMENT FOR NEPHROPATHY: ICD-10-PCS | Mod: CPTII,,, | Performed by: NURSE PRACTITIONER

## 2023-07-12 PROCEDURE — 3061F NEG MICROALBUMINURIA REV: CPT | Mod: CPTII,,, | Performed by: NURSE PRACTITIONER

## 2023-07-12 PROCEDURE — 3008F PR BODY MASS INDEX (BMI) DOCUMENTED: ICD-10-PCS | Mod: CPTII,,, | Performed by: NURSE PRACTITIONER

## 2023-07-12 PROCEDURE — 93000 ELECTROCARDIOGRAM COMPLETE: CPT | Mod: ,,, | Performed by: NURSE PRACTITIONER

## 2023-07-12 PROCEDURE — 3079F DIAST BP 80-89 MM HG: CPT | Mod: CPTII,,, | Performed by: NURSE PRACTITIONER

## 2023-07-12 PROCEDURE — 3075F SYST BP GE 130 - 139MM HG: CPT | Mod: CPTII,,, | Performed by: NURSE PRACTITIONER

## 2023-07-12 PROCEDURE — 3075F PR MOST RECENT SYSTOLIC BLOOD PRESS GE 130-139MM HG: ICD-10-PCS | Mod: CPTII,,, | Performed by: NURSE PRACTITIONER

## 2023-07-12 PROCEDURE — 99213 PR OFFICE/OUTPT VISIT, EST, LEVL III, 20-29 MIN: ICD-10-PCS | Mod: ,,, | Performed by: NURSE PRACTITIONER

## 2023-07-12 PROCEDURE — 1160F PR REVIEW ALL MEDS BY PRESCRIBER/CLIN PHARMACIST DOCUMENTED: ICD-10-PCS | Mod: CPTII,,, | Performed by: NURSE PRACTITIONER

## 2023-07-12 PROCEDURE — 93000 PR ELECTROCARDIOGRAM, COMPLETE: ICD-10-PCS | Mod: ,,, | Performed by: NURSE PRACTITIONER

## 2023-07-12 PROCEDURE — 4010F ACE/ARB THERAPY RXD/TAKEN: CPT | Mod: CPTII,,, | Performed by: NURSE PRACTITIONER

## 2023-07-12 PROCEDURE — 99213 OFFICE O/P EST LOW 20 MIN: CPT | Mod: ,,, | Performed by: NURSE PRACTITIONER

## 2023-07-12 PROCEDURE — 1159F MED LIST DOCD IN RCRD: CPT | Mod: CPTII,,, | Performed by: NURSE PRACTITIONER

## 2023-07-12 PROCEDURE — 1159F PR MEDICATION LIST DOCUMENTED IN MEDICAL RECORD: ICD-10-PCS | Mod: CPTII,,, | Performed by: NURSE PRACTITIONER

## 2023-07-12 PROCEDURE — 3079F PR MOST RECENT DIASTOLIC BLOOD PRESSURE 80-89 MM HG: ICD-10-PCS | Mod: CPTII,,, | Performed by: NURSE PRACTITIONER

## 2023-07-12 PROCEDURE — 3044F PR MOST RECENT HEMOGLOBIN A1C LEVEL <7.0%: ICD-10-PCS | Mod: CPTII,,, | Performed by: NURSE PRACTITIONER

## 2023-07-12 PROCEDURE — 3008F BODY MASS INDEX DOCD: CPT | Mod: CPTII,,, | Performed by: NURSE PRACTITIONER

## 2023-07-12 PROCEDURE — 4010F PR ACE/ARB THEARPY RXD/TAKEN: ICD-10-PCS | Mod: CPTII,,, | Performed by: NURSE PRACTITIONER

## 2023-07-12 PROCEDURE — 1160F RVW MEDS BY RX/DR IN RCRD: CPT | Mod: CPTII,,, | Performed by: NURSE PRACTITIONER

## 2023-07-12 NOTE — PROGRESS NOTES
"   MICHELLE Shafer   [facility] [address] [place] [address]  [phone]      PATIENT NAME: Gabriella Ulloa  : 1995  DATE: 23  MRN: [id number]      Billing Provider: MICHELLE Shafer  Level of Service:   Patient PCP Information       Provider PCP Type    Jayde Cole DNP General            Reason for Visit / Chief Complaint: Heart Problem (Having heart palpitations-states this happened once before about a year or so ago and she saw cardiologist in [place] and he said she was having premature heart beat-he stopped HCTZ and started her on Losartan)       Update PCP  Update Chief Complaint         History of Present Illness / Problem Focused Workflow     Gabriella Ulloa presents to the clinic with Heart Problem (Having heart palpitations-states this happened once before about a year or so ago and she saw cardiologist in [place] and he said she was having premature heart beat-he stopped HCTZ and started her on Losartan)     Patient presents for evaluation taking palpitations.  She reports the last several weeks she has noticed feeling as if her heart is fluttering in her chest.  She had some mild shortness of breath and states that she feels as if she has to "clear her throat".  She denies any chest pressure or tightness no exertional chest pain.  There are no known triggers that palpitations there self limiting to her last knee were from 30 seconds to a couple of minutes.  She denies any syncopal or near syncopal episodes.    She reports previous episodes and was seen by Cardiology.  Patient reports she was told that she is having "early beats".  EKG in office today shows sinus rhythm, however patient is asymptomatic in clinic as well.  She does have an EKG tracker on her from which was reviewed and showed PACs.  Due to recurrent symptoms will schedule patient to follow-up with cardiology " for further evaluation.  Patient was instructed that if symptoms last longer, more frequent or do not resolve to go to the emergency room for evaluation.      Review of Systems     Review of Systems   Constitutional:  Negative for fatigue and fever.   HENT:  Negative for nasal congestion and sore throat.    Eyes:  Negative for visual disturbance.   Respiratory:  Negative for chest tightness and shortness of breath.    Cardiovascular:  Positive for palpitations. Negative for chest pain and leg swelling.   Gastrointestinal:  Negative for abdominal pain, change in bowel habit and change in bowel habit.   Endocrine: Negative for polydipsia, polyphagia and polyuria.   Genitourinary:  Negative for dysuria and hematuria.   Musculoskeletal:  Negative for back pain and leg pain.   Integumentary:  Negative for rash.   Neurological:  Negative for dizziness, syncope, weakness and light-headedness.      Medical / Social / Family History     Past Medical History:   Diagnosis Date    Anemia     Anxiety     Breakthrough bleeding on birth control pills 2015    Endometriosis of pelvic peritoneum 2020    cul-de-sac and sigmoid colon    Hypertension     Irritable bowel syndrome Chronic    Increased pain with bowel movements with menses       Past Surgical History:   Procedure Laterality Date     SECTION      D&C/Hysteroscopy with robotic and operative Laparoscopy  2020    DIAGNOSTIC LAPAROSCOPY N/A 2022    Procedure: LAPAROSCOPY, DIAGNOSTIC;  Surgeon: Otis Mccullough MD;  Location: [facility] OR;  Service: OB/GYN;  Laterality: N/A;    DILATION AND CURETTAGE OF UTERUS  2020    ENDOMETRIAL BIOPSY N/A 2022    Procedure: BIOPSY, ENDOMETRIUM;  Surgeon: Otis Mccullough MD;  Location: [facility] OR;  Service: OB/GYN;  Laterality: N/A;    HYSTEROSCOPY WITH DILATION AND CURETTAGE OF UTERUS N/A 2022    Procedure: HYSTEROSCOPY, WITH DILATION AND CURETTAGE OF UTERUS;  Surgeon: Otis Mccullough MD;   Location: [facility];  Service: OB/GYN;  Laterality: N/A;       Social History  Ms. Ulloa  reports that she has never smoked. She has never used smokeless tobacco. She reports that she does not drink alcohol and does not use drugs.    Family History  Ms. Ulloa's family history includes Breast cancer in an other family member; Diabetes in her mother and another family member; Heart disease in her mother; Hypertension in an other family member; Liver cancer in an other family member; Prostate cancer in her maternal grandfather; Stomach cancer in an other family member.    Medications and Allergies     Medications  Outpatient Medications Marked as Taking for the 7/12/23 encounter (Office Visit) with MICHELLE Shafer   Medication Sig Dispense Refill    amitriptyline (ELAVIL) 25 MG tablet Take 1 tablet (25 mg total) by mouth nightly as needed for Insomnia. 90 tablet 3    ergocalciferol (ERGOCALCIFEROL) 50,000 unit Cap Take 1 capsule (50,000 Units total) by mouth every 7 days. 12 capsule 3    losartan (COZAAR) 50 MG tablet Take 1 tablet (50 mg total) by mouth once daily. 90 tablet 3    pantoprazole (PROTONIX) 40 MG tablet Take 1 tablet (40 mg total) by mouth once daily. 30 tablet 6    rizatriptan (MAXALT) 10 MG tablet Take 1 tablet (10 mg total) by mouth as needed for Migraine (take at onset of migraine). 6 tablet 3     Current Facility-Administered Medications for the 7/12/23 encounter (Office Visit) with MICHELLE Shafer   Medication Dose Route Frequency Provider Last Rate Last Admin    [COMPLETED] Allergy Mix   Subcutaneous 1 time in Clinic/HOD MICHELLE Shafer   Given at 07/12/23 0956       Allergies  Review of patient's allergies indicates:   Allergen Reactions    Grass pollen-bermuda, standard     Grass pollen-natalia, standard        Physical Examination     Vitals:    07/12/23 0833   BP: 132/80   Pulse: 83   Resp: 16   Temp: 98 °F (36.7 °C)     Physical Exam  Eyes:      Pupils: Pupils  are equal, round, and reactive to light.   Cardiovascular:      Rate and Rhythm: Normal rate and regular rhythm.      Heart sounds: Normal heart sounds. No murmur heard.  Pulmonary:      Breath sounds: Normal breath sounds. No wheezing, rhonchi or rales.   Abdominal:      General: Bowel sounds are normal.   Musculoskeletal:         General: No swelling.      Cervical back: Normal range of motion and neck supple.   Skin:     General: Skin is warm and dry.   Neurological:      Mental Status: She is alert and oriented to person, place, and time.        Lab Results   Component Value Date    WBC 6.29 06/22/2023    HGB 11.5 (L) 06/22/2023    HCT 38.8 06/22/2023    MCV 80.8 06/22/2023     06/22/2023        Sodium   Date Value Ref Range Status   06/22/2023 138 136 - 145 mmol/L Final     Potassium   Date Value Ref Range Status   06/22/2023 3.9 3.5 - 5.1 mmol/L Final     Chloride   Date Value Ref Range Status   06/22/2023 107 98 - 107 mmol/L Final     CO2   Date Value Ref Range Status   06/22/2023 28 21 - 32 mmol/L Final     Glucose   Date Value Ref Range Status   06/22/2023 76 74 - 106 mg/dL Final     BUN   Date Value Ref Range Status   06/22/2023 8 7 - 18 mg/dL Final     Creatinine   Date Value Ref Range Status   06/22/2023 0.75 0.55 - 1.02 mg/dL Final     Calcium   Date Value Ref Range Status   06/22/2023 9.7 8.5 - 10.1 mg/dL Final     Total Protein   Date Value Ref Range Status   06/22/2023 7.4 6.4 - 8.2 g/dL Final     Albumin   Date Value Ref Range Status   06/22/2023 3.3 (L) 3.5 - 5.0 g/dL Final     Bilirubin, Total   Date Value Ref Range Status   06/22/2023 0.4 >0.0 - 1.2 mg/dL Final     Alk Phos   Date Value Ref Range Status   06/22/2023 78 37 - 98 U/L Final     AST   Date Value Ref Range Status   06/22/2023 13 (L) 15 - 37 U/L Final     ALT   Date Value Ref Range Status   06/22/2023 25 13 - 56 U/L Final     Anion Gap   Date Value Ref Range Status   06/22/2023 7 7 - 16 mmol/L Final     eGFR   Date Value Ref  Range Status   06/22/2023 111 >=60 mL/min/1.73m2 Final      Lab Results   Component Value Date    HGBA1C 5.1 03/01/2023      Lab Results   Component Value Date    CHOL 171 06/22/2023    CHOL 185 03/01/2023    CHOL 155 08/31/2021     Lab Results   Component Value Date    HDL 62 (H) 06/22/2023    HDL 69 (H) 03/01/2023    HDL 64 (H) 08/31/2021     Lab Results   Component Value Date    LDLCALC 89 06/22/2023    LDLCALC 102 03/01/2023    LDLCALC 81 08/31/2021     No results found for: DLDL  Lab Results   Component Value Date    TRIG 99 06/22/2023    TRIG 68 03/01/2023    TRIG 51 08/31/2021     Lab Results   Component Value Date    CHOLHDL 2.8 06/22/2023    CHOLHDL 2.7 03/01/2023    CHOLHDL 2.4 08/31/2021      Lab Results   Component Value Date    TSH 0.884 03/01/2023    FREET4 0.94 03/01/2023        Assessment and Plan (including Health Maintenance)      Problem List  Smart Sets  Document Outside HM   :    Plan:     1. Heart palpitations  -     POCT EKG 12-LEAD (Manually Resulted by Ordering Provider)  -     Ambulatory referral/consult to Cardiology; Future; Expected date: 07/19/2023    2. Chronic rhinitis  -     Allergy Mix         There are no Patient Instructions on file for this visit.     There are no preventive care reminders to display for this patient.      Health Maintenance Topics with due status: Not Due       Topic Last Completion Date    Pap Smear 11/09/2021    Influenza Vaccine 11/16/2022    TETANUS VACCINE 03/01/2023       Future Appointments   Date Time Provider Department Center   9/12/2023  2:00 PM Markos Genao MD [facility] NEURO [place] [facility]   10/18/2023  8:10 AM Sanjay Palmer MD [facility] ENT [place] [facility]   6/24/2024  9:00 AM Jayde Cole DNP [facility] HECT[facility] [place]            Signature:  MICHELLE Shafer [facility] [address] [place] [address]  [phone]    Date of encounter: 7/12/23

## 2023-07-12 NOTE — PROGRESS NOTES
Having heart palpitations-states this happened once before about a year or so ago and she saw cardiologist in [place] and he said she was having premature heart beat-he stopped HCTZ and started her on Losartan

## 2023-07-20 ENCOUNTER — CLINICAL SUPPORT (OUTPATIENT)
Dept: OTOLARYNGOLOGY | Facility: CLINIC | Age: 28
End: 2023-07-20
Payer: COMMERCIAL

## 2023-07-20 DIAGNOSIS — J30.89 OTHER ALLERGIC RHINITIS: ICD-10-CM

## 2023-07-20 DIAGNOSIS — J31.0 CHRONIC RHINITIS: Primary | ICD-10-CM

## 2023-07-20 DIAGNOSIS — J30.1 ALLERGIC REACTION TO GRASS POLLEN: ICD-10-CM

## 2023-07-20 DIAGNOSIS — J30.1 NON-SEASONAL ALLERGIC RHINITIS DUE TO POLLEN: ICD-10-CM

## 2023-07-20 DIAGNOSIS — J30.1 ALLERGIC RHINITIS DUE TO GRASS POLLEN: ICD-10-CM

## 2023-07-20 PROCEDURE — 95165 PR PROFES SVC,IMMUNOTHER,SINGLE/MULT AGS: ICD-10-PCS | Mod: S$PBB,,, | Performed by: OTOLARYNGOLOGY

## 2023-07-20 PROCEDURE — 95165 ANTIGEN THERAPY SERVICES: CPT | Mod: S$PBB,,, | Performed by: OTOLARYNGOLOGY

## 2023-07-20 PROCEDURE — 95115 IMMUNOTHERAPY ONE INJECTION: CPT | Mod: PBBFAC | Performed by: OTOLARYNGOLOGY

## 2023-07-20 PROCEDURE — 95165 ANTIGEN THERAPY SERVICES: CPT | Mod: PBBFAC | Performed by: OTOLARYNGOLOGY

## 2023-07-20 NOTE — PROGRESS NOTES
Established patient with Dr. Palmer. See previous note for written order. Allergy injections per Dr. Palmer.   Vial test administered from new vial B.  10 minute vial test mm reactions  Administered right arm at 4:10 PM  3mm observed.  First dose of 0.02ml given.  20 minute mm reaction  4:11 PM   right arm; observation 4mm.

## 2023-07-26 PROCEDURE — 95115 IMMUNOTHERAPY ONE INJECTION: CPT | Mod: ,,, | Performed by: NURSE PRACTITIONER

## 2023-07-26 PROCEDURE — 95115 PR IMMUNOTHERAPY, ONE INJECTION: ICD-10-PCS | Mod: ,,, | Performed by: NURSE PRACTITIONER

## 2023-07-27 ENCOUNTER — CLINICAL SUPPORT (OUTPATIENT)
Dept: FAMILY MEDICINE | Facility: CLINIC | Age: 28
End: 2023-07-27
Payer: COMMERCIAL

## 2023-07-27 DIAGNOSIS — J31.0 CHRONIC RHINITIS: Primary | ICD-10-CM

## 2023-08-01 ENCOUNTER — PATIENT MESSAGE (OUTPATIENT)
Dept: OBSTETRICS AND GYNECOLOGY | Facility: CLINIC | Age: 28
End: 2023-08-01
Payer: COMMERCIAL

## 2023-08-03 ENCOUNTER — CLINICAL SUPPORT (OUTPATIENT)
Dept: FAMILY MEDICINE | Facility: CLINIC | Age: 28
End: 2023-08-03
Payer: COMMERCIAL

## 2023-08-03 DIAGNOSIS — Z76.89 ENCOUNTER FOR ALLERGY INJECTION: Primary | ICD-10-CM

## 2023-08-03 PROCEDURE — 95115 PR IMMUNOTHERAPY, ONE INJECTION: ICD-10-PCS | Mod: ,,, | Performed by: NURSE PRACTITIONER

## 2023-08-03 PROCEDURE — 95115 IMMUNOTHERAPY ONE INJECTION: CPT | Mod: ,,, | Performed by: NURSE PRACTITIONER

## 2023-08-03 NOTE — PROGRESS NOTES
Allergy injection given in right arm 0.10ml of dilution instructed to wait 20 mins for s/s of reaction

## 2023-08-04 RX ORDER — DICLOFENAC SODIUM 50 MG/1
50 TABLET, DELAYED RELEASE ORAL EVERY 6 HOURS PRN
Qty: 20 TABLET | Refills: 1 | Status: SHIPPED | OUTPATIENT
Start: 2023-08-04

## 2023-08-11 ENCOUNTER — CLINICAL SUPPORT (OUTPATIENT)
Dept: FAMILY MEDICINE | Facility: CLINIC | Age: 28
End: 2023-08-11
Payer: COMMERCIAL

## 2023-08-11 DIAGNOSIS — J31.0 CHRONIC RHINITIS: Primary | ICD-10-CM

## 2023-08-17 ENCOUNTER — OFFICE VISIT (OUTPATIENT)
Dept: FAMILY MEDICINE | Facility: CLINIC | Age: 28
End: 2023-08-17
Payer: COMMERCIAL

## 2023-08-17 VITALS
OXYGEN SATURATION: 98 % | RESPIRATION RATE: 18 BRPM | BODY MASS INDEX: 41.53 KG/M2 | DIASTOLIC BLOOD PRESSURE: 74 MMHG | TEMPERATURE: 98 F | HEART RATE: 68 BPM | HEIGHT: 63 IN | SYSTOLIC BLOOD PRESSURE: 133 MMHG | WEIGHT: 234.38 LBS

## 2023-08-17 DIAGNOSIS — R50.9 FEVER, UNSPECIFIED FEVER CAUSE: ICD-10-CM

## 2023-08-17 DIAGNOSIS — R51.9 ACUTE INTRACTABLE HEADACHE, UNSPECIFIED HEADACHE TYPE: ICD-10-CM

## 2023-08-17 DIAGNOSIS — Z20.822 EXPOSURE TO COVID-19 VIRUS: Primary | ICD-10-CM

## 2023-08-17 PROCEDURE — 87428 SARSCOV & INF VIR A&B AG IA: CPT | Mod: QW,,, | Performed by: NURSE PRACTITIONER

## 2023-08-17 PROCEDURE — 4010F ACE/ARB THERAPY RXD/TAKEN: CPT | Mod: CPTII,,, | Performed by: NURSE PRACTITIONER

## 2023-08-17 PROCEDURE — 3078F DIAST BP <80 MM HG: CPT | Mod: CPTII,,, | Performed by: NURSE PRACTITIONER

## 2023-08-17 PROCEDURE — 1159F MED LIST DOCD IN RCRD: CPT | Mod: CPTII,,, | Performed by: NURSE PRACTITIONER

## 2023-08-17 PROCEDURE — 1159F PR MEDICATION LIST DOCUMENTED IN MEDICAL RECORD: ICD-10-PCS | Mod: CPTII,,, | Performed by: NURSE PRACTITIONER

## 2023-08-17 PROCEDURE — 87428 POCT SARS-COV2 (COVID) WITH FLU ANTIGEN: ICD-10-PCS | Mod: QW,,, | Performed by: NURSE PRACTITIONER

## 2023-08-17 PROCEDURE — 3044F PR MOST RECENT HEMOGLOBIN A1C LEVEL <7.0%: ICD-10-PCS | Mod: CPTII,,, | Performed by: NURSE PRACTITIONER

## 2023-08-17 PROCEDURE — 3061F NEG MICROALBUMINURIA REV: CPT | Mod: CPTII,,, | Performed by: NURSE PRACTITIONER

## 2023-08-17 PROCEDURE — 99213 PR OFFICE/OUTPT VISIT, EST, LEVL III, 20-29 MIN: ICD-10-PCS | Mod: ,,, | Performed by: NURSE PRACTITIONER

## 2023-08-17 PROCEDURE — 3008F PR BODY MASS INDEX (BMI) DOCUMENTED: ICD-10-PCS | Mod: CPTII,,, | Performed by: NURSE PRACTITIONER

## 2023-08-17 PROCEDURE — 87880 STREP A ASSAY W/OPTIC: CPT | Mod: QW,,, | Performed by: NURSE PRACTITIONER

## 2023-08-17 PROCEDURE — 3044F HG A1C LEVEL LT 7.0%: CPT | Mod: CPTII,,, | Performed by: NURSE PRACTITIONER

## 2023-08-17 PROCEDURE — 3008F BODY MASS INDEX DOCD: CPT | Mod: CPTII,,, | Performed by: NURSE PRACTITIONER

## 2023-08-17 PROCEDURE — 3066F PR DOCUMENTATION OF TREATMENT FOR NEPHROPATHY: ICD-10-PCS | Mod: CPTII,,, | Performed by: NURSE PRACTITIONER

## 2023-08-17 PROCEDURE — 3075F PR MOST RECENT SYSTOLIC BLOOD PRESS GE 130-139MM HG: ICD-10-PCS | Mod: CPTII,,, | Performed by: NURSE PRACTITIONER

## 2023-08-17 PROCEDURE — 3075F SYST BP GE 130 - 139MM HG: CPT | Mod: CPTII,,, | Performed by: NURSE PRACTITIONER

## 2023-08-17 PROCEDURE — 99213 OFFICE O/P EST LOW 20 MIN: CPT | Mod: ,,, | Performed by: NURSE PRACTITIONER

## 2023-08-17 PROCEDURE — 3078F PR MOST RECENT DIASTOLIC BLOOD PRESSURE < 80 MM HG: ICD-10-PCS | Mod: CPTII,,, | Performed by: NURSE PRACTITIONER

## 2023-08-17 PROCEDURE — 4010F PR ACE/ARB THEARPY RXD/TAKEN: ICD-10-PCS | Mod: CPTII,,, | Performed by: NURSE PRACTITIONER

## 2023-08-17 PROCEDURE — 3061F PR NEG MICROALBUMINURIA RESULT DOCUMENTED/REVIEW: ICD-10-PCS | Mod: CPTII,,, | Performed by: NURSE PRACTITIONER

## 2023-08-17 PROCEDURE — 3066F NEPHROPATHY DOC TX: CPT | Mod: CPTII,,, | Performed by: NURSE PRACTITIONER

## 2023-08-17 PROCEDURE — 87880 POCT RAPID STREP A: ICD-10-PCS | Mod: QW,,, | Performed by: NURSE PRACTITIONER

## 2023-08-17 RX ORDER — ACETAMINOPHEN AND CODEINE PHOSPHATE 120; 12 MG/5ML; MG/5ML
SOLUTION ORAL
COMMUNITY
Start: 2023-07-18

## 2023-08-17 RX ORDER — LETROZOLE 2.5 MG/1
TABLET, FILM COATED ORAL
COMMUNITY
Start: 2023-07-18

## 2023-08-17 NOTE — PROGRESS NOTES
Jayde Cole DNP   [address] [place] [address]     PATIENT NAME: Gabriella Ulloa  : 1995  DATE: 23  MRN: [id number]      Billing Provider: Jayde Cole DNP  Level of Service:   Patient PCP Information       Provider PCP Type    Jayde Cole DNP General            Reason for Visit / Chief Complaint: lost of taste       Update PCP  Update Chief Complaint         History of Present Illness / Problem Focused Workflow     Gabriella Ulloa presents to the clinic with lost of taste     HPI    Review of Systems     Review of Systems     Medical / Social / Family History     Past Medical History:   Diagnosis Date    Anemia     Anxiety     Breakthrough bleeding on birth control pills 2015    Endometriosis of pelvic peritoneum 2020    cul-de-sac and sigmoid colon    Hypertension     Irritable bowel syndrome Chronic    Increased pain with bowel movements with menses       Past Surgical History:   Procedure Laterality Date     SECTION      D&C/Hysteroscopy with robotic and operative Laparoscopy  2020    DIAGNOSTIC LAPAROSCOPY N/A 2022    Procedure: LAPAROSCOPY, DIAGNOSTIC;  Surgeon: Otis Mccullough MD;  Location: [facility];  Service: OB/GYN;  Laterality: N/A;    DILATION AND CURETTAGE OF UTERUS  2020    ENDOMETRIAL BIOPSY N/A 2022    Procedure: BIOPSY, ENDOMETRIUM;  Surgeon: Otis Mccullough MD;  Location: [facility];  Service: OB/GYN;  Laterality: N/A;    HYSTEROSCOPY WITH DILATION AND CURETTAGE OF UTERUS N/A 2022    Procedure: HYSTEROSCOPY, WITH DILATION AND CURETTAGE OF UTERUS;  Surgeon: Otis Mccullough MD;  Location: [facility];  Service: OB/GYN;  Laterality: N/A;       Social History  Ms.  reports that she has never smoked. She has never used smokeless tobacco. She reports that she does not drink alcohol and does not use drugs.    Family History  Ms.'s family history includes Breast cancer in an other family  "member; Diabetes in her mother and another family member; Heart disease in her mother; Hypertension in an other family member; Liver cancer in an other family member; Prostate cancer in her maternal grandfather; Stomach cancer in an other family member.    Medications and Allergies     Medications  Outpatient Medications Marked as Taking for the 8/17/23 encounter (Office Visit) with Jayde Cole DNP   Medication Sig Dispense Refill    amitriptyline (ELAVIL) 25 MG tablet Take 1 tablet (25 mg total) by mouth nightly as needed for Insomnia. 90 tablet 3    cetirizine (ZYRTEC) 10 MG tablet Take 1 tablet (10 mg total) by mouth once daily. 30 tablet 0    diclofenac (VOLTAREN) 50 MG EC tablet Take 1 tablet (50 mg total) by mouth every 6 (six) hours as needed (Pain). 20 tablet 1    ergocalciferol (ERGOCALCIFEROL) 50,000 unit Cap Take 1 capsule (50,000 Units total) by mouth every 7 days. 12 capsule 3    losartan (COZAAR) 50 MG tablet Take 1 tablet (50 mg total) by mouth once daily. 90 tablet 3    norethindrone (MICRONOR) 0.35 mg tablet       pantoprazole (PROTONIX) 40 MG tablet Take 1 tablet (40 mg total) by mouth once daily. 30 tablet 6       Allergies  Review of patient's allergies indicates:   Allergen Reactions    Grass pollen-bermuda, standard     Grass pollen-natalia, standard        Physical Examination   /74 (BP Location: Left arm, Patient Position: Sitting, BP Method: Large (Automatic))   Pulse 68   Temp 98.4 °F (36.9 °C) (Oral)   Resp 18   Ht 5' 3" (1.6 m)   Wt 106.3 kg (234 lb 6.4 oz)   SpO2 98%   BMI 41.52 kg/m²    Physical Exam  Vitals and nursing note reviewed.   Constitutional:       Appearance: She is obese. She is ill-appearing.   HENT:      Head: Normocephalic.      Nose: Congestion present.      Mouth/Throat:      Mouth: Mucous membranes are moist.   Eyes:      Extraocular Movements: Extraocular movements intact.      Conjunctiva/sclera: Conjunctivae normal.      Pupils: Pupils " are equal, round, and reactive to light.   Cardiovascular:      Rate and Rhythm: Normal rate and regular rhythm.      Pulses: Normal pulses.      Heart sounds: Normal heart sounds.   Pulmonary:      Effort: Pulmonary effort is normal.      Breath sounds: Normal breath sounds.   Musculoskeletal:      Cervical back: Normal range of motion.   Skin:     General: Skin is warm and dry.      Capillary Refill: Capillary refill takes less than 2 seconds.   Neurological:      General: No focal deficit present.      Mental Status: She is alert and oriented to person, place, and time. Mental status is at baseline.   Psychiatric:         Mood and Affect: Mood normal.         Behavior: Behavior normal.         Thought Content: Thought content normal.         Judgment: Judgment normal.        Assessment and Plan (including Health Maintenance)      Problem List  Smart Sets  Document Outside HM   :    Plan:         There are no preventive care reminders to display for this patient.    Problem List Items Addressed This Visit          Neuro    Acute intractable headache       ID    Exposure to COVID-19 virus - Primary       Other    Fever    Relevant Orders    POCT SARS-COV2 (COVID) with Flu Antigen (Completed)    POCT rapid strep A (Completed)       Health Maintenance Topics with due status: Not Due       Topic Last Completion Date    Pap Smear 11/09/2021    Influenza Vaccine 11/16/2022    TETANUS VACCINE 03/01/2023       Future Appointments   Date Time Provider Department Center   9/12/2023  2:00 PM Markos Genao MD [facility] NEURO [place] [facility]   10/18/2023  8:10 AM Sanjay Palmer MD [facility] ENT [place] [facility]   6/24/2024  9:00 AM Jayde Cole DNP [facility] [place]            Signature:  Jayde Cole DNP      [address] [place] [address]    Date of encounter: 8/17/23

## 2023-08-17 NOTE — LETTER
August 17, 2023      [facility]  [address] [facility] [address]  Phone: [phone]  Fax: [phone]       Patient: Gabriella Ulloa   YOB: 1995  Date of Visit: 08/17/2023    To Whom It May Concern:    Elle Ulloa  was at [facility] on 08/17/2023. The patient may return to work/school on 8/21/2023 with no restrictions. If you have any questions or concerns, or if I can be of further assistance, please do not hesitate to contact me.    Sincerely,    Jayde Cole, DNP

## 2023-08-22 RX ORDER — AMITRIPTYLINE HYDROCHLORIDE 25 MG/1
25 TABLET, FILM COATED ORAL NIGHTLY PRN
Qty: 90 TABLET | Refills: 3 | Status: SHIPPED | OUTPATIENT
Start: 2023-08-22 | End: 2023-09-11 | Stop reason: SDUPTHER

## 2023-08-29 ENCOUNTER — OFFICE VISIT (OUTPATIENT)
Dept: FAMILY MEDICINE | Facility: CLINIC | Age: 28
End: 2023-08-29
Payer: COMMERCIAL

## 2023-08-29 VITALS
HEIGHT: 63 IN | HEART RATE: 79 BPM | SYSTOLIC BLOOD PRESSURE: 115 MMHG | OXYGEN SATURATION: 100 % | DIASTOLIC BLOOD PRESSURE: 80 MMHG | WEIGHT: 237.63 LBS | BODY MASS INDEX: 42.11 KG/M2

## 2023-08-29 DIAGNOSIS — R05.1 ACUTE COUGH: ICD-10-CM

## 2023-08-29 DIAGNOSIS — E66.9 OBESITY (BMI 35.0-39.9 WITHOUT COMORBIDITY): ICD-10-CM

## 2023-08-29 DIAGNOSIS — R50.9 FEVER, UNSPECIFIED FEVER CAUSE: ICD-10-CM

## 2023-08-29 DIAGNOSIS — J01.00 ACUTE NON-RECURRENT MAXILLARY SINUSITIS: Primary | ICD-10-CM

## 2023-08-29 DIAGNOSIS — J06.9 UPPER RESPIRATORY TRACT INFECTION, UNSPECIFIED TYPE: ICD-10-CM

## 2023-08-29 PROCEDURE — 3074F PR MOST RECENT SYSTOLIC BLOOD PRESSURE < 130 MM HG: ICD-10-PCS | Mod: CPTII,,, | Performed by: NURSE PRACTITIONER

## 2023-08-29 PROCEDURE — 1159F PR MEDICATION LIST DOCUMENTED IN MEDICAL RECORD: ICD-10-PCS | Mod: CPTII,,, | Performed by: NURSE PRACTITIONER

## 2023-08-29 PROCEDURE — 3061F PR NEG MICROALBUMINURIA RESULT DOCUMENTED/REVIEW: ICD-10-PCS | Mod: CPTII,,, | Performed by: NURSE PRACTITIONER

## 2023-08-29 PROCEDURE — 4010F PR ACE/ARB THEARPY RXD/TAKEN: ICD-10-PCS | Mod: CPTII,,, | Performed by: NURSE PRACTITIONER

## 2023-08-29 PROCEDURE — 3044F HG A1C LEVEL LT 7.0%: CPT | Mod: CPTII,,, | Performed by: NURSE PRACTITIONER

## 2023-08-29 PROCEDURE — 87428 SARSCOV & INF VIR A&B AG IA: CPT | Mod: QW,,, | Performed by: NURSE PRACTITIONER

## 2023-08-29 PROCEDURE — 3079F PR MOST RECENT DIASTOLIC BLOOD PRESSURE 80-89 MM HG: ICD-10-PCS | Mod: CPTII,,, | Performed by: NURSE PRACTITIONER

## 2023-08-29 PROCEDURE — 3044F PR MOST RECENT HEMOGLOBIN A1C LEVEL <7.0%: ICD-10-PCS | Mod: CPTII,,, | Performed by: NURSE PRACTITIONER

## 2023-08-29 PROCEDURE — 87428 POCT SARS-COV2 (COVID) WITH FLU ANTIGEN: ICD-10-PCS | Mod: QW,,, | Performed by: NURSE PRACTITIONER

## 2023-08-29 PROCEDURE — 1159F MED LIST DOCD IN RCRD: CPT | Mod: CPTII,,, | Performed by: NURSE PRACTITIONER

## 2023-08-29 PROCEDURE — 3074F SYST BP LT 130 MM HG: CPT | Mod: CPTII,,, | Performed by: NURSE PRACTITIONER

## 2023-08-29 PROCEDURE — 87880 POCT RAPID STREP A: ICD-10-PCS | Mod: QW,,, | Performed by: NURSE PRACTITIONER

## 2023-08-29 PROCEDURE — 3061F NEG MICROALBUMINURIA REV: CPT | Mod: CPTII,,, | Performed by: NURSE PRACTITIONER

## 2023-08-29 PROCEDURE — 3008F BODY MASS INDEX DOCD: CPT | Mod: CPTII,,, | Performed by: NURSE PRACTITIONER

## 2023-08-29 PROCEDURE — 96372 THER/PROPH/DIAG INJ SC/IM: CPT | Mod: ,,, | Performed by: NURSE PRACTITIONER

## 2023-08-29 PROCEDURE — 3008F PR BODY MASS INDEX (BMI) DOCUMENTED: ICD-10-PCS | Mod: CPTII,,, | Performed by: NURSE PRACTITIONER

## 2023-08-29 PROCEDURE — 96372 PR INJECTION,THERAP/PROPH/DIAG2ST, IM OR SUBCUT: ICD-10-PCS | Mod: ,,, | Performed by: NURSE PRACTITIONER

## 2023-08-29 PROCEDURE — 3066F NEPHROPATHY DOC TX: CPT | Mod: CPTII,,, | Performed by: NURSE PRACTITIONER

## 2023-08-29 PROCEDURE — 99213 OFFICE O/P EST LOW 20 MIN: CPT | Mod: 25,,, | Performed by: NURSE PRACTITIONER

## 2023-08-29 PROCEDURE — 4010F ACE/ARB THERAPY RXD/TAKEN: CPT | Mod: CPTII,,, | Performed by: NURSE PRACTITIONER

## 2023-08-29 PROCEDURE — 3066F PR DOCUMENTATION OF TREATMENT FOR NEPHROPATHY: ICD-10-PCS | Mod: CPTII,,, | Performed by: NURSE PRACTITIONER

## 2023-08-29 PROCEDURE — 99213 PR OFFICE/OUTPT VISIT, EST, LEVL III, 20-29 MIN: ICD-10-PCS | Mod: 25,,, | Performed by: NURSE PRACTITIONER

## 2023-08-29 PROCEDURE — 3079F DIAST BP 80-89 MM HG: CPT | Mod: CPTII,,, | Performed by: NURSE PRACTITIONER

## 2023-08-29 PROCEDURE — 87880 STREP A ASSAY W/OPTIC: CPT | Mod: QW,,, | Performed by: NURSE PRACTITIONER

## 2023-08-29 RX ORDER — CEFTRIAXONE 1 G/1
1 INJECTION, POWDER, FOR SOLUTION INTRAMUSCULAR; INTRAVENOUS
Status: COMPLETED | OUTPATIENT
Start: 2023-08-29 | End: 2023-08-29

## 2023-08-29 RX ORDER — BETAMETHASONE SODIUM PHOSPHATE AND BETAMETHASONE ACETATE 3; 3 MG/ML; MG/ML
6 INJECTION, SUSPENSION INTRA-ARTICULAR; INTRALESIONAL; INTRAMUSCULAR; SOFT TISSUE
Status: COMPLETED | OUTPATIENT
Start: 2023-08-29 | End: 2023-08-29

## 2023-08-29 RX ORDER — METHYLPREDNISOLONE 4 MG/1
TABLET ORAL
Qty: 21 TABLET | Refills: 0 | Status: SHIPPED | OUTPATIENT
Start: 2023-08-29

## 2023-08-29 RX ORDER — CETIRIZINE HYDROCHLORIDE 10 MG/1
10 TABLET ORAL DAILY
Qty: 30 TABLET | Refills: 0 | Status: SHIPPED | OUTPATIENT
Start: 2023-08-29

## 2023-08-29 RX ORDER — AZITHROMYCIN 250 MG/1
TABLET, FILM COATED ORAL
Qty: 6 TABLET | Refills: 0 | Status: SHIPPED | OUTPATIENT
Start: 2023-08-29 | End: 2023-10-28 | Stop reason: SDUPTHER

## 2023-08-29 RX ADMIN — BETAMETHASONE SODIUM PHOSPHATE AND BETAMETHASONE ACETATE 6 MG: 3; 3 INJECTION, SUSPENSION INTRA-ARTICULAR; INTRALESIONAL; INTRAMUSCULAR; SOFT TISSUE at 11:08

## 2023-08-29 RX ADMIN — CEFTRIAXONE 1 G: 1 INJECTION, POWDER, FOR SOLUTION INTRAMUSCULAR; INTRAVENOUS at 11:08

## 2023-08-29 NOTE — PROGRESS NOTES
Jayde Cole DNP   [address] [place] [address]     PATIENT NAME: Gabriella Ulloa  : 1995  DATE: 23  MRN: [id number]      Billing Provider: Jayde Cole DNP  Level of Service:   Patient PCP Information       Provider PCP Type    Jayde Cole DNP General            Reason for Visit / Chief Complaint: Sore Throat, Headache, and Eye Drainage       Update PCP  Update Chief Complaint         History of Present Illness / Problem Focused Workflow     Gabriella Ulloa presents to the clinic with Sore Throat, Headache, and Eye Drainage     Sore Throat   Associated symptoms include headaches.   Headache   Associated symptoms include a sore throat.     Review of Systems     Review of Systems   HENT:  Positive for sore throat.    Neurological:  Positive for headaches.      Medical / Social / Family History     Past Medical History:   Diagnosis Date    Anemia     Anxiety     Breakthrough bleeding on birth control pills 2015    Endometriosis of pelvic peritoneum 2020    cul-de-sac and sigmoid colon    Hypertension     Irritable bowel syndrome Chronic    Increased pain with bowel movements with menses       Past Surgical History:   Procedure Laterality Date     SECTION      D&C/Hysteroscopy with robotic and operative Laparoscopy  2020    DIAGNOSTIC LAPAROSCOPY N/A 2022    Procedure: LAPAROSCOPY, DIAGNOSTIC;  Surgeon: Otis Mccullough MD;  Location: [facility] OR;  Service: OB/GYN;  Laterality: N/A;    DILATION AND CURETTAGE OF UTERUS  2020    ENDOMETRIAL BIOPSY N/A 2022    Procedure: BIOPSY, ENDOMETRIUM;  Surgeon: Otis Mccullough MD;  Location: [facility] OR;  Service: OB/GYN;  Laterality: N/A;    HYSTEROSCOPY WITH DILATION AND CURETTAGE OF UTERUS N/A 2022    Procedure: HYSTEROSCOPY, WITH DILATION AND CURETTAGE OF UTERUS;  Surgeon: Otis Mccullough MD;  Location: [facility] OR;  Service: OB/GYN;  Laterality: N/A;       Social  "History  Ms.  reports that she has never smoked. She has never used smokeless tobacco. She reports that she does not drink alcohol and does not use drugs.    Family History  Ms.'s family history includes Breast cancer in an other family member; Diabetes in her mother and another family member; Heart disease in her mother; Hypertension in an other family member; Liver cancer in an other family member; Prostate cancer in her maternal grandfather; Stomach cancer in an other family member.    Medications and Allergies     Medications  No outpatient medications have been marked as taking for the 8/29/23 encounter (Office Visit) with Jayde Cole DNP.     Current Facility-Administered Medications for the 8/29/23 encounter (Office Visit) with Jayde Cole DNP   Medication Dose Route Frequency Provider Last Rate Last Admin    [COMPLETED] betamethasone acetate-betamethasone sodium phosphate injection 6 mg  6 mg Intramuscular 1 time in Clinic/HOD Jayde Cole DNP   6 mg at 08/29/23 1129    [COMPLETED] cefTRIAXone injection 1 g  1 g Intramuscular 1 time in Clinic/HOD Jayde Cole DNP   1 g at 08/29/23 1129       Allergies  Review of patient's allergies indicates:   Allergen Reactions    Grass pollen-bermuda, standard     Grass pollen-natalia, standard        Physical Examination   /80   Pulse 79   Ht 5' 3" (1.6 m)   Wt 107.8 kg (237 lb 9.6 oz)   SpO2 100%   BMI 42.09 kg/m²    Physical Exam  Vitals and nursing note reviewed.   Constitutional:       Appearance: She is obese. She is ill-appearing.   HENT:      Head: Normocephalic.      Right Ear: Tympanic membrane normal.      Left Ear: Tympanic membrane normal.      Nose: Congestion and rhinorrhea present.      Mouth/Throat:      Mouth: Mucous membranes are moist.      Pharynx: Posterior oropharyngeal erythema present.   Eyes:      Extraocular Movements: Extraocular movements intact.      Conjunctiva/sclera: Conjunctivae normal.      " Pupils: Pupils are equal, round, and reactive to light.   Cardiovascular:      Rate and Rhythm: Normal rate and regular rhythm.      Pulses: Normal pulses.      Heart sounds: Normal heart sounds.   Pulmonary:      Effort: Pulmonary effort is normal.      Breath sounds: Normal breath sounds.   Musculoskeletal:      Cervical back: Normal range of motion.   Lymphadenopathy:      Cervical: Cervical adenopathy present.   Skin:     General: Skin is warm and dry.      Capillary Refill: Capillary refill takes less than 2 seconds.   Neurological:      General: No focal deficit present.      Mental Status: She is alert and oriented to person, place, and time. Mental status is at baseline.   Psychiatric:         Mood and Affect: Mood normal.         Behavior: Behavior normal.         Thought Content: Thought content normal.         Judgment: Judgment normal.        Assessment and Plan (including Health Maintenance)      Problem List  Smart [employer]  Document Outside HM   :    Plan:         There are no preventive care reminders to display for this patient.    Problem List Items Addressed This Visit          ENT    Acute non-recurrent maxillary sinusitis - Primary       Pulmonary    Acute cough       Endocrine    Obesity (BMI 35.0-39.9 without comorbidity)       Other    Fever    Relevant Orders    POCT SARS-COV2 (COVID) with Flu Antigen (Completed)    POCT rapid strep A (Completed)     Other Visit Diagnoses       Upper respiratory tract infection, unspecified type        Relevant Medications    betamethasone acetate-betamethasone sodium phosphate injection 6 mg (Completed)    cefTRIAXone injection 1 g (Completed)            Health Maintenance Topics with due status: Not Due       Topic Last Completion Date    Pap Smear 11/09/2021    Influenza Vaccine 11/16/2022    TETANUS VACCINE 03/01/2023       Future Appointments   Date Time Provider Department Center   9/12/2023  2:00 PM Markos Genao MD [facility] NEURO [facility]   10/18/2023   8:10 AM Sanjay Palmer MD [facility] ENT [facility]   6/24/2024  9:00 AM [facility]            Signature:  Jayde Cole DNP      [address] [place] [address]    Date of encounter: 8/29/23

## 2023-09-12 RX ORDER — ERGOCALCIFEROL 1.25 MG/1
50000 CAPSULE ORAL
Qty: 12 CAPSULE | Refills: 3 | Status: SHIPPED | OUTPATIENT
Start: 2023-09-12

## 2023-09-12 RX ORDER — AMITRIPTYLINE HYDROCHLORIDE 25 MG/1
25 TABLET, FILM COATED ORAL NIGHTLY PRN
Qty: 90 TABLET | Refills: 3 | Status: SHIPPED | OUTPATIENT
Start: 2023-09-12 | End: 2024-02-20 | Stop reason: SDUPTHER

## 2023-09-13 ENCOUNTER — OFFICE VISIT (OUTPATIENT)
Dept: FAMILY MEDICINE | Facility: CLINIC | Age: 28
End: 2023-09-13
Payer: COMMERCIAL

## 2023-09-13 VITALS
SYSTOLIC BLOOD PRESSURE: 119 MMHG | BODY MASS INDEX: 40.32 KG/M2 | HEART RATE: 77 BPM | WEIGHT: 236.19 LBS | OXYGEN SATURATION: 97 % | DIASTOLIC BLOOD PRESSURE: 80 MMHG | HEIGHT: 64 IN | TEMPERATURE: 98 F

## 2023-09-13 DIAGNOSIS — M54.2 NECK PAIN: ICD-10-CM

## 2023-09-13 DIAGNOSIS — E66.9 OBESITY (BMI 35.0-39.9 WITHOUT COMORBIDITY): ICD-10-CM

## 2023-09-13 DIAGNOSIS — Z32.00 ENCOUNTER FOR PREGNANCY TEST, RESULT UNKNOWN: ICD-10-CM

## 2023-09-13 DIAGNOSIS — R93.89 ABNORMAL X-RAY OF NECK: ICD-10-CM

## 2023-09-13 DIAGNOSIS — J02.9 SORE THROAT: ICD-10-CM

## 2023-09-13 DIAGNOSIS — R13.10 DYSPHAGIA, UNSPECIFIED TYPE: Primary | ICD-10-CM

## 2023-09-13 LAB
B-HCG UR QL: NEGATIVE
CTP QC/QA: YES
PTH-INTACT SERPL-MCNC: 29.4 PG/ML (ref 18.4–80.1)
T3FREE SERPL-MCNC: 2.3 PG/ML (ref 2.18–3.98)
T4 FREE SERPL-MCNC: 0.99 NG/DL (ref 0.76–1.46)
THYROPEROXIDASE AB SERPL-ACNC: 29 U/ML (ref 0–60)
TSH SERPL DL<=0.005 MIU/L-ACNC: 0.64 UIU/ML (ref 0.36–3.74)

## 2023-09-13 PROCEDURE — 83970 ASSAY OF PARATHORMONE: CPT | Mod: ,,, | Performed by: CLINICAL MEDICAL LABORATORY

## 2023-09-13 PROCEDURE — 99213 PR OFFICE/OUTPT VISIT, EST, LEVL III, 20-29 MIN: ICD-10-PCS | Mod: ,,, | Performed by: NURSE PRACTITIONER

## 2023-09-13 PROCEDURE — 3008F BODY MASS INDEX DOCD: CPT | Mod: CPTII,,, | Performed by: NURSE PRACTITIONER

## 2023-09-13 PROCEDURE — 84481 T3, FREE: ICD-10-PCS | Mod: ,,, | Performed by: CLINICAL MEDICAL LABORATORY

## 2023-09-13 PROCEDURE — 99213 OFFICE O/P EST LOW 20 MIN: CPT | Mod: ,,, | Performed by: NURSE PRACTITIONER

## 2023-09-13 PROCEDURE — 4010F ACE/ARB THERAPY RXD/TAKEN: CPT | Mod: CPTII,,, | Performed by: NURSE PRACTITIONER

## 2023-09-13 PROCEDURE — 84481 FREE ASSAY (FT-3): CPT | Mod: ,,, | Performed by: CLINICAL MEDICAL LABORATORY

## 2023-09-13 PROCEDURE — 81025 POCT URINE PREGNANCY: ICD-10-PCS | Mod: QW,,, | Performed by: NURSE PRACTITIONER

## 2023-09-13 PROCEDURE — 3008F PR BODY MASS INDEX (BMI) DOCUMENTED: ICD-10-PCS | Mod: CPTII,,, | Performed by: NURSE PRACTITIONER

## 2023-09-13 PROCEDURE — 81025 URINE PREGNANCY TEST: CPT | Mod: QW,,, | Performed by: NURSE PRACTITIONER

## 2023-09-13 PROCEDURE — 3044F PR MOST RECENT HEMOGLOBIN A1C LEVEL <7.0%: ICD-10-PCS | Mod: CPTII,,, | Performed by: NURSE PRACTITIONER

## 2023-09-13 PROCEDURE — 1159F MED LIST DOCD IN RCRD: CPT | Mod: CPTII,,, | Performed by: NURSE PRACTITIONER

## 2023-09-13 PROCEDURE — 3066F PR DOCUMENTATION OF TREATMENT FOR NEPHROPATHY: ICD-10-PCS | Mod: CPTII,,, | Performed by: NURSE PRACTITIONER

## 2023-09-13 PROCEDURE — 3074F PR MOST RECENT SYSTOLIC BLOOD PRESSURE < 130 MM HG: ICD-10-PCS | Mod: CPTII,,, | Performed by: NURSE PRACTITIONER

## 2023-09-13 PROCEDURE — 4010F PR ACE/ARB THEARPY RXD/TAKEN: ICD-10-PCS | Mod: CPTII,,, | Performed by: NURSE PRACTITIONER

## 2023-09-13 PROCEDURE — 1159F PR MEDICATION LIST DOCUMENTED IN MEDICAL RECORD: ICD-10-PCS | Mod: CPTII,,, | Performed by: NURSE PRACTITIONER

## 2023-09-13 PROCEDURE — 86376 MICROSOMAL ANTIBODY EACH: CPT | Mod: ,,, | Performed by: CLINICAL MEDICAL LABORATORY

## 2023-09-13 PROCEDURE — 3066F NEPHROPATHY DOC TX: CPT | Mod: CPTII,,, | Performed by: NURSE PRACTITIONER

## 2023-09-13 PROCEDURE — 3061F NEG MICROALBUMINURIA REV: CPT | Mod: CPTII,,, | Performed by: NURSE PRACTITIONER

## 2023-09-13 PROCEDURE — 3074F SYST BP LT 130 MM HG: CPT | Mod: CPTII,,, | Performed by: NURSE PRACTITIONER

## 2023-09-13 PROCEDURE — 83970 PTH, INTACT: ICD-10-PCS | Mod: ,,, | Performed by: CLINICAL MEDICAL LABORATORY

## 2023-09-13 PROCEDURE — 3061F PR NEG MICROALBUMINURIA RESULT DOCUMENTED/REVIEW: ICD-10-PCS | Mod: CPTII,,, | Performed by: NURSE PRACTITIONER

## 2023-09-13 PROCEDURE — 84443 ASSAY THYROID STIM HORMONE: CPT | Mod: ,,, | Performed by: CLINICAL MEDICAL LABORATORY

## 2023-09-13 PROCEDURE — 3079F PR MOST RECENT DIASTOLIC BLOOD PRESSURE 80-89 MM HG: ICD-10-PCS | Mod: CPTII,,, | Performed by: NURSE PRACTITIONER

## 2023-09-13 PROCEDURE — 84443 THYROID PANEL: ICD-10-PCS | Mod: ,,, | Performed by: CLINICAL MEDICAL LABORATORY

## 2023-09-13 PROCEDURE — 3044F HG A1C LEVEL LT 7.0%: CPT | Mod: CPTII,,, | Performed by: NURSE PRACTITIONER

## 2023-09-13 PROCEDURE — 84439 THYROID PANEL: ICD-10-PCS | Mod: ,,, | Performed by: CLINICAL MEDICAL LABORATORY

## 2023-09-13 PROCEDURE — 86376 THYROID PEROXIDASE ANTIBODY: ICD-10-PCS | Mod: ,,, | Performed by: CLINICAL MEDICAL LABORATORY

## 2023-09-13 PROCEDURE — 3079F DIAST BP 80-89 MM HG: CPT | Mod: CPTII,,, | Performed by: NURSE PRACTITIONER

## 2023-09-13 PROCEDURE — 84439 ASSAY OF FREE THYROXINE: CPT | Mod: ,,, | Performed by: CLINICAL MEDICAL LABORATORY

## 2023-09-13 NOTE — PROGRESS NOTES
Jayde Cole DNP   [address] [place] [address]     PATIENT NAME: Gabriella Ulloa  : 1995  DATE: 23  MRN: [id number]      Billing Provider: Jayde Cole DNP  Level of Service:   Patient PCP Information       Provider PCP Type    Jayde Cole DNP General            Reason for Visit / Chief Complaint: Sore Throat       Update PCP  Update Chief Complaint         History of Present Illness / Problem Focused Workflow     Gabriella Ulloa presents to the clinic with Sore Throat     Sore Throat     Review of Systems     Review of Systems   HENT:  Positive for sore throat.       Medical / Social / Family History     Past Medical History:   Diagnosis Date    Anemia     Anxiety     Breakthrough bleeding on birth control pills 2015    Endometriosis of pelvic peritoneum 2020    cul-de-sac and sigmoid colon    Hypertension     Irritable bowel syndrome Chronic    Increased pain with bowel movements with menses       Past Surgical History:   Procedure Laterality Date     SECTION      D&C/Hysteroscopy with robotic and operative Laparoscopy  2020    DIAGNOSTIC LAPAROSCOPY N/A 2022    Procedure: LAPAROSCOPY, DIAGNOSTIC;  Surgeon: Otis Mccullough MD;  Location: [facility] OR;  Service: OB/GYN;  Laterality: N/A;    DILATION AND CURETTAGE OF UTERUS  2020    ENDOMETRIAL BIOPSY N/A 2022    Procedure: BIOPSY, ENDOMETRIUM;  Surgeon: Otis Mccullough MD;  Location: [facility] OR;  Service: OB/GYN;  Laterality: N/A;    HYSTEROSCOPY WITH DILATION AND CURETTAGE OF UTERUS N/A 2022    Procedure: HYSTEROSCOPY, WITH DILATION AND CURETTAGE OF UTERUS;  Surgeon: Otis Mccullough MD;  Location: [facility];  Service: OB/GYN;  Laterality: N/A;       Social History  Ms.  reports that she has never smoked. She has never used smokeless tobacco. She reports that she does not drink alcohol and does not use drugs.    Family History  Ms.'s family history  "includes Breast cancer in an other family member; Diabetes in her mother and another family member; Heart disease in her mother; Hypertension in an other family member; Liver cancer in an other family member; Prostate cancer in her maternal grandfather; Stomach cancer in an other family member.    Medications and Allergies     Medications  No outpatient medications have been marked as taking for the 9/13/23 encounter (Office Visit) with Jayde Cole DNP.       Allergies  Review of patient's allergies indicates:   Allergen Reactions    Grass pollen-bermuda, standard     Grass pollen-natalia, standard        Physical Examination   /80   Pulse 77   Temp 98.2 °F (36.8 °C)   Ht 5' 3.5" (1.613 m)   Wt 107.1 kg (236 lb 3.2 oz)   SpO2 97%   BMI 41.18 kg/m²    Physical Exam  Vitals and nursing note reviewed.   Constitutional:       Appearance: Normal appearance. She is normal weight.   HENT:      Head: Normocephalic.      Nose: Nose normal.      Mouth/Throat:      Mouth: Mucous membranes are moist.   Eyes:      Pupils: Pupils are equal, round, and reactive to light.   Neck:      Vascular: No carotid bruit.   Cardiovascular:      Rate and Rhythm: Normal rate and regular rhythm.      Pulses: Normal pulses.      Heart sounds: Normal heart sounds.   Pulmonary:      Effort: Pulmonary effort is normal.      Breath sounds: Normal breath sounds.   Abdominal:      General: Abdomen is flat. Bowel sounds are normal.      Palpations: Abdomen is soft.   Musculoskeletal:         General: Normal range of motion.      Cervical back: Normal range of motion and neck supple. Tenderness present. No rigidity.   Skin:     General: Skin is warm and dry.      Capillary Refill: Capillary refill takes less than 2 seconds.   Neurological:      General: No focal deficit present.      Mental Status: She is alert and oriented to person, place, and time. Mental status is at baseline.   Psychiatric:         Mood and Affect: Mood normal. "         Behavior: Behavior normal.         Thought Content: Thought content normal.         Judgment: Judgment normal.        Assessment and Plan (including Health Maintenance)      Problem List  Smart Sets  Document Outside HM   :    Plan:         Health Maintenance Due   Topic Date Due    Influenza Vaccine (1) 09/01/2023       Problem List Items Addressed This Visit          ENT    Sore throat    Relevant Orders    X-Ray Neck Soft Tissue (Completed)    Thyroid Peroxidase Antibody    PTH, Intact    T3, Free    Thyroid Panel       Renal/    Encounter for pregnancy test, result unknown    Relevant Orders    POCT urine pregnancy (Completed)       Endocrine    Obesity (BMI 35.0-39.9 without comorbidity)       GI    Dysphagia - Primary       Orthopedic    Neck pain    Relevant Orders    X-Ray Neck Soft Tissue (Completed)    Thyroid Peroxidase Antibody    PTH, Intact    T3, Free    Thyroid Panel       Other    Abnormal x-ray of neck    Relevant Orders    US Soft Tissue Head Neck Thyroid       Health Maintenance Topics with due status: Not Due       Topic Last Completion Date    Pap Smear 11/09/2021    TETANUS VACCINE 03/01/2023       Future Appointments   Date Time Provider Department Center   9/28/2023  3:45 PM Markos Genao MD [facility] NEUR[facility] [place] [facility]   10/18/2023  8:10 AM Sanjay Palmer MD [facility] ENT [place] [facility]            Signature:  Jayde Cole, DNP      [address] [place] [address]    Date of encounter: 9/13/23

## 2023-09-20 ENCOUNTER — TELEPHONE (OUTPATIENT)
Dept: FAMILY MEDICINE | Facility: CLINIC | Age: 28
End: 2023-09-20
Payer: COMMERCIAL

## 2023-09-20 NOTE — TELEPHONE ENCOUNTER
----- Message from Ana Carlos sent at 9/20/2023  3:25 PM CDT -----  Please call patient regarding her xray and lab results. She also is wondering why was she scheduled to get a ultrasound done [phone].

## 2023-09-22 NOTE — TELEPHONE ENCOUNTER
EXAMINATION:  XR NECK SOFT TISSUE     CLINICAL HISTORY:  Cervicalgia     TECHNIQUE:  Soft tissue neck, AP and lateral views     COMPARISON:  None.     FINDINGS:  No prevertebral soft tissue swelling is present.  The epiglottis is normal in appearance.  There is no dilatation of the hypopharynx.  5 mm calcifications are seen in the expected area of the posterior thyroid cartilage that also project in the expected area of the upper esophagus.     Bony structures are within normal limits.     Impression:     There are calcifications in the soft tissues posterior to the larynx which likely represent calcifications within the thyroid cartilage.  However, if there is concern for an ingested radiopaque foreign body, this cannot be excluded.  No other abnormalities are seen.

## 2023-09-22 NOTE — TELEPHONE ENCOUNTER
She came in for soreness in her neck and throat, diff swallowing.. all her thyroid levels came back normal. Said she wasn't sick so I talked with her about Ultrasound... I'm confused here..

## 2023-09-26 ENCOUNTER — PATIENT MESSAGE (OUTPATIENT)
Dept: OBSTETRICS AND GYNECOLOGY | Facility: CLINIC | Age: 28
End: 2023-09-26
Payer: COMMERCIAL

## 2023-09-28 ENCOUNTER — OFFICE VISIT (OUTPATIENT)
Dept: NEUROLOGY | Facility: CLINIC | Age: 28
End: 2023-09-28
Payer: COMMERCIAL

## 2023-09-28 VITALS
WEIGHT: 236.31 LBS | DIASTOLIC BLOOD PRESSURE: 89 MMHG | HEIGHT: 64 IN | RESPIRATION RATE: 18 BRPM | BODY MASS INDEX: 40.34 KG/M2 | HEART RATE: 101 BPM | SYSTOLIC BLOOD PRESSURE: 142 MMHG | TEMPERATURE: 98 F | OXYGEN SATURATION: 98 %

## 2023-09-28 DIAGNOSIS — R51.9 ACUTE INTRACTABLE HEADACHE, UNSPECIFIED HEADACHE TYPE: Primary | ICD-10-CM

## 2023-09-28 PROCEDURE — 1159F PR MEDICATION LIST DOCUMENTED IN MEDICAL RECORD: ICD-10-PCS | Mod: CPTII,,, | Performed by: PSYCHIATRY & NEUROLOGY

## 2023-09-28 PROCEDURE — 3077F SYST BP >= 140 MM HG: CPT | Mod: CPTII,,, | Performed by: PSYCHIATRY & NEUROLOGY

## 2023-09-28 PROCEDURE — 3061F NEG MICROALBUMINURIA REV: CPT | Mod: CPTII,,, | Performed by: PSYCHIATRY & NEUROLOGY

## 2023-09-28 PROCEDURE — 99214 OFFICE O/P EST MOD 30 MIN: CPT | Mod: S$PBB,,, | Performed by: PSYCHIATRY & NEUROLOGY

## 2023-09-28 PROCEDURE — 3044F PR MOST RECENT HEMOGLOBIN A1C LEVEL <7.0%: ICD-10-PCS | Mod: CPTII,,, | Performed by: PSYCHIATRY & NEUROLOGY

## 2023-09-28 PROCEDURE — 3066F PR DOCUMENTATION OF TREATMENT FOR NEPHROPATHY: ICD-10-PCS | Mod: CPTII,,, | Performed by: PSYCHIATRY & NEUROLOGY

## 2023-09-28 PROCEDURE — 99215 OFFICE O/P EST HI 40 MIN: CPT | Mod: PBBFAC | Performed by: PSYCHIATRY & NEUROLOGY

## 2023-09-28 PROCEDURE — 3079F PR MOST RECENT DIASTOLIC BLOOD PRESSURE 80-89 MM HG: ICD-10-PCS | Mod: CPTII,,, | Performed by: PSYCHIATRY & NEUROLOGY

## 2023-09-28 PROCEDURE — 3079F DIAST BP 80-89 MM HG: CPT | Mod: CPTII,,, | Performed by: PSYCHIATRY & NEUROLOGY

## 2023-09-28 PROCEDURE — 4010F ACE/ARB THERAPY RXD/TAKEN: CPT | Mod: CPTII,,, | Performed by: PSYCHIATRY & NEUROLOGY

## 2023-09-28 PROCEDURE — 1160F RVW MEDS BY RX/DR IN RCRD: CPT | Mod: CPTII,,, | Performed by: PSYCHIATRY & NEUROLOGY

## 2023-09-28 PROCEDURE — 3008F PR BODY MASS INDEX (BMI) DOCUMENTED: ICD-10-PCS | Mod: CPTII,,, | Performed by: PSYCHIATRY & NEUROLOGY

## 2023-09-28 PROCEDURE — 3008F BODY MASS INDEX DOCD: CPT | Mod: CPTII,,, | Performed by: PSYCHIATRY & NEUROLOGY

## 2023-09-28 PROCEDURE — 4010F PR ACE/ARB THEARPY RXD/TAKEN: ICD-10-PCS | Mod: CPTII,,, | Performed by: PSYCHIATRY & NEUROLOGY

## 2023-09-28 PROCEDURE — 3061F PR NEG MICROALBUMINURIA RESULT DOCUMENTED/REVIEW: ICD-10-PCS | Mod: CPTII,,, | Performed by: PSYCHIATRY & NEUROLOGY

## 2023-09-28 PROCEDURE — 3066F NEPHROPATHY DOC TX: CPT | Mod: CPTII,,, | Performed by: PSYCHIATRY & NEUROLOGY

## 2023-09-28 PROCEDURE — 99214 PR OFFICE/OUTPT VISIT, EST, LEVL IV, 30-39 MIN: ICD-10-PCS | Mod: S$PBB,,, | Performed by: PSYCHIATRY & NEUROLOGY

## 2023-09-28 PROCEDURE — 1159F MED LIST DOCD IN RCRD: CPT | Mod: CPTII,,, | Performed by: PSYCHIATRY & NEUROLOGY

## 2023-09-28 PROCEDURE — 3044F HG A1C LEVEL LT 7.0%: CPT | Mod: CPTII,,, | Performed by: PSYCHIATRY & NEUROLOGY

## 2023-09-28 PROCEDURE — 3077F PR MOST RECENT SYSTOLIC BLOOD PRESSURE >= 140 MM HG: ICD-10-PCS | Mod: CPTII,,, | Performed by: PSYCHIATRY & NEUROLOGY

## 2023-09-28 PROCEDURE — 1160F PR REVIEW ALL MEDS BY PRESCRIBER/CLIN PHARMACIST DOCUMENTED: ICD-10-PCS | Mod: CPTII,,, | Performed by: PSYCHIATRY & NEUROLOGY

## 2023-09-28 RX ORDER — RIZATRIPTAN BENZOATE 10 MG/1
10 TABLET ORAL DAILY PRN
Qty: 18 TABLET | Refills: 3 | Status: SHIPPED | OUTPATIENT
Start: 2023-09-28

## 2023-09-28 NOTE — PROGRESS NOTES
Subjective:       Patient ID: Gabriella Ulloa is a 28 y.o. female     Chief Complaint:    Chief Complaint   Patient presents with    Follow-up     Headaches        Allergies:  Grass pollen-bermuda, standard and Grass pollen-natalia, standard    Current Medications:    Outpatient Encounter Medications as of 9/28/2023   Medication Sig Dispense Refill    amitriptyline (ELAVIL) 25 MG tablet Take 1 tablet (25 mg total) by mouth nightly as needed for Insomnia. 90 tablet 3    azithromycin (Z-LEONARD) 250 MG tablet Take 2 tablets by mouth on day 1; Take 1 tablet by mouth on days 2-5 6 tablet 0    cetirizine (ZYRTEC) 10 MG tablet Take 1 tablet (10 mg total) by mouth once daily. 30 tablet 0    cetirizine (ZYRTEC) 10 MG tablet Take 1 tablet (10 mg total) by mouth once daily. 30 tablet 0    diclofenac (VOLTAREN) 50 MG EC tablet Take 1 tablet (50 mg total) by mouth every 6 (six) hours as needed (Pain). 20 tablet 1    ergocalciferol (ERGOCALCIFEROL) 50,000 unit Cap Take 1 capsule (50,000 Units total) by mouth every 7 days. 12 capsule 3    fluticasone propionate (FLONASE) 50 mcg/actuation nasal spray 1 spray (50 mcg total) by Each Nostril route once daily. 11.1 mL 0    letrozole (FEMARA) 2.5 mg Tab       losartan (COZAAR) 50 MG tablet Take 1 tablet (50 mg total) by mouth once daily. 90 tablet 3    methylPREDNISolone (MEDROL DOSEPACK) 4 mg tablet use as directed 21 tablet 0    norethindrone (MICRONOR) 0.35 mg tablet       EPINEPHrine (EPIPEN) 0.3 mg/0.3 mL AtIn Inject 0.3 mLs (0.3 mg total) into the muscle once. for 1 dose 0.3 mL 0    pantoprazole (PROTONIX) 40 MG tablet Take 1 tablet (40 mg total) by mouth once daily. 30 tablet 6    rizatriptan (MAXALT) 10 MG tablet Take 1 tablet (10 mg total) by mouth daily as needed for Migraine (take at onset of migraine). 18 tablet 3    [DISCONTINUED] amitriptyline (ELAVIL) 25 MG tablet Take 1 tablet (25 mg total) by mouth nightly as needed for Insomnia. 90 tablet 3    [DISCONTINUED]  ergocalciferol (ERGOCALCIFEROL) 50,000 unit Cap Take 1 capsule (50,000 Units total) by mouth every 7 days. 12 capsule 3    [DISCONTINUED] rizatriptan (MAXALT) 10 MG tablet Take 1 tablet (10 mg total) by mouth as needed for Migraine (take at onset of migraine). 6 tablet 3     No facility-administered encounter medications on file as of 2023.       History of Present Illness  28-year-old black female in clinic for follow-up evaluation of chronic headaches and chronic migraines that have been well improved on Elavil 10 mg p.o. q.h.s. and Maxalt p.r.n. onset of migraine.  Patient had had previously poor sleep hygiene with excess daytime napping throwing off her natural circadian rhythms and exacerbating her chronic daily headaches.  The Elavil has helped not only improve her headaches but help regulate her sleep pattern.   Today has HEADACHE more c/w stress/tension HEADACHE          Past Medical History:   Diagnosis Date    Anemia     Anxiety     Breakthrough bleeding on birth control pills 2015    Endometriosis of pelvic peritoneum 2020    cul-de-sac and sigmoid colon    Hypertension     Irritable bowel syndrome Chronic    Increased pain with bowel movements with menses       Past Surgical History:   Procedure Laterality Date     SECTION      D&C/Hysteroscopy with robotic and operative Laparoscopy  2020    DIAGNOSTIC LAPAROSCOPY N/A 2022    Procedure: LAPAROSCOPY, DIAGNOSTIC;  Surgeon: Otis Mccullough MD;  Location: [facility];  Service: OB/GYN;  Laterality: N/A;    DILATION AND CURETTAGE OF UTERUS  2020    ENDOMETRIAL BIOPSY N/A 2022    Procedure: BIOPSY, ENDOMETRIUM;  Surgeon: Otis Mccullough MD;  Location: [facility] OR;  Service: OB/GYN;  Laterality: N/A;    HYSTEROSCOPY WITH DILATION AND CURETTAGE OF UTERUS N/A 2022    Procedure: HYSTEROSCOPY, WITH DILATION AND CURETTAGE OF UTERUS;  Surgeon: Otis Mccullough MD;  Location: [facility];  Service: OB/GYN;   "Laterality: N/A;       Social History  Ms. Ulloa  reports that she has never smoked. She has never used smokeless tobacco. She reports that she does not drink alcohol and does not use drugs.    Family History  Ms.'s Ulloa family history includes Breast cancer in an other family member; Diabetes in her mother and another family member; Heart disease in her mother; Hypertension in an other family member; Liver cancer in an other family member; Prostate cancer in her maternal grandfather; Stomach cancer in an other family member.    Review of Systems  Review of Systems   Musculoskeletal:  Positive for neck pain.   Neurological:  Positive for headaches.   Psychiatric/Behavioral:  The patient is nervous/anxious.    All other systems reviewed and are negative.     Objective:   BP (!) 142/89 (BP Location: Left arm, Patient Position: Sitting, BP Method: Large (Manual))   Pulse 101   Temp 97.6 °F (36.4 °C) (Temporal)   Resp 18   Ht 5' 3.5" (1.613 m)   Wt 107.2 kg (236 lb 4.8 oz)   LMP  (LMP Unknown)   SpO2 98%   BMI 41.20 kg/m²    NEUROLOGICAL EXAMINATION:     MENTAL STATUS   Oriented to person, place, and time.   Level of consciousness: alert  Knowledge: good.     CRANIAL NERVES   Cranial nerves II through XII intact.     MOTOR EXAM     Strength   Strength 5/5 throughout.     GAIT AND COORDINATION     Gait  Gait: normal       Physical Exam  Vitals reviewed.   Constitutional:       Appearance: She is obese.   Neurological:      General: No focal deficit present.      Mental Status: She is alert and oriented to person, place, and time. Mental status is at baseline.      Cranial Nerves: Cranial nerves 2-12 are intact.      Motor: Motor strength is normal.     Gait: Gait is intact.          Assessment:     Acute intractable headache, unspecified headache type    Other orders  -     rizatriptan (MAXALT) 10 MG tablet; Take 1 tablet (10 mg total) by mouth daily as needed for Migraine (take at onset of migraine).  " Dispense: 18 tablet; Refill: 3         Primary Diagnosis and ICD10  Acute intractable headache, unspecified headache type [R51.9]    Plan:     Patient Instructions   Continue Elavil at dosage and frequency tolerated at bedtime for headache relief and improve sleep   Maxalt p.r.n. onset of migraine headache  Avoid any triggers   Stress reduction   Improve sleep hygiene an increase physical activity as tolerated   Follow-up 6 months    Medications Discontinued During This Encounter   Medication Reason    rizatriptan (MAXALT) 10 MG tablet Reorder       Requested Prescriptions     Signed Prescriptions Disp Refills    rizatriptan (MAXALT) 10 MG tablet 18 tablet 3     Sig: Take 1 tablet (10 mg total) by mouth daily as needed for Migraine (take at onset of migraine).

## 2023-09-28 NOTE — PATIENT INSTRUCTIONS
Continue Elavil at dosage and frequency tolerated at bedtime for headache relief and improve sleep   Maxalt p.r.n. onset of migraine headache  Avoid any triggers   Stress reduction   Improve sleep hygiene an increase physical activity as tolerated   Follow-up 6 months

## 2023-10-02 ENCOUNTER — HOSPITAL ENCOUNTER (OUTPATIENT)
Dept: RADIOLOGY | Facility: HOSPITAL | Age: 28
Discharge: HOME | End: 2023-10-02
Attending: NURSE PRACTITIONER
Payer: COMMERCIAL

## 2023-10-02 ENCOUNTER — OFFICE VISIT (OUTPATIENT)
Dept: OBSTETRICS AND GYNECOLOGY | Facility: CLINIC | Age: 28
End: 2023-10-02
Payer: COMMERCIAL

## 2023-10-02 ENCOUNTER — CLINICAL SUPPORT (OUTPATIENT)
Dept: OTOLARYNGOLOGY | Facility: CLINIC | Age: 28
End: 2023-10-02
Payer: COMMERCIAL

## 2023-10-02 ENCOUNTER — PATIENT MESSAGE (OUTPATIENT)
Dept: OBSTETRICS AND GYNECOLOGY | Facility: CLINIC | Age: 28
End: 2023-10-02
Payer: COMMERCIAL

## 2023-10-02 VITALS
BODY MASS INDEX: 41.99 KG/M2 | WEIGHT: 237 LBS | HEIGHT: 63 IN | TEMPERATURE: 99 F | HEART RATE: 91 BPM | DIASTOLIC BLOOD PRESSURE: 87 MMHG | SYSTOLIC BLOOD PRESSURE: 117 MMHG | OXYGEN SATURATION: 99 % | RESPIRATION RATE: 18 BRPM

## 2023-10-02 DIAGNOSIS — E55.9 VITAMIN D DEFICIENCY: ICD-10-CM

## 2023-10-02 DIAGNOSIS — E66.01 MORBID OBESITY WITH BMI OF 40.0-44.9, ADULT: ICD-10-CM

## 2023-10-02 DIAGNOSIS — I10 HYPERTENSION, UNSPECIFIED TYPE: ICD-10-CM

## 2023-10-02 DIAGNOSIS — N83.202 OVARIAN CYST, LEFT: ICD-10-CM

## 2023-10-02 DIAGNOSIS — J30.1 ALLERGIC RHINITIS DUE TO GRASS POLLEN: ICD-10-CM

## 2023-10-02 DIAGNOSIS — J30.89 OTHER ALLERGIC RHINITIS: ICD-10-CM

## 2023-10-02 DIAGNOSIS — N93.9 ABNORMAL UTERINE BLEEDING: ICD-10-CM

## 2023-10-02 DIAGNOSIS — N92.1 MENORRHAGIA WITH IRREGULAR CYCLE: Primary | ICD-10-CM

## 2023-10-02 DIAGNOSIS — J30.1 ALLERGIC REACTION TO GRASS POLLEN: ICD-10-CM

## 2023-10-02 DIAGNOSIS — J30.1 NON-SEASONAL ALLERGIC RHINITIS DUE TO POLLEN: ICD-10-CM

## 2023-10-02 DIAGNOSIS — R93.89 THICKENED ENDOMETRIUM: ICD-10-CM

## 2023-10-02 DIAGNOSIS — Z01.419 ENCOUNTER FOR ANNUAL ROUTINE GYNECOLOGICAL EXAMINATION: ICD-10-CM

## 2023-10-02 DIAGNOSIS — N80.30 ENDOMETRIOSIS OF PELVIC PERITONEUM: ICD-10-CM

## 2023-10-02 DIAGNOSIS — J31.0 CHRONIC RHINITIS: Primary | ICD-10-CM

## 2023-10-02 DIAGNOSIS — N94.6 DYSMENORRHEA: ICD-10-CM

## 2023-10-02 DIAGNOSIS — R93.89 ABNORMAL X-RAY OF NECK: ICD-10-CM

## 2023-10-02 LAB
BILIRUB UR QL STRIP: NEGATIVE
CLARITY UR: CLEAR
COLOR UR: NORMAL
GLUCOSE UR STRIP-MCNC: NORMAL MG/DL
KETONES UR STRIP-SCNC: NEGATIVE MG/DL
LEUKOCYTE ESTERASE UR QL STRIP: NEGATIVE
MUCOUS, UA: ABNORMAL /LPF
NITRITE UR QL STRIP: NEGATIVE
PH UR STRIP: 6 PH UNITS
PROT UR QL STRIP: NEGATIVE
RBC # UR STRIP: NEGATIVE /UL
RBC #/AREA URNS HPF: 2 /HPF
SP GR UR STRIP: 1.01
SQUAMOUS #/AREA URNS LPF: ABNORMAL /HPF
UROBILINOGEN UR STRIP-ACNC: NORMAL MG/DL
WBC #/AREA URNS HPF: 1 /HPF

## 2023-10-02 PROCEDURE — 95165 ANTIGEN THERAPY SERVICES: CPT | Mod: PBBFAC | Performed by: OTOLARYNGOLOGY

## 2023-10-02 PROCEDURE — 1159F PR MEDICATION LIST DOCUMENTED IN MEDICAL RECORD: ICD-10-PCS | Mod: CPTII,,, | Performed by: OBSTETRICS & GYNECOLOGY

## 2023-10-02 PROCEDURE — 3044F PR MOST RECENT HEMOGLOBIN A1C LEVEL <7.0%: ICD-10-PCS | Mod: CPTII,,, | Performed by: OBSTETRICS & GYNECOLOGY

## 2023-10-02 PROCEDURE — 4010F PR ACE/ARB THEARPY RXD/TAKEN: ICD-10-PCS | Mod: CPTII,,, | Performed by: OBSTETRICS & GYNECOLOGY

## 2023-10-02 PROCEDURE — 3074F PR MOST RECENT SYSTOLIC BLOOD PRESSURE < 130 MM HG: ICD-10-PCS | Mod: CPTII,,, | Performed by: OBSTETRICS & GYNECOLOGY

## 2023-10-02 PROCEDURE — 99395 PR PREVENTIVE VISIT,EST,18-39: ICD-10-PCS | Mod: S$PBB,,, | Performed by: OBSTETRICS & GYNECOLOGY

## 2023-10-02 PROCEDURE — 88142 CYTOPATH C/V THIN LAYER: CPT | Mod: TC,GCY | Performed by: OBSTETRICS & GYNECOLOGY

## 2023-10-02 PROCEDURE — 87491 CHLMYD TRACH DNA AMP PROBE: CPT | Mod: ,,, | Performed by: CLINICAL MEDICAL LABORATORY

## 2023-10-02 PROCEDURE — 1160F RVW MEDS BY RX/DR IN RCRD: CPT | Mod: CPTII,,, | Performed by: OBSTETRICS & GYNECOLOGY

## 2023-10-02 PROCEDURE — 95115 IMMUNOTHERAPY ONE INJECTION: CPT | Mod: PBBFAC | Performed by: OTOLARYNGOLOGY

## 2023-10-02 PROCEDURE — 76536 US EXAM OF HEAD AND NECK: CPT | Mod: 26,,, | Performed by: RADIOLOGY

## 2023-10-02 PROCEDURE — 3079F PR MOST RECENT DIASTOLIC BLOOD PRESSURE 80-89 MM HG: ICD-10-PCS | Mod: CPTII,,, | Performed by: OBSTETRICS & GYNECOLOGY

## 2023-10-02 PROCEDURE — 4010F ACE/ARB THERAPY RXD/TAKEN: CPT | Mod: CPTII,,, | Performed by: OBSTETRICS & GYNECOLOGY

## 2023-10-02 PROCEDURE — 3061F NEG MICROALBUMINURIA REV: CPT | Mod: CPTII,,, | Performed by: OBSTETRICS & GYNECOLOGY

## 2023-10-02 PROCEDURE — 87491 CHLAMYDIA/GC, PCR (THINPREP): ICD-10-PCS | Mod: ,,, | Performed by: CLINICAL MEDICAL LABORATORY

## 2023-10-02 PROCEDURE — 3066F PR DOCUMENTATION OF TREATMENT FOR NEPHROPATHY: ICD-10-PCS | Mod: CPTII,,, | Performed by: OBSTETRICS & GYNECOLOGY

## 2023-10-02 PROCEDURE — 81001 URINALYSIS AUTO W/SCOPE: CPT | Mod: ,,, | Performed by: CLINICAL MEDICAL LABORATORY

## 2023-10-02 PROCEDURE — 3044F HG A1C LEVEL LT 7.0%: CPT | Mod: CPTII,,, | Performed by: OBSTETRICS & GYNECOLOGY

## 2023-10-02 PROCEDURE — 87624 HPV HI-RISK TYP POOLED RSLT: CPT | Mod: ,,, | Performed by: CLINICAL MEDICAL LABORATORY

## 2023-10-02 PROCEDURE — 76536 US SOFT TISSUE HEAD NECK THYROID: ICD-10-PCS | Mod: 26,,, | Performed by: RADIOLOGY

## 2023-10-02 PROCEDURE — 99395 PREV VISIT EST AGE 18-39: CPT | Mod: S$PBB,,, | Performed by: OBSTETRICS & GYNECOLOGY

## 2023-10-02 PROCEDURE — 87591 N.GONORRHOEAE DNA AMP PROB: CPT | Mod: ,,, | Performed by: CLINICAL MEDICAL LABORATORY

## 2023-10-02 PROCEDURE — 76536 US EXAM OF HEAD AND NECK: CPT | Mod: TC

## 2023-10-02 PROCEDURE — 95165 PR PROFES SVC,IMMUNOTHER,SINGLE/MULT AGS: ICD-10-PCS | Mod: S$PBB,,, | Performed by: OTOLARYNGOLOGY

## 2023-10-02 PROCEDURE — 87624 HUMAN PAPILLOMAVIRUS (HPV): ICD-10-PCS | Mod: ,,, | Performed by: CLINICAL MEDICAL LABORATORY

## 2023-10-02 PROCEDURE — 3079F DIAST BP 80-89 MM HG: CPT | Mod: CPTII,,, | Performed by: OBSTETRICS & GYNECOLOGY

## 2023-10-02 PROCEDURE — 87591 CHLAMYDIA/GC, PCR (THINPREP): ICD-10-PCS | Mod: ,,, | Performed by: CLINICAL MEDICAL LABORATORY

## 2023-10-02 PROCEDURE — 95165 ANTIGEN THERAPY SERVICES: CPT | Mod: S$PBB,,, | Performed by: OTOLARYNGOLOGY

## 2023-10-02 PROCEDURE — 3066F NEPHROPATHY DOC TX: CPT | Mod: CPTII,,, | Performed by: OBSTETRICS & GYNECOLOGY

## 2023-10-02 PROCEDURE — 3061F PR NEG MICROALBUMINURIA RESULT DOCUMENTED/REVIEW: ICD-10-PCS | Mod: CPTII,,, | Performed by: OBSTETRICS & GYNECOLOGY

## 2023-10-02 PROCEDURE — 99215 OFFICE O/P EST HI 40 MIN: CPT | Mod: PBBFAC,25 | Performed by: OBSTETRICS & GYNECOLOGY

## 2023-10-02 PROCEDURE — 3008F BODY MASS INDEX DOCD: CPT | Mod: CPTII,,, | Performed by: OBSTETRICS & GYNECOLOGY

## 2023-10-02 PROCEDURE — 3074F SYST BP LT 130 MM HG: CPT | Mod: CPTII,,, | Performed by: OBSTETRICS & GYNECOLOGY

## 2023-10-02 PROCEDURE — 1159F MED LIST DOCD IN RCRD: CPT | Mod: CPTII,,, | Performed by: OBSTETRICS & GYNECOLOGY

## 2023-10-02 PROCEDURE — 81001 URINALYSIS: ICD-10-PCS | Mod: ,,, | Performed by: CLINICAL MEDICAL LABORATORY

## 2023-10-02 PROCEDURE — 1160F PR REVIEW ALL MEDS BY PRESCRIBER/CLIN PHARMACIST DOCUMENTED: ICD-10-PCS | Mod: CPTII,,, | Performed by: OBSTETRICS & GYNECOLOGY

## 2023-10-02 PROCEDURE — 3008F PR BODY MASS INDEX (BMI) DOCUMENTED: ICD-10-PCS | Mod: CPTII,,, | Performed by: OBSTETRICS & GYNECOLOGY

## 2023-10-02 NOTE — PROGRESS NOTES
Gabriella Ulloa female  for   Chief Complaint   Patient presents with    Annual Exam     Patient is here for an annual exam, added she stated having a 10 day period this last time and was spotting in between periods.       OB History          3    Para   3    Term                AB        Living   3         SAB        IAB        Ectopic        Multiple        Live Births                      HPI:  Patient presents today for a examination and with a history of abnormal uterine bleeding.  The menstrual cycle has been irregular most recent is 10 days in duration and considered heavy.  Her last withdrawal bleeding was 2023.  Patient has use due to her obesity Micronor in the past.    She is been diagnosed by laparoscopy in  have posterior cul-de-sac endometriosis.    She was started on Femara for treatment of endometriosis.  Patient still on this medication.  She is on no oral contraceptive at this time.  Patient does use Femara therapy for endometriosis treatment as prescribed by physician at [facility].  The patient denies dyspareunia.    Patient was treated at the [facility] for endometriosis with the Femara therapy.  Patient was advised to take Femara twice daily.  She does use it irregularly.  She stopped taking on a regular daily basis due to hot flashes.    She is still complaining of sensation of movement in the abdomen.    Patient concerned about a thyroid ultrasound was performed today and the report:  FINDINGS:  Right thyroid lobe measures 32 x 17 x 15 mm; left measures 37 x 18 x 12 mm.  Thyroid isthmus measures 3 mm AP diameter.  There is no focal thyroid solid nodule or cyst.     Impression:     Normal thyroid ultrasound        Electronically signed by: Markos Singh  Date:                                            10/02/2023  Time:                                           13:34           Exam Ended: 10/02/23 11:40 Last Resulted: 10/02/23 13:34         She denies dysphagia.    Past Medical  "History:   Diagnosis Date    Anemia     Anxiety     Breakthrough bleeding on birth control pills 2015    Endometriosis of pelvic peritoneum 2020    cul-de-sac and sigmoid colon    Hypertension     Irritable bowel syndrome Chronic    Increased pain with bowel movements with menses      Past Surgical History:   Procedure Laterality Date     SECTION      D&C/Hysteroscopy with robotic and operative Laparoscopy  2020    DIAGNOSTIC LAPAROSCOPY N/A 2022    Procedure: LAPAROSCOPY, DIAGNOSTIC;  Surgeon: Otis Mccullough MD;  Location: [facility] OR;  Service: OB/GYN;  Laterality: N/A;    DILATION AND CURETTAGE OF UTERUS  2020    ENDOMETRIAL BIOPSY N/A 2022    Procedure: BIOPSY, ENDOMETRIUM;  Surgeon: Otis Mccullough MD;  Location: [facility] OR;  Service: OB/GYN;  Laterality: N/A;    HYSTEROSCOPY WITH DILATION AND CURETTAGE OF UTERUS N/A 2022    Procedure: HYSTEROSCOPY, WITH DILATION AND CURETTAGE OF UTERUS;  Surgeon: Otis Mccullough MD;  Location: [facility] OR;  Service: OB/GYN;  Laterality: N/A;      Review of patient's allergies indicates:   Allergen Reactions    Grass pollen-bermuda, standard     Grass pollen-natalia, standard         Review of Systems:Pertinent items are noted in HPI.     Physical exam:    /87 (BP Location: Left arm, Patient Position: Sitting)   Pulse 91   Temp 98.7 °F (37.1 °C)   Resp 18   Ht 5' 3" (1.6 m)   Wt 107.5 kg (237 lb)   LMP 2023   SpO2 99%   BMI 41.98 kg/m²      General Appearance: healthy, alert, no distress, smiling, very obese with a BMI of 41.98-increased from previous years.    HEENT: Normal exam; on exam of the thyroid no obvious enlargement on palpation.    Lymphatic: no palpable lymphadenopathy, no hepatosplenomegaly    Chest:           Breasts:No dimpling, nipple retraction or discharge. No masses or nodes., Normal to inspection, Normal breast tissue bilaterally, and very large breasts bilaterally and very dependent.       "     Lungs:clear to auscultation bilaterally           Heart: regular rate and rhythm, S1, S2 normal, no murmur, click, rub or gallop    Abdomen: soft, non-tender, without masses or organomegaly, protuberant, normal bowel sounds, without guarding, without rebound, and no abdominal bruit and no evidence of ascites; very large abdominal panniculus    Pelvic:                    Vulva: normal appearing vulva with no masses, tenderness or lesions                    Cervix: normal appearing cervix without discharge or lesions, nulliparous os, speculum exam reveals a slight vaginal discharge without odor and there is no clumping this to the minimally white what appears be physiologic discharge.                     Uterus: uterus is normal size, shape, consistency and nontender, anteverted, mobile                    Annexa(e):  Due to morbid obesity impossible feel the adnexa but there is no obvious masses in the area of the left and right adnexa is nontender.                   Rectal: normal rectal, no masses.     Extremity: normal, extremities warm, no clubbing, no cyanosis, no edema, non-tender    Skin: normal exam    Psych and neurologic exam: WNL                Assessment:   Problem List Items Addressed This Visit          Cardiac/Vascular    Hypertension    Relevant Orders    ThinPrep Pap Test (Completed)    CBC Auto Differential (Completed)    Comprehensive Metabolic Panel (Completed)    Vitamin D (Completed)    Urinalysis (Completed)    Thyroid Panel (Completed)    Follicle Stimulating Hormone (Completed)    Estradiol (Completed)    Luteinizing Hormone (Completed)    Sedimentation Rate (Completed)    HCG, Serum, Qualitative (Completed)    Urinalysis, Microscopic (Completed)    HPV DNA probe, amplified (Completed)    CT/GC, PCR (THINPREP) (Completed)       Renal/    Endometriosis of pelvic peritoneum    Relevant Orders    ThinPrep Pap Test (Completed)    CBC Auto Differential (Completed)    Comprehensive Metabolic  Panel (Completed)    Vitamin D (Completed)    Urinalysis (Completed)    Thyroid Panel (Completed)    Follicle Stimulating Hormone (Completed)    Estradiol (Completed)    Luteinizing Hormone (Completed)    Sedimentation Rate (Completed)    HCG, Serum, Qualitative (Completed)    Urinalysis, Microscopic (Completed)    HPV DNA probe, amplified (Completed)    CT/GC, PCR (THINPREP) (Completed)    Dysmenorrhea    Relevant Orders    ThinPrep Pap Test (Completed)    CBC Auto Differential (Completed)    Comprehensive Metabolic Panel (Completed)    Vitamin D (Completed)    Urinalysis (Completed)    Thyroid Panel (Completed)    Follicle Stimulating Hormone (Completed)    Estradiol (Completed)    Luteinizing Hormone (Completed)    Sedimentation Rate (Completed)    HCG, Serum, Qualitative (Completed)    Urinalysis, Microscopic (Completed)    HPV DNA probe, amplified (Completed)    CT/GC, PCR (THINPREP) (Completed)    Abnormal uterine bleeding    Relevant Orders    ThinPrep Pap Test (Completed)    CBC Auto Differential (Completed)    Comprehensive Metabolic Panel (Completed)    Vitamin D (Completed)    Urinalysis (Completed)    Thyroid Panel (Completed)    Follicle Stimulating Hormone (Completed)    Estradiol (Completed)    Luteinizing Hormone (Completed)    Sedimentation Rate (Completed)    HCG, Serum, Qualitative (Completed)    US Pelvis Complete Non OB (Completed)    Urinalysis, Microscopic (Completed)    HPV DNA probe, amplified (Completed)    CT/GC, PCR (THINPREP) (Completed)     Other Visit Diagnoses       Menorrhagia with irregular cycle    -  Primary    Relevant Orders    ThinPrep Pap Test (Completed)    CBC Auto Differential (Completed)    Comprehensive Metabolic Panel (Completed)    Vitamin D (Completed)    Urinalysis (Completed)    Thyroid Panel (Completed)    Follicle Stimulating Hormone (Completed)    Estradiol (Completed)    Luteinizing Hormone (Completed)    Sedimentation Rate (Completed)    HCG, Serum, Qualitative  (Completed)    Urinalysis, Microscopic (Completed)    HPV DNA probe, amplified (Completed)    CT/GC, PCR (THINPREP) (Completed)    Morbid obesity with BMI of 40.0-44.9, adult        Relevant Orders    ThinPrep Pap Test (Completed)    CBC Auto Differential (Completed)    Comprehensive Metabolic Panel (Completed)    Vitamin D (Completed)    Urinalysis (Completed)    Thyroid Panel (Completed)    Follicle Stimulating Hormone (Completed)    Estradiol (Completed)    Luteinizing Hormone (Completed)    Sedimentation Rate (Completed)    HCG, Serum, Qualitative (Completed)    Urinalysis, Microscopic (Completed)    HPV DNA probe, amplified (Completed)    CT/GC, PCR (THINPREP) (Completed)    Encounter for annual routine gynecological examination        Relevant Orders    ThinPrep Pap Test (Completed)    CBC Auto Differential (Completed)    Comprehensive Metabolic Panel (Completed)    Vitamin D (Completed)    Urinalysis (Completed)    Thyroid Panel (Completed)    Follicle Stimulating Hormone (Completed)    Estradiol (Completed)    Luteinizing Hormone (Completed)    Sedimentation Rate (Completed)    HCG, Serum, Qualitative (Completed)    Urinalysis, Microscopic (Completed)    HPV DNA probe, amplified (Completed)    CT/GC, PCR (THINPREP) (Completed)    Vitamin D deficiency        Vitamin D 25-Hydroxy, Blood ng/mL 26.9  11.7 CM  - low        Ovarian cyst, left        Noted by ultrasound on 10/06/2023 - probable complex hemorrhagic cyst    Thickened endometrium        11 mm             Plan:  Thin prep Pap today; CBC and CMP; urinalysis; vitamin-D check; gonadotropins and estradiol; hCG check; thyroid panel; schedule this patient for pelvic ultrasound            Follow-up after labs and pelvic ultrasound              Addendum on 10/02/2023 at 8:00 p.m.:  Her vitamin-D is low at 26.9 ng/mL; the patient be advised to use vitamin D3 at 4000 units daily with a repeat vitamin-D in 3 months.              Ultrasound report from  10/06/2023:  FINDINGS:  The uterus measures 10.1 x 5.1 x 4.5 cm. Endometrial stripe measures 1.1 cm.  Right ovary measurement of 3.2 x 1.8 x 2.5 cm given. Left ovary measurement of 4.3 x 3.7 x 3.5 cm given.  3.1 cm simple appearing complex left ovarian cyst, likely hemorrhagic cyst.  Small amount of fluid demonstrated around the left ovary which is nonspecific.     Impression:     3.1 cm simple appearing complex left ovarian cyst, likely hemorrhagic cyst. Small amount of fluid demonstrated around the left ovary which is nonspecific.     Point of Service: [facility]        Electronically signed by: Ed Malagon  Date:                                            10/06/2023  Time:                                           11:11            Dr. Otis Mccullough recommends after reviewing the ultrasound, an office D&C/hysteroscopy in serial ultrasound follow-up of the ovarian cyst that probably was hemorrhagic due to ovulation.

## 2023-10-02 NOTE — PROGRESS NOTES
Established patient with Dr. Palmer. See previous note for written order. Allergy injections per Dr. Palmer.   Vial test administered from reordered vial B.  10 minute vial test mm reactions  Administered right arm at 4:29 PM  3mm observed.  First dose of 0.02ml given.  20 minute mm reaction  4:29 PM   right arm; observation 5mm.  Will send vial to [facility] to continue shots there.

## 2023-10-02 NOTE — LETTER
[facility]  Radiology  [address] [place] [id number]  Phone: [phone]  Fax: [phone]           Patient: Gabriella Ulloa   YOB: 1995   Today's Date: October 2, 2023       To Whom It May Concern:    This notice verifies that Gabriella Ulloa was seen in Radiology on 10/2/2023.         Sincerely,    Yancy Lazaro

## 2023-10-02 NOTE — PATIENT INSTRUCTIONS
Dr. Otis Mccullough thanks you for this office visit at [facility].    Diagnosis for this visit:   Problem List Items Addressed This Visit          Cardiac/Vascular    Hypertension       Renal/    Endometriosis of pelvic peritoneum    Dysmenorrhea    Abnormal uterine bleeding     Other Visit Diagnoses       Menorrhagia with irregular cycle    -  Primary    Morbid obesity with BMI of 40.0-44.9, adult        Encounter for annual routine gynecological examination                 The Plan: Thin prep Pap today; CBC and CMP; urinalysis; vitamin-D check; gonadotropins and estradiol; hCG check; thyroid panel; schedule this patient for pelvic ultrasound            Follow-up after labs and pelvic ultrasound                      Please practice best food and exercise habits for your age.    Dr. Otis Mccullough recommends avoidance of smoking and illicit medications or habits.    Please call  or schedule for any change in your health.     Please keep the next scheduled appointment or call for any need to change the appointment.     Dr. Otis Mccullough recommends yearly exams for good health maintenance.      Thank you    Dr. Otis Mccullough  10/02/2023 1:56 PM

## 2023-10-04 LAB
GH SERPL-MCNC: NORMAL NG/ML
INSULIN SERPL-ACNC: NORMAL U[IU]/ML
LAB AP CLINICAL INFORMATION: NORMAL
LAB AP GYN INTERPRETATION: NEGATIVE
LAB AP PAP DISCLAIMER COMMENTS: NORMAL
RENIN PLAS-CCNC: NORMAL NG/ML/H

## 2023-10-06 ENCOUNTER — HOSPITAL ENCOUNTER (OUTPATIENT)
Dept: RADIOLOGY | Facility: HOSPITAL | Age: 28
Discharge: HOME | End: 2023-10-06
Attending: OBSTETRICS & GYNECOLOGY
Payer: COMMERCIAL

## 2023-10-06 DIAGNOSIS — N93.9 ABNORMAL UTERINE BLEEDING: ICD-10-CM

## 2023-10-06 PROCEDURE — 76856 US EXAM PELVIC COMPLETE: CPT | Mod: TC

## 2023-10-06 PROCEDURE — 76856 US EXAM PELVIC COMPLETE: CPT | Mod: 26,,, | Performed by: RADIOLOGY

## 2023-10-06 PROCEDURE — 76856 US PELVIS COMPLETE NON OB: ICD-10-PCS | Mod: 26,,, | Performed by: RADIOLOGY

## 2023-10-07 LAB
CHLAMYDIA BY PCR: NEGATIVE
HPV 16: NEGATIVE
HPV 18: NEGATIVE
HPV OTHER: NEGATIVE
N. GONORRHOEAE (GC) BY PCR: NEGATIVE

## 2023-10-09 ENCOUNTER — CLINICAL SUPPORT (OUTPATIENT)
Dept: FAMILY MEDICINE | Facility: CLINIC | Age: 28
End: 2023-10-09
Payer: COMMERCIAL

## 2023-10-09 DIAGNOSIS — Z76.89 ENCOUNTER FOR ALLERGY INJECTION: Primary | ICD-10-CM

## 2023-10-17 ENCOUNTER — CLINICAL SUPPORT (OUTPATIENT)
Dept: FAMILY MEDICINE | Facility: CLINIC | Age: 28
End: 2023-10-17
Payer: COMMERCIAL

## 2023-10-17 DIAGNOSIS — Z76.89 ENCOUNTER FOR ALLERGY INJECTION: Primary | ICD-10-CM

## 2023-10-19 ENCOUNTER — PATIENT MESSAGE (OUTPATIENT)
Dept: OBSTETRICS AND GYNECOLOGY | Facility: CLINIC | Age: 28
End: 2023-10-19
Payer: COMMERCIAL

## 2023-10-23 ENCOUNTER — CLINICAL SUPPORT (OUTPATIENT)
Dept: FAMILY MEDICINE | Facility: CLINIC | Age: 28
End: 2023-10-23
Payer: COMMERCIAL

## 2023-10-23 DIAGNOSIS — Z76.89 ENCOUNTER FOR ALLERGY INJECTION: Primary | ICD-10-CM

## 2023-10-23 NOTE — PROGRESS NOTES
Pt is here today for weekly allergy injectjion. Given per orders without difficulty. Pt tolerated well.

## 2023-10-24 ENCOUNTER — CLINICAL SUPPORT (OUTPATIENT)
Dept: FAMILY MEDICINE | Facility: CLINIC | Age: 28
End: 2023-10-24
Payer: COMMERCIAL

## 2023-10-24 DIAGNOSIS — Z23 NEED FOR VACCINATION: Primary | ICD-10-CM

## 2023-10-24 PROCEDURE — 90686 IIV4 VACC NO PRSV 0.5 ML IM: CPT | Mod: ,,, | Performed by: NURSE PRACTITIONER

## 2023-10-24 PROCEDURE — 90471 FLU VACCINE (QUAD) GREATER THAN OR EQUAL TO 3YO PRESERVATIVE FREE IM: ICD-10-PCS | Mod: ,,, | Performed by: NURSE PRACTITIONER

## 2023-10-24 PROCEDURE — 90686 FLU VACCINE (QUAD) GREATER THAN OR EQUAL TO 3YO PRESERVATIVE FREE IM: ICD-10-PCS | Mod: ,,, | Performed by: NURSE PRACTITIONER

## 2023-10-24 PROCEDURE — 90471 IMMUNIZATION ADMIN: CPT | Mod: ,,, | Performed by: NURSE PRACTITIONER

## 2023-10-30 ENCOUNTER — OFFICE VISIT (OUTPATIENT)
Dept: OBSTETRICS AND GYNECOLOGY | Facility: CLINIC | Age: 28
End: 2023-10-30
Payer: COMMERCIAL

## 2023-10-30 ENCOUNTER — HOSPITAL ENCOUNTER (OUTPATIENT)
Dept: RADIOLOGY | Facility: HOSPITAL | Age: 28
Discharge: HOME | End: 2023-10-30
Attending: OBSTETRICS & GYNECOLOGY
Payer: COMMERCIAL

## 2023-10-30 VITALS
RESPIRATION RATE: 18 BRPM | BODY MASS INDEX: 41.88 KG/M2 | OXYGEN SATURATION: 98 % | HEART RATE: 100 BPM | TEMPERATURE: 98 F | SYSTOLIC BLOOD PRESSURE: 122 MMHG | DIASTOLIC BLOOD PRESSURE: 88 MMHG | WEIGHT: 236.38 LBS | HEIGHT: 63 IN

## 2023-10-30 DIAGNOSIS — I10 HYPERTENSION, UNSPECIFIED TYPE: ICD-10-CM

## 2023-10-30 DIAGNOSIS — E66.01 CLASS 3 SEVERE OBESITY WITH BODY MASS INDEX (BMI) OF 40.0 TO 44.9 IN ADULT, UNSPECIFIED OBESITY TYPE, UNSPECIFIED WHETHER SERIOUS COMORBIDITY PRESENT: ICD-10-CM

## 2023-10-30 DIAGNOSIS — N93.8 DUB (DYSFUNCTIONAL UTERINE BLEEDING): ICD-10-CM

## 2023-10-30 DIAGNOSIS — R93.89 THICKENED ENDOMETRIUM: ICD-10-CM

## 2023-10-30 DIAGNOSIS — N83.202 OVARIAN CYST, LEFT: ICD-10-CM

## 2023-10-30 DIAGNOSIS — N80.9 ENDOMETRIOSIS DETERMINED BY LAPAROSCOPY: Primary | ICD-10-CM

## 2023-10-30 PROCEDURE — 3079F PR MOST RECENT DIASTOLIC BLOOD PRESSURE 80-89 MM HG: ICD-10-PCS | Mod: CPTII,,, | Performed by: OBSTETRICS & GYNECOLOGY

## 2023-10-30 PROCEDURE — 99214 OFFICE O/P EST MOD 30 MIN: CPT | Mod: S$PBB,,, | Performed by: OBSTETRICS & GYNECOLOGY

## 2023-10-30 PROCEDURE — 3079F DIAST BP 80-89 MM HG: CPT | Mod: CPTII,,, | Performed by: OBSTETRICS & GYNECOLOGY

## 2023-10-30 PROCEDURE — 99214 PR OFFICE/OUTPT VISIT, EST, LEVL IV, 30-39 MIN: ICD-10-PCS | Mod: S$PBB,,, | Performed by: OBSTETRICS & GYNECOLOGY

## 2023-10-30 PROCEDURE — 76856 US EXAM PELVIC COMPLETE: CPT | Mod: TC

## 2023-10-30 PROCEDURE — 1160F PR REVIEW ALL MEDS BY PRESCRIBER/CLIN PHARMACIST DOCUMENTED: ICD-10-PCS | Mod: CPTII,,, | Performed by: OBSTETRICS & GYNECOLOGY

## 2023-10-30 PROCEDURE — 99215 OFFICE O/P EST HI 40 MIN: CPT | Mod: PBBFAC,25 | Performed by: OBSTETRICS & GYNECOLOGY

## 2023-10-30 PROCEDURE — 3074F SYST BP LT 130 MM HG: CPT | Mod: CPTII,,, | Performed by: OBSTETRICS & GYNECOLOGY

## 2023-10-30 PROCEDURE — 76856 US EXAM PELVIC COMPLETE: CPT | Mod: 26,,, | Performed by: RADIOLOGY

## 2023-10-30 PROCEDURE — 3044F HG A1C LEVEL LT 7.0%: CPT | Mod: CPTII,,, | Performed by: OBSTETRICS & GYNECOLOGY

## 2023-10-30 PROCEDURE — 1159F MED LIST DOCD IN RCRD: CPT | Mod: CPTII,,, | Performed by: OBSTETRICS & GYNECOLOGY

## 2023-10-30 PROCEDURE — 1159F PR MEDICATION LIST DOCUMENTED IN MEDICAL RECORD: ICD-10-PCS | Mod: CPTII,,, | Performed by: OBSTETRICS & GYNECOLOGY

## 2023-10-30 PROCEDURE — 3008F BODY MASS INDEX DOCD: CPT | Mod: CPTII,,, | Performed by: OBSTETRICS & GYNECOLOGY

## 2023-10-30 PROCEDURE — 3061F NEG MICROALBUMINURIA REV: CPT | Mod: CPTII,,, | Performed by: OBSTETRICS & GYNECOLOGY

## 2023-10-30 PROCEDURE — 1160F RVW MEDS BY RX/DR IN RCRD: CPT | Mod: CPTII,,, | Performed by: OBSTETRICS & GYNECOLOGY

## 2023-10-30 PROCEDURE — 3008F PR BODY MASS INDEX (BMI) DOCUMENTED: ICD-10-PCS | Mod: CPTII,,, | Performed by: OBSTETRICS & GYNECOLOGY

## 2023-10-30 PROCEDURE — 3066F NEPHROPATHY DOC TX: CPT | Mod: CPTII,,, | Performed by: OBSTETRICS & GYNECOLOGY

## 2023-10-30 PROCEDURE — 3066F PR DOCUMENTATION OF TREATMENT FOR NEPHROPATHY: ICD-10-PCS | Mod: CPTII,,, | Performed by: OBSTETRICS & GYNECOLOGY

## 2023-10-30 PROCEDURE — 76856 US PELVIS COMPLETE NON OB: ICD-10-PCS | Mod: 26,,, | Performed by: RADIOLOGY

## 2023-10-30 PROCEDURE — 3061F PR NEG MICROALBUMINURIA RESULT DOCUMENTED/REVIEW: ICD-10-PCS | Mod: CPTII,,, | Performed by: OBSTETRICS & GYNECOLOGY

## 2023-10-30 PROCEDURE — 3044F PR MOST RECENT HEMOGLOBIN A1C LEVEL <7.0%: ICD-10-PCS | Mod: CPTII,,, | Performed by: OBSTETRICS & GYNECOLOGY

## 2023-10-30 PROCEDURE — 4010F PR ACE/ARB THEARPY RXD/TAKEN: ICD-10-PCS | Mod: CPTII,,, | Performed by: OBSTETRICS & GYNECOLOGY

## 2023-10-30 PROCEDURE — 3074F PR MOST RECENT SYSTOLIC BLOOD PRESSURE < 130 MM HG: ICD-10-PCS | Mod: CPTII,,, | Performed by: OBSTETRICS & GYNECOLOGY

## 2023-10-30 PROCEDURE — 4010F ACE/ARB THERAPY RXD/TAKEN: CPT | Mod: CPTII,,, | Performed by: OBSTETRICS & GYNECOLOGY

## 2023-10-30 RX ORDER — AZITHROMYCIN 250 MG/1
TABLET, FILM COATED ORAL
Qty: 6 TABLET | Refills: 0 | Status: SHIPPED | OUTPATIENT
Start: 2023-10-30

## 2023-10-30 NOTE — PATIENT INSTRUCTIONS
Dr. Otis Mccullough thanks you for this office visit at [facility].    Diagnosis for this visit:   Problem List Items Addressed This Visit          Cardiac/Vascular    Hypertension       Endocrine    Obese     Other Visit Diagnoses       Endometriosis determined by laparoscopy    -  Primary    DUB (dysfunctional uterine bleeding)                 The Plan: Recommend follow-up ultrasound in the ovarian cyst and endometrial stripe; recommend follow-up with physicians at [facility] as regards therapy endometriosis.  Patient is sexually active and is ambivalent as regards to pregnancy.        Please practice best food and exercise habits for your age.    Dr. Otis Mccullough recommends avoidance of smoking and illicit medications or habits.    Please call  or schedule for any change in your health.     Please keep the next scheduled appointment or call for any need to change the appointment.     Dr. Otis Mccullough recommends yearly exams for good health maintenance.      Thank you    Dr. Otis Mccullough  10/30/2023 9:19 AM

## 2023-10-30 NOTE — PROGRESS NOTES
Gabriella Ulloa female  for   Chief Complaint   Patient presents with    Follow-up     Patient is here for a 4 week follow up on an ultrasound that was ordered. Added patient stated she does not have any pain or discomfort           PHI:  Patient presents for follow-up.  Her labs were within normal limit except for a minimal persistent anemia.  Hemoglobin 12.0 - 16.0 g/dL 11.7 Low   11.5 Low   11.8 Low   12.5  12.2  11.6 Low   12.0    Hematocrit 38.0 - 47.0 % 37.7 Low   38.8  39.0  41.7  40.0  38.3  39.6    MCV 80.0 - 96.0 fL 77.1 Low   80.8  79.8 Low   79.7 Low   79.2 Low   79.8 Low        The patient's dysfunctional bleeding has stopped.  The pelvic ultrasound reveals a 3.1 cm simple cyst left ovary.  Endometrial stripe was 1.1 cm at that time.  This was performed on 10/06/2023.  Her last episode of dysfunctional bleeding was around mid October.  She is asymptomatic as regards the pelvic endometriosis with no cramps.  She did discontinue Femara therapy.  Therapy in 2023 prescribed by Dr. Ulloa of [facility].    Today she is asymptomatic.    Past Medical History:   Diagnosis Date    Anemia     Anxiety     Breakthrough bleeding on birth control pills 2015    Endometriosis of pelvic peritoneum 2020    cul-de-sac and sigmoid colon    Hypertension     Irritable bowel syndrome Chronic    Increased pain with bowel movements with menses      Past Surgical History:   Procedure Laterality Date     SECTION      D&C/Hysteroscopy with robotic and operative Laparoscopy  2020    DIAGNOSTIC LAPAROSCOPY N/A 2022    Procedure: LAPAROSCOPY, DIAGNOSTIC;  Surgeon: Otis Mccullough MD;  Location: [facility] OR;  Service: OB/GYN;  Laterality: N/A;    DILATION AND CURETTAGE OF UTERUS  2020    ENDOMETRIAL BIOPSY N/A 2022    Procedure: BIOPSY, ENDOMETRIUM;  Surgeon: Otis Mccullough MD;  Location: [facility] OR;  Service: OB/GYN;  Laterality: N/A;    HYSTEROSCOPY WITH DILATION AND CURETTAGE OF  "UTERUS N/A 2/8/2022    Procedure: HYSTEROSCOPY, WITH DILATION AND CURETTAGE OF UTERUS;  Surgeon: Otis Mccullough MD;  Location: [facility];  Service: OB/GYN;  Laterality: N/A;      Review of patient's allergies indicates:   Allergen Reactions    Grass pollen-bermuda, standard     Grass pollen-natalia, standard         ROS:Pertinent items are noted in HPI.    Physical exam:    /88 (BP Location: Left arm, Patient Position: Sitting)   Pulse 100   Temp 98 °F (36.7 °C)   Resp 18   Ht 5' 3" (1.6 m)   Wt 107.2 kg (236 lb 6.4 oz)   LMP 10/13/2023 (Exact Date)   SpO2 98%   BMI 41.88 kg/m²      General Appearance: healthy, alert, no distress, smiling    Chest:           Lungs: clear to auscultation bilaterally           Heart: regular rate and rhythm, S1, S2 normal, no murmur, click, rub or gallop    Abdomen:  Soft and nontender  Pelvic: not performed     Extremity: normal    Skin: normal exam        Assessment:   Problem List Items Addressed This Visit          Cardiac/Vascular    Hypertension       Endocrine    Obese     Other Visit Diagnoses       Endometriosis determined by laparoscopy    -  Primary    DUB (dysfunctional uterine bleeding)        Thickened endometrium        10/03/2023 ultrasound revealed endometrial stripe of 11 mm.             Plan:  Recommend follow-up ultrasound in the ovarian cyst and endometrial stripe; recommend follow-up with physicians at [facility] as regards therapy endometriosis.  Patient is sexually active and is ambivalent as regards to pregnancy.    Addendum on 10/30/2023 at 12:30 p.m.:  Ultrasound reveals normalization with no ovarian cyst noted.  Six mm endometrial stripe.  FINDINGS:  Uterus appears normal measuring 10.4 x 5.1 x 3.8 cm.  Endometrial stripe 0.6 cm.  The ovaries now appear normal.  The left-sided cyst is no longer identified.  No free fluid.     Impression:     Resolution of the left-sided no ovarian cyst.  No abnormality demonstrated on today's study.      "   Electronically signed by: Elmer Carrizales  Date:                                            10/30/2023  Time:                                           11:21

## 2023-10-31 ENCOUNTER — CLINICAL SUPPORT (OUTPATIENT)
Dept: FAMILY MEDICINE | Facility: CLINIC | Age: 28
End: 2023-10-31
Payer: COMMERCIAL

## 2023-10-31 DIAGNOSIS — J31.0 CHRONIC RHINITIS: Primary | ICD-10-CM

## 2023-10-31 NOTE — PROGRESS NOTES
Pt is here today for weekly allergy injections. Given per orders without difficulty. Pt tolerated well

## 2023-11-01 ENCOUNTER — TELEPHONE (OUTPATIENT)
Dept: OBSTETRICS AND GYNECOLOGY | Facility: CLINIC | Age: 28
End: 2023-11-01
Payer: COMMERCIAL

## 2023-11-01 NOTE — TELEPHONE ENCOUNTER
----- Message from Otis Mccullough MD sent at 10/16/2023  9:53 PM CDT -----  Regarding: Abnormal pelvic ultrasound  Dr. Otis Mccullough recommends after reviewing the pelvic ultrasound, an office D&C/hysteroscopy iand serial ultrasound follow-up of the ovarian cyst that probably was hemorrhagic due to ovulation      Per Dr. Mccullough last clinic noted, pt to follow up with [facility]. No further treatment required at this time

## 2023-11-03 RX ORDER — FLUCONAZOLE 150 MG/1
150 TABLET ORAL DAILY
COMMUNITY
End: 2023-11-03 | Stop reason: SDUPTHER

## 2023-11-03 RX ORDER — FLUCONAZOLE 150 MG/1
150 TABLET ORAL DAILY
Qty: 3 TABLET | Refills: 0 | Status: SHIPPED | OUTPATIENT
Start: 2023-11-03

## 2023-11-06 ENCOUNTER — CLINICAL SUPPORT (OUTPATIENT)
Dept: OTOLARYNGOLOGY | Facility: CLINIC | Age: 28
End: 2023-11-06
Payer: COMMERCIAL

## 2023-11-06 ENCOUNTER — OFFICE VISIT (OUTPATIENT)
Dept: OTOLARYNGOLOGY | Facility: CLINIC | Age: 28
End: 2023-11-06
Payer: COMMERCIAL

## 2023-11-06 VITALS — BODY MASS INDEX: 41.82 KG/M2 | HEIGHT: 63 IN | WEIGHT: 236 LBS

## 2023-11-06 DIAGNOSIS — J30.1 NON-SEASONAL ALLERGIC RHINITIS DUE TO POLLEN: ICD-10-CM

## 2023-11-06 DIAGNOSIS — J30.1 SEASONAL ALLERGIC RHINITIS DUE TO POLLEN: ICD-10-CM

## 2023-11-06 DIAGNOSIS — J31.0 CHRONIC RHINITIS: Primary | ICD-10-CM

## 2023-11-06 DIAGNOSIS — J30.1 ALLERGIC RHINITIS DUE TO GRASS POLLEN: ICD-10-CM

## 2023-11-06 DIAGNOSIS — J30.89 OTHER ALLERGIC RHINITIS: ICD-10-CM

## 2023-11-06 DIAGNOSIS — J30.1 ALLERGIC REACTION TO GRASS POLLEN: ICD-10-CM

## 2023-11-06 PROCEDURE — 3061F PR NEG MICROALBUMINURIA RESULT DOCUMENTED/REVIEW: ICD-10-PCS | Mod: CPTII,,, | Performed by: OTOLARYNGOLOGY

## 2023-11-06 PROCEDURE — 99213 OFFICE O/P EST LOW 20 MIN: CPT | Mod: PBBFAC,25 | Performed by: OTOLARYNGOLOGY

## 2023-11-06 PROCEDURE — 3044F HG A1C LEVEL LT 7.0%: CPT | Mod: CPTII,,, | Performed by: OTOLARYNGOLOGY

## 2023-11-06 PROCEDURE — 3008F PR BODY MASS INDEX (BMI) DOCUMENTED: ICD-10-PCS | Mod: CPTII,,, | Performed by: OTOLARYNGOLOGY

## 2023-11-06 PROCEDURE — 1160F PR REVIEW ALL MEDS BY PRESCRIBER/CLIN PHARMACIST DOCUMENTED: ICD-10-PCS | Mod: CPTII,,, | Performed by: OTOLARYNGOLOGY

## 2023-11-06 PROCEDURE — 3061F NEG MICROALBUMINURIA REV: CPT | Mod: CPTII,,, | Performed by: OTOLARYNGOLOGY

## 2023-11-06 PROCEDURE — 4010F PR ACE/ARB THEARPY RXD/TAKEN: ICD-10-PCS | Mod: CPTII,,, | Performed by: OTOLARYNGOLOGY

## 2023-11-06 PROCEDURE — 1160F RVW MEDS BY RX/DR IN RCRD: CPT | Mod: CPTII,,, | Performed by: OTOLARYNGOLOGY

## 2023-11-06 PROCEDURE — 95115 IMMUNOTHERAPY ONE INJECTION: CPT | Mod: PBBFAC | Performed by: OTOLARYNGOLOGY

## 2023-11-06 PROCEDURE — 1159F MED LIST DOCD IN RCRD: CPT | Mod: CPTII,,, | Performed by: OTOLARYNGOLOGY

## 2023-11-06 PROCEDURE — 3066F PR DOCUMENTATION OF TREATMENT FOR NEPHROPATHY: ICD-10-PCS | Mod: CPTII,,, | Performed by: OTOLARYNGOLOGY

## 2023-11-06 PROCEDURE — 99214 OFFICE O/P EST MOD 30 MIN: CPT | Mod: S$PBB,25,, | Performed by: OTOLARYNGOLOGY

## 2023-11-06 PROCEDURE — 4010F ACE/ARB THERAPY RXD/TAKEN: CPT | Mod: CPTII,,, | Performed by: OTOLARYNGOLOGY

## 2023-11-06 PROCEDURE — 1159F PR MEDICATION LIST DOCUMENTED IN MEDICAL RECORD: ICD-10-PCS | Mod: CPTII,,, | Performed by: OTOLARYNGOLOGY

## 2023-11-06 PROCEDURE — 3008F BODY MASS INDEX DOCD: CPT | Mod: CPTII,,, | Performed by: OTOLARYNGOLOGY

## 2023-11-06 PROCEDURE — 99214 PR OFFICE/OUTPT VISIT, EST, LEVL IV, 30-39 MIN: ICD-10-PCS | Mod: S$PBB,25,, | Performed by: OTOLARYNGOLOGY

## 2023-11-06 PROCEDURE — 95165 ANTIGEN THERAPY SERVICES: CPT | Mod: PBBFAC | Performed by: OTOLARYNGOLOGY

## 2023-11-06 PROCEDURE — 95165 PR PROFES SVC,IMMUNOTHER,SINGLE/MULT AGS: ICD-10-PCS | Mod: S$PBB,,, | Performed by: OTOLARYNGOLOGY

## 2023-11-06 PROCEDURE — 3066F NEPHROPATHY DOC TX: CPT | Mod: CPTII,,, | Performed by: OTOLARYNGOLOGY

## 2023-11-06 PROCEDURE — 95165 ANTIGEN THERAPY SERVICES: CPT | Mod: S$PBB,,, | Performed by: OTOLARYNGOLOGY

## 2023-11-06 PROCEDURE — 3044F PR MOST RECENT HEMOGLOBIN A1C LEVEL <7.0%: ICD-10-PCS | Mod: CPTII,,, | Performed by: OTOLARYNGOLOGY

## 2023-11-06 NOTE — PROGRESS NOTES
Established patient with Dr. Palmer. See previous note for written order. Allergy injections per Dr. Plamer.   Vial test administered from new vial C.  10 minute vial test mm reactions  Administered right arm at 10:53 AM  3mm observed.  First dose of 0.02ml given.  20 minute mm reaction  10:53 AM   right arm; observation 5mm.  Will continue getting weekly allergy injections at [place], [place].

## 2023-11-06 NOTE — PROGRESS NOTES
Subjective:       Patient ID: Gabriella Ulloa is a 28 y.o. female.    Chief Complaint: chronic rhinitis (6 month follow-up on allergies. Pt states she is taking weekly allergy shots here in allergy clinic without difficulty. )    HPI  Review of Systems   HENT:  Negative for congestion, postnasal drip, rhinorrhea, sinus pressure and sinus pain.    All other systems reviewed and are negative.      Objective:      Physical Exam  General: NAD  Head: Normocephalic, atraumatic, no facial asymmetry/normal strength,  Ears: Both auricules normal in appearance, w/o deformities tympanic membranes normal external auditory canals normal  Nose: External nose w/o deformities normal turbinates no drainage or inflammation  Oral Cavity: Lips, gums, floor of mouth, tongue hard palate, and buccal mucosa without mass/lesion  Oropharynx: Mucosa pink and moist, soft palate, posterior pharynx and oropharyngeal wall without mass/lesion  Neck: Supple, symmetric, trachea midline, no palpable mass/lesion, no palpable cervical lymphadenopathy  Skin: Warm and dry, no concerning lesions  Respiratory: Respirations even, unlabored   Assessment:       1. Chronic rhinitis    2. Seasonal allergic rhinitis due to pollen        Plan:       Continue allergy shots helping well f/u 6 months

## 2023-11-13 ENCOUNTER — CLINICAL SUPPORT (OUTPATIENT)
Dept: FAMILY MEDICINE | Facility: CLINIC | Age: 28
End: 2023-11-13
Payer: COMMERCIAL

## 2023-11-13 DIAGNOSIS — J31.0 CHRONIC RHINITIS: Primary | ICD-10-CM

## 2023-11-13 NOTE — PROGRESS NOTES
Pt is here today for weekly allergy injection. Given per orders without difficulty. Pt tolerated well.

## 2023-11-20 ENCOUNTER — CLINICAL SUPPORT (OUTPATIENT)
Dept: FAMILY MEDICINE | Facility: CLINIC | Age: 28
End: 2023-11-20
Payer: COMMERCIAL

## 2023-11-20 DIAGNOSIS — J31.0 CHRONIC RHINITIS: Primary | ICD-10-CM

## 2023-11-20 DIAGNOSIS — Z76.89 ENCOUNTER FOR ALLERGY INJECTION: ICD-10-CM

## 2023-11-20 NOTE — PROGRESS NOTES
Pt is here today for weekly allergy injections. Given per orders without difficulty. Pt tolerated well.

## 2023-11-27 ENCOUNTER — CLINICAL SUPPORT (OUTPATIENT)
Dept: FAMILY MEDICINE | Facility: CLINIC | Age: 28
End: 2023-11-27
Payer: COMMERCIAL

## 2023-11-27 DIAGNOSIS — Z76.89 ENCOUNTER FOR ALLERGY INJECTION: Primary | ICD-10-CM

## 2023-11-27 NOTE — PROGRESS NOTES
Walk in for 0.15ML in right arm   Pt waited for 30mins post injection  Reaction 9mm noted to arm post injection

## 2023-12-04 ENCOUNTER — CLINICAL SUPPORT (OUTPATIENT)
Dept: FAMILY MEDICINE | Facility: CLINIC | Age: 28
End: 2023-12-04
Payer: COMMERCIAL

## 2023-12-04 DIAGNOSIS — J31.0 CHRONIC RHINITIS: Primary | ICD-10-CM

## 2023-12-04 DIAGNOSIS — Z76.89 ENCOUNTER FOR ALLERGY INJECTION: ICD-10-CM

## 2023-12-04 PROBLEM — J01.00 ACUTE NON-RECURRENT MAXILLARY SINUSITIS: Status: RESOLVED | Noted: 2023-08-29 | Resolved: 2023-12-04

## 2023-12-04 NOTE — PROGRESS NOTES
Pt here today for weekly allergy injections. Given per orders without difficulty. Pt tolerated well.

## 2023-12-11 ENCOUNTER — CLINICAL SUPPORT (OUTPATIENT)
Dept: FAMILY MEDICINE | Facility: CLINIC | Age: 28
End: 2023-12-11
Payer: COMMERCIAL

## 2023-12-11 DIAGNOSIS — Z76.89 ENCOUNTER FOR ALLERGY INJECTION: ICD-10-CM

## 2023-12-11 DIAGNOSIS — J31.0 CHRONIC RHINITIS: Primary | ICD-10-CM

## 2023-12-26 ENCOUNTER — CLINICAL SUPPORT (OUTPATIENT)
Dept: OTOLARYNGOLOGY | Facility: CLINIC | Age: 28
End: 2023-12-26
Payer: COMMERCIAL

## 2023-12-26 DIAGNOSIS — J30.1 ALLERGIC REACTION TO GRASS POLLEN: ICD-10-CM

## 2023-12-26 DIAGNOSIS — J30.1 SEASONAL ALLERGIC RHINITIS DUE TO POLLEN: Primary | ICD-10-CM

## 2023-12-26 DIAGNOSIS — J31.0 CHRONIC RHINITIS: ICD-10-CM

## 2023-12-26 DIAGNOSIS — J30.1 ALLERGIC RHINITIS DUE TO GRASS POLLEN: ICD-10-CM

## 2023-12-26 DIAGNOSIS — L30.9 DERMATITIS: ICD-10-CM

## 2023-12-26 DIAGNOSIS — J30.89 OTHER ALLERGIC RHINITIS: ICD-10-CM

## 2023-12-26 DIAGNOSIS — J30.1 NON-SEASONAL ALLERGIC RHINITIS DUE TO POLLEN: ICD-10-CM

## 2023-12-26 PROCEDURE — 95165 ANTIGEN THERAPY SERVICES: CPT | Mod: S$PBB,,, | Performed by: OTOLARYNGOLOGY

## 2023-12-26 PROCEDURE — 95115 IMMUNOTHERAPY ONE INJECTION: CPT | Mod: PBBFAC | Performed by: OTOLARYNGOLOGY

## 2023-12-26 PROCEDURE — 95165 PR PROFES SVC,IMMUNOTHER,SINGLE/MULT AGS: ICD-10-PCS | Mod: S$PBB,,, | Performed by: OTOLARYNGOLOGY

## 2023-12-26 PROCEDURE — 95165 ANTIGEN THERAPY SERVICES: CPT | Mod: PBBFAC | Performed by: OTOLARYNGOLOGY

## 2023-12-26 NOTE — PROGRESS NOTES
Established patient with Dr. Palmer. See previous note for written order. Allergy injections per Dr. Palmer.   Vial test administered from new vial D.  10 minute vial test mm reactions  Administered right arm at 12:36 PM  5mm observed   First dose of 0.02ml given.  20 minute mm reaction  12:37 PM   right arm; observation 7mm.

## 2024-01-02 ENCOUNTER — OFFICE VISIT (OUTPATIENT)
Dept: FAMILY MEDICINE | Facility: CLINIC | Age: 29
End: 2024-01-02
Payer: COMMERCIAL

## 2024-01-02 VITALS
WEIGHT: 241.81 LBS | TEMPERATURE: 98 F | HEART RATE: 84 BPM | DIASTOLIC BLOOD PRESSURE: 78 MMHG | BODY MASS INDEX: 42.84 KG/M2 | SYSTOLIC BLOOD PRESSURE: 126 MMHG | HEIGHT: 63 IN | OXYGEN SATURATION: 98 %

## 2024-01-02 DIAGNOSIS — R50.9 FEVER, UNSPECIFIED FEVER CAUSE: ICD-10-CM

## 2024-01-02 DIAGNOSIS — E66.9 OBESITY (BMI 35.0-39.9 WITHOUT COMORBIDITY): ICD-10-CM

## 2024-01-02 DIAGNOSIS — Z20.828 EXPOSURE TO INFLUENZA: Primary | ICD-10-CM

## 2024-01-02 DIAGNOSIS — J02.9 SORE THROAT: ICD-10-CM

## 2024-01-02 DIAGNOSIS — R05.1 ACUTE COUGH: ICD-10-CM

## 2024-01-02 LAB
CTP QC/QA: YES
CTP QC/QA: YES
S PYO RRNA THROAT QL PROBE: NEGATIVE
SARS-COV-2 AG RESP QL IA.RAPID: NEGATIVE

## 2024-01-02 PROCEDURE — 1159F MED LIST DOCD IN RCRD: CPT | Mod: CPTII,,, | Performed by: NURSE PRACTITIONER

## 2024-01-02 PROCEDURE — 99213 OFFICE O/P EST LOW 20 MIN: CPT | Mod: ,,, | Performed by: NURSE PRACTITIONER

## 2024-01-02 PROCEDURE — 87426 SARSCOV CORONAVIRUS AG IA: CPT | Mod: QW,,, | Performed by: NURSE PRACTITIONER

## 2024-01-02 PROCEDURE — 3008F BODY MASS INDEX DOCD: CPT | Mod: CPTII,,, | Performed by: NURSE PRACTITIONER

## 2024-01-02 PROCEDURE — 87880 STREP A ASSAY W/OPTIC: CPT | Mod: QW,,, | Performed by: NURSE PRACTITIONER

## 2024-01-02 PROCEDURE — 3078F DIAST BP <80 MM HG: CPT | Mod: CPTII,,, | Performed by: NURSE PRACTITIONER

## 2024-01-02 PROCEDURE — 3074F SYST BP LT 130 MM HG: CPT | Mod: CPTII,,, | Performed by: NURSE PRACTITIONER

## 2024-01-02 RX ORDER — SERTRALINE HYDROCHLORIDE 25 MG/1
25 TABLET, FILM COATED ORAL
COMMUNITY
Start: 2023-11-29

## 2024-01-02 RX ORDER — OSELTAMIVIR PHOSPHATE 75 MG/1
75 CAPSULE ORAL 2 TIMES DAILY
Qty: 10 CAPSULE | Refills: 0 | Status: SHIPPED | OUTPATIENT
Start: 2024-01-02 | End: 2024-01-07

## 2024-01-02 RX ORDER — NORETHINDRONE 5 MG/1
5 TABLET ORAL
COMMUNITY
Start: 2023-10-24

## 2024-01-02 RX ORDER — METHYLPREDNISOLONE 4 MG/1
TABLET ORAL
Qty: 21 TABLET | Refills: 0 | Status: SHIPPED | OUTPATIENT
Start: 2024-01-02 | End: 2024-01-11 | Stop reason: SDUPTHER

## 2024-01-02 RX ORDER — AZITHROMYCIN 250 MG/1
TABLET, FILM COATED ORAL
Qty: 6 TABLET | Refills: 0 | Status: SHIPPED | OUTPATIENT
Start: 2024-01-02

## 2024-01-02 NOTE — PROGRESS NOTES
Jayde Cole DNP   [address] [place] [address]     PATIENT NAME: Gabriella Ulloa  : 1995  DATE: 24  MRN: [id number]      Billing Provider: Jayde Cole DNP  Level of Service:   Patient PCP Information       Provider PCP Type    Jayde Cole DNP General            Reason for Visit / Chief Complaint: Cough, Nasal Congestion, Sore Throat, Fever, Emesis, and Headache       Update PCP  Update Chief Complaint         History of Present Illness / Problem Focused Workflow     Gabriella Ulloa presents to the clinic with Cough, Nasal Congestion, Sore Throat, Fever, Emesis, and Headache     Cough  Associated symptoms include a fever, headaches and a sore throat.   Sore Throat   Associated symptoms include coughing, headaches and vomiting.   Fever   Associated symptoms include coughing, headaches, a sore throat and vomiting.   Emesis   Associated symptoms include coughing, a fever and headaches.   Headache   Associated symptoms include coughing, a fever, a sore throat and vomiting.     Review of Systems     Review of Systems   Constitutional:  Positive for fever.   HENT:  Positive for sore throat.    Respiratory:  Positive for cough.    Gastrointestinal:  Positive for vomiting.   Neurological:  Positive for headaches.      Medical / Social / Family History     Past Medical History:   Diagnosis Date    Anemia     Anxiety     Breakthrough bleeding on birth control pills 2015    Endometriosis of pelvic peritoneum 2020    cul-de-sac and sigmoid colon    Hypertension     Irritable bowel syndrome Chronic    Increased pain with bowel movements with menses       Past Surgical History:   Procedure Laterality Date     SECTION      D&C/Hysteroscopy with robotic and operative Laparoscopy  2020    DIAGNOSTIC LAPAROSCOPY N/A 2022    Procedure: LAPAROSCOPY, DIAGNOSTIC;  Surgeon: Otis Mccullough MD;  Location: [facility];  Service: OB/GYN;   "Laterality: N/A;    DILATION AND CURETTAGE OF UTERUS  06/25/2020    ENDOMETRIAL BIOPSY N/A 2/8/2022    Procedure: BIOPSY, ENDOMETRIUM;  Surgeon: Otis Mccullough MD;  Location: [facility] OR;  Service: OB/GYN;  Laterality: N/A;    HYSTEROSCOPY WITH DILATION AND CURETTAGE OF UTERUS N/A 2/8/2022    Procedure: HYSTEROSCOPY, WITH DILATION AND CURETTAGE OF UTERUS;  Surgeon: Otis Mccullough MD;  Location: [facility] OR;  Service: OB/GYN;  Laterality: N/A;       Social History  Ms.  reports that she has never smoked. She has never used smokeless tobacco. She reports that she does not drink alcohol and does not use drugs.    Family History  Ms.'s family history includes Breast cancer in an other family member; Diabetes in her mother and another family member; Heart disease in her mother; Hypertension in an other family member; Liver cancer in an other family member; Prostate cancer in her maternal grandfather; Stomach cancer in an other family member.    Medications and Allergies     Medications  Outpatient Medications Marked as Taking for the 1/2/24 encounter (Office Visit) with Jayde Cole DNP   Medication Sig Dispense Refill    norethindrone (AYGESTIN) 5 mg Tab Take 5 mg by mouth.      sertraline (ZOLOFT) 25 MG tablet Take 25 mg by mouth.         Allergies  Review of patient's allergies indicates:   Allergen Reactions    Grass pollen-bermuda, standard     Grass pollen-natalia, standard        Physical Examination   /78   Pulse 84   Temp 98.3 °F (36.8 °C)   Ht 5' 3" (1.6 m)   Wt 109.7 kg (241 lb 12.8 oz)   SpO2 98%   BMI 42.83 kg/m²    Physical Exam  Vitals and nursing note reviewed.   Constitutional:       Appearance: Normal appearance. She is obese. She is ill-appearing.   HENT:      Head: Normocephalic.      Nose: Congestion and rhinorrhea present.      Mouth/Throat:      Mouth: Mucous membranes are moist.      Pharynx: Posterior oropharyngeal erythema present.   Eyes:      Extraocular Movements: " Extraocular movements intact.      Conjunctiva/sclera: Conjunctivae normal.      Pupils: Pupils are equal, round, and reactive to light.   Cardiovascular:      Rate and Rhythm: Normal rate and regular rhythm.      Pulses: Normal pulses.      Heart sounds: Normal heart sounds.   Pulmonary:      Effort: Pulmonary effort is normal.      Breath sounds: Normal breath sounds.   Musculoskeletal:      Cervical back: Normal range of motion.   Lymphadenopathy:      Cervical: Cervical adenopathy present.   Skin:     General: Skin is warm and dry.      Capillary Refill: Capillary refill takes less than 2 seconds.   Neurological:      General: No focal deficit present.      Mental Status: She is alert and oriented to person, place, and time. Mental status is at baseline.   Psychiatric:         Mood and Affect: Mood normal.         Behavior: Behavior normal.         Thought Content: Thought content normal.         Judgment: Judgment normal.        Assessment and Plan (including Health Maintenance)      Problem List  Smart Sets  Document Outside HM   :    Plan:         Health Maintenance Due   Topic Date Due    COVID-19 Vaccine (3 - 2023-24 season) 09/01/2023       Problem List Items Addressed This Visit          ENT    Sore throat       Pulmonary    Acute cough       ID    Exposure to influenza - Primary       Endocrine    Obesity (BMI 35.0-39.9 without comorbidity)       Other    Fever    Relevant Orders    POCT rapid strep A (Completed)    SARS Coronavirus 2 Antigen, POCT (Completed)       Health Maintenance Topics with due status: Not Due       Topic Last Completion Date    TETANUS VACCINE 03/01/2023    Pap Smear 10/02/2023       Future Appointments   Date Time Provider Department Center   1/30/2024  8:45 AM Markos Genao MD [facility] NEURO [facility]   1/30/2024  9:30 AM Otis Mccullough MD [facility] OBGYN [facility]            Signature:  Jayde Cole, [facility]      [address] [place] [address]    Date of  encounter: 1/2/24

## 2024-01-08 ENCOUNTER — CLINICAL SUPPORT (OUTPATIENT)
Dept: OTOLARYNGOLOGY | Facility: CLINIC | Age: 29
End: 2024-01-08
Payer: COMMERCIAL

## 2024-01-08 DIAGNOSIS — J30.1 ALLERGIC RHINITIS DUE TO GRASS POLLEN: ICD-10-CM

## 2024-01-08 DIAGNOSIS — J30.89 OTHER ALLERGIC RHINITIS: ICD-10-CM

## 2024-01-08 DIAGNOSIS — J31.0 CHRONIC RHINITIS: Primary | ICD-10-CM

## 2024-01-08 DIAGNOSIS — J30.1 ALLERGIC REACTION TO GRASS POLLEN: ICD-10-CM

## 2024-01-08 DIAGNOSIS — J30.1 SEASONAL ALLERGIC RHINITIS DUE TO POLLEN: ICD-10-CM

## 2024-01-08 DIAGNOSIS — J30.1 NON-SEASONAL ALLERGIC RHINITIS DUE TO POLLEN: ICD-10-CM

## 2024-01-08 DIAGNOSIS — L30.9 DERMATITIS: ICD-10-CM

## 2024-01-08 PROCEDURE — 95115 IMMUNOTHERAPY ONE INJECTION: CPT | Mod: PBBFAC | Performed by: OTOLARYNGOLOGY

## 2024-01-08 NOTE — PROGRESS NOTES
Established patient with Dr. Palmer. See previous note for written order. Allergy injections per Dr. Palmer.   20 minute mm reaction  10:59 AM   right arm; observation 5mm

## 2024-01-11 ENCOUNTER — OFFICE VISIT (OUTPATIENT)
Dept: FAMILY MEDICINE | Facility: CLINIC | Age: 29
End: 2024-01-11
Payer: COMMERCIAL

## 2024-01-11 VITALS
BODY MASS INDEX: 43.09 KG/M2 | SYSTOLIC BLOOD PRESSURE: 120 MMHG | DIASTOLIC BLOOD PRESSURE: 82 MMHG | WEIGHT: 243.19 LBS | HEIGHT: 63 IN | HEART RATE: 67 BPM | OXYGEN SATURATION: 100 % | TEMPERATURE: 98 F

## 2024-01-11 DIAGNOSIS — E66.9 OBESITY (BMI 35.0-39.9 WITHOUT COMORBIDITY): ICD-10-CM

## 2024-01-11 DIAGNOSIS — J01.00 ACUTE NON-RECURRENT MAXILLARY SINUSITIS: Primary | ICD-10-CM

## 2024-01-11 DIAGNOSIS — J02.9 SORE THROAT: ICD-10-CM

## 2024-01-11 DIAGNOSIS — R05.9 COUGH, UNSPECIFIED TYPE: ICD-10-CM

## 2024-01-11 LAB
CTP QC/QA: YES
FLUAV AG NPH QL: NEGATIVE
FLUBV AG NPH QL: NEGATIVE
RSV RAPID ANTIGEN: NEGATIVE
S PYO RRNA THROAT QL PROBE: NEGATIVE
SARS-COV-2 AG RESP QL IA.RAPID: NEGATIVE

## 2024-01-11 PROCEDURE — 3079F DIAST BP 80-89 MM HG: CPT | Mod: CPTII,,, | Performed by: NURSE PRACTITIONER

## 2024-01-11 PROCEDURE — 99213 OFFICE O/P EST LOW 20 MIN: CPT | Mod: 25,,, | Performed by: NURSE PRACTITIONER

## 2024-01-11 PROCEDURE — 87804 INFLUENZA ASSAY W/OPTIC: CPT | Mod: 59,QW,, | Performed by: NURSE PRACTITIONER

## 2024-01-11 PROCEDURE — 1159F MED LIST DOCD IN RCRD: CPT | Mod: CPTII,,, | Performed by: NURSE PRACTITIONER

## 2024-01-11 PROCEDURE — 3008F BODY MASS INDEX DOCD: CPT | Mod: CPTII,,, | Performed by: NURSE PRACTITIONER

## 2024-01-11 PROCEDURE — 87880 STREP A ASSAY W/OPTIC: CPT | Mod: QW,,, | Performed by: NURSE PRACTITIONER

## 2024-01-11 PROCEDURE — 87807 RSV ASSAY W/OPTIC: CPT | Mod: QW,,, | Performed by: NURSE PRACTITIONER

## 2024-01-11 PROCEDURE — 3074F SYST BP LT 130 MM HG: CPT | Mod: CPTII,,, | Performed by: NURSE PRACTITIONER

## 2024-01-11 PROCEDURE — 87426 SARSCOV CORONAVIRUS AG IA: CPT | Mod: QW,,, | Performed by: NURSE PRACTITIONER

## 2024-01-11 PROCEDURE — 96372 THER/PROPH/DIAG INJ SC/IM: CPT | Mod: ,,, | Performed by: NURSE PRACTITIONER

## 2024-01-11 RX ORDER — METHYLPREDNISOLONE 4 MG/1
TABLET ORAL
Qty: 21 TABLET | Refills: 0 | Status: SHIPPED | OUTPATIENT
Start: 2024-01-11

## 2024-01-11 RX ORDER — BETAMETHASONE SODIUM PHOSPHATE AND BETAMETHASONE ACETATE 3; 3 MG/ML; MG/ML
6 INJECTION, SUSPENSION INTRA-ARTICULAR; INTRALESIONAL; INTRAMUSCULAR; SOFT TISSUE
Status: COMPLETED | OUTPATIENT
Start: 2024-01-11 | End: 2024-01-11

## 2024-01-11 RX ORDER — CEFTRIAXONE 1 G/1
1 INJECTION, POWDER, FOR SOLUTION INTRAMUSCULAR; INTRAVENOUS
Status: COMPLETED | OUTPATIENT
Start: 2024-01-11 | End: 2024-01-11

## 2024-01-11 RX ORDER — DOXYCYCLINE 100 MG/1
100 CAPSULE ORAL 2 TIMES DAILY
Qty: 14 CAPSULE | Refills: 0 | Status: SHIPPED | OUTPATIENT
Start: 2024-01-11 | End: 2024-01-18

## 2024-01-11 RX ADMIN — CEFTRIAXONE 1 G: 1 INJECTION, POWDER, FOR SOLUTION INTRAMUSCULAR; INTRAVENOUS at 09:01

## 2024-01-11 RX ADMIN — BETAMETHASONE SODIUM PHOSPHATE AND BETAMETHASONE ACETATE 6 MG: 3; 3 INJECTION, SUSPENSION INTRA-ARTICULAR; INTRALESIONAL; INTRAMUSCULAR; SOFT TISSUE at 09:01

## 2024-01-11 NOTE — PROGRESS NOTES
Jayde Cole DNP   [address] [place] [address]     PATIENT NAME: Gabriella Ulloa  : 1995  DATE: 24  MRN: [id number]      Billing Provider: Jayde Cole DNP  Level of Service:   Patient PCP Information       Provider PCP Type    Jayde Cole DNP General            Reason for Visit / Chief Complaint: Cough, Nasal Congestion, and Sore Throat       Update PCP  Update Chief Complaint         History of Present Illness / Problem Focused Workflow     Gabriella Ulloa presents to the clinic with Cough, Nasal Congestion, and Sore Throat     Cough  Associated symptoms include a sore throat.   Sore Throat   Associated symptoms include coughing.     Review of Systems     Review of Systems   HENT:  Positive for sore throat.    Respiratory:  Positive for cough.       Medical / Social / Family History     Past Medical History:   Diagnosis Date    Anemia     Anxiety     Breakthrough bleeding on birth control pills 2015    Endometriosis of pelvic peritoneum 2020    cul-de-sac and sigmoid colon    Hypertension     Irritable bowel syndrome Chronic    Increased pain with bowel movements with menses       Past Surgical History:   Procedure Laterality Date     SECTION      D&C/Hysteroscopy with robotic and operative Laparoscopy  2020    DIAGNOSTIC LAPAROSCOPY N/A 2022    Procedure: LAPAROSCOPY, DIAGNOSTIC;  Surgeon: Otis Mccullough MD;  Location: [facility] OR;  Service: OB/GYN;  Laterality: N/A;    DILATION AND CURETTAGE OF UTERUS  2020    ENDOMETRIAL BIOPSY N/A 2022    Procedure: BIOPSY, ENDOMETRIUM;  Surgeon: Otis Mccullough MD;  Location: [facility] OR;  Service: OB/GYN;  Laterality: N/A;    HYSTEROSCOPY WITH DILATION AND CURETTAGE OF UTERUS N/A 2022    Procedure: HYSTEROSCOPY, WITH DILATION AND CURETTAGE OF UTERUS;  Surgeon: Otis Mccullough MD;  Location: [facility] OR;  Service: OB/GYN;  Laterality: N/A;       Social  "History  Ms.  reports that she has never smoked. She has never been exposed to tobacco smoke. She has never used smokeless tobacco. She reports that she does not drink alcohol and does not use drugs.    Family History  Ms.'s family history includes Breast cancer in an other family member; Diabetes in her mother and another family member; Heart disease in her mother; Hypertension in an other family member; Liver cancer in an other family member; Prostate cancer in her maternal grandfather; Stomach cancer in an other family member.    Medications and Allergies     Medications  No outpatient medications have been marked as taking for the 1/11/24 encounter (Office Visit) with Jayde Cole DNP.     Current Facility-Administered Medications for the 1/11/24 encounter (Office Visit) with Jayde Cole DNP   Medication Dose Route Frequency Provider Last Rate Last Admin    [COMPLETED] betamethasone acetate-betamethasone sodium phosphate injection 6 mg  6 mg Intramuscular 1 time in Clinic/HOD Jayde Cole DNP   6 mg at 01/11/24 0915    [COMPLETED] cefTRIAXone injection 1 g  1 g Intramuscular 1 time in Clinic/HOD Jayde Cole DNP   1 g at 01/11/24 0915       Allergies  Review of patient's allergies indicates:   Allergen Reactions    Grass pollen-bermuda, standard     Grass pollen-natalia, standard        Physical Examination   /82   Pulse 67   Temp 98.3 °F (36.8 °C)   Ht 5' 3" (1.6 m)   Wt 110.3 kg (243 lb 3.2 oz)   SpO2 100%   BMI 43.08 kg/m²    Physical Exam  Vitals and nursing note reviewed.   Constitutional:       Appearance: She is obese. She is ill-appearing.   HENT:      Head: Normocephalic.      Right Ear: Tympanic membrane normal.      Left Ear: Tympanic membrane normal.      Nose: Congestion and rhinorrhea present.      Mouth/Throat:      Mouth: Mucous membranes are moist.      Pharynx: Posterior oropharyngeal erythema present.   Eyes:      Extraocular Movements: Extraocular " movements intact.      Conjunctiva/sclera: Conjunctivae normal.      Pupils: Pupils are equal, round, and reactive to light.   Cardiovascular:      Rate and Rhythm: Normal rate and regular rhythm.      Pulses: Normal pulses.      Heart sounds: Normal heart sounds.   Pulmonary:      Effort: Pulmonary effort is normal.      Breath sounds: Normal breath sounds.   Chest:      Chest wall: Tenderness present.   Musculoskeletal:      Cervical back: Normal range of motion.   Lymphadenopathy:      Cervical: Cervical adenopathy present.   Skin:     General: Skin is warm and dry.      Capillary Refill: Capillary refill takes less than 2 seconds.   Neurological:      General: No focal deficit present.      Mental Status: She is alert and oriented to person, place, and time. Mental status is at baseline.   Psychiatric:         Mood and Affect: Mood normal.         Behavior: Behavior normal.         Thought Content: Thought content normal.         Judgment: Judgment normal.        Assessment and Plan (including Health Maintenance)      Problem List  [employer]  Document Outside HM   :    Plan:         Health Maintenance Due   Topic Date Due    COVID-19 Vaccine (3 - 2023-24 season) 09/01/2023       Problem List Items Addressed This Visit          ENT    Sore throat    Relevant Orders    SARS Coronavirus 2 Antigen, POCT (Completed)    POCT Influenza A/B (Completed)    POCT rapid strep A (Completed)    Acute non-recurrent maxillary sinusitis - Primary       Pulmonary    Cough    Relevant Orders    SARS Coronavirus 2 Antigen, POCT (Completed)    POCT Influenza A/B (Completed)    POCT rapid strep A (Completed)    POCT respiratory syncytial virus (Completed)       Endocrine    Obesity (BMI 35.0-39.9 without comorbidity)       Health Maintenance Topics with due status: Not Due       Topic Last Completion Date    TETANUS VACCINE 03/01/2023    Pap Smear 10/02/2023       Future Appointments   Date Time Provider Department Center    1/16/2024  3:20 PM INJECTION, [facility] ENT [facility]   1/30/2024  8:45 AM Markos Genao MD [facility] NEURO [facility]   1/30/2024  9:30 AM Otis Mccullough MD [facility] OBGYN [facility]            Signature:  Jayde Cole, DNP      [address] [place] [address]    Date of encounter: 1/11/24

## 2024-01-22 ENCOUNTER — TELEPHONE (OUTPATIENT)
Dept: FAMILY MEDICINE | Facility: CLINIC | Age: 29
End: 2024-01-22
Payer: COMMERCIAL

## 2024-01-22 NOTE — LETTER
January 22, 2024      [facility]  [address] [facility] [address]  Phone: [phone]  Fax: [phone]       Patient: Gabriella Ulloa   YOB: 1995      To Whom It May Concern:    The patient may return to work/school on 01/29/2024 with no restrictions. If you have any questions or concerns, or if I can be of further assistance, please do not hesitate to contact me.    Sincerely,    Jayne Coyle RN

## 2024-01-23 ENCOUNTER — OFFICE VISIT (OUTPATIENT)
Dept: OBSTETRICS AND GYNECOLOGY | Facility: CLINIC | Age: 29
End: 2024-01-23
Payer: COMMERCIAL

## 2024-01-23 ENCOUNTER — OFFICE VISIT (OUTPATIENT)
Dept: FAMILY MEDICINE | Facility: CLINIC | Age: 29
End: 2024-01-23
Payer: COMMERCIAL

## 2024-01-23 VITALS
DIASTOLIC BLOOD PRESSURE: 76 MMHG | HEIGHT: 63 IN | BODY MASS INDEX: 42.52 KG/M2 | WEIGHT: 240 LBS | SYSTOLIC BLOOD PRESSURE: 113 MMHG | OXYGEN SATURATION: 100 % | RESPIRATION RATE: 18 BRPM | HEART RATE: 71 BPM

## 2024-01-23 VITALS
SYSTOLIC BLOOD PRESSURE: 112 MMHG | DIASTOLIC BLOOD PRESSURE: 70 MMHG | HEIGHT: 63 IN | BODY MASS INDEX: 42.52 KG/M2 | HEART RATE: 58 BPM | WEIGHT: 240 LBS

## 2024-01-23 DIAGNOSIS — E66.9 OBESITY (BMI 35.0-39.9 WITHOUT COMORBIDITY): ICD-10-CM

## 2024-01-23 DIAGNOSIS — N80.9 ENDOMETRIOSIS: Primary | ICD-10-CM

## 2024-01-23 DIAGNOSIS — M25.511 ACUTE PAIN OF RIGHT SHOULDER: ICD-10-CM

## 2024-01-23 DIAGNOSIS — S46.911A STRAIN OF RIGHT SHOULDER, INITIAL ENCOUNTER: Primary | ICD-10-CM

## 2024-01-23 DIAGNOSIS — N94.6 DYSMENORRHEA: ICD-10-CM

## 2024-01-23 DIAGNOSIS — Z09 FOLLOW-UP EXAM: ICD-10-CM

## 2024-01-23 DIAGNOSIS — N93.8 DUB (DYSFUNCTIONAL UTERINE BLEEDING): ICD-10-CM

## 2024-01-23 LAB
BILIRUB UR QL STRIP: NEGATIVE
CLARITY UR: CLEAR
COLOR UR: YELLOW
GLUCOSE UR STRIP-MCNC: NORMAL MG/DL
KETONES UR STRIP-SCNC: NEGATIVE MG/DL
LEUKOCYTE ESTERASE UR QL STRIP: NEGATIVE
MUCOUS, UA: ABNORMAL /LPF
NITRITE UR QL STRIP: NEGATIVE
PH UR STRIP: 6.5 PH UNITS
PROT UR QL STRIP: 20
RBC # UR STRIP: NEGATIVE /UL
RBC #/AREA URNS HPF: 1 /HPF
SP GR UR STRIP: 1.03
SQUAMOUS #/AREA URNS LPF: ABNORMAL /HPF
UROBILINOGEN UR STRIP-ACNC: NORMAL MG/DL
WBC #/AREA URNS HPF: 1 /HPF

## 2024-01-23 PROCEDURE — 3008F BODY MASS INDEX DOCD: CPT | Mod: CPTII,,, | Performed by: NURSE PRACTITIONER

## 2024-01-23 PROCEDURE — 3074F SYST BP LT 130 MM HG: CPT | Mod: CPTII,,, | Performed by: NURSE PRACTITIONER

## 2024-01-23 PROCEDURE — 3008F BODY MASS INDEX DOCD: CPT | Mod: CPTII,,, | Performed by: OBSTETRICS & GYNECOLOGY

## 2024-01-23 PROCEDURE — 81001 URINALYSIS AUTO W/SCOPE: CPT | Mod: ,,, | Performed by: CLINICAL MEDICAL LABORATORY

## 2024-01-23 PROCEDURE — 1160F RVW MEDS BY RX/DR IN RCRD: CPT | Mod: CPTII,,, | Performed by: OBSTETRICS & GYNECOLOGY

## 2024-01-23 PROCEDURE — 99214 OFFICE O/P EST MOD 30 MIN: CPT | Mod: S$PBB,,, | Performed by: OBSTETRICS & GYNECOLOGY

## 2024-01-23 PROCEDURE — 3078F DIAST BP <80 MM HG: CPT | Mod: CPTII,,, | Performed by: OBSTETRICS & GYNECOLOGY

## 2024-01-23 PROCEDURE — 99215 OFFICE O/P EST HI 40 MIN: CPT | Mod: PBBFAC | Performed by: OBSTETRICS & GYNECOLOGY

## 2024-01-23 PROCEDURE — 99212 OFFICE O/P EST SF 10 MIN: CPT | Mod: ,,, | Performed by: NURSE PRACTITIONER

## 2024-01-23 PROCEDURE — 3074F SYST BP LT 130 MM HG: CPT | Mod: CPTII,,, | Performed by: OBSTETRICS & GYNECOLOGY

## 2024-01-23 PROCEDURE — 1159F MED LIST DOCD IN RCRD: CPT | Mod: CPTII,,, | Performed by: OBSTETRICS & GYNECOLOGY

## 2024-01-23 PROCEDURE — 3078F DIAST BP <80 MM HG: CPT | Mod: CPTII,,, | Performed by: NURSE PRACTITIONER

## 2024-01-23 RX ORDER — DICLOFENAC SODIUM 50 MG/1
50 TABLET, DELAYED RELEASE ORAL 2 TIMES DAILY
Qty: 60 TABLET | Refills: 1 | Status: SHIPPED | OUTPATIENT
Start: 2024-01-23

## 2024-01-23 RX ORDER — PREDNISONE 10 MG/1
TABLET ORAL
Qty: 48 TABLET | Refills: 0 | Status: SHIPPED | OUTPATIENT
Start: 2024-01-23

## 2024-01-23 RX ORDER — COLCHICINE 0.6 MG/1
TABLET ORAL
Qty: 8 TABLET | Refills: 0 | Status: SHIPPED | OUTPATIENT
Start: 2024-01-23

## 2024-01-23 NOTE — PROGRESS NOTES
"Gabriella Ulloa female  for   Chief Complaint   Patient presents with    Follow-up     3 month follow     No complaints          PHI:  For a follow-up examination.  Her follow-up pelvic ultrasound reveals ovarian cyst resolution and endometrial stripe was not thickened.  She is asymptomatic.  Patient's last menstrual cycle was 2023 and lasted for 6 days.  She denies chronic pelvic pain; she denies dysmenorrhea.  Patient has no history of dysfunction uterine bleeding.  Patient denies dyspareunia    Past Medical History:   Diagnosis Date    Anemia     Anxiety     Breakthrough bleeding on birth control pills 2015    Endometriosis of pelvic peritoneum 2020    cul-de-sac and sigmoid colon    Hypertension     Irritable bowel syndrome Chronic    Increased pain with bowel movements with menses      Past Surgical History:   Procedure Laterality Date     SECTION      D&C/Hysteroscopy with robotic and operative Laparoscopy  2020    DIAGNOSTIC LAPAROSCOPY N/A 2022    Procedure: LAPAROSCOPY, DIAGNOSTIC;  Surgeon: Otis Mccullough MD;  Location: [facility] OR;  Service: OB/GYN;  Laterality: N/A;    DILATION AND CURETTAGE OF UTERUS  2020    ENDOMETRIAL BIOPSY N/A 2022    Procedure: BIOPSY, ENDOMETRIUM;  Surgeon: Otis Mccullough MD;  Location: [facility] OR;  Service: OB/GYN;  Laterality: N/A;    HYSTEROSCOPY WITH DILATION AND CURETTAGE OF UTERUS N/A 2022    Procedure: HYSTEROSCOPY, WITH DILATION AND CURETTAGE OF UTERUS;  Surgeon: Otis Mccullough MD;  Location: [facility] OR;  Service: OB/GYN;  Laterality: N/A;      Review of patient's allergies indicates:   Allergen Reactions    Grass pollen-bermuda, standard     Grass pollen-natalia, standard         ROS:Pertinent items are noted in HPI.    Physical exam:    /70 (BP Location: Left arm, Patient Position: Sitting)   Pulse (!) 58   Ht 5' 3" (1.6 m)   Wt 108.9 kg (240 lb)   LMP 2023   BMI 42.51 kg/m²      General " Appearance: healthy, alert, no distress, smiling    Chest:           Lungs: clear to auscultation bilaterally and normal percussion bilaterally           Heart: regular rate and rhythm, S1, S2 normal, no murmur, click, rub or gallop    Abdomen:  Abdomen is soft and nontender with good bowel sounds   Pelvic:  Vulva unremarkable; vaginal speculum exam unremarkable; cervix somewhat posteriorly directed and normal nontender uterus as well as nontender adnexae.  No rectal exam was performed     Extremity: normal    Skin: normal exam        Assessment:   Problem List Items Addressed This Visit          Renal/    Dysmenorrhea     Other Visit Diagnoses       Endometriosis    -  Primary    Follow-up exam        DUB (dysfunctional uterine bleeding)        Resolved             Plan:  Monitor menstrual pattern; recommend follow-up in 6 months; patient is requesting a urinalysis.

## 2024-01-23 NOTE — PROGRESS NOTES
Jayde Cole DNP   [address] [place] [address]     PATIENT NAME: Gabriella Ulloa  : 1995  DATE: 24  MRN: [id number]      Billing Provider: Jayde Cole DNP  Level of Service:   Patient PCP Information       Provider PCP Type    Jayde Cole DNP General            Reason for Visit / Chief Complaint: Arm Pain (Right arm pain bicep area )       Update PCP  Update Chief Complaint         History of Present Illness / Problem Focused Workflow     Gabriella Ulloa presents to the clinic with Arm Pain (Right arm pain bicep area )     Arm Pain         Review of Systems     Review of Systems     Medical / Social / Family History     Past Medical History:   Diagnosis Date    Anemia     Anxiety     Breakthrough bleeding on birth control pills 2015    Endometriosis of pelvic peritoneum 2020    cul-de-sac and sigmoid colon    Hypertension     Irritable bowel syndrome Chronic    Increased pain with bowel movements with menses       Past Surgical History:   Procedure Laterality Date     SECTION      D&C/Hysteroscopy with robotic and operative Laparoscopy  2020    DIAGNOSTIC LAPAROSCOPY N/A 2022    Procedure: LAPAROSCOPY, DIAGNOSTIC;  Surgeon: Otis Mccullough MD;  Location: [facility];  Service: OB/GYN;  Laterality: N/A;    DILATION AND CURETTAGE OF UTERUS  2020    ENDOMETRIAL BIOPSY N/A 2022    Procedure: BIOPSY, ENDOMETRIUM;  Surgeon: Otis Mccullough MD;  Location: [facility] OR;  Service: OB/GYN;  Laterality: N/A;    HYSTEROSCOPY WITH DILATION AND CURETTAGE OF UTERUS N/A 2022    Procedure: HYSTEROSCOPY, WITH DILATION AND CURETTAGE OF UTERUS;  Surgeon: Otis Mccullough MD;  Location: [facility];  Service: OB/GYN;  Laterality: N/A;       Social History  Ms.  reports that she has never smoked. She has never been exposed to tobacco smoke. She has never used smokeless tobacco. She reports that she does not drink alcohol and does not use  "drugs.    Family History  Ms.'s family history includes Breast cancer in an other family member; Diabetes in her mother and another family member; Heart disease in her mother; Hypertension in an other family member; Liver cancer in an other family member; Prostate cancer in her maternal grandfather; Stomach cancer in an other family member.    Medications and Allergies     Medications  Outpatient Medications Marked as Taking for the 1/23/24 encounter (Office Visit) with Jayde Cole DNP   Medication Sig Dispense Refill    amitriptyline (ELAVIL) 25 MG tablet Take 1 tablet (25 mg total) by mouth nightly as needed for Insomnia. 90 tablet 3    azithromycin (Z-LEONARD) 250 MG tablet Take 2 tablets by mouth on day 1; Take 1 tablet by mouth on days 2-5 6 tablet 0    azithromycin (Z-LEONARD) 250 MG tablet Take 2 tablets by mouth on day 1; Take 1 tablet by mouth on days 2-5 6 tablet 0    cetirizine (ZYRTEC) 10 MG tablet Take 1 tablet (10 mg total) by mouth once daily. 30 tablet 0    ergocalciferol (ERGOCALCIFEROL) 50,000 unit Cap Take 1 capsule (50,000 Units total) by mouth every 7 days. 12 capsule 3    losartan (COZAAR) 50 MG tablet Take 1 tablet (50 mg total) by mouth once daily. 90 tablet 3       Allergies  Review of patient's allergies indicates:   Allergen Reactions    Grass pollen-bermuda, standard     Grass pollen-natalia, standard        Physical Examination   /76 (BP Location: Right arm, Patient Position: Sitting, BP Method: X-Large (Automatic))   Pulse 71   Resp 18   Ht 5' 3" (1.6 m)   Wt 108.9 kg (240 lb)   SpO2 100%   BMI 42.51 kg/m²    Physical Exam  Vitals and nursing note reviewed.   HENT:      Head: Normocephalic.      Nose: Nose normal.      Mouth/Throat:      Mouth: Mucous membranes are moist.   Eyes:      Extraocular Movements: Extraocular movements intact.      Conjunctiva/sclera: Conjunctivae normal.      Pupils: Pupils are equal, round, and reactive to light.   Cardiovascular:      Rate and " Rhythm: Normal rate and regular rhythm.   Pulmonary:      Effort: Pulmonary effort is normal.      Breath sounds: Normal breath sounds.   Musculoskeletal:         General: Tenderness present. No swelling, deformity or signs of injury.      Cervical back: Normal range of motion.      Comments: Difficulty with cross chest and above head   Diff grasping with rt arm for long periods of time  Feeling of heaviness and soreness.    Skin:     General: Skin is warm and dry.      Capillary Refill: Capillary refill takes less than 2 seconds.   Neurological:      General: No focal deficit present.      Mental Status: She is alert and oriented to person, place, and time. Mental status is at baseline.   Psychiatric:         Mood and Affect: Mood normal.         Behavior: Behavior normal.         Thought Content: Thought content normal.         Judgment: Judgment normal.          Assessment and Plan (including Health Maintenance)      Problem List  Smart Sets  Document Outside HM   :    Plan:     Pain x 1 month with no change in otc meds  Rx sent today  See [facility] or [facility] at [facility]. May need PT or injection   If worsen consider xray and MRI if needed.      Health Maintenance Due   Topic Date Due    COVID-19 Vaccine (3 - 2023-24 season) 09/01/2023       Problem List Items Addressed This Visit          Endocrine    Obesity (BMI 35.0-39.9 without comorbidity)       Orthopedic    Acute pain of right shoulder    Strain of right shoulder - Primary       Health Maintenance Topics with due status: Not Due       Topic Last Completion Date    TETANUS VACCINE 03/01/2023    Pap Smear 10/02/2023       Future Appointments   Date Time Provider Department Center   1/23/2024  2:15 PM Otis Mccullough MD [facility] OBGYN [facility]   1/24/2024  2:00 PM Jayde Cole DNP [facility]   2/20/2024  8:30 AM Markos Genao MD [facility] NEURO [facility]            Signature:  Jayde Cole DNP      [address] [facility]        [place] [id number]    Date of encounter: 1/23/24

## 2024-01-23 NOTE — PATIENT INSTRUCTIONS
Dr. Otis Mccullough thanks you for this office visit at [facility].    Diagnosis for this visit:   Problem List Items Addressed This Visit          Renal/    Dysmenorrhea     Other Visit Diagnoses       Endometriosis    -  Primary    Follow-up exam        DUB (dysfunctional uterine bleeding)        Resolved             The Plan: Monitor menstrual pattern; recommend follow-up in 6 months      Please practice best food and exercise habits for your age.    Dr. Otis Mccullough recommends avoidance of smoking and illicit medications or habits.    Please call  or schedule for any change in your health.     Please keep the next scheduled appointment or call for any need to change the appointment.     Dr. Otis Mccullough recommends yearly exams for good health maintenance.      Thank you    Dr. Otis Mccullough  01/23/2024 3:50 PM

## 2024-01-24 ENCOUNTER — OFFICE VISIT (OUTPATIENT)
Dept: FAMILY MEDICINE | Facility: CLINIC | Age: 29
End: 2024-01-24
Payer: COMMERCIAL

## 2024-01-24 VITALS
RESPIRATION RATE: 18 BRPM | BODY MASS INDEX: 42.51 KG/M2 | WEIGHT: 240 LBS | OXYGEN SATURATION: 96 % | SYSTOLIC BLOOD PRESSURE: 125 MMHG | TEMPERATURE: 98 F | HEART RATE: 72 BPM | DIASTOLIC BLOOD PRESSURE: 80 MMHG

## 2024-01-24 DIAGNOSIS — E61.1 IRON DEFICIENCY: ICD-10-CM

## 2024-01-24 DIAGNOSIS — R82.90 ABNORMAL URINE: ICD-10-CM

## 2024-01-24 DIAGNOSIS — N93.9 ABNORMAL UTERINE BLEEDING: ICD-10-CM

## 2024-01-24 DIAGNOSIS — R00.2 HEART PALPITATIONS: ICD-10-CM

## 2024-01-24 DIAGNOSIS — R73.9 HYPERGLYCEMIA: ICD-10-CM

## 2024-01-24 DIAGNOSIS — Z13.6 ENCOUNTER FOR SCREENING FOR CARDIOVASCULAR DISORDERS: ICD-10-CM

## 2024-01-24 DIAGNOSIS — I10 PRIMARY HYPERTENSION: Primary | ICD-10-CM

## 2024-01-24 DIAGNOSIS — Z11.3 SCREENING EXAMINATION FOR STD (SEXUALLY TRANSMITTED DISEASE): ICD-10-CM

## 2024-01-24 LAB
ALBUMIN SERPL BCP-MCNC: 3.4 G/DL (ref 3.5–5)
ALBUMIN/GLOB SERPL: 0.8 {RATIO}
ALP SERPL-CCNC: 79 U/L (ref 37–98)
ALT SERPL W P-5'-P-CCNC: 21 U/L (ref 13–56)
ANION GAP SERPL CALCULATED.3IONS-SCNC: 7 MMOL/L (ref 7–16)
ANISOCYTOSIS BLD QL SMEAR: ABNORMAL
AST SERPL W P-5'-P-CCNC: 13 U/L (ref 15–37)
BACTERIA VAG QL WET PREP: ABNORMAL /HPF
BASOPHILS # BLD AUTO: 0.04 K/UL (ref 0–0.2)
BASOPHILS NFR BLD AUTO: 0.7 % (ref 0–1)
BILIRUB SERPL-MCNC: 0.3 MG/DL (ref ?–1.2)
BUN SERPL-MCNC: 9 MG/DL (ref 7–18)
BUN/CREAT SERPL: 13 (ref 6–20)
CALCIUM SERPL-MCNC: 9 MG/DL (ref 8.5–10.1)
CHLORIDE SERPL-SCNC: 110 MMOL/L (ref 98–107)
CHOLEST SERPL-MCNC: 189 MG/DL (ref 0–200)
CHOLEST/HDLC SERPL: 3 {RATIO}
CLUE CELLS VAG QL WET PREP: ABNORMAL /HPF
CO2 SERPL-SCNC: 27 MMOL/L (ref 21–32)
CREAT SERPL-MCNC: 0.71 MG/DL (ref 0.55–1.02)
DIFFERENTIAL METHOD BLD: ABNORMAL
EGFR (NO RACE VARIABLE) (RUSH/TITUS): 118 ML/MIN/1.73M2
EOSINOPHIL # BLD AUTO: 0.25 K/UL (ref 0–0.5)
EOSINOPHIL NFR BLD AUTO: 4.7 % (ref 1–4)
ERYTHROCYTE [DISTWIDTH] IN BLOOD BY AUTOMATED COUNT: 18.4 % (ref 11.5–14.5)
EST. AVERAGE GLUCOSE BLD GHB EST-MCNC: 103 MG/DL
GLOBULIN SER-MCNC: 4.1 G/DL (ref 2–4)
GLUCOSE SERPL-MCNC: 87 MG/DL (ref 74–106)
HBA1C MFR BLD HPLC: 5.2 % (ref 4.5–6.6)
HCT VFR BLD AUTO: 33.9 % (ref 38–47)
HDLC SERPL-MCNC: 62 MG/DL (ref 40–60)
HGB BLD-MCNC: 10.3 G/DL (ref 12–16)
HYPOCHROMIA BLD QL SMEAR: ABNORMAL
IMM GRANULOCYTES # BLD AUTO: 0.01 K/UL (ref 0–0.04)
IMM GRANULOCYTES NFR BLD: 0.2 % (ref 0–0.4)
IRON SATN MFR SERPL: 5 % (ref 14–50)
IRON SERPL-MCNC: 21 ΜG/DL (ref 50–170)
LDLC SERPL CALC-MCNC: 111 MG/DL
LDLC/HDLC SERPL: 1.8 {RATIO}
LYMPHOCYTES # BLD AUTO: 2.92 K/UL (ref 1–4.8)
LYMPHOCYTES NFR BLD AUTO: 54.4 % (ref 27–41)
MCH RBC QN AUTO: 21.8 PG (ref 27–31)
MCHC RBC AUTO-ENTMCNC: 30.4 G/DL (ref 32–36)
MCV RBC AUTO: 71.8 FL (ref 80–96)
MICROCYTES BLD QL SMEAR: ABNORMAL
MONOCYTES # BLD AUTO: 0.5 K/UL (ref 0–0.8)
MONOCYTES NFR BLD AUTO: 9.3 % (ref 2–6)
MPC BLD CALC-MCNC: 10.3 FL (ref 9.4–12.4)
NEUTROPHILS # BLD AUTO: 1.65 K/UL (ref 1.8–7.7)
NEUTROPHILS NFR BLD AUTO: 30.7 % (ref 53–65)
NONHDLC SERPL-MCNC: 127 MG/DL
NRBC # BLD AUTO: 0 X10E3/UL
NRBC, AUTO (.00): 0 %
OVALOCYTES BLD QL SMEAR: ABNORMAL
PLATELET # BLD AUTO: 361 K/UL (ref 150–400)
PLATELET MORPHOLOGY: ABNORMAL
POTASSIUM SERPL-SCNC: 4.2 MMOL/L (ref 3.5–5.1)
PROT SERPL-MCNC: 7.5 G/DL (ref 6.4–8.2)
RBC # BLD AUTO: 4.72 M/UL (ref 4.2–5.4)
RBC #/AREA VAG WET PREP: ABNORMAL /HPF
SODIUM SERPL-SCNC: 140 MMOL/L (ref 136–145)
SQUAMOUS EPITHELIALS WET WOUNT, GENITAL: ABNORMAL /HPF
T VAGINALIS VAG QL WET PREP: ABNORMAL /HPF
TARGETS BLD QL SMEAR: ABNORMAL
TIBC SERPL-MCNC: 414 ΜG/DL (ref 250–450)
TRIGL SERPL-MCNC: 82 MG/DL (ref 35–150)
VLDLC SERPL-MCNC: 16 MG/DL
WBC # BLD AUTO: 5.37 K/UL (ref 4.5–11)
WBC CLUMPS WET MOUNT, GENITAL: ABNORMAL /HPF
WBC VAG QL WET PREP: ABNORMAL /HPF
YEAST VAG QL WET PREP: ABNORMAL /HPF

## 2024-01-24 PROCEDURE — 83540 ASSAY OF IRON: CPT | Mod: ,,, | Performed by: CLINICAL MEDICAL LABORATORY

## 2024-01-24 PROCEDURE — 87661 TRICHOMONAS VAGINALIS AMPLIF: CPT | Mod: ,,, | Performed by: CLINICAL MEDICAL LABORATORY

## 2024-01-24 PROCEDURE — 87591 N.GONORRHOEAE DNA AMP PROB: CPT | Mod: ,,, | Performed by: CLINICAL MEDICAL LABORATORY

## 2024-01-24 PROCEDURE — 80061 LIPID PANEL: CPT | Mod: ,,, | Performed by: CLINICAL MEDICAL LABORATORY

## 2024-01-24 PROCEDURE — 87210 SMEAR WET MOUNT SALINE/INK: CPT | Mod: 59,,, | Performed by: CLINICAL MEDICAL LABORATORY

## 2024-01-24 PROCEDURE — 3008F BODY MASS INDEX DOCD: CPT | Mod: CPTII,,, | Performed by: NURSE PRACTITIONER

## 2024-01-24 PROCEDURE — 3074F SYST BP LT 130 MM HG: CPT | Mod: CPTII,,, | Performed by: NURSE PRACTITIONER

## 2024-01-24 PROCEDURE — 83036 HEMOGLOBIN GLYCOSYLATED A1C: CPT | Mod: ,,, | Performed by: CLINICAL MEDICAL LABORATORY

## 2024-01-24 PROCEDURE — 87491 CHLMYD TRACH DNA AMP PROBE: CPT | Mod: ,,, | Performed by: CLINICAL MEDICAL LABORATORY

## 2024-01-24 PROCEDURE — 99213 OFFICE O/P EST LOW 20 MIN: CPT | Mod: ,,, | Performed by: NURSE PRACTITIONER

## 2024-01-24 PROCEDURE — 3079F DIAST BP 80-89 MM HG: CPT | Mod: CPTII,,, | Performed by: NURSE PRACTITIONER

## 2024-01-24 PROCEDURE — 85025 COMPLETE CBC W/AUTO DIFF WBC: CPT | Mod: ,,, | Performed by: CLINICAL MEDICAL LABORATORY

## 2024-01-24 PROCEDURE — 87086 URINE CULTURE/COLONY COUNT: CPT | Mod: ,,, | Performed by: CLINICAL MEDICAL LABORATORY

## 2024-01-24 PROCEDURE — 83550 IRON BINDING TEST: CPT | Mod: ,,, | Performed by: CLINICAL MEDICAL LABORATORY

## 2024-01-24 PROCEDURE — 80053 COMPREHEN METABOLIC PANEL: CPT | Mod: ,,, | Performed by: CLINICAL MEDICAL LABORATORY

## 2024-01-24 NOTE — PROGRESS NOTES
Jayde Cole DNP   [address] [place] [address]     PATIENT NAME: Gabriella Ulloa  : 1995  DATE: 24  MRN: [id number]      Billing Provider: Jayde Cole DNP  Level of Service:   Patient PCP Information       Provider PCP Type    Jayde Cole DNP General            Reason for Visit / Chief Complaint: Follow-up (Routine check up )       Update PCP  Update Chief Complaint         History of Present Illness / Problem Focused Workflow     Gabriella Ulloa presents to the clinic with Follow-up (Routine check up )     Follow-up    Review of Systems     Review of Systems     Medical / Social / Family History     Past Medical History:   Diagnosis Date    Anemia     Anxiety     Breakthrough bleeding on birth control pills 2015    Endometriosis of pelvic peritoneum 2020    cul-de-sac and sigmoid colon    Hypertension     Irritable bowel syndrome Chronic    Increased pain with bowel movements with menses       Past Surgical History:   Procedure Laterality Date     SECTION      D&C/Hysteroscopy with robotic and operative Laparoscopy  2020    DIAGNOSTIC LAPAROSCOPY N/A 2022    Procedure: LAPAROSCOPY, DIAGNOSTIC;  Surgeon: Otis Mccullough MD;  Location: [facility] OR;  Service: OB/GYN;  Laterality: N/A;    DILATION AND CURETTAGE OF UTERUS  2020    ENDOMETRIAL BIOPSY N/A 2022    Procedure: BIOPSY, ENDOMETRIUM;  Surgeon: Otis Mccullough MD;  Location: [facility] OR;  Service: OB/GYN;  Laterality: N/A;    HYSTEROSCOPY WITH DILATION AND CURETTAGE OF UTERUS N/A 2022    Procedure: HYSTEROSCOPY, WITH DILATION AND CURETTAGE OF UTERUS;  Surgeon: Otis Mccullough MD;  Location: [facility] OR;  Service: OB/GYN;  Laterality: N/A;       Social History  Ms.  reports that she has never smoked. She has never been exposed to tobacco smoke. She has never used smokeless tobacco. She reports that she does not drink alcohol and does not use  drugs.    Family History  Ms.'s family history includes Breast cancer in an other family member; Diabetes in her mother and another family member; Heart disease in her mother; Hypertension in an other family member; Liver cancer in an other family member; Prostate cancer in her maternal grandfather; Stomach cancer in an other family member.    Medications and Allergies     Medications  No outpatient medications have been marked as taking for the 1/24/24 encounter (Office Visit) with Jayde Cole DNP.       Allergies  Review of patient's allergies indicates:   Allergen Reactions    Grass pollen-bermuda, standard     Grass pollen-natalia, standard        Physical Examination   /80 (BP Location: Left arm, Patient Position: Sitting, BP Method: X-Large (Automatic))   Pulse 72   Temp 98.1 °F (36.7 °C)   Resp 18   Wt 108.9 kg (240 lb)   LMP 12/28/2023   SpO2 96%   BMI 42.51 kg/m²    Physical Exam  Vitals and nursing note reviewed.   Constitutional:       Appearance: Normal appearance. She is obese.   HENT:      Head: Normocephalic.      Nose: Nose normal.      Mouth/Throat:      Mouth: Mucous membranes are moist.   Eyes:      Pupils: Pupils are equal, round, and reactive to light.   Cardiovascular:      Rate and Rhythm: Normal rate and regular rhythm.      Pulses: Normal pulses.      Heart sounds: Normal heart sounds.   Pulmonary:      Effort: Pulmonary effort is normal.      Breath sounds: Normal breath sounds.   Abdominal:      General: Abdomen is flat. Bowel sounds are normal.      Palpations: Abdomen is soft.   Musculoskeletal:         General: Normal range of motion.      Cervical back: Normal range of motion and neck supple.   Skin:     General: Skin is warm and dry.      Capillary Refill: Capillary refill takes less than 2 seconds.   Neurological:      General: No focal deficit present.      Mental Status: She is alert and oriented to person, place, and time. Mental status is at baseline.    Psychiatric:         Mood and Affect: Mood normal.         Behavior: Behavior normal.         Thought Content: Thought content normal.         Judgment: Judgment normal.        Assessment and Plan (including Health Maintenance)      Problem List  Smart Sets  Document Outside HM   :    Plan:         Health Maintenance Due   Topic Date Due    COVID-19 Vaccine (3 - 2023-24 season) 09/01/2023       Problem List Items Addressed This Visit          Cardiac/Vascular    Hypertension - Primary    Relevant Orders    CBC Auto Differential    Comprehensive Metabolic Panel    Heart palpitations       Renal/    Abnormal uterine bleeding    Abnormal urine    Relevant Orders    POCT URINALYSIS W/O SCOPE    Urine culture       ID    Screening examination for STD (sexually transmitted disease)    Relevant Orders    Wet Prep, Genital    Chlamydia/GC, PCR    Trichomonas vaginalis by PCR       Oncology    Iron deficiency    Relevant Orders    Lipid Panel    Iron and TIBC       Endocrine    Hyperglycemia    Relevant Orders    Hemoglobin A1C       Health Maintenance Topics with due status: Not Due       Topic Last Completion Date    TETANUS VACCINE 03/01/2023    Pap Smear 10/02/2023       Future Appointments   Date Time Provider Department Center   2/20/2024  8:30 AM Markos Genao MD [facility]   7/15/2024  9:30 AM Otis Mccullough MD [facility]            Signature:  Jayde Cole, RERE      [address] [place] [address]    Date of encounter: 1/24/24

## 2024-01-25 LAB
CHLAMYDIA BY PCR: NEGATIVE
N. GONORRHOEAE (GC) BY PCR: NEGATIVE
TRICHOMONAS NAT: NEGATIVE

## 2024-01-26 DIAGNOSIS — N76.0 BACTERIAL VAGINOSIS: ICD-10-CM

## 2024-01-26 DIAGNOSIS — E61.1 IRON DEFICIENCY: Primary | ICD-10-CM

## 2024-01-26 DIAGNOSIS — B96.89 BACTERIAL VAGINOSIS: ICD-10-CM

## 2024-01-26 LAB — UA COMPLETE W REFLEX CULTURE PNL UR: NORMAL

## 2024-01-26 RX ORDER — FLUCONAZOLE 150 MG/1
150 TABLET ORAL DAILY
COMMUNITY
End: 2024-01-26 | Stop reason: SDUPTHER

## 2024-01-26 RX ORDER — FLUCONAZOLE 150 MG/1
150 TABLET ORAL DAILY
Qty: 3 TABLET | Refills: 0 | Status: SHIPPED | OUTPATIENT
Start: 2024-01-26 | End: 2024-03-20 | Stop reason: SDUPTHER

## 2024-01-26 RX ORDER — METRONIDAZOLE 500 MG/1
500 TABLET ORAL EVERY 12 HOURS
COMMUNITY
End: 2024-01-26 | Stop reason: SDUPTHER

## 2024-01-26 RX ORDER — METRONIDAZOLE 500 MG/1
500 TABLET ORAL EVERY 12 HOURS
Qty: 14 TABLET | Refills: 0 | Status: SHIPPED | OUTPATIENT
Start: 2024-01-26 | End: 2024-03-20 | Stop reason: SDUPTHER

## 2024-01-31 ENCOUNTER — CLINICAL SUPPORT (OUTPATIENT)
Dept: OTOLARYNGOLOGY | Facility: CLINIC | Age: 29
End: 2024-01-31
Payer: COMMERCIAL

## 2024-01-31 DIAGNOSIS — J30.1 SEASONAL ALLERGIC RHINITIS DUE TO POLLEN: ICD-10-CM

## 2024-01-31 DIAGNOSIS — J31.0 CHRONIC RHINITIS: Primary | ICD-10-CM

## 2024-01-31 DIAGNOSIS — J30.89 OTHER ALLERGIC RHINITIS: ICD-10-CM

## 2024-01-31 DIAGNOSIS — J30.1 ALLERGIC RHINITIS DUE TO GRASS POLLEN: ICD-10-CM

## 2024-01-31 DIAGNOSIS — J30.1 NON-SEASONAL ALLERGIC RHINITIS DUE TO POLLEN: ICD-10-CM

## 2024-01-31 DIAGNOSIS — J30.1 ALLERGIC REACTION TO GRASS POLLEN: ICD-10-CM

## 2024-01-31 PROCEDURE — 95115 IMMUNOTHERAPY ONE INJECTION: CPT | Mod: PBBFAC | Performed by: OTOLARYNGOLOGY

## 2024-01-31 NOTE — PROGRESS NOTES
Established patient with Dr. Palmer. See previous note for written order. Allergy injections per Dr. Palmer.   20 minute mm reaction  4:16 PM   right arm; observation 5mm

## 2024-02-05 ENCOUNTER — CLINICAL SUPPORT (OUTPATIENT)
Dept: FAMILY MEDICINE | Facility: CLINIC | Age: 29
End: 2024-02-05
Payer: COMMERCIAL

## 2024-02-05 DIAGNOSIS — Z32.00 ENCOUNTER FOR PREGNANCY TEST, RESULT UNKNOWN: ICD-10-CM

## 2024-02-05 DIAGNOSIS — M25.511 ACUTE PAIN OF RIGHT SHOULDER: Primary | ICD-10-CM

## 2024-02-05 LAB
B-HCG UR QL: NEGATIVE
CTP QC/QA: YES

## 2024-02-05 PROCEDURE — 81025 URINE PREGNANCY TEST: CPT | Mod: QW,,, | Performed by: NURSE PRACTITIONER

## 2024-02-08 ENCOUNTER — CLINICAL SUPPORT (OUTPATIENT)
Dept: OTOLARYNGOLOGY | Facility: CLINIC | Age: 29
End: 2024-02-08
Payer: COMMERCIAL

## 2024-02-08 DIAGNOSIS — J30.1 ALLERGIC RHINITIS DUE TO GRASS POLLEN: ICD-10-CM

## 2024-02-08 DIAGNOSIS — J30.89 OTHER ALLERGIC RHINITIS: ICD-10-CM

## 2024-02-08 DIAGNOSIS — J30.1 NON-SEASONAL ALLERGIC RHINITIS DUE TO POLLEN: ICD-10-CM

## 2024-02-08 DIAGNOSIS — J30.1 SEASONAL ALLERGIC RHINITIS DUE TO POLLEN: Primary | ICD-10-CM

## 2024-02-08 DIAGNOSIS — J30.1 ALLERGIC REACTION TO GRASS POLLEN: ICD-10-CM

## 2024-02-08 DIAGNOSIS — J31.0 CHRONIC RHINITIS: ICD-10-CM

## 2024-02-08 PROCEDURE — 95115 IMMUNOTHERAPY ONE INJECTION: CPT | Mod: PBBFAC | Performed by: OTOLARYNGOLOGY

## 2024-02-08 NOTE — PROGRESS NOTES
Established patient with Dr. Palmer. See previous note for written order. Allergy injections per Dr. Palmer.   20 minute mm reaction  5:10 PM   right arm; observation 9mm

## 2024-02-15 ENCOUNTER — CLINICAL SUPPORT (OUTPATIENT)
Dept: OTOLARYNGOLOGY | Facility: CLINIC | Age: 29
End: 2024-02-15
Payer: COMMERCIAL

## 2024-02-15 DIAGNOSIS — J30.89 OTHER ALLERGIC RHINITIS: ICD-10-CM

## 2024-02-15 DIAGNOSIS — J30.1 ALLERGIC RHINITIS DUE TO GRASS POLLEN: ICD-10-CM

## 2024-02-15 DIAGNOSIS — J30.1 NON-SEASONAL ALLERGIC RHINITIS DUE TO POLLEN: ICD-10-CM

## 2024-02-15 DIAGNOSIS — J31.0 CHRONIC RHINITIS: ICD-10-CM

## 2024-02-15 DIAGNOSIS — J30.1 ALLERGIC REACTION TO GRASS POLLEN: ICD-10-CM

## 2024-02-15 DIAGNOSIS — J30.1 SEASONAL ALLERGIC RHINITIS DUE TO POLLEN: Primary | ICD-10-CM

## 2024-02-15 PROCEDURE — 95115 IMMUNOTHERAPY ONE INJECTION: CPT | Mod: PBBFAC | Performed by: OTOLARYNGOLOGY

## 2024-02-15 NOTE — PROGRESS NOTES
Established patient with Dr. Palmer. See previous note for written order. Allergy injections per Dr. Palmer.   20 minute mm reaction  4:16 PM   right arm; observation 9mm

## 2024-02-20 ENCOUNTER — OFFICE VISIT (OUTPATIENT)
Dept: NEUROLOGY | Facility: CLINIC | Age: 29
End: 2024-02-20
Payer: COMMERCIAL

## 2024-02-20 VITALS
DIASTOLIC BLOOD PRESSURE: 81 MMHG | OXYGEN SATURATION: 100 % | SYSTOLIC BLOOD PRESSURE: 122 MMHG | BODY MASS INDEX: 42.52 KG/M2 | HEART RATE: 75 BPM | RESPIRATION RATE: 18 BRPM | HEIGHT: 63 IN | WEIGHT: 240 LBS

## 2024-02-20 DIAGNOSIS — G44.209 TENSION HEADACHE: Primary | ICD-10-CM

## 2024-02-20 PROBLEM — R51.9 ACUTE INTRACTABLE HEADACHE: Status: RESOLVED | Noted: 2023-03-01 | Resolved: 2024-02-20

## 2024-02-20 PROCEDURE — 99215 OFFICE O/P EST HI 40 MIN: CPT | Mod: PBBFAC | Performed by: PSYCHIATRY & NEUROLOGY

## 2024-02-20 PROCEDURE — 3079F DIAST BP 80-89 MM HG: CPT | Mod: CPTII,,, | Performed by: PSYCHIATRY & NEUROLOGY

## 2024-02-20 PROCEDURE — 3008F BODY MASS INDEX DOCD: CPT | Mod: CPTII,,, | Performed by: PSYCHIATRY & NEUROLOGY

## 2024-02-20 PROCEDURE — 3074F SYST BP LT 130 MM HG: CPT | Mod: CPTII,,, | Performed by: PSYCHIATRY & NEUROLOGY

## 2024-02-20 PROCEDURE — 99214 OFFICE O/P EST MOD 30 MIN: CPT | Mod: S$PBB,,, | Performed by: PSYCHIATRY & NEUROLOGY

## 2024-02-20 PROCEDURE — 1159F MED LIST DOCD IN RCRD: CPT | Mod: CPTII,,, | Performed by: PSYCHIATRY & NEUROLOGY

## 2024-02-20 PROCEDURE — 3044F HG A1C LEVEL LT 7.0%: CPT | Mod: CPTII,,, | Performed by: PSYCHIATRY & NEUROLOGY

## 2024-02-20 PROCEDURE — 4010F ACE/ARB THERAPY RXD/TAKEN: CPT | Mod: CPTII,,, | Performed by: PSYCHIATRY & NEUROLOGY

## 2024-02-20 PROCEDURE — 1160F RVW MEDS BY RX/DR IN RCRD: CPT | Mod: CPTII,,, | Performed by: PSYCHIATRY & NEUROLOGY

## 2024-02-20 RX ORDER — AMITRIPTYLINE HYDROCHLORIDE 25 MG/1
25 TABLET, FILM COATED ORAL NIGHTLY
Qty: 90 TABLET | Refills: 3 | Status: SHIPPED | OUTPATIENT
Start: 2024-02-20

## 2024-02-20 NOTE — PROGRESS NOTES
Subjective:       Patient ID: Gabriella Ulloa is a 28 y.o. female     Chief Complaint:    Chief Complaint   Patient presents with    acute intractable headache     Pt. States headaches better.        Allergies:  Grass pollen-bermuda, standard and Grass pollen-natalia, standard    Current Medications:    Outpatient Encounter Medications as of 2/20/2024   Medication Sig Dispense Refill    EPINEPHrine (EPIPEN) 0.3 mg/0.3 mL AtIn Inject 0.3 mLs (0.3 mg total) into the muscle once. for 1 dose 0.3 mL 0    ergocalciferol (ERGOCALCIFEROL) 50,000 unit Cap Take 1 capsule (50,000 Units total) by mouth every 7 days. 12 capsule 3    losartan (COZAAR) 50 MG tablet Take 1 tablet (50 mg total) by mouth once daily. 90 tablet 3    pantoprazole (PROTONIX) 40 MG tablet Take 1 tablet (40 mg total) by mouth once daily. 30 tablet 6    sertraline (ZOLOFT) 25 MG tablet Take 25 mg by mouth.      [DISCONTINUED] amitriptyline (ELAVIL) 25 MG tablet Take 1 tablet (25 mg total) by mouth nightly as needed for Insomnia. 90 tablet 3    amitriptyline (ELAVIL) 25 MG tablet Take 1 tablet (25 mg total) by mouth every evening. 90 tablet 3    azithromycin (Z-LEONARD) 250 MG tablet Take 2 tablets by mouth on day 1; Take 1 tablet by mouth on days 2-5 (Patient not taking: Reported on 1/23/2024) 6 tablet 0    azithromycin (Z-LEONARD) 250 MG tablet Take 2 tablets by mouth on day 1; Take 1 tablet by mouth on days 2-5 (Patient not taking: Reported on 1/23/2024) 6 tablet 0    cetirizine (ZYRTEC) 10 MG tablet Take 1 tablet (10 mg total) by mouth once daily. (Patient not taking: Reported on 1/23/2024) 30 tablet 0    cetirizine (ZYRTEC) 10 MG tablet Take 1 tablet (10 mg total) by mouth once daily. (Patient not taking: Reported on 1/23/2024) 30 tablet 0    colchicine (COLCRYS) 0.6 mg tablet 2 tablets by mouth on day 1, then daily x1 week (Patient not taking: Reported on 1/23/2024) 8 tablet 0    diclofenac (VOLTAREN) 50 MG EC tablet Take 1 tablet (50 mg total) by mouth  every 6 (six) hours as needed (Pain). (Patient not taking: Reported on 1/23/2024) 20 tablet 1    diclofenac (VOLTAREN) 50 MG EC tablet Take 1 tablet (50 mg total) by mouth 2 (two) times daily. (Patient not taking: Reported on 1/23/2024) 60 tablet 1    fluconazole (DIFLUCAN) 150 MG Tab Take 1 tablet (150 mg total) by mouth once daily. (Patient not taking: Reported on 1/23/2024) 3 tablet 0    fluconazole (DIFLUCAN) 150 MG Tab Take 1 tablet (150 mg total) by mouth once daily. (Patient not taking: Reported on 2/20/2024) 3 tablet 0    fluticasone propionate (FLONASE) 50 mcg/actuation nasal spray 1 spray (50 mcg total) by Each Nostril route once daily. (Patient not taking: Reported on 1/23/2024) 11.1 mL 0    letrozole (FEMARA) 2.5 mg Tab       methylPREDNISolone (MEDROL DOSEPACK) 4 mg tablet use as directed (Patient not taking: Reported on 1/23/2024) 21 tablet 0    methylPREDNISolone (MEDROL DOSEPACK) 4 mg tablet use as directed (Patient not taking: Reported on 1/23/2024) 21 tablet 0    metroNIDAZOLE (FLAGYL) 500 MG tablet Take 1 tablet (500 mg total) by mouth every 12 (twelve) hours. (Patient not taking: Reported on 2/20/2024) 14 tablet 0    norethindrone (AYGESTIN) 5 mg Tab Take 5 mg by mouth.      norethindrone (MICRONOR) 0.35 mg tablet       predniSONE (DELTASONE) 10 mg tablet pack Take as directed (Patient not taking: Reported on 2/20/2024) 48 tablet 0    rizatriptan (MAXALT) 10 MG tablet Take 1 tablet (10 mg total) by mouth daily as needed for Migraine (take at onset of migraine). (Patient not taking: Reported on 1/23/2024) 18 tablet 3     No facility-administered encounter medications on file as of 2/20/2024.       History of Present Illness  29 yo BF w/ prev chronic stress tension HEADACHE well alleviated on Elavil 25 mg qhs   More stress free and improved sleep   Avoiding triggers         Past Medical History:   Diagnosis Date    Anemia     Anxiety     Breakthrough bleeding on birth control pills 09/28/2015     "Endometriosis of pelvic peritoneum 2020    cul-de-sac and sigmoid colon    Hypertension     Irritable bowel syndrome Chronic    Increased pain with bowel movements with menses       Past Surgical History:   Procedure Laterality Date     SECTION      D&C/Hysteroscopy with robotic and operative Laparoscopy  2020    DIAGNOSTIC LAPAROSCOPY N/A 2022    Procedure: LAPAROSCOPY, DIAGNOSTIC;  Surgeon: Otis Mccullough MD;  Location: [facility] OR;  Service: OB/GYN;  Laterality: N/A;    DILATION AND CURETTAGE OF UTERUS  2020    ENDOMETRIAL BIOPSY N/A 2022    Procedure: BIOPSY, ENDOMETRIUM;  Surgeon: Otis Mccullough MD;  Location: [facility] OR;  Service: OB/GYN;  Laterality: N/A;    HYSTEROSCOPY WITH DILATION AND CURETTAGE OF UTERUS N/A 2022    Procedure: HYSTEROSCOPY, WITH DILATION AND CURETTAGE OF UTERUS;  Surgeon: Otis Mccullough MD;  Location: [facility] OR;  Service: OB/GYN;  Laterality: N/A;       Social History  Ms. Ulloa  reports that she has never smoked. She has never been exposed to tobacco smoke. She has never used smokeless tobacco. She reports that she does not drink alcohol and does not use drugs.    Family History  Ms.'s Ulloa family history includes Breast cancer in an other family member; Diabetes in her mother and another family member; Heart disease in her mother; Hypertension in an other family member; Liver cancer in an other family member; Prostate cancer in her maternal grandfather; Stomach cancer in an other family member.    Review of Systems  Review of Systems   Neurological:  Positive for headaches.   All other systems reviewed and are negative.     Objective:   /81 (BP Location: Left arm, Patient Position: Sitting, BP Method: Large (Manual))   Pulse 75   Resp 18   Ht 5' 3" (1.6 m)   Wt 108.9 kg (240 lb)   LMP 2023   SpO2 100%   BMI 42.51 kg/m²    NEUROLOGICAL EXAMINATION:     MENTAL STATUS   Oriented to person, place, and time.   Level of " consciousness: alert  Knowledge: good.     CRANIAL NERVES   Cranial nerves II through XII intact.     MOTOR EXAM     Strength   Strength 5/5 throughout.     GAIT AND COORDINATION     Gait  Gait: normal       Physical Exam  Vitals reviewed.   Neurological:      General: No focal deficit present.      Mental Status: She is alert and oriented to person, place, and time. Mental status is at baseline.      Cranial Nerves: Cranial nerves 2-12 are intact.      Motor: Motor strength is normal.     Gait: Gait is intact.          Assessment:     Tension headache    Other orders  -     amitriptyline (ELAVIL) 25 MG tablet; Take 1 tablet (25 mg total) by mouth every evening.  Dispense: 90 tablet; Refill: 3         Primary Diagnosis and ICD10  Tension headache [G44.209]    Plan:     Patient Instructions   Cont Elavil 25 mg bedtime   Avoid any triggers   Cont healthy lifestyle habits   F/u 6 months    Medications Discontinued During This Encounter   Medication Reason    amitriptyline (ELAVIL) 25 MG tablet Reorder       Requested Prescriptions     Signed Prescriptions Disp Refills    amitriptyline (ELAVIL) 25 MG tablet 90 tablet 3     Sig: Take 1 tablet (25 mg total) by mouth every evening.

## 2024-02-21 ENCOUNTER — CLINICAL SUPPORT (OUTPATIENT)
Dept: OTOLARYNGOLOGY | Facility: CLINIC | Age: 29
End: 2024-02-21
Payer: COMMERCIAL

## 2024-02-21 DIAGNOSIS — J30.1 NON-SEASONAL ALLERGIC RHINITIS DUE TO POLLEN: ICD-10-CM

## 2024-02-21 DIAGNOSIS — J30.89 OTHER ALLERGIC RHINITIS: ICD-10-CM

## 2024-02-21 DIAGNOSIS — J30.1 SEASONAL ALLERGIC RHINITIS DUE TO POLLEN: ICD-10-CM

## 2024-02-21 DIAGNOSIS — J31.0 CHRONIC RHINITIS: Primary | ICD-10-CM

## 2024-02-21 DIAGNOSIS — J30.1 ALLERGIC REACTION TO GRASS POLLEN: ICD-10-CM

## 2024-02-21 DIAGNOSIS — J30.1 ALLERGIC RHINITIS DUE TO GRASS POLLEN: ICD-10-CM

## 2024-02-21 PROCEDURE — 95115 IMMUNOTHERAPY ONE INJECTION: CPT | Mod: PBBFAC | Performed by: OTOLARYNGOLOGY

## 2024-02-21 NOTE — PROGRESS NOTES
Established patient with Dr. Palmer. See previous note for written order. Allergy injections per Dr. Palmer.   20 minute mm reaction  3:48 PM   left arm; observation 5mm

## 2024-03-01 ENCOUNTER — CLINICAL SUPPORT (OUTPATIENT)
Dept: OTOLARYNGOLOGY | Facility: CLINIC | Age: 29
End: 2024-03-01
Payer: COMMERCIAL

## 2024-03-01 DIAGNOSIS — J30.1 ALLERGIC REACTION TO GRASS POLLEN: ICD-10-CM

## 2024-03-01 DIAGNOSIS — L30.9 DERMATITIS: ICD-10-CM

## 2024-03-01 DIAGNOSIS — J30.1 NON-SEASONAL ALLERGIC RHINITIS DUE TO POLLEN: ICD-10-CM

## 2024-03-01 DIAGNOSIS — J30.1 ALLERGIC RHINITIS DUE TO GRASS POLLEN: ICD-10-CM

## 2024-03-01 DIAGNOSIS — J30.89 OTHER ALLERGIC RHINITIS: ICD-10-CM

## 2024-03-01 DIAGNOSIS — J30.1 SEASONAL ALLERGIC RHINITIS DUE TO POLLEN: ICD-10-CM

## 2024-03-01 DIAGNOSIS — J31.0 CHRONIC RHINITIS: Primary | ICD-10-CM

## 2024-03-01 PROCEDURE — 95115 IMMUNOTHERAPY ONE INJECTION: CPT | Mod: PBBFAC | Performed by: OTOLARYNGOLOGY

## 2024-03-01 NOTE — PROGRESS NOTES
Established patient with Dr. Palmer. See previous note for written order. Allergy injections per Dr. Palmer.   20 minute mm reaction  10:33 AM   left arm; observation 7mm

## 2024-03-12 ENCOUNTER — CLINICAL SUPPORT (OUTPATIENT)
Dept: FAMILY MEDICINE | Facility: CLINIC | Age: 29
End: 2024-03-12
Payer: COMMERCIAL

## 2024-03-12 DIAGNOSIS — Z76.89 ENCOUNTER FOR ALLERGY INJECTION: ICD-10-CM

## 2024-03-12 DIAGNOSIS — J31.0 CHRONIC RHINITIS: Primary | ICD-10-CM

## 2024-03-12 NOTE — PROGRESS NOTES
Pt is here today for weekly allergy injection. Last dose 11 days ago on 03/01/2024(0.15cc). 0.20cc given to left arm per orders. [id number] localized reaction noted. Pt denies any of s/s besides localized burning/swelling around injection site. Will put in call to allergy nurse.

## 2024-03-19 ENCOUNTER — CLINICAL SUPPORT (OUTPATIENT)
Dept: FAMILY MEDICINE | Facility: CLINIC | Age: 29
End: 2024-03-19
Payer: COMMERCIAL

## 2024-03-19 ENCOUNTER — TELEPHONE (OUTPATIENT)
Dept: OTOLARYNGOLOGY | Facility: CLINIC | Age: 29
End: 2024-03-19
Payer: COMMERCIAL

## 2024-03-19 DIAGNOSIS — J31.0 CHRONIC RHINITIS: Primary | ICD-10-CM

## 2024-03-19 DIAGNOSIS — Z76.89 ENCOUNTER FOR ALLERGY INJECTION: ICD-10-CM

## 2024-03-19 NOTE — PROGRESS NOTES
Pt is here for weekly allergy injection. Given per orders. Pt reaction [id number]. Reports burning/itching at site-denies any other s/s .

## 2024-03-19 NOTE — TELEPHONE ENCOUNTER
Spoke with nurse- Jayne from [facility].  Patient had reaction last week after allergy injection. 40mm x 40mm. Received 0.20ml dose. The reaction stayed localized.  Recommended to decrease dose to 0.15ml this week.  Call with any questions or concerns.

## 2024-03-20 DIAGNOSIS — B96.89 BACTERIAL VAGINOSIS: ICD-10-CM

## 2024-03-20 DIAGNOSIS — N76.0 BACTERIAL VAGINOSIS: ICD-10-CM

## 2024-03-20 RX ORDER — FLUCONAZOLE 150 MG/1
150 TABLET ORAL DAILY
Qty: 3 TABLET | Refills: 0 | Status: SHIPPED | OUTPATIENT
Start: 2024-03-20

## 2024-03-20 RX ORDER — METRONIDAZOLE 500 MG/1
500 TABLET ORAL EVERY 12 HOURS
Qty: 14 TABLET | Refills: 0 | Status: SHIPPED | OUTPATIENT
Start: 2024-03-20

## 2024-03-26 ENCOUNTER — CLINICAL SUPPORT (OUTPATIENT)
Dept: FAMILY MEDICINE | Facility: CLINIC | Age: 29
End: 2024-03-26
Payer: COMMERCIAL

## 2024-03-26 DIAGNOSIS — J31.0 CHRONIC RHINITIS: Primary | ICD-10-CM

## 2024-03-26 DIAGNOSIS — Z76.89 ENCOUNTER FOR ALLERGY INJECTION: ICD-10-CM

## 2024-03-26 NOTE — LETTER
March 26, 2024      [facility]  [address] [address]  Phone: [phone]  Fax: [phone]       Patient: Gabriella Ulloa   YOB: 1995  Date of Visit: 03/26/2024    To Whom It May Concern:    Elle Ulloa  was at [facility] on 03/26/2024. The patient may return to work/school on 03/26/2024 with no restrictions. If you have any questions or concerns, or if I can be of further assistance, please do not hesitate to contact me.    Sincerely,    Jayne Coyle RN

## 2024-04-01 ENCOUNTER — OFFICE VISIT (OUTPATIENT)
Dept: FAMILY MEDICINE | Facility: CLINIC | Age: 29
End: 2024-04-01
Payer: COMMERCIAL

## 2024-04-01 VITALS
WEIGHT: 239.81 LBS | DIASTOLIC BLOOD PRESSURE: 83 MMHG | TEMPERATURE: 98 F | HEART RATE: 70 BPM | HEIGHT: 63 IN | SYSTOLIC BLOOD PRESSURE: 120 MMHG | OXYGEN SATURATION: 100 % | BODY MASS INDEX: 42.49 KG/M2

## 2024-04-01 DIAGNOSIS — F41.9 ANXIETY: Primary | ICD-10-CM

## 2024-04-01 DIAGNOSIS — R23.2 HOT FLASHES: ICD-10-CM

## 2024-04-01 DIAGNOSIS — E66.9 OBESITY (BMI 35.0-39.9 WITHOUT COMORBIDITY): ICD-10-CM

## 2024-04-01 DIAGNOSIS — R45.86 MOOD SWINGS: ICD-10-CM

## 2024-04-01 PROCEDURE — 4010F ACE/ARB THERAPY RXD/TAKEN: CPT | Mod: CPTII,,, | Performed by: NURSE PRACTITIONER

## 2024-04-01 PROCEDURE — 3079F DIAST BP 80-89 MM HG: CPT | Mod: CPTII,,, | Performed by: NURSE PRACTITIONER

## 2024-04-01 PROCEDURE — 3044F HG A1C LEVEL LT 7.0%: CPT | Mod: CPTII,,, | Performed by: NURSE PRACTITIONER

## 2024-04-01 PROCEDURE — 99213 OFFICE O/P EST LOW 20 MIN: CPT | Mod: ,,, | Performed by: NURSE PRACTITIONER

## 2024-04-01 PROCEDURE — 1159F MED LIST DOCD IN RCRD: CPT | Mod: CPTII,,, | Performed by: NURSE PRACTITIONER

## 2024-04-01 PROCEDURE — 3074F SYST BP LT 130 MM HG: CPT | Mod: CPTII,,, | Performed by: NURSE PRACTITIONER

## 2024-04-01 PROCEDURE — 3008F BODY MASS INDEX DOCD: CPT | Mod: CPTII,,, | Performed by: NURSE PRACTITIONER

## 2024-04-01 RX ORDER — LOSARTAN POTASSIUM 50 MG/1
50 TABLET ORAL DAILY
Qty: 30 TABLET | Refills: 3 | Status: SHIPPED | OUTPATIENT
Start: 2024-04-01

## 2024-04-01 RX ORDER — VENLAFAXINE HYDROCHLORIDE 37.5 MG/1
37.5 CAPSULE, EXTENDED RELEASE ORAL DAILY
Qty: 30 CAPSULE | Refills: 0 | Status: SHIPPED | OUTPATIENT
Start: 2024-04-01 | End: 2024-05-07 | Stop reason: SDUPTHER

## 2024-04-01 NOTE — PROGRESS NOTES
Jayde Cole DNP   [address] [place] [address]     PATIENT NAME: Gabriella Ulloa  : 1995  DATE: 24  MRN: [id number]      Billing Provider: Jayde Cole DNP  Level of Service:   Patient PCP Information       Provider PCP Type    Jayde Cole DNP General            Reason for Visit / Chief Complaint: Hot Flashes and Migraine       Update PCP  Update Chief Complaint         History of Present Illness / Problem Focused Workflow     Gabriella Ulloa presents to the clinic with Hot Flashes and Migraine     Migraine         Review of Systems     Review of Systems     Medical / Social / Family History     Past Medical History:   Diagnosis Date    Anemia     Anxiety     Breakthrough bleeding on birth control pills 2015    Endometriosis of pelvic peritoneum 2020    cul-de-sac and sigmoid colon    Hypertension     Irritable bowel syndrome Chronic    Increased pain with bowel movements with menses       Past Surgical History:   Procedure Laterality Date     SECTION      D&C/Hysteroscopy with robotic and operative Laparoscopy  2020    DIAGNOSTIC LAPAROSCOPY N/A 2022    Procedure: LAPAROSCOPY, DIAGNOSTIC;  Surgeon: Otis Mccullough MD;  Location: [facility] OR;  Service: OB/GYN;  Laterality: N/A;    DILATION AND CURETTAGE OF UTERUS  2020    ENDOMETRIAL BIOPSY N/A 2022    Procedure: BIOPSY, ENDOMETRIUM;  Surgeon: Otis Mccullough MD;  Location: [facility] OR;  Service: OB/GYN;  Laterality: N/A;    HYSTEROSCOPY WITH DILATION AND CURETTAGE OF UTERUS N/A 2022    Procedure: HYSTEROSCOPY, WITH DILATION AND CURETTAGE OF UTERUS;  Surgeon: Otis Mccullough MD;  Location: [facility] OR;  Service: OB/GYN;  Laterality: N/A;       Social History  Ms.  reports that she has never smoked. She has never been exposed to tobacco smoke. She has never used smokeless tobacco. She reports that she does not drink alcohol and does not use drugs.    Family  "History  Ms.'s family history includes Breast cancer in an other family member; Diabetes in her mother and another family member; Heart disease in her mother; Hypertension in an other family member; Liver cancer in an other family member; Prostate cancer in her maternal grandfather; Stomach cancer in an other family member.    Medications and Allergies     Medications  No outpatient medications have been marked as taking for the 4/1/24 encounter (Office Visit) with Jayde Cole DNP.       Allergies  Review of patient's allergies indicates:   Allergen Reactions    Grass pollen-bermuda, standard     Grass pollen-natalia, standard        Physical Examination   /83   Pulse 70   Temp 98.2 °F (36.8 °C)   Ht 5' 3" (1.6 m)   Wt 108.8 kg (239 lb 12.8 oz)   SpO2 100%   BMI 42.48 kg/m²    Physical Exam  Vitals and nursing note reviewed.   Constitutional:       Appearance: Normal appearance. She is normal weight.   HENT:      Head: Normocephalic.      Nose: Nose normal.      Mouth/Throat:      Mouth: Mucous membranes are moist.   Eyes:      Pupils: Pupils are equal, round, and reactive to light.   Cardiovascular:      Rate and Rhythm: Normal rate and regular rhythm.      Pulses: Normal pulses.      Heart sounds: Normal heart sounds.   Pulmonary:      Effort: Pulmonary effort is normal.      Breath sounds: Normal breath sounds.   Abdominal:      General: Abdomen is flat. Bowel sounds are normal.      Palpations: Abdomen is soft.   Musculoskeletal:         General: Normal range of motion.      Cervical back: Normal range of motion and neck supple.   Skin:     General: Skin is warm and dry.      Capillary Refill: Capillary refill takes less than 2 seconds.   Neurological:      General: No focal deficit present.      Mental Status: She is alert and oriented to person, place, and time. Mental status is at baseline.   Psychiatric:         Mood and Affect: Mood normal.         Behavior: Behavior normal.         " Thought Content: Thought content normal.         Judgment: Judgment normal.          Assessment and Plan (including Health Maintenance)      Problem List  Smart Sets  Document Outside HM   :    Plan:     Reports stressed. Has 3 daughters. 2 of which are undergoing mental health issues  As well as her mom  Caregiver for uncle as well.     Stopped her zoloft. Switch to effexor since having menstrual issues and hot flashes      Health Maintenance Due   Topic Date Due    COVID-19 Vaccine (3 - 2023-24 season) 09/01/2023       Problem List Items Addressed This Visit          Psychiatric    Anxiety - Primary    Mood swings       Endocrine    Obesity (BMI 35.0-39.9 without comorbidity)       Other    Hot flashes       Health Maintenance Topics with due status: Not Due       Topic Last Completion Date    TETANUS VACCINE 03/01/2023    Cervical Cancer Screening 10/02/2023       Future Appointments   Date Time Provider Department Center   7/15/2024  9:30 AM Otis Mccullough MD [facility]   8/20/2024  8:15 AM Markos Genao MD [facility]            Signature:  Jayde Cole, RERE      [address] [place] [address]    Date of encounter: 4/1/24

## 2024-04-02 ENCOUNTER — CLINICAL SUPPORT (OUTPATIENT)
Dept: FAMILY MEDICINE | Facility: CLINIC | Age: 29
End: 2024-04-02
Payer: COMMERCIAL

## 2024-04-02 DIAGNOSIS — Z76.89 ENCOUNTER FOR ALLERGY INJECTION: ICD-10-CM

## 2024-04-02 DIAGNOSIS — J31.0 CHRONIC RHINITIS: Primary | ICD-10-CM

## 2024-04-02 NOTE — LETTER
[facility]  [address] [facility] [address]  Phone: [phone]  Fax: [phone]       Patient: Gabriella Ulloa   YOB: 1995  Date of Visit: 04/01/2024    To Whom It May Concern:    Elle Ulloa  was at [facility] on 04/01/2024. The patient may return to work/school on 04/02/2024 with no restrictions. If you have any questions or concerns, or if I can be of further assistance, please do not hesitate to contact me.    Sincerely,    Jayne Coyle RN

## 2024-04-15 ENCOUNTER — CLINICAL SUPPORT (OUTPATIENT)
Dept: FAMILY MEDICINE | Facility: CLINIC | Age: 29
End: 2024-04-15
Payer: COMMERCIAL

## 2024-04-15 DIAGNOSIS — Z76.89 ENCOUNTER FOR ALLERGY INJECTION: ICD-10-CM

## 2024-04-15 DIAGNOSIS — J31.0 CHRONIC RHINITIS: Primary | ICD-10-CM

## 2024-04-15 PROBLEM — J01.00 ACUTE NON-RECURRENT MAXILLARY SINUSITIS: Status: RESOLVED | Noted: 2023-08-29 | Resolved: 2024-04-15

## 2024-04-15 NOTE — LETTER
April 15, 2024      [facility]  [address] [address]  Phone: [phone]  Fax: [phone]       Patient: Gabriella Ulloa   YOB: 1995  Date of Visit: 04/15/2024    To Whom It May Concern:    Elle Ulloa  was at [facility] on 04/15/2024. The patient may return to work/school on 04/15/2024 with no restrictions. If you have any questions or concerns, or if I can be of further assistance, please do not hesitate to contact me.    Sincerely,    Jayne Coyle RN

## 2024-04-22 ENCOUNTER — CLINICAL SUPPORT (OUTPATIENT)
Dept: FAMILY MEDICINE | Facility: CLINIC | Age: 29
End: 2024-04-22
Payer: COMMERCIAL

## 2024-04-22 DIAGNOSIS — Z76.89 ENCOUNTER FOR ALLERGY INJECTION: ICD-10-CM

## 2024-04-22 DIAGNOSIS — J31.0 CHRONIC RHINITIS: Primary | ICD-10-CM

## 2024-04-22 NOTE — LETTER
April 22, 2024      [facility]  [address] [facility] [facility] [address]  Phone: [phone]  Fax: [phone]       Patient: Gabriella Ulloa   YOB: 1995  Date of Visit: 04/22/2024    To Whom It May Concern:    Elle Ulloa  was at [facility] on 04/22/2024. The patient may return to work/school on 04/23/2024 with no restrictions. If you have any questions or concerns, or if I can be of further assistance, please do not hesitate to contact me.    Sincerely,    Jayne Coyle RN

## 2024-04-30 ENCOUNTER — CLINICAL SUPPORT (OUTPATIENT)
Dept: FAMILY MEDICINE | Facility: CLINIC | Age: 29
End: 2024-04-30
Payer: COMMERCIAL

## 2024-04-30 DIAGNOSIS — Z76.89 ENCOUNTER FOR ALLERGY INJECTION: ICD-10-CM

## 2024-04-30 DIAGNOSIS — J31.0 CHRONIC RHINITIS: Primary | ICD-10-CM

## 2024-04-30 PROCEDURE — 95115 IMMUNOTHERAPY ONE INJECTION: CPT | Mod: ,,, | Performed by: NURSE PRACTITIONER

## 2024-04-30 RX ORDER — NORETHINDRONE 5 MG/1
5 TABLET ORAL DAILY
Qty: 90 TABLET | Refills: 1 | Status: SHIPPED | OUTPATIENT
Start: 2024-04-30

## 2024-05-06 ENCOUNTER — CLINICAL SUPPORT (OUTPATIENT)
Dept: FAMILY MEDICINE | Facility: CLINIC | Age: 29
End: 2024-05-06
Payer: COMMERCIAL

## 2024-05-06 DIAGNOSIS — J31.0 CHRONIC RHINITIS: Primary | ICD-10-CM

## 2024-05-06 DIAGNOSIS — Z76.89 ENCOUNTER FOR ALLERGY INJECTION: ICD-10-CM

## 2024-05-07 RX ORDER — VENLAFAXINE HYDROCHLORIDE 37.5 MG/1
37.5 CAPSULE, EXTENDED RELEASE ORAL DAILY
Qty: 30 CAPSULE | Refills: 0 | Status: SHIPPED | OUTPATIENT
Start: 2024-05-07 | End: 2025-05-07

## 2024-05-17 ENCOUNTER — OFFICE VISIT (OUTPATIENT)
Dept: FAMILY MEDICINE | Facility: CLINIC | Age: 29
End: 2024-05-17
Payer: COMMERCIAL

## 2024-05-17 VITALS
TEMPERATURE: 98 F | HEART RATE: 73 BPM | DIASTOLIC BLOOD PRESSURE: 84 MMHG | OXYGEN SATURATION: 100 % | HEIGHT: 63 IN | SYSTOLIC BLOOD PRESSURE: 126 MMHG | BODY MASS INDEX: 42.48 KG/M2

## 2024-05-17 DIAGNOSIS — N92.6 MENSES, IRREGULAR: Primary | ICD-10-CM

## 2024-05-17 DIAGNOSIS — N91.2 AMENORRHEA: ICD-10-CM

## 2024-05-17 DIAGNOSIS — E66.9 OBESITY (BMI 35.0-39.9 WITHOUT COMORBIDITY): ICD-10-CM

## 2024-05-17 LAB
B-HCG UR QL: NEGATIVE
CTP QC/QA: YES
HCG SERPL-ACNC: <1 MIU/ML

## 2024-05-17 PROCEDURE — 3008F BODY MASS INDEX DOCD: CPT | Mod: CPTII,,, | Performed by: NURSE PRACTITIONER

## 2024-05-17 PROCEDURE — 4010F ACE/ARB THERAPY RXD/TAKEN: CPT | Mod: CPTII,,, | Performed by: NURSE PRACTITIONER

## 2024-05-17 PROCEDURE — 3044F HG A1C LEVEL LT 7.0%: CPT | Mod: CPTII,,, | Performed by: NURSE PRACTITIONER

## 2024-05-17 PROCEDURE — 81025 URINE PREGNANCY TEST: CPT | Mod: QW,,, | Performed by: NURSE PRACTITIONER

## 2024-05-17 PROCEDURE — 3079F DIAST BP 80-89 MM HG: CPT | Mod: CPTII,,, | Performed by: NURSE PRACTITIONER

## 2024-05-17 PROCEDURE — 1159F MED LIST DOCD IN RCRD: CPT | Mod: CPTII,,, | Performed by: NURSE PRACTITIONER

## 2024-05-17 PROCEDURE — 3074F SYST BP LT 130 MM HG: CPT | Mod: CPTII,,, | Performed by: NURSE PRACTITIONER

## 2024-05-17 PROCEDURE — 99213 OFFICE O/P EST LOW 20 MIN: CPT | Mod: ,,, | Performed by: NURSE PRACTITIONER

## 2024-05-17 PROCEDURE — 84702 CHORIONIC GONADOTROPIN TEST: CPT | Mod: ,,, | Performed by: CLINICAL MEDICAL LABORATORY

## 2024-05-17 NOTE — PROGRESS NOTES
Jayde Cole DNP   [address] [place] [address]     PATIENT NAME: Gabriella Ulloa  : 1995  DATE: 24  MRN: [id number]      Billing Provider: Jayde Cole DNP  Level of Service:   Patient PCP Information       Provider PCP Type    Jayde Cole DNP General            Reason for Visit / Chief Complaint: Amenorrhea       Update PCP  Update Chief Complaint         History of Present Illness / Problem Focused Workflow     Gabriella Ulloa presents to the clinic with Amenorrhea     HPI    Review of Systems     Review of Systems     Medical / Social / Family History     Past Medical History:   Diagnosis Date    Anemia     Anxiety     Breakthrough bleeding on birth control pills 2015    Endometriosis of pelvic peritoneum 2020    cul-de-sac and sigmoid colon    Hypertension     Irritable bowel syndrome Chronic    Increased pain with bowel movements with menses       Past Surgical History:   Procedure Laterality Date     SECTION      D&C/Hysteroscopy with robotic and operative Laparoscopy  2020    DIAGNOSTIC LAPAROSCOPY N/A 2022    Procedure: LAPAROSCOPY, DIAGNOSTIC;  Surgeon: Otis Mccullough MD;  Location: [facility] [place];  Service: OB/GYN;  Laterality: N/A;    DILATION AND CURETTAGE OF UTERUS  2020    ENDOMETRIAL BIOPSY N/A 2022    Procedure: BIOPSY, ENDOMETRIUM;  Surgeon: Otis Mcculloguh MD;  Location: [facility] OR;  Service: OB/GYN;  Laterality: N/A;    HYSTEROSCOPY WITH DILATION AND CURETTAGE OF UTERUS N/A 2022    Procedure: HYSTEROSCOPY, WITH DILATION AND CURETTAGE OF UTERUS;  Surgeon: Otis Mccullough MD;  Location: [facility] [place];  Service: OB/GYN;  Laterality: N/A;       Social History  Ms.  reports that she has never smoked. She has never been exposed to tobacco smoke. She has never used smokeless tobacco. She reports that she does not drink alcohol and does not use drugs.    Family History  Ms.'s family history includes Breast  "cancer in an other family member; Diabetes in her mother and another family member; Heart disease in her mother; Hypertension in an other family member; Liver cancer in an other family member; Prostate cancer in her maternal grandfather; Stomach cancer in an other family member.    Medications and Allergies     Medications  No outpatient medications have been marked as taking for the 5/17/24 encounter (Office Visit) with Jayde Cole DNP.       Allergies  Review of patient's allergies indicates:   Allergen Reactions    Grass pollen-bermuda, standard     Grass pollen-natalia, standard        Physical Examination   /84   Pulse 73   Temp 97.9 °F (36.6 °C)   Ht 5' 3" (1.6 m)   LMP 04/03/2024   SpO2 100%   BMI 42.48 kg/m²    Physical Exam  Vitals and nursing note reviewed.   Constitutional:       Appearance: Normal appearance. She is obese.   HENT:      Head: Normocephalic.      Nose: Nose normal.      Mouth/Throat:      Mouth: Mucous membranes are moist.   Eyes:      Pupils: Pupils are equal, round, and reactive to light.   Cardiovascular:      Rate and Rhythm: Normal rate and regular rhythm.      Pulses: Normal pulses.      Heart sounds: Normal heart sounds.   Pulmonary:      Effort: Pulmonary effort is normal.      Breath sounds: Normal breath sounds.   Abdominal:      General: Abdomen is flat. Bowel sounds are normal.      Palpations: Abdomen is soft.   Musculoskeletal:         General: Normal range of motion.      Cervical back: Normal range of motion and neck supple.   Skin:     General: Skin is warm and dry.      Capillary Refill: Capillary refill takes less than 2 seconds.   Neurological:      General: No focal deficit present.      Mental Status: She is alert and oriented to person, place, and time. Mental status is at baseline.   Psychiatric:         Mood and Affect: Mood normal.         Behavior: Behavior normal.          Assessment and Plan (including Health Maintenance)      Problem List  " Smart Sets  Document Outside HM   :    Plan:     States started Aygestin in February and missed her cycle this month   After reviewing her ob gyn notes. This med is not for birth control was started on it to stop heavy cycles x6 months  Discussed with pt this is normal side effect and that it was the goal of the medication.   To fu with ob gyn if anything changes.    Health Maintenance Due   Topic Date Due    COVID-19 Vaccine (3 - 2023-24 season) 09/01/2023       Problem List Items Addressed This Visit          Renal/    Amenorrhea    Relevant Orders    POCT urine pregnancy (Completed)    hCG, Total, Quantitative    Menses, irregular - Primary       Endocrine    Obesity (BMI 35.0-39.9 without comorbidity)       Health Maintenance Topics with due status: Not Due       Topic Last Completion Date    TETANUS VACCINE 03/01/2023    Cervical Cancer Screening 10/02/2023       Future Appointments   Date Time Provider Department Center   7/15/2024  9:30 AM Otis Mccullough MD [facility]   8/20/2024  8:15 AM Markos Genao MD [facility]            Signature:  Jayde Cole, RERE      [address] [place] [address]    Date of encounter: 5/17/24

## 2024-05-23 ENCOUNTER — CLINICAL SUPPORT (OUTPATIENT)
Dept: FAMILY MEDICINE | Facility: CLINIC | Age: 29
End: 2024-05-23
Payer: COMMERCIAL

## 2024-05-23 DIAGNOSIS — Z76.89 ENCOUNTER FOR ALLERGY INJECTION: ICD-10-CM

## 2024-05-23 DIAGNOSIS — J31.0 CHRONIC RHINITIS: Primary | ICD-10-CM

## 2024-05-30 ENCOUNTER — CLINICAL SUPPORT (OUTPATIENT)
Dept: FAMILY MEDICINE | Facility: CLINIC | Age: 29
End: 2024-05-30
Payer: COMMERCIAL

## 2024-05-30 DIAGNOSIS — Z76.89 ENCOUNTER FOR ALLERGY INJECTION: ICD-10-CM

## 2024-05-30 DIAGNOSIS — J31.0 CHRONIC RHINITIS: Primary | ICD-10-CM

## 2024-06-04 ENCOUNTER — OFFICE VISIT (OUTPATIENT)
Dept: FAMILY MEDICINE | Facility: CLINIC | Age: 29
End: 2024-06-04
Payer: COMMERCIAL

## 2024-06-04 VITALS
WEIGHT: 235.81 LBS | OXYGEN SATURATION: 99 % | DIASTOLIC BLOOD PRESSURE: 81 MMHG | HEIGHT: 63 IN | SYSTOLIC BLOOD PRESSURE: 119 MMHG | HEART RATE: 76 BPM | BODY MASS INDEX: 41.78 KG/M2 | TEMPERATURE: 98 F

## 2024-06-04 DIAGNOSIS — R19.7 DIARRHEA, UNSPECIFIED TYPE: ICD-10-CM

## 2024-06-04 DIAGNOSIS — R11.2 NAUSEA AND VOMITING, UNSPECIFIED VOMITING TYPE: ICD-10-CM

## 2024-06-04 DIAGNOSIS — E66.9 OBESITY (BMI 35.0-39.9 WITHOUT COMORBIDITY): ICD-10-CM

## 2024-06-04 DIAGNOSIS — K52.9 GASTROENTERITIS: Primary | ICD-10-CM

## 2024-06-04 PROCEDURE — 3008F BODY MASS INDEX DOCD: CPT | Mod: CPTII,,, | Performed by: NURSE PRACTITIONER

## 2024-06-04 PROCEDURE — 99213 OFFICE O/P EST LOW 20 MIN: CPT | Mod: ,,, | Performed by: NURSE PRACTITIONER

## 2024-06-04 PROCEDURE — 3079F DIAST BP 80-89 MM HG: CPT | Mod: CPTII,,, | Performed by: NURSE PRACTITIONER

## 2024-06-04 PROCEDURE — 3044F HG A1C LEVEL LT 7.0%: CPT | Mod: CPTII,,, | Performed by: NURSE PRACTITIONER

## 2024-06-04 PROCEDURE — 3074F SYST BP LT 130 MM HG: CPT | Mod: CPTII,,, | Performed by: NURSE PRACTITIONER

## 2024-06-04 PROCEDURE — 4010F ACE/ARB THERAPY RXD/TAKEN: CPT | Mod: CPTII,,, | Performed by: NURSE PRACTITIONER

## 2024-06-04 PROCEDURE — 1159F MED LIST DOCD IN RCRD: CPT | Mod: CPTII,,, | Performed by: NURSE PRACTITIONER

## 2024-06-04 RX ORDER — ONDANSETRON 4 MG/1
4 TABLET, ORALLY DISINTEGRATING ORAL EVERY 8 HOURS PRN
Qty: 10 TABLET | Refills: 0 | Status: SHIPPED | OUTPATIENT
Start: 2024-06-04

## 2024-06-04 RX ORDER — DICYCLOMINE HYDROCHLORIDE 20 MG/1
20 TABLET ORAL EVERY 6 HOURS
Qty: 30 TABLET | Refills: 0 | Status: SHIPPED | OUTPATIENT
Start: 2024-06-04 | End: 2024-07-04

## 2024-06-04 NOTE — PROGRESS NOTES
Jayde Cole DNP   [address] [place] [address]     PATIENT NAME: Gabriella Ulloa  : 1995  DATE: 24  MRN: [id number]      Billing Provider: Jayde Cole DNP  Level of Service:   Patient PCP Information       Provider PCP Type    Jayde Cole DNP General            Reason for Visit / Chief Complaint: food poisoning       Update PCP  Update Chief Complaint         History of Present Illness / Problem Focused Workflow     Gabriella Ulloa presents to the clinic with food poisoning     HPI    Review of Systems     Review of Systems     Medical / Social / Family History     Past Medical History:   Diagnosis Date    Anemia     Anxiety     Breakthrough bleeding on birth control pills 2015    Endometriosis of pelvic peritoneum 2020    cul-de-sac and sigmoid colon    Hypertension     Irritable bowel syndrome Chronic    Increased pain with bowel movements with menses       Past Surgical History:   Procedure Laterality Date     SECTION      D&C/Hysteroscopy with robotic and operative Laparoscopy  2020    DIAGNOSTIC LAPAROSCOPY N/A 2022    Procedure: LAPAROSCOPY, DIAGNOSTIC;  Surgeon: Otis Mccullough MD;  Location: [facility];  Service: OB/GYN;  Laterality: N/A;    DILATION AND CURETTAGE OF UTERUS  2020    ENDOMETRIAL BIOPSY N/A 2022    Procedure: BIOPSY, ENDOMETRIUM;  Surgeon: Otis Mccullough MD;  Location: [facility] OR;  Service: OB/GYN;  Laterality: N/A;    HYSTEROSCOPY WITH DILATION AND CURETTAGE OF UTERUS N/A 2022    Procedure: HYSTEROSCOPY, WITH DILATION AND CURETTAGE OF UTERUS;  Surgeon: Otis Mccullough MD;  Location: [facility];  Service: OB/GYN;  Laterality: N/A;       Social History  Ms.  reports that she has never smoked. She has never been exposed to tobacco smoke. She has never used smokeless tobacco. She reports that she does not drink alcohol and does not use drugs.    Family History  Ms.'s family  "history includes Breast cancer in an other family member; Diabetes in her mother and another family member; Heart disease in her mother; Hypertension in an other family member; Liver cancer in an other family member; Prostate cancer in her maternal grandfather; Stomach cancer in an other family member.    Medications and Allergies     Medications  No outpatient medications have been marked as taking for the 6/4/24 encounter (Office Visit) with Jayde Cole DNP.       Allergies  Review of patient's allergies indicates:   Allergen Reactions    Grass pollen-bermuda, standard     Grass pollen-natalia, standard        Physical Examination   /81   Pulse 76   Temp 98.3 °F (36.8 °C)   Ht 5' 3" (1.6 m)   Wt 107 kg (235 lb 12.8 oz)   LMP 04/03/2024   SpO2 99%   BMI 41.77 kg/m²    Physical Exam  Vitals and nursing note reviewed.   Constitutional:       Appearance: Normal appearance. She is obese.   HENT:      Head: Normocephalic.      Nose: Nose normal.      Mouth/Throat:      Mouth: Mucous membranes are moist.   Eyes:      Pupils: Pupils are equal, round, and reactive to light.   Cardiovascular:      Rate and Rhythm: Normal rate and regular rhythm.      Pulses: Normal pulses.      Heart sounds: Normal heart sounds.   Pulmonary:      Effort: Pulmonary effort is normal.      Breath sounds: Normal breath sounds.   Abdominal:      General: Abdomen is flat. Bowel sounds are normal. There is distension.      Palpations: Abdomen is soft.      Tenderness: There is abdominal tenderness.   Musculoskeletal:         General: Normal range of motion.      Cervical back: Normal range of motion and neck supple.   Skin:     General: Skin is warm and dry.      Capillary Refill: Capillary refill takes less than 2 seconds.   Neurological:      General: No focal deficit present.      Mental Status: She is alert and oriented to person, place, and time. Mental status is at baseline.   Psychiatric:         Mood and Affect: Mood " normal.         Behavior: Behavior normal.         Thought Content: Thought content normal.         Judgment: Judgment normal.        Assessment and Plan (including Health Maintenance)      Problem List  Smart Sets  Document Outside HM   :    Plan:         There are no preventive care reminders to display for this patient.    Problem List Items Addressed This Visit          Endocrine    Obesity (BMI 35.0-39.9 without comorbidity)       GI    Diarrhea    Nausea and vomiting    Gastroenteritis - Primary       Health Maintenance Topics with due status: Not Due       Topic Last Completion Date    TETANUS VACCINE 03/01/2023    Cervical Cancer Screening 10/02/2023       Future Appointments   Date Time Provider Department Center   7/2/2024  9:45 AM Markos Genao MD [facility]   7/15/2024  9:30 AM Otis Mccullough MD [facility]            Signature:  Jayde Cole DNP      [address] [place] [address]    Date of encounter: 6/4/24

## 2024-06-04 NOTE — LETTER
June 4, 2024      [facility]  [address] [facility] [address]  Phone: [phone]  Fax: [phone]       Patient: Gabriella Ulloa   YOB: 1995  Date of Visit: 06/04/2024    To Whom It May Concern:    Elle Ulloa  was at [facility] on 06/04/2024. The patient may return to work/school on 06/06/2024 with no restrictions. If you have any questions or concerns, or if I can be of further assistance, please do not hesitate to contact me.    Sincerely,    Jayne Coyle RN

## 2024-06-05 ENCOUNTER — CLINICAL SUPPORT (OUTPATIENT)
Dept: FAMILY MEDICINE | Facility: CLINIC | Age: 29
End: 2024-06-05
Payer: COMMERCIAL

## 2024-06-05 DIAGNOSIS — Z76.89 ENCOUNTER FOR ALLERGY INJECTION: ICD-10-CM

## 2024-06-05 DIAGNOSIS — J31.0 CHRONIC RHINITIS: Primary | ICD-10-CM

## 2024-06-12 ENCOUNTER — CLINICAL SUPPORT (OUTPATIENT)
Dept: FAMILY MEDICINE | Facility: CLINIC | Age: 29
End: 2024-06-12
Payer: COMMERCIAL

## 2024-06-12 DIAGNOSIS — Z76.89 ENCOUNTER FOR ALLERGY INJECTION: ICD-10-CM

## 2024-06-12 DIAGNOSIS — J31.0 CHRONIC RHINITIS: Primary | ICD-10-CM

## 2024-06-12 NOTE — PROGRESS NOTES
Pt is here today for weekly allergy injection . Given per orders without difficulty. Pt tolerated well.

## 2024-06-19 ENCOUNTER — CLINICAL SUPPORT (OUTPATIENT)
Dept: FAMILY MEDICINE | Facility: CLINIC | Age: 29
End: 2024-06-19
Payer: COMMERCIAL

## 2024-06-19 DIAGNOSIS — J31.0 CHRONIC RHINITIS: Primary | ICD-10-CM

## 2024-06-19 DIAGNOSIS — Z76.89 ENCOUNTER FOR ALLERGY INJECTION: ICD-10-CM

## 2024-06-19 PROCEDURE — 95115 IMMUNOTHERAPY ONE INJECTION: CPT | Mod: ,,, | Performed by: NURSE PRACTITIONER

## 2024-06-19 NOTE — LETTER
June 19, 2024      [facility]  [address] [facility] [address]  Phone: [phone]  Fax: [phone]       Patient: Gabriella Ulloa   YOB: 1995  Date of Visit: 06/19/2024    To Whom It May Concern:    Elle Ulloa  was at [facility] on 06/19/2024. The patient may return to work/school on 06/20/2023 with no restrictions. If you have any questions or concerns, or if I can be of further assistance, please do not hesitate to contact me.    Sincerely,    Jayne Coyle RN

## 2024-06-26 ENCOUNTER — CLINICAL SUPPORT (OUTPATIENT)
Dept: FAMILY MEDICINE | Facility: CLINIC | Age: 29
End: 2024-06-26
Payer: COMMERCIAL

## 2024-06-26 DIAGNOSIS — Z76.89 ENCOUNTER FOR ALLERGY INJECTION: ICD-10-CM

## 2024-06-26 DIAGNOSIS — J31.0 CHRONIC RHINITIS: Primary | ICD-10-CM

## 2024-06-26 NOTE — LETTER
June 26, 2024      [facility]  [address] [facility] [address]  Phone: [phone]  Fax: [phone]       Patient: Gabriella Ulloa   YOB: 1995  Date of Visit: 06/26/2024    To Whom It May Concern:    Elle Ulloa  was at [facility] on 06/26/2024. The patient may return to work/school on 06/27/2024 with no restrictions. If you have any questions or concerns, or if I can be of further assistance, please do not hesitate to contact me.    Sincerely,    Jayne Coyle RN

## 2024-07-03 ENCOUNTER — CLINICAL SUPPORT (OUTPATIENT)
Dept: FAMILY MEDICINE | Facility: CLINIC | Age: 29
End: 2024-07-03
Payer: COMMERCIAL

## 2024-07-03 ENCOUNTER — OFFICE VISIT (OUTPATIENT)
Dept: OBSTETRICS AND GYNECOLOGY | Facility: CLINIC | Age: 29
End: 2024-07-03
Payer: COMMERCIAL

## 2024-07-03 VITALS
SYSTOLIC BLOOD PRESSURE: 138 MMHG | DIASTOLIC BLOOD PRESSURE: 84 MMHG | HEIGHT: 63 IN | WEIGHT: 234 LBS | TEMPERATURE: 98 F | HEART RATE: 81 BPM | BODY MASS INDEX: 41.46 KG/M2 | RESPIRATION RATE: 18 BRPM | OXYGEN SATURATION: 100 %

## 2024-07-03 DIAGNOSIS — K57.30 DIVERTICULOSIS OF COLON: ICD-10-CM

## 2024-07-03 DIAGNOSIS — E61.1 IRON DEFICIENCY: ICD-10-CM

## 2024-07-03 DIAGNOSIS — I10 HYPERTENSION, UNSPECIFIED TYPE: ICD-10-CM

## 2024-07-03 DIAGNOSIS — N80.30 ENDOMETRIOSIS OF PELVIC PERITONEUM: Primary | ICD-10-CM

## 2024-07-03 DIAGNOSIS — N93.8 DUB (DYSFUNCTIONAL UTERINE BLEEDING): ICD-10-CM

## 2024-07-03 DIAGNOSIS — Z76.89 ENCOUNTER FOR ALLERGY INJECTION: ICD-10-CM

## 2024-07-03 DIAGNOSIS — E66.9 OBESITY (BMI 35.0-39.9 WITHOUT COMORBIDITY): ICD-10-CM

## 2024-07-03 DIAGNOSIS — J31.0 CHRONIC RHINITIS: Primary | ICD-10-CM

## 2024-07-03 PROCEDURE — 3008F BODY MASS INDEX DOCD: CPT | Mod: CPTII,,, | Performed by: OBSTETRICS & GYNECOLOGY

## 2024-07-03 PROCEDURE — 99215 OFFICE O/P EST HI 40 MIN: CPT | Mod: PBBFAC | Performed by: OBSTETRICS & GYNECOLOGY

## 2024-07-03 PROCEDURE — 3075F SYST BP GE 130 - 139MM HG: CPT | Mod: CPTII,,, | Performed by: OBSTETRICS & GYNECOLOGY

## 2024-07-03 PROCEDURE — 1159F MED LIST DOCD IN RCRD: CPT | Mod: CPTII,,, | Performed by: OBSTETRICS & GYNECOLOGY

## 2024-07-03 PROCEDURE — 99459 PELVIC EXAMINATION: CPT | Mod: S$PBB,,, | Performed by: OBSTETRICS & GYNECOLOGY

## 2024-07-03 PROCEDURE — 1160F RVW MEDS BY RX/DR IN RCRD: CPT | Mod: CPTII,,, | Performed by: OBSTETRICS & GYNECOLOGY

## 2024-07-03 PROCEDURE — 99459 PELVIC EXAMINATION: CPT | Mod: PBBFAC | Performed by: OBSTETRICS & GYNECOLOGY

## 2024-07-03 PROCEDURE — 3044F HG A1C LEVEL LT 7.0%: CPT | Mod: CPTII,,, | Performed by: OBSTETRICS & GYNECOLOGY

## 2024-07-03 PROCEDURE — 99999 PR PBB SHADOW E&M-EST. PATIENT-LVL V: CPT | Mod: PBBFAC,,, | Performed by: OBSTETRICS & GYNECOLOGY

## 2024-07-03 PROCEDURE — 3079F DIAST BP 80-89 MM HG: CPT | Mod: CPTII,,, | Performed by: OBSTETRICS & GYNECOLOGY

## 2024-07-03 PROCEDURE — 4010F ACE/ARB THERAPY RXD/TAKEN: CPT | Mod: CPTII,,, | Performed by: OBSTETRICS & GYNECOLOGY

## 2024-07-03 PROCEDURE — 99214 OFFICE O/P EST MOD 30 MIN: CPT | Mod: S$PBB,,, | Performed by: OBSTETRICS & GYNECOLOGY

## 2024-07-03 NOTE — PROGRESS NOTES
Gabriella Ulloa female  for   Chief Complaint   Patient presents with    Follow-up     Patient is here for a 6 month follow up, as per patient she denied having any kind of pain/discomfort at this time. Added no refills needed either .          PHI:  Patient presents for a six-month follow-up.  She is 29 years of age  3, para 3 Afro-American female.  The patient states she had some dysfunctional bleeding in April and she started yesterday on 2024 with some mild bleeding.  She denies any cramps.  She has an occasional dyspareunia.  Overall she feels she is doing very well.     She does complain of hot flashes approximately 2-3 a day and she denies vaginal dryness.      Chart review indicates she does have a iron deficiency type anemia without any clinical symptoms of a blood loss.  This has been a chronic problem.  Patient is sickle cell screen negative.  Patient's last serum iron total iron-binding capacity rectal affected low serum iron and high total iron-binding capacity.  Patient has had PICA but this has now resolved.  Patient has had last year a negative endoscopic upper evaluation and a negative colonoscopy.  She was found have diverticula.    Patient is hemoglobin A1c in the recent past was normal as was her nonfasting blood sugar.  Patient's last 2023 vitamin-D level was low at 26 0.9 ng/mL.  Past Medical History:   Diagnosis Date    Anemia     Anxiety     Breakthrough bleeding on birth control pills 2015    Endometriosis of pelvic peritoneum 2020    cul-de-sac and sigmoid colon    Hypertension     Irritable bowel syndrome Chronic    Increased pain with bowel movements with menses      Past Surgical History:   Procedure Laterality Date     SECTION      D&C/Hysteroscopy with robotic and operative Laparoscopy  2020    DIAGNOSTIC LAPAROSCOPY N/A 2022    Procedure: LAPAROSCOPY, DIAGNOSTIC;  Surgeon: Otis Mccullough MD;  Location: [facility] OR;  Service:  "OB/GYN;  Laterality: N/A;    DILATION AND CURETTAGE OF UTERUS  06/25/2020    ENDOMETRIAL BIOPSY N/A 2/8/2022    Procedure: BIOPSY, ENDOMETRIUM;  Surgeon: Otis Mccullough MD;  Location: [facility];  Service: OB/GYN;  Laterality: N/A;    HYSTEROSCOPY WITH DILATION AND CURETTAGE OF UTERUS N/A 2/8/2022    Procedure: HYSTEROSCOPY, WITH DILATION AND CURETTAGE OF UTERUS;  Surgeon: Otis Mccullough MD;  Location: [facility] OR;  Service: OB/GYN;  Laterality: N/A;      Review of patient's allergies indicates:   Allergen Reactions    Grass pollen-bermuda, standard     Grass pollen-natalia, standard         ROS:Pertinent items are noted in HPI.    Physical exam:This gyn related exam was chaperoned by a female medical assistant.      /84 (BP Location: Right arm, Patient Position: Sitting)   Pulse 81   Temp 97.8 °F (36.6 °C)   Resp 18   Ht 5' 3" (1.6 m)   Wt 106.1 kg (234 lb)   SpO2 100%   BMI 41.45 kg/m²      General Appearance: alert, no distress, smiling, overweight large framed Afro-American female with a BMI of 41.45    Chest:           Lungs: clear to auscultation bilaterally           Heart: regular rate and rhythm, S1, S2 normal, no murmur, click, rub or gallop    Abdomen:  Obese abdomen; bowel sounds normal; non tender    Pelvic normal-appearing vulva; speculum exam reveals slight dark brownish black discharge without odor from the cervical os.  Uterus is hard to feel due to obesity but is nontender.  Bimanual exam reveals no uterosacral ligament tenderness and no adnexal masses.    Extremity: normal; no CVA tenderness bilaterally; no evidence of DVT on extremity exam      Skin: normal exam        Assessment:   Problem List Items Addressed This Visit          Cardiac/Vascular    Hypertension       Renal/    Endometriosis of pelvic peritoneum - Primary       Oncology    Iron deficiency       Endocrine    Obesity (BMI 35.0-39.9 without comorbidity)       GI    Diverticulosis of colon     Other Visit " Diagnoses       DUB (dysfunctional uterine bleeding)                 Plan:  Patient appears be doing very well on norethindrone as contraceptive and also suppressive endometriosis.  The patient is not using letrozole since last year.  She does complain of hot flashes -probably due to the use of Micronor.              Recommendation continue on her losartan; continue on Micronor            Recommend vitamin-D check today; recommend over-the-counter vitamin-D3 4000 units daily; CBC

## 2024-07-03 NOTE — LETTER
July 3, 2024      [facility]  [address] [facility] [address]  Phone: [phone]  Fax: [phone]       Patient: Gabriella Ulloa   YOB: 1995  Date of Visit: 07/03/2024    To Whom It May Concern:    Elle Ulloa  was at [facility] on 07/03/2024. The patient may return to work/school on 07/04/2024 with no restrictions. If you have any questions or concerns, or if I can be of further assistance, please do not hesitate to contact me.    Sincerely,    Jayne Coyle RN

## 2024-07-03 NOTE — PATIENT INSTRUCTIONS
Dr. Otis Mccullough thanks you for this office visit at [facility].    Diagnosis for this visit:   Problem List Items Addressed This Visit          Cardiac/Vascular    Hypertension    Relevant Orders    CBC Auto Differential    Sedimentation Rate    Vitamin D       Renal/    Endometriosis of pelvic peritoneum - Primary    Relevant Orders    CBC Auto Differential    Sedimentation Rate    Vitamin D       Oncology    Iron deficiency    Relevant Orders    CBC Auto Differential    Sedimentation Rate    Vitamin D       Endocrine    Obesity (BMI 35.0-39.9 without comorbidity)    Relevant Orders    CBC Auto Differential    Sedimentation Rate    Vitamin D       GI    Diverticulosis of colon    Relevant Orders    CBC Auto Differential    Sedimentation Rate    Vitamin D     Other Visit Diagnoses       DUB (dysfunctional uterine bleeding)        Relevant Orders    CBC Auto Differential    Sedimentation Rate    Vitamin D             The Plan: Patient appears be doing very well on norethindrone as contraceptive and also suppressive endometriosis.  The patient is not using letrozole since last year.  She does complain of hot flashes -probably due to the use of Micronor.              Recommendation continue on her losartan; continue on Micronor            Recommend vitamin-D check today; recommend over-the-counter vitamin-D3 4000 units daily; CBC        Please practice best food and exercise habits for your age.    Dr. Otis Mccullough recommends avoidance of smoking and illicit medications or habits.    Please call  or schedule for any change in your health.     Please keep the next scheduled appointment or call for any need to change the appointment.     Dr. Otis Mccullough recommends yearly exams for good health maintenance.      Thank you    Dr. Otis Mccullough  07/03/2024 9:41 AM

## 2024-07-10 ENCOUNTER — CLINICAL SUPPORT (OUTPATIENT)
Dept: OTOLARYNGOLOGY | Facility: CLINIC | Age: 29
End: 2024-07-10
Payer: COMMERCIAL

## 2024-07-10 DIAGNOSIS — J31.0 CHRONIC RHINITIS: Primary | ICD-10-CM

## 2024-07-10 DIAGNOSIS — J30.89 OTHER ALLERGIC RHINITIS: ICD-10-CM

## 2024-07-10 DIAGNOSIS — J30.1 ALLERGIC REACTION TO GRASS POLLEN: ICD-10-CM

## 2024-07-10 DIAGNOSIS — J30.1 SEASONAL ALLERGIC RHINITIS DUE TO POLLEN: ICD-10-CM

## 2024-07-10 DIAGNOSIS — J30.1 ALLERGIC RHINITIS DUE TO GRASS POLLEN: ICD-10-CM

## 2024-07-10 DIAGNOSIS — J30.1 NON-SEASONAL ALLERGIC RHINITIS DUE TO POLLEN: ICD-10-CM

## 2024-07-10 PROCEDURE — 99999 PR PBB SHADOW E&M-EST. PATIENT-LVL II: CPT | Mod: PBBFAC,,,

## 2024-07-10 PROCEDURE — 95165 ANTIGEN THERAPY SERVICES: CPT | Mod: S$PBB,,, | Performed by: OTOLARYNGOLOGY

## 2024-07-10 PROCEDURE — 95115 IMMUNOTHERAPY ONE INJECTION: CPT | Mod: PBBFAC | Performed by: OTOLARYNGOLOGY

## 2024-07-10 PROCEDURE — 95165 ANTIGEN THERAPY SERVICES: CPT | Mod: PBBFAC | Performed by: OTOLARYNGOLOGY

## 2024-07-10 RX ORDER — EPINEPHRINE 0.3 MG/.3ML
1 INJECTION SUBCUTANEOUS ONCE
Qty: 0.3 ML | Refills: 0 | Status: SHIPPED | OUTPATIENT
Start: 2024-07-10 | End: 2024-07-10

## 2024-07-10 NOTE — PROGRESS NOTES
Established patient with Dr. Palmer. See previous note for written order. Allergy injections per Dr. Palmer.   Vial test administered from new maintenance vial.  10 minute vial test mm reactions  Administered left arm at 4:38 PM  4mm observed   First dose of 0.08ml given. Maintenance dose is 0.15ml.  20 minute mm reaction  4:38 PM   left arm; observation 7mm.  Will continue getting weekly allergy injections at [facility]. Vial sent via courrier.

## 2024-07-12 RX ORDER — POLYMYXIN B SULFATE AND TRIMETHOPRIM 1; 10000 MG/ML; [USP'U]/ML
1 SOLUTION OPHTHALMIC EVERY 6 HOURS
Qty: 10 ML | Refills: 0 | Status: SHIPPED | OUTPATIENT
Start: 2024-07-12

## 2024-07-12 RX ORDER — POLYMYXIN B SULFATE AND TRIMETHOPRIM 1; 10000 MG/ML; [USP'U]/ML
1 SOLUTION OPHTHALMIC EVERY 6 HOURS
COMMUNITY
End: 2024-07-12 | Stop reason: SDUPTHER

## 2024-07-17 ENCOUNTER — CLINICAL SUPPORT (OUTPATIENT)
Dept: FAMILY MEDICINE | Facility: CLINIC | Age: 29
End: 2024-07-17
Payer: COMMERCIAL

## 2024-07-17 DIAGNOSIS — J31.0 CHRONIC RHINITIS: ICD-10-CM

## 2024-07-17 DIAGNOSIS — Z76.89 ENCOUNTER FOR ALLERGY INJECTION: Primary | ICD-10-CM

## 2024-07-17 NOTE — LETTER
July 17, 2024      [facility]  [address] [facility] [address]  Phone: [phone]  Fax: [phone]       Patient: Gabriella Ulloa   YOB: 1995  Date of Visit: 07/17/2024    To Whom It May Concern:    Elle Ulloa  was at [facility] on 07/17/2024. The patient may return to work/school on 07/18/2024 with no restrictions. If you have any questions or concerns, or if I can be of further assistance, please do not hesitate to contact me.    Sincerely,    Jayne Coyle RN

## 2024-07-26 DIAGNOSIS — K21.9 GASTROESOPHAGEAL REFLUX DISEASE, UNSPECIFIED WHETHER ESOPHAGITIS PRESENT: ICD-10-CM

## 2024-07-26 DIAGNOSIS — N76.0 BACTERIAL VAGINOSIS: ICD-10-CM

## 2024-07-26 DIAGNOSIS — B96.89 BACTERIAL VAGINOSIS: ICD-10-CM

## 2024-07-26 RX ORDER — PANTOPRAZOLE SODIUM 40 MG/1
40 TABLET, DELAYED RELEASE ORAL DAILY
Qty: 30 TABLET | Refills: 6 | Status: SHIPPED | OUTPATIENT
Start: 2024-07-26 | End: 2024-10-24

## 2024-07-26 RX ORDER — FLUCONAZOLE 150 MG/1
150 TABLET ORAL DAILY
Qty: 3 TABLET | Refills: 0 | Status: SHIPPED | OUTPATIENT
Start: 2024-07-26

## 2024-07-26 RX ORDER — FLUCONAZOLE 150 MG/1
150 TABLET ORAL DAILY
Qty: 3 TABLET | Refills: 0 | OUTPATIENT
Start: 2024-07-26

## 2024-07-31 ENCOUNTER — CLINICAL SUPPORT (OUTPATIENT)
Dept: FAMILY MEDICINE | Facility: CLINIC | Age: 29
End: 2024-07-31
Payer: COMMERCIAL

## 2024-07-31 DIAGNOSIS — Z76.89 ENCOUNTER FOR ALLERGY INJECTION: Primary | ICD-10-CM

## 2024-07-31 PROCEDURE — 95115 IMMUNOTHERAPY ONE INJECTION: CPT | Mod: ,,,

## 2024-08-01 ENCOUNTER — OFFICE VISIT (OUTPATIENT)
Dept: FAMILY MEDICINE | Facility: CLINIC | Age: 29
End: 2024-08-01
Payer: COMMERCIAL

## 2024-08-01 VITALS
BODY MASS INDEX: 41.29 KG/M2 | TEMPERATURE: 98 F | HEART RATE: 77 BPM | SYSTOLIC BLOOD PRESSURE: 111 MMHG | WEIGHT: 233 LBS | OXYGEN SATURATION: 100 % | HEIGHT: 63 IN | DIASTOLIC BLOOD PRESSURE: 69 MMHG | RESPIRATION RATE: 20 BRPM

## 2024-08-01 DIAGNOSIS — R53.83 FATIGUE, UNSPECIFIED TYPE: ICD-10-CM

## 2024-08-01 DIAGNOSIS — R53.1 LEFT-SIDED WEAKNESS: Primary | ICD-10-CM

## 2024-08-01 DIAGNOSIS — R51.9 ACUTE NONINTRACTABLE HEADACHE, UNSPECIFIED HEADACHE TYPE: ICD-10-CM

## 2024-08-01 DIAGNOSIS — I10 PRIMARY HYPERTENSION: ICD-10-CM

## 2024-08-01 LAB
ALBUMIN SERPL BCP-MCNC: 3.6 G/DL (ref 3.5–5)
ALBUMIN/GLOB SERPL: 1 {RATIO}
ALP SERPL-CCNC: 80 U/L (ref 37–98)
ALT SERPL W P-5'-P-CCNC: 31 U/L (ref 13–56)
ANION GAP SERPL CALCULATED.3IONS-SCNC: 9 MMOL/L (ref 7–16)
AST SERPL W P-5'-P-CCNC: 13 U/L (ref 15–37)
BILIRUB SERPL-MCNC: 0.6 MG/DL (ref ?–1.2)
BUN SERPL-MCNC: 12 MG/DL (ref 7–18)
BUN/CREAT SERPL: 17 (ref 6–20)
CALCIUM SERPL-MCNC: 9.1 MG/DL (ref 8.5–10.1)
CHLORIDE SERPL-SCNC: 108 MMOL/L (ref 98–107)
CO2 SERPL-SCNC: 26 MMOL/L (ref 21–32)
CREAT SERPL-MCNC: 0.69 MG/DL (ref 0.55–1.02)
EGFR (NO RACE VARIABLE) (RUSH/TITUS): 120 ML/MIN/1.73M2
GLOBULIN SER-MCNC: 3.6 G/DL (ref 2–4)
GLUCOSE SERPL-MCNC: 77 MG/DL (ref 74–106)
POTASSIUM SERPL-SCNC: 4.3 MMOL/L (ref 3.5–5.1)
PROT SERPL-MCNC: 7.2 G/DL (ref 6.4–8.2)
SODIUM SERPL-SCNC: 139 MMOL/L (ref 136–145)
TSH SERPL DL<=0.005 MIU/L-ACNC: 1.22 UIU/ML (ref 0.36–3.74)

## 2024-08-01 PROCEDURE — 1159F MED LIST DOCD IN RCRD: CPT | Mod: CPTII,,, | Performed by: NURSE PRACTITIONER

## 2024-08-01 PROCEDURE — 3078F DIAST BP <80 MM HG: CPT | Mod: CPTII,,, | Performed by: NURSE PRACTITIONER

## 2024-08-01 PROCEDURE — 99214 OFFICE O/P EST MOD 30 MIN: CPT | Mod: ,,, | Performed by: NURSE PRACTITIONER

## 2024-08-01 PROCEDURE — 80053 COMPREHEN METABOLIC PANEL: CPT | Mod: ,,, | Performed by: CLINICAL MEDICAL LABORATORY

## 2024-08-01 PROCEDURE — 3074F SYST BP LT 130 MM HG: CPT | Mod: CPTII,,, | Performed by: NURSE PRACTITIONER

## 2024-08-01 PROCEDURE — 4010F ACE/ARB THERAPY RXD/TAKEN: CPT | Mod: CPTII,,, | Performed by: NURSE PRACTITIONER

## 2024-08-01 PROCEDURE — 3044F HG A1C LEVEL LT 7.0%: CPT | Mod: CPTII,,, | Performed by: NURSE PRACTITIONER

## 2024-08-01 PROCEDURE — 1160F RVW MEDS BY RX/DR IN RCRD: CPT | Mod: CPTII,,, | Performed by: NURSE PRACTITIONER

## 2024-08-01 PROCEDURE — 84443 ASSAY THYROID STIM HORMONE: CPT | Mod: ,,, | Performed by: CLINICAL MEDICAL LABORATORY

## 2024-08-01 PROCEDURE — 3008F BODY MASS INDEX DOCD: CPT | Mod: CPTII,,, | Performed by: NURSE PRACTITIONER

## 2024-08-02 ENCOUNTER — TELEPHONE (OUTPATIENT)
Dept: FAMILY MEDICINE | Facility: CLINIC | Age: 29
End: 2024-08-02
Payer: COMMERCIAL

## 2024-08-02 NOTE — PROGRESS NOTES
Ramila Lewis, JAYLIN   [place] [facility] [address] [place] [address]  [phone]      PATIENT NAME: Gabriella Ulloa  : 1995  DATE: 24  MRN: [id number]      Billing Provider: JAYLIN Leal  Level of Service: OK OFFICE/OUTPT VISIT, EST, LEVL IV, 30-39 MIN  Patient PCP Information       Provider PCP Type    Jayde Cole, RERE General            Reason for Visit / Chief Complaint: Hypertension (Left side feels numb) and Headache    History of Present Illness / Problem Focused Workflow     Gabriella Ulloa presents to the clinic with Hypertension (Left side feels numb) and Headache     PP with c/o several day hx of left sided weakness/ numbness -- states it affects arm and leg. States all digits of left hand feel numb. Also c/o headache that started this am -- frontal and top of head. Thinks it is her BP but it is nml today.    Hypertension  Associated symptoms include headaches. Pertinent negatives include no chest pain or shortness of breath.   Headache   Associated symptoms include numbness and weakness. Pertinent negatives include no coughing, dizziness, ear pain, fever, nausea, rhinorrhea or vomiting.       Review of Systems     Review of Systems   Constitutional:  Negative for appetite change, diaphoresis and fever.   HENT:  Negative for congestion, ear pain and rhinorrhea.    Respiratory:  Negative for cough and shortness of breath.    Cardiovascular:  Negative for chest pain.   Gastrointestinal:  Negative for nausea and vomiting.   Skin: Negative.    Allergic/Immunologic: Negative for environmental allergies and food allergies.   Neurological:  Positive for weakness, numbness and headaches. Negative for dizziness.   Hematological:  Does not bruise/bleed easily.   Psychiatric/Behavioral:  Negative for confusion and sleep disturbance.        Medical / Social / Family History     Past Medical  History:   Diagnosis Date    Anemia     Anxiety     Breakthrough bleeding on birth control pills 2015    Endometriosis of pelvic peritoneum 2020    cul-de-sac and sigmoid colon    Hypertension     Irritable bowel syndrome Chronic    Increased pain with bowel movements with menses       Past Surgical History:   Procedure Laterality Date     SECTION      D&C/Hysteroscopy with robotic and operative Laparoscopy  2020    DIAGNOSTIC LAPAROSCOPY N/A 2022    Procedure: LAPAROSCOPY, DIAGNOSTIC;  Surgeon: Otis Mccullough MD;  Location: [facility] OR;  Service: OB/GYN;  Laterality: N/A;    DILATION AND CURETTAGE OF UTERUS  2020    ENDOMETRIAL BIOPSY N/A 2022    Procedure: BIOPSY, ENDOMETRIUM;  Surgeon: Otis Mccullough MD;  Location: [facility] OR;  Service: OB/GYN;  Laterality: N/A;    HYSTEROSCOPY WITH DILATION AND CURETTAGE OF UTERUS N/A 2022    Procedure: HYSTEROSCOPY, WITH DILATION AND CURETTAGE OF UTERUS;  Surgeon: Otis Mccullough MD;  Location: [facility] OR;  Service: OB/GYN;  Laterality: N/A;       Social History  Ms.  reports that she has never smoked. She has never been exposed to tobacco smoke. She has never used smokeless tobacco. She reports that she does not drink alcohol and does not use drugs.    Family History  Ms.'s family history includes Breast cancer in an other family member; Diabetes in her mother and another family member; Heart disease in her mother; Hypertension in an other family member; Liver cancer in an other family member; Prostate cancer in her maternal grandfather; Stomach cancer in an other family member.       Health Maintenance  There are no preventive care reminders to display for this patient.  Health Maintenance Topics with due status: Not Due       Topic Last Completion Date    TETANUS VACCINE 2023    Cervical Cancer Screening 10/02/2023    Influenza Vaccine 10/24/2023       Medications and Allergies     Medications  Outpatient Medications  Marked as Taking for the 8/1/24 encounter (Office Visit) with Ramila Lewis FNP   Medication Sig Dispense Refill    ergocalciferol (ERGOCALCIFEROL) 50,000 unit Cap Take 1 capsule (50,000 Units total) by mouth every 7 days. 12 capsule 3    losartan (COZAAR) 50 MG tablet TAKE 1 TABLET (50 MG TOTAL) BY MOUTH ONCE DAILY. 30 tablet 3    norethindrone (AYGESTIN) 5 mg Tab Take 1 tablet (5 mg total) by mouth once daily. 90 tablet 1       Allergies  Review of patient's allergies indicates:   Allergen Reactions    Grass pollen-bermuda, standard     Grass pollen-natalia, standard        Physical Examination     Vitals:    08/01/24 1042   BP: 111/69   Pulse: 77   Resp: 20   Temp: 98.1 °F (36.7 °C)     Physical Exam  Constitutional:       Appearance: Normal appearance.   HENT:      Head: Normocephalic.      Right Ear: Tympanic membrane normal.      Left Ear: Tympanic membrane normal.      Nose: Nose normal.      Mouth/Throat:      Mouth: Mucous membranes are moist.   Cardiovascular:      Rate and Rhythm: Normal rate and regular rhythm.      Heart sounds: Normal heart sounds.   Pulmonary:      Effort: Pulmonary effort is normal. No respiratory distress.      Breath sounds: Normal breath sounds. No wheezing, rhonchi or rales.   Musculoskeletal:      Comments: Decreased hand grasps and strength ( mild) to left side  Negative phalen's test. Negative tinels sign  LE strength nml/ equal bilaterally   Skin:     General: Skin is warm and dry.   Neurological:      General: No focal deficit present.      Mental Status: She is alert and oriented to person, place, and time.      Cranial Nerves: No cranial nerve deficit.      Sensory: No sensory deficit.      Coordination: Coordination normal.      Gait: Gait normal.      Deep Tendon Reflexes: Reflexes normal.   Psychiatric:         Behavior: Behavior normal.         Assessment and Plan     :1. Left-sided weakness    -     CT Head Without Contrast; Future; Expected date: 08/01/2024-  order printed and given to patient to take to [facility] in [place]  -     Comprehensive Metabolic Panel; Future; Expected date: 08/01/2024  -     TSH; Future; Expected date: 08/01/2024    2. Acute nonintractable headache, unspecified headache type    -     CT Head Without Contrast; Future; Expected date: 08/01/2024  -     Comprehensive Metabolic Panel; Future; Expected date: 08/01/2024  -     TSH; Future; Expected date: 08/01/2024    3. Primary hypertension    -     Comprehensive Metabolic Panel; Future; Expected date: 08/01/2024    4. Fatigue, unspecified type    -     TSH; Future; Expected date: 08/01/2024       Await lab and CT  Discussed possible neuro consult if s/s persist but patient. States she already sees  neuro and has appointment soon.       Future Appointments   Date Time Provider Department Center   8/12/2024  9:45 AM [facility] Genao [facility]            Signature:  JAYLIN Leal [facility] [address] [place] [address]  [phone]    Date of encounter: 8/1/24

## 2024-08-02 NOTE — TELEPHONE ENCOUNTER
Called patient to see if she had head CT yesterday in [place]. Patient stated no due to insurance needing an authorization code and they would have to schedule her a CT but has not heard back yet with appt.

## 2024-08-09 ENCOUNTER — CLINICAL SUPPORT (OUTPATIENT)
Dept: FAMILY MEDICINE | Facility: CLINIC | Age: 29
End: 2024-08-09
Payer: COMMERCIAL

## 2024-08-09 VITALS
WEIGHT: 232.63 LBS | TEMPERATURE: 99 F | OXYGEN SATURATION: 99 % | DIASTOLIC BLOOD PRESSURE: 76 MMHG | HEART RATE: 74 BPM | SYSTOLIC BLOOD PRESSURE: 114 MMHG | BODY MASS INDEX: 41.22 KG/M2 | HEIGHT: 63 IN

## 2024-08-09 DIAGNOSIS — Z76.89 ENCOUNTER FOR ALLERGY INJECTION: Primary | ICD-10-CM

## 2024-08-20 ENCOUNTER — OFFICE VISIT (OUTPATIENT)
Dept: NEUROLOGY | Facility: CLINIC | Age: 29
End: 2024-08-20
Payer: COMMERCIAL

## 2024-08-20 VITALS
WEIGHT: 227 LBS | DIASTOLIC BLOOD PRESSURE: 84 MMHG | RESPIRATION RATE: 18 BRPM | OXYGEN SATURATION: 100 % | HEART RATE: 79 BPM | SYSTOLIC BLOOD PRESSURE: 126 MMHG | BODY MASS INDEX: 40.22 KG/M2 | HEIGHT: 63 IN

## 2024-08-20 DIAGNOSIS — G43.009 MIGRAINE WITHOUT AURA AND WITHOUT STATUS MIGRAINOSUS, NOT INTRACTABLE: ICD-10-CM

## 2024-08-20 DIAGNOSIS — G44.209 TENSION HEADACHE: Primary | ICD-10-CM

## 2024-08-20 PROCEDURE — 1160F RVW MEDS BY RX/DR IN RCRD: CPT | Mod: CPTII,,, | Performed by: PSYCHIATRY & NEUROLOGY

## 2024-08-20 PROCEDURE — 99214 OFFICE O/P EST MOD 30 MIN: CPT | Mod: PBBFAC | Performed by: PSYCHIATRY & NEUROLOGY

## 2024-08-20 PROCEDURE — 3044F HG A1C LEVEL LT 7.0%: CPT | Mod: CPTII,,, | Performed by: PSYCHIATRY & NEUROLOGY

## 2024-08-20 PROCEDURE — 99999 PR PBB SHADOW E&M-EST. PATIENT-LVL IV: CPT | Mod: PBBFAC,,, | Performed by: PSYCHIATRY & NEUROLOGY

## 2024-08-20 PROCEDURE — 4010F ACE/ARB THERAPY RXD/TAKEN: CPT | Mod: CPTII,,, | Performed by: PSYCHIATRY & NEUROLOGY

## 2024-08-20 PROCEDURE — 3079F DIAST BP 80-89 MM HG: CPT | Mod: CPTII,,, | Performed by: PSYCHIATRY & NEUROLOGY

## 2024-08-20 PROCEDURE — 1159F MED LIST DOCD IN RCRD: CPT | Mod: CPTII,,, | Performed by: PSYCHIATRY & NEUROLOGY

## 2024-08-20 PROCEDURE — 99214 OFFICE O/P EST MOD 30 MIN: CPT | Mod: S$PBB,,, | Performed by: PSYCHIATRY & NEUROLOGY

## 2024-08-20 PROCEDURE — 3074F SYST BP LT 130 MM HG: CPT | Mod: CPTII,,, | Performed by: PSYCHIATRY & NEUROLOGY

## 2024-08-20 PROCEDURE — 3008F BODY MASS INDEX DOCD: CPT | Mod: CPTII,,, | Performed by: PSYCHIATRY & NEUROLOGY

## 2024-08-20 RX ORDER — AMITRIPTYLINE HYDROCHLORIDE 25 MG/1
25 TABLET, FILM COATED ORAL NIGHTLY
Qty: 90 TABLET | Refills: 3 | Status: SHIPPED | OUTPATIENT
Start: 2024-08-20 | End: 2025-08-20

## 2024-08-20 RX ORDER — RIZATRIPTAN BENZOATE 10 MG/1
10 TABLET ORAL DAILY PRN
Qty: 27 TABLET | Refills: 3 | Status: SHIPPED | OUTPATIENT
Start: 2024-08-20

## 2024-08-20 NOTE — PROGRESS NOTES
Subjective:       Patient ID: Gabriella Ulloa is a 29 y.o. female     Chief Complaint:    Chief Complaint   Patient presents with    Tension Headache     Pt. States had a bad migraine a couple weeks ago had numbness on left side and had Cat scan.        Allergies:  Grass pollen-bermuda, standard and Grass pollen-natalia, standard    Current Medications:    Outpatient Encounter Medications as of 2024   Medication Sig Dispense Refill    ergocalciferol (ERGOCALCIFEROL) 50,000 unit Cap Take 1 capsule (50,000 Units total) by mouth every 7 days. 12 capsule 3    losartan (COZAAR) 50 MG tablet TAKE 1 TABLET (50 MG TOTAL) BY MOUTH ONCE DAILY. 30 tablet 3    norethindrone (AYGESTIN) 5 mg Tab Take 1 tablet (5 mg total) by mouth once daily. 90 tablet 1    norethindrone (MICRONOR) 0.35 mg tablet       polymyxin B sulf-trimethoprim (POLYTRIM) 10,000 unit- 1 mg/mL Drop Place 1 drop into both eyes every 6 (six) hours. 10 mL 0    EPINEPHrine (EPIPEN) 0.3 mg/0.3 mL AtIn Inject 0.3 mLs (0.3 mg total) into the muscle once. for 1 dose 0.3 mL 0     No facility-administered encounter medications on file as of 2024.       History of Present Illness  30 yo BF w/ wellcontrolled tension headaches on Elavil 25 mg qhs   Recent flurry of week long migraine one month ago   Had CT head in [place] but no records available   Maxalt prn does work well but she did not take one at the time          Past Medical History:   Diagnosis Date    Anemia     Anxiety     Breakthrough bleeding on birth control pills 2015    Endometriosis of pelvic peritoneum 2020    cul-de-sac and sigmoid colon    Hypertension     Irritable bowel syndrome Chronic    Increased pain with bowel movements with menses       Past Surgical History:   Procedure Laterality Date     SECTION      D&C/Hysteroscopy with robotic and operative Laparoscopy  2020    DIAGNOSTIC LAPAROSCOPY N/A 2022    Procedure: LAPAROSCOPY, DIAGNOSTIC;  Surgeon:  "Otis Mccullough MD;  [facility];  Service: OB/GYN;  Laterality: N/A;    DILATION AND CURETTAGE OF UTERUS  06/25/2020    ENDOMETRIAL BIOPSY N/A 2/8/2022    Procedure: BIOPSY, ENDOMETRIUM;  Surgeon: Otis Mccullough MD;  [facility];  Service: OB/GYN;  Laterality: N/A;    HYSTEROSCOPY WITH DILATION AND CURETTAGE OF UTERUS N/A 2/8/2022    Procedure: HYSTEROSCOPY, WITH DILATION AND CURETTAGE OF UTERUS;  Surgeon: Otis Mccullough MD;  [facility];  Service: OB/GYN;  Laterality: N/A;       Social History  Ms. Ulloa  reports that she has never smoked. She has never been exposed to tobacco smoke. She has never used smokeless tobacco. She reports that she does not drink alcohol and does not use drugs.    Family History  Ms.'s Ulloa family history includes Breast cancer in an other family member; Diabetes in her mother and another family member; Heart disease in her mother; Hypertension in an other family member; Liver cancer in an other family member; Prostate cancer in her maternal grandfather; Stomach cancer in an other family member.    Review of Systems  Review of Systems   Neurological:  Positive for headaches.   All other systems reviewed and are negative.     Objective:   /84 (BP Location: Right arm, Patient Position: Sitting, BP Method: Large (Manual))   Pulse 79   Resp 18   Ht 5' 3" (1.6 m)   Wt 103 kg (227 lb)   SpO2 100%   BMI 40.21 kg/m²    NEUROLOGICAL EXAMINATION:     MENTAL STATUS   Oriented to person, place, and time.   Level of consciousness: alert  Knowledge: good.     CRANIAL NERVES   Cranial nerves II through XII intact.     MOTOR EXAM     Strength   Strength 5/5 throughout.     GAIT AND COORDINATION     Gait  Gait: normal       Physical Exam  Vitals reviewed.   Constitutional:       Appearance: She is obese.   Neurological:      General: No focal deficit present.      Mental Status: She is alert and oriented to person, place, and time. Mental status is at " baseline.      Cranial Nerves: Cranial nerves 2-12 are intact.      Motor: Motor strength is normal.     Gait: Gait is intact.          Assessment:     There are no diagnoses linked to this encounter.     Primary Diagnosis and ICD10  No primary diagnosis found.    Plan:     There are no Patient Instructions on file for this visit.    There are no discontinued medications.    Requested Prescriptions      No prescriptions requested or ordered in this encounter

## 2024-08-21 ENCOUNTER — PATIENT MESSAGE (OUTPATIENT)
Dept: FAMILY MEDICINE | Facility: CLINIC | Age: 29
End: 2024-08-21
Payer: COMMERCIAL

## 2024-08-21 RX ORDER — LOSARTAN POTASSIUM 50 MG/1
50 TABLET ORAL DAILY
Qty: 30 TABLET | Refills: 3 | Status: SHIPPED | OUTPATIENT
Start: 2024-08-21

## 2024-08-22 RX ORDER — NORETHINDRONE 5 MG/1
5 TABLET ORAL DAILY
Qty: 90 TABLET | Refills: 1 | Status: SHIPPED | OUTPATIENT
Start: 2024-08-22

## 2024-08-22 RX ORDER — FLUCONAZOLE 100 MG/1
100 TABLET ORAL DAILY
Qty: 5 TABLET | Refills: 0 | Status: SHIPPED | OUTPATIENT
Start: 2024-08-22 | End: 2024-08-27

## 2024-08-23 ENCOUNTER — CLINICAL SUPPORT (OUTPATIENT)
Dept: FAMILY MEDICINE | Facility: CLINIC | Age: 29
End: 2024-08-23
Payer: COMMERCIAL

## 2024-08-23 DIAGNOSIS — Z76.89 ENCOUNTER FOR ALLERGY INJECTION: ICD-10-CM

## 2024-08-23 DIAGNOSIS — R53.1 LEFT-SIDED WEAKNESS: ICD-10-CM

## 2024-08-23 DIAGNOSIS — E53.8 B12 DEFICIENCY: Primary | ICD-10-CM

## 2024-08-23 PROCEDURE — 95115 IMMUNOTHERAPY ONE INJECTION: CPT | Mod: ,,, | Performed by: NURSE PRACTITIONER

## 2024-09-17 RX ORDER — VENLAFAXINE HYDROCHLORIDE 37.5 MG/1
37.5 CAPSULE, EXTENDED RELEASE ORAL DAILY
Qty: 30 CAPSULE | Refills: 0 | Status: SHIPPED | OUTPATIENT
Start: 2024-09-17 | End: 2025-09-17

## 2024-09-20 ENCOUNTER — OFFICE VISIT (OUTPATIENT)
Dept: FAMILY MEDICINE | Facility: CLINIC | Age: 29
End: 2024-09-20
Payer: MEDICAID

## 2024-09-20 VITALS
DIASTOLIC BLOOD PRESSURE: 78 MMHG | OXYGEN SATURATION: 100 % | HEIGHT: 63 IN | SYSTOLIC BLOOD PRESSURE: 116 MMHG | HEART RATE: 78 BPM | TEMPERATURE: 99 F | BODY MASS INDEX: 41.71 KG/M2 | WEIGHT: 235.38 LBS

## 2024-09-20 DIAGNOSIS — R23.2 HOT FLASHES: ICD-10-CM

## 2024-09-20 DIAGNOSIS — I10 HYPERTENSION, UNSPECIFIED TYPE: Primary | ICD-10-CM

## 2024-09-20 DIAGNOSIS — R00.2 HEART PALPITATIONS: ICD-10-CM

## 2024-09-20 DIAGNOSIS — F41.9 ANXIETY: ICD-10-CM

## 2024-09-20 LAB
ALBUMIN SERPL BCP-MCNC: 3.5 G/DL (ref 3.5–5)
ALBUMIN/GLOB SERPL: 0.9 {RATIO}
ALP SERPL-CCNC: 73 U/L (ref 37–98)
ALT SERPL W P-5'-P-CCNC: 20 U/L (ref 13–56)
ANION GAP SERPL CALCULATED.3IONS-SCNC: 7 MMOL/L (ref 7–16)
AST SERPL W P-5'-P-CCNC: 13 U/L (ref 15–37)
BASOPHILS # BLD AUTO: 0.05 K/UL (ref 0–0.2)
BASOPHILS NFR BLD AUTO: 1 % (ref 0–1)
BILIRUB SERPL-MCNC: 0.5 MG/DL (ref ?–1.2)
BUN SERPL-MCNC: 8 MG/DL (ref 7–18)
BUN/CREAT SERPL: 10 (ref 6–20)
CALCIUM SERPL-MCNC: 9.4 MG/DL (ref 8.5–10.1)
CHLORIDE SERPL-SCNC: 108 MMOL/L (ref 98–107)
CK MB SERPL-MCNC: <1 NG/ML (ref 1–3.6)
CK SERPL-CCNC: 79 U/L (ref 26–192)
CO2 SERPL-SCNC: 28 MMOL/L (ref 21–32)
CREAT SERPL-MCNC: 0.77 MG/DL (ref 0.55–1.02)
D DIMER PPP FEU-MCNC: 0.31 ΜG/ML (ref 0–0.47)
DIFFERENTIAL METHOD BLD: ABNORMAL
EGFR (NO RACE VARIABLE) (RUSH/TITUS): 107 ML/MIN/1.73M2
EOSINOPHIL # BLD AUTO: 0.28 K/UL (ref 0–0.5)
EOSINOPHIL NFR BLD AUTO: 5.6 % (ref 1–4)
ERYTHROCYTE [DISTWIDTH] IN BLOOD BY AUTOMATED COUNT: 14.4 % (ref 11.5–14.5)
GLOBULIN SER-MCNC: 3.9 G/DL (ref 2–4)
GLUCOSE SERPL-MCNC: 84 MG/DL (ref 74–106)
HCT VFR BLD AUTO: 41.2 % (ref 38–47)
HGB BLD-MCNC: 12.9 G/DL (ref 12–16)
IMM GRANULOCYTES # BLD AUTO: 0.02 K/UL (ref 0–0.04)
IMM GRANULOCYTES NFR BLD: 0.4 % (ref 0–0.4)
LYMPHOCYTES # BLD AUTO: 2.44 K/UL (ref 1–4.8)
LYMPHOCYTES NFR BLD AUTO: 48.4 % (ref 27–41)
MAGNESIUM SERPL-MCNC: 2.1 MG/DL (ref 1.7–2.3)
MCH RBC QN AUTO: 26.8 PG (ref 27–31)
MCHC RBC AUTO-ENTMCNC: 31.3 G/DL (ref 32–36)
MCV RBC AUTO: 85.5 FL (ref 80–96)
MONOCYTES # BLD AUTO: 0.41 K/UL (ref 0–0.8)
MONOCYTES NFR BLD AUTO: 8.1 % (ref 2–6)
MPC BLD CALC-MCNC: 10.3 FL (ref 9.4–12.4)
NEUTROPHILS # BLD AUTO: 1.84 K/UL (ref 1.8–7.7)
NEUTROPHILS NFR BLD AUTO: 36.5 % (ref 53–65)
NRBC # BLD AUTO: 0 X10E3/UL
NRBC, AUTO (.00): 0 %
NT-PROBNP SERPL-MCNC: 16 PG/ML (ref 1–125)
PLATELET # BLD AUTO: 388 K/UL (ref 150–400)
POTASSIUM SERPL-SCNC: 4 MMOL/L (ref 3.5–5.1)
PROT SERPL-MCNC: 7.4 G/DL (ref 6.4–8.2)
RBC # BLD AUTO: 4.82 M/UL (ref 4.2–5.4)
SODIUM SERPL-SCNC: 139 MMOL/L (ref 136–145)
TROPONIN I SERPL DL<=0.01 NG/ML-MCNC: 4.5 PG/ML
WBC # BLD AUTO: 5.04 K/UL (ref 4.5–11)

## 2024-09-20 PROCEDURE — 84484 ASSAY OF TROPONIN QUANT: CPT | Mod: ,,, | Performed by: CLINICAL MEDICAL LABORATORY

## 2024-09-20 PROCEDURE — 83880 ASSAY OF NATRIURETIC PEPTIDE: CPT | Mod: ,,, | Performed by: CLINICAL MEDICAL LABORATORY

## 2024-09-20 PROCEDURE — 82550 ASSAY OF CK (CPK): CPT | Mod: ,,, | Performed by: CLINICAL MEDICAL LABORATORY

## 2024-09-20 PROCEDURE — 82553 CREATINE MB FRACTION: CPT | Mod: ,,, | Performed by: CLINICAL MEDICAL LABORATORY

## 2024-09-20 PROCEDURE — 80053 COMPREHEN METABOLIC PANEL: CPT | Mod: ,,, | Performed by: CLINICAL MEDICAL LABORATORY

## 2024-09-20 PROCEDURE — 85025 COMPLETE CBC W/AUTO DIFF WBC: CPT | Mod: ,,, | Performed by: CLINICAL MEDICAL LABORATORY

## 2024-09-20 PROCEDURE — 83735 ASSAY OF MAGNESIUM: CPT | Mod: ,,, | Performed by: CLINICAL MEDICAL LABORATORY

## 2024-09-20 NOTE — PROGRESS NOTES
-   Floyd Hawkins NP   [address] [place] [address]     PATIENT NAME: Gabriella Ulloa  : 1995  DATE: 24  MRN: [id number]      Billing Provider: Floyd Hawkins NP  Level of Service:   Patient PCP Information       Provider PCP Type    Jayde Cole DNP General            Reason for Visit / Chief Complaint: Cough, Migraine, and Palpitations       Update PCP  Update Chief Complaint         History of Present Illness / Problem Focused Workflow     Gabriella Ulloa presents to the clinic with Cough, Migraine, and Palpitations     Patient here today for complaints of heart palpitations and hot flashes Patient reports feeling like her heart is racing and she has to cough to slow it down or take Aspirin for relief of symptoms. Denies shortness of breath, chest pain, or diaphoresis. Patient has a hx of anxiety and taking Effexor.She is also followed by cardiology and has not seen provider in about a year. Labs today. EKG normal. Advised to decrease/stop caffeine use. Increase Effexor to 75 mg daily. Advised to follow up with cardiology. Return to clinic if symptoms worsen and as needed.      BP at goal today. Continue DASH diet and current medication.      Cough  Pertinent negatives include no chest pain, chills, fever, headaches, postnasal drip, rash or sore throat.   Migraine   Pertinent negatives include no abdominal pain, coughing, dizziness, fever or sore throat.   Palpitations   Pertinent negatives include no anxiety, chest pain, coughing, dizziness or fever.       Review of Systems     Review of Systems   Constitutional:  Negative for chills and fever.   HENT:  Negative for nasal congestion, postnasal drip and sore throat.    Eyes: Negative.    Respiratory: Negative.  Negative for cough.    Cardiovascular:  Positive for palpitations. Negative for chest pain.   Gastrointestinal:  Negative for abdominal pain.   Endocrine: Negative.    Genitourinary: Negative.  Negative for  dysuria and hematuria.   Musculoskeletal: Negative.    Integumentary:  Negative for rash. Negative.   Neurological:  Negative for dizziness and headaches.   Hematological: Negative.    Psychiatric/Behavioral: Negative.  Negative for agitation and behavioral problems. The patient is not nervous/anxious and is not hyperactive.         Medical / Social / Family History     Past Medical History:   Diagnosis Date    Anemia     Anxiety     Breakthrough bleeding on birth control pills 2015    Endometriosis of pelvic peritoneum 2020    cul-de-sac and sigmoid colon    Hypertension     Irritable bowel syndrome Chronic    Increased pain with bowel movements with menses       Past Surgical History:   Procedure Laterality Date     SECTION      D&C/Hysteroscopy with robotic and operative Laparoscopy  2020    DIAGNOSTIC LAPAROSCOPY N/A 2022    Procedure: LAPAROSCOPY, DIAGNOSTIC;  Surgeon: Otis Mccullough MD;  Location: [facility];  Service: OB/GYN;  Laterality: N/A;    DILATION AND CURETTAGE OF UTERUS  2020    ENDOMETRIAL BIOPSY N/A 2022    Procedure: BIOPSY, ENDOMETRIUM;  Surgeon: Otis Mccullough MD;  Location: [facility];  Service: OB/GYN;  Laterality: N/A;    HYSTEROSCOPY WITH DILATION AND CURETTAGE OF UTERUS N/A 2022    Procedure: HYSTEROSCOPY, WITH DILATION AND CURETTAGE OF UTERUS;  Surgeon: Otis Mccullough MD;  Location: [facility];  Service: OB/GYN;  Laterality: N/A;       Social History  Ms.  reports that she has never smoked. She has never been exposed to tobacco smoke. She has never used smokeless tobacco. She reports that she does not drink alcohol and does not use drugs.    Family History  Ms.'s family history includes Breast cancer in an other family member; Diabetes in her mother and another family member; Heart disease in her mother; Hypertension in an other family member; Liver cancer in an other family member; Prostate cancer in her maternal grandfather; Stomach  "cancer in an other family member.    Medications and Allergies     Medications  No outpatient medications have been marked as taking for the 9/20/24 encounter (Office Visit) with Floyd Hawkins NP.       Allergies  Review of patient's allergies indicates:   Allergen Reactions    Grass pollen-bermuda, standard     Grass pollen-natalia, standard        Physical Examination   /78   Pulse 78   Temp 98.5 °F (36.9 °C)   Ht 5' 3" (1.6 m)   Wt 106.8 kg (235 lb 6.4 oz)   SpO2 100%   BMI 41.70 kg/m²    Physical Exam  Vitals reviewed.   Constitutional:       Appearance: Normal appearance. She is obese.   HENT:      Mouth/Throat:      Mouth: Mucous membranes are moist.   Eyes:      Conjunctiva/sclera: Conjunctivae normal.      Pupils: Pupils are equal, round, and reactive to light.   Cardiovascular:      Rate and Rhythm: Normal rate and regular rhythm.      Pulses: Normal pulses.      Heart sounds: Normal heart sounds. No murmur heard.  Pulmonary:      Effort: Pulmonary effort is normal.      Breath sounds: Normal breath sounds.   Abdominal:      General: Bowel sounds are normal.      Palpations: Abdomen is soft.   Musculoskeletal:      Cervical back: Normal range of motion and neck supple.   Lymphadenopathy:      Cervical: No cervical adenopathy.   Skin:     General: Skin is warm and dry.      Capillary Refill: Capillary refill takes less than 2 seconds.   Neurological:      Mental Status: She is alert.   Psychiatric:         Mood and Affect: Mood normal.         Behavior: Behavior normal.          Assessment and Plan (including Health Maintenance)      Problem List  Smart Sets  Document Outside HM   :    Plan:   Labs today  EKG normal  Advised to decrease/stop caffeine use  Increase Effexor to 75 mg daily  Advised to follow up with cardiology  Return to clinic  as needed.    Go to ER if symptoms worsen        Health Maintenance Due   Topic Date Due    Influenza Vaccine (1) 09/01/2024       Problem List Items " Addressed This Visit          Cardiac/Vascular    Heart palpitations - Primary    Relevant Orders    CK Total & CKMB    Magnesium    Troponin I    D-Dimer, Quantitative    CBC Auto Differential    Comprehensive Metabolic Panel    NT-Pro Natriuretic Peptide    IN OFFICE EKG 12-LEAD (to [place])       Health Maintenance Topics with due status: Not Due       Topic Last Completion Date    TETANUS VACCINE 03/01/2023    Cervical Cancer Screening 10/02/2023       Future Appointments   Date Time Provider Department Center   2/20/2025  8:45 AM Markos Genao MD [facility] NEURO [facility]            Signature:  Floyd Hawkins NP      [address] [place] [address]    Date of encounter: 9/20/24

## 2024-09-20 NOTE — PATIENT INSTRUCTIONS
Labs today  EKG normal  Advised to decrease/stop caffeine use  Increase Effexor to 75 mg daily  Advised to follow up with cardiology  Return to clinic  as needed.    Go to ER if symptoms worsen.

## 2024-09-26 ENCOUNTER — PATIENT MESSAGE (OUTPATIENT)
Dept: FAMILY MEDICINE | Facility: CLINIC | Age: 29
End: 2024-09-26
Payer: MEDICAID

## 2024-09-26 RX ORDER — FLUCONAZOLE 150 MG/1
150 TABLET ORAL DAILY
Qty: 5 TABLET | Refills: 0 | Status: SHIPPED | OUTPATIENT
Start: 2024-09-26

## 2024-09-26 RX ORDER — CEPHALEXIN 500 MG/1
500 CAPSULE ORAL EVERY 6 HOURS
COMMUNITY

## 2024-09-26 RX ORDER — FLUCONAZOLE 150 MG/1
150 TABLET ORAL DAILY
COMMUNITY
End: 2024-09-26 | Stop reason: SDUPTHER

## 2024-09-26 RX ORDER — AZITHROMYCIN 250 MG/1
500 TABLET, FILM COATED ORAL DAILY
COMMUNITY

## 2024-09-26 RX ORDER — AZITHROMYCIN 250 MG/1
500 TABLET, FILM COATED ORAL DAILY
Qty: 2 TABLET | Status: CANCELLED | OUTPATIENT
Start: 2024-09-26

## 2024-09-27 RX ORDER — METRONIDAZOLE 500 MG/1
500 TABLET ORAL
COMMUNITY
End: 2024-09-27 | Stop reason: SDUPTHER

## 2024-09-27 RX ORDER — METRONIDAZOLE 500 MG/1
500 TABLET ORAL EVERY 8 HOURS
Qty: 14 TABLET | Refills: 0 | Status: SHIPPED | OUTPATIENT
Start: 2024-09-27 | End: 2024-10-04

## 2024-10-11 RX ORDER — VENLAFAXINE HYDROCHLORIDE 37.5 MG/1
37.5 CAPSULE, EXTENDED RELEASE ORAL DAILY
Qty: 30 CAPSULE | Refills: 0 | Status: SHIPPED | OUTPATIENT
Start: 2024-10-11 | End: 2025-10-11

## 2024-11-07 ENCOUNTER — OFFICE VISIT (OUTPATIENT)
Dept: FAMILY MEDICINE | Facility: CLINIC | Age: 29
End: 2024-11-07
Payer: MEDICAID

## 2024-11-07 VITALS
OXYGEN SATURATION: 97 % | HEART RATE: 73 BPM | SYSTOLIC BLOOD PRESSURE: 116 MMHG | WEIGHT: 233.38 LBS | BODY MASS INDEX: 41.35 KG/M2 | DIASTOLIC BLOOD PRESSURE: 82 MMHG | HEIGHT: 63 IN

## 2024-11-07 DIAGNOSIS — M54.32 SCIATICA OF LEFT SIDE: Primary | ICD-10-CM

## 2024-11-07 DIAGNOSIS — M54.9 BACK PAIN, UNSPECIFIED BACK LOCATION, UNSPECIFIED BACK PAIN LATERALITY, UNSPECIFIED CHRONICITY: ICD-10-CM

## 2024-11-07 DIAGNOSIS — M25.552 LEFT HIP PAIN: ICD-10-CM

## 2024-11-07 LAB
BILIRUB SERPL-MCNC: ABNORMAL MG/DL
BLOOD URINE, POC: ABNORMAL
CLARITY, UA: CLEAR
COLOR, UA: YELLOW
GLUCOSE UR QL STRIP: ABNORMAL
KETONES UR QL STRIP: ABNORMAL
LEUKOCYTE ESTERASE URINE, POC: ABNORMAL
NITRITE, POC UA: ABNORMAL
PH, POC UA: 6.5
PROTEIN, POC: ABNORMAL
SPECIFIC GRAVITY, POC UA: 1.03
UROBILINOGEN, POC UA: 2

## 2024-11-07 PROCEDURE — 81003 URINALYSIS AUTO W/O SCOPE: CPT | Mod: QW,,, | Performed by: NURSE PRACTITIONER

## 2024-11-07 PROCEDURE — 87086 URINE CULTURE/COLONY COUNT: CPT | Mod: ,,, | Performed by: CLINICAL MEDICAL LABORATORY

## 2024-11-07 RX ORDER — BETAMETHASONE SODIUM PHOSPHATE AND BETAMETHASONE ACETATE 3; 3 MG/ML; MG/ML
6 INJECTION, SUSPENSION INTRA-ARTICULAR; INTRALESIONAL; INTRAMUSCULAR; SOFT TISSUE
Status: COMPLETED | OUTPATIENT
Start: 2024-11-07 | End: 2024-11-07

## 2024-11-07 RX ORDER — KETOROLAC TROMETHAMINE 30 MG/ML
60 INJECTION, SOLUTION INTRAMUSCULAR; INTRAVENOUS
Status: COMPLETED | OUTPATIENT
Start: 2024-11-07 | End: 2024-11-07

## 2024-11-07 RX ADMIN — KETOROLAC TROMETHAMINE 60 MG: 30 INJECTION, SOLUTION INTRAMUSCULAR; INTRAVENOUS at 09:11

## 2024-11-07 RX ADMIN — BETAMETHASONE SODIUM PHOSPHATE AND BETAMETHASONE ACETATE 6 MG: 3; 3 INJECTION, SUSPENSION INTRA-ARTICULAR; INTRALESIONAL; INTRAMUSCULAR; SOFT TISSUE at 09:11

## 2024-11-08 PROBLEM — R53.83 FATIGUE: Status: RESOLVED | Noted: 2024-08-01 | Resolved: 2024-11-08

## 2024-11-08 PROBLEM — M54.9 BACK PAIN: Status: ACTIVE | Noted: 2024-11-08

## 2024-11-08 PROBLEM — G47.30 SLEEP APNEA: Status: RESOLVED | Noted: 2022-11-15 | Resolved: 2024-11-08

## 2024-11-08 PROBLEM — R23.2 HOT FLASHES: Status: RESOLVED | Noted: 2024-04-01 | Resolved: 2024-11-08

## 2024-11-08 PROBLEM — G47.10 HYPERSOMNIA: Status: RESOLVED | Noted: 2022-11-15 | Resolved: 2024-11-08

## 2024-11-08 PROBLEM — R93.89 ABNORMAL X-RAY OF NECK: Status: RESOLVED | Noted: 2023-09-13 | Resolved: 2024-11-08

## 2024-11-08 PROBLEM — R50.9 FEVER: Status: RESOLVED | Noted: 2023-08-17 | Resolved: 2024-11-08

## 2024-11-08 NOTE — PROGRESS NOTES
Jayde Cole DNP   [address] [place] [address]     PATIENT NAME: Gabriella Ulloa  : 1995  DATE: 24  MRN: [id number]      Billing Provider: Jayde Cole DNP  Level of Service:   Patient PCP Information       Provider PCP Type    Jayde Cole DNP General            Reason for Visit / Chief Complaint: Back Pain and Hip Pain       Update PCP  Update Chief Complaint         History of Present Illness / Problem Focused Workflow     Gabriella Ulloa presents to the clinic with Back Pain and Hip Pain     Back Pain    Hip Pain         Review of Systems     Review of Systems   Musculoskeletal:  Positive for back pain.        Medical / Social / Family History     Past Medical History:   Diagnosis Date    Anemia     Anxiety     Breakthrough bleeding on birth control pills 2015    Endometriosis of pelvic peritoneum 2020    cul-de-sac and sigmoid colon    Hypertension     Irritable bowel syndrome Chronic    Increased pain with bowel movements with menses       Past Surgical History:   Procedure Laterality Date     SECTION      D&C/Hysteroscopy with robotic and operative Laparoscopy  2020    DIAGNOSTIC LAPAROSCOPY N/A 2022    Procedure: LAPAROSCOPY, DIAGNOSTIC;  Surgeon: Otis Mccullough MD;  Location: [facility];  Service: OB/GYN;  Laterality: N/A;    DILATION AND CURETTAGE OF UTERUS  2020    ENDOMETRIAL BIOPSY N/A 2022    Procedure: BIOPSY, ENDOMETRIUM;  Surgeon: Otis Mccullough MD;  Location: [facility] OR;  Service: OB/GYN;  Laterality: N/A;    HYSTEROSCOPY WITH DILATION AND CURETTAGE OF UTERUS N/A 2022    Procedure: HYSTEROSCOPY, WITH DILATION AND CURETTAGE OF UTERUS;  Surgeon: Otis Mccullough MD;  Location: [facility];  Service: OB/GYN;  Laterality: N/A;       Social History  Ms.  reports that she has never smoked. She has never been exposed to tobacco smoke. She has never used smokeless tobacco. She reports that she does not  "drink alcohol and does not use drugs.    Family History  Ms.'s family history includes Breast cancer in an other family member; Diabetes in her mother and another family member; Heart disease in her mother; Hypertension in an other family member; Liver cancer in an other family member; Prostate cancer in her maternal grandfather; Stomach cancer in an other family member.    Medications and Allergies     Medications  Outpatient Medications Marked as Taking for the 11/7/24 encounter (Office Visit) with Jayde Cole DNP   Medication Sig Dispense Refill    EPINEPHrine (EPIPEN) 0.3 mg/0.3 mL AtIn Inject 0.3 mLs (0.3 mg total) into the muscle once. for 1 dose 0.3 mL 0    losartan (COZAAR) 50 MG tablet Take 1 tablet (50 mg total) by mouth once daily. 30 tablet 3    rizatriptan (MAXALT) 10 MG tablet Take 1 tablet (10 mg total) by mouth daily as needed for Migraine (take onset of migraine). 27 tablet 3    venlafaxine (EFFEXOR-XR) 37.5 MG 24 hr capsule Take 1 capsule (37.5 mg total) by mouth once daily. 30 capsule 0       Allergies  Review of patient's allergies indicates:   Allergen Reactions    Grass pollen-bermuda, standard     Grass pollen-natalia, standard        Physical Examination   /82 (BP Location: Right arm, Patient Position: Sitting)   Pulse 73   Ht 5' 3" (1.6 m)   Wt 105.9 kg (233 lb 6.4 oz)   SpO2 97%   BMI 41.34 kg/m²    Physical Exam  Vitals and nursing note reviewed.   Constitutional:       Appearance: Normal appearance. She is obese.   HENT:      Head: Normocephalic.      Nose: Nose normal.      Mouth/Throat:      Mouth: Mucous membranes are moist.   Eyes:      Pupils: Pupils are equal, round, and reactive to light.   Cardiovascular:      Rate and Rhythm: Normal rate and regular rhythm.      Pulses: Normal pulses.      Heart sounds: Normal heart sounds.   Pulmonary:      Effort: Pulmonary effort is normal.      Breath sounds: Normal breath sounds.   Abdominal:      General: Abdomen is flat. " Bowel sounds are normal.      Palpations: Abdomen is soft.   Musculoskeletal:         General: Tenderness present.      Cervical back: Normal range of motion and neck supple.      Comments: Left hip and left sciatica radiating down buttocks   Skin:     General: Skin is warm and dry.      Capillary Refill: Capillary refill takes less than 2 seconds.   Neurological:      General: No focal deficit present.      Mental Status: She is alert and oriented to person, place, and time. Mental status is at baseline.   Psychiatric:         Mood and Affect: Mood normal.         Behavior: Behavior normal.         Thought Content: Thought content normal.         Judgment: Judgment normal.          Assessment and Plan (including Health Maintenance)      Problem List  Smart Sets  Document Outside HM   :    Plan:     Pt requested IM injections   Urine ok  Discussed and demonstrated ROM techniques    Health Maintenance Due   Topic Date Due    Influenza Vaccine (1) 09/01/2024       Problem List Items Addressed This Visit          Orthopedic    Back pain    Relevant Orders    POCT URINALYSIS W/O SCOPE (Completed)    Urine culture (Completed)    Left hip pain    Sciatica of left side - Primary       Health Maintenance Topics with due status: Not Due       Topic Last Completion Date    TETANUS VACCINE 03/01/2023    Cervical Cancer Screening 10/02/2023    RSV Vaccine (Age 60+ and Pregnant patients) Not Due       Future Appointments   Date Time Provider Department Center   2/20/2025  8:45 AM Markos Genao MD [facility] NEURO [facility]            Signature:  Jayde Cole DNP      [address] [place] [address]    Date of encounter: 11/7/24

## 2024-11-09 LAB — UA COMPLETE W REFLEX CULTURE PNL UR: NORMAL

## 2024-11-14 RX ORDER — VENLAFAXINE HYDROCHLORIDE 37.5 MG/1
37.5 CAPSULE, EXTENDED RELEASE ORAL DAILY
Qty: 30 CAPSULE | Refills: 0 | Status: SHIPPED | OUTPATIENT
Start: 2024-11-14 | End: 2025-11-14

## 2024-11-20 ENCOUNTER — CLINICAL SUPPORT (OUTPATIENT)
Dept: FAMILY MEDICINE | Facility: CLINIC | Age: 29
End: 2024-11-20
Payer: MEDICAID

## 2024-11-20 VITALS
TEMPERATURE: 98 F | BODY MASS INDEX: 41.36 KG/M2 | HEIGHT: 63 IN | WEIGHT: 233.44 LBS | OXYGEN SATURATION: 99 % | SYSTOLIC BLOOD PRESSURE: 124 MMHG | HEART RATE: 71 BPM | DIASTOLIC BLOOD PRESSURE: 86 MMHG

## 2024-11-20 DIAGNOSIS — Z23 NEED FOR VACCINATION: Primary | ICD-10-CM

## 2024-11-20 PROCEDURE — 90656 IIV3 VACC NO PRSV 0.5 ML IM: CPT | Mod: ,,, | Performed by: NURSE PRACTITIONER

## 2024-11-20 PROCEDURE — 90471 IMMUNIZATION ADMIN: CPT | Mod: ,,, | Performed by: NURSE PRACTITIONER

## 2024-12-02 RX ORDER — LOSARTAN POTASSIUM 50 MG/1
50 TABLET ORAL DAILY
Qty: 30 TABLET | Refills: 3 | Status: SHIPPED | OUTPATIENT
Start: 2024-12-02

## 2024-12-07 ENCOUNTER — OFFICE VISIT (OUTPATIENT)
Dept: FAMILY MEDICINE | Facility: CLINIC | Age: 29
End: 2024-12-07
Payer: MEDICAID

## 2024-12-07 VITALS
BODY MASS INDEX: 42.69 KG/M2 | SYSTOLIC BLOOD PRESSURE: 124 MMHG | DIASTOLIC BLOOD PRESSURE: 84 MMHG | HEART RATE: 86 BPM | WEIGHT: 241 LBS | OXYGEN SATURATION: 98 % | TEMPERATURE: 99 F

## 2024-12-07 DIAGNOSIS — Z20.828 EXPOSURE TO VIRAL DISEASE: ICD-10-CM

## 2024-12-07 DIAGNOSIS — J32.9 SINUSITIS, UNSPECIFIED CHRONICITY, UNSPECIFIED LOCATION: Primary | ICD-10-CM

## 2024-12-07 LAB
CTP QC/QA: YES
MOLECULAR STREP A: NEGATIVE
POC MOLECULAR INFLUENZA A AGN: NEGATIVE
POC MOLECULAR INFLUENZA B AGN: NEGATIVE
POC RSV RAPID ANT MOLECULAR: NEGATIVE
SARS-COV-2 RDRP RESP QL NAA+PROBE: NEGATIVE

## 2024-12-07 PROCEDURE — 99214 OFFICE O/P EST MOD 30 MIN: CPT | Mod: 25,,, | Performed by: FAMILY MEDICINE

## 2024-12-07 PROCEDURE — 87502 INFLUENZA DNA AMP PROBE: CPT | Mod: QW,,, | Performed by: FAMILY MEDICINE

## 2024-12-07 PROCEDURE — 87635 SARS-COV-2 COVID-19 AMP PRB: CPT | Mod: QW,,, | Performed by: FAMILY MEDICINE

## 2024-12-07 PROCEDURE — 3008F BODY MASS INDEX DOCD: CPT | Mod: ,,, | Performed by: FAMILY MEDICINE

## 2024-12-07 PROCEDURE — 99051 MED SERV EVE/WKEND/HOLIDAY: CPT | Mod: ,,, | Performed by: FAMILY MEDICINE

## 2024-12-07 PROCEDURE — 87634 RSV DNA/RNA AMP PROBE: CPT | Mod: QW,,, | Performed by: FAMILY MEDICINE

## 2024-12-07 PROCEDURE — 3074F SYST BP LT 130 MM HG: CPT | Mod: ,,, | Performed by: FAMILY MEDICINE

## 2024-12-07 PROCEDURE — 96372 THER/PROPH/DIAG INJ SC/IM: CPT | Mod: ,,, | Performed by: FAMILY MEDICINE

## 2024-12-07 PROCEDURE — 87651 STREP A DNA AMP PROBE: CPT | Mod: QW,,, | Performed by: FAMILY MEDICINE

## 2024-12-07 RX ORDER — DEXAMETHASONE SODIUM PHOSPHATE 4 MG/ML
4 INJECTION, SOLUTION INTRA-ARTICULAR; INTRALESIONAL; INTRAMUSCULAR; INTRAVENOUS; SOFT TISSUE
Status: COMPLETED | OUTPATIENT
Start: 2024-12-07 | End: 2024-12-07

## 2024-12-07 RX ORDER — PREDNISONE 20 MG/1
20 TABLET ORAL DAILY
Qty: 5 TABLET | Refills: 0 | Status: SHIPPED | OUTPATIENT
Start: 2024-12-07 | End: 2024-12-12

## 2024-12-07 RX ORDER — AZITHROMYCIN 250 MG/1
TABLET, FILM COATED ORAL
Qty: 6 TABLET | Refills: 0 | Status: SHIPPED | OUTPATIENT
Start: 2024-12-07

## 2024-12-07 RX ADMIN — DEXAMETHASONE SODIUM PHOSPHATE 4 MG: 4 INJECTION, SOLUTION INTRA-ARTICULAR; INTRALESIONAL; INTRAMUSCULAR; INTRAVENOUS; SOFT TISSUE at 07:12

## 2024-12-08 NOTE — PROGRESS NOTES
Subjective:       Patient ID: Gabriella Ulloa is a 29 y.o. female.    Chief Complaint: Cough, Nasal Congestion, and Sore Throat (Symptoms started last week.)    Cough  Associated symptoms include postnasal drip, rhinorrhea and a sore throat. Pertinent negatives include no chest pain, chills, ear pain, fever, headaches, myalgias, rash, shortness of breath or wheezing. There is no history of environmental allergies.   Sore Throat   Associated symptoms include congestion and coughing. Pertinent negatives include no abdominal pain, diarrhea, drooling, ear discharge, ear pain, headaches, neck pain, shortness of breath, stridor, trouble swallowing or vomiting.     Review of Systems   Constitutional:  Negative for activity change, appetite change, chills, diaphoresis, fatigue, fever and unexpected weight change.   HENT:  Positive for nasal congestion, postnasal drip, rhinorrhea, sinus pressure/congestion and sore throat. Negative for dental problem, drooling, ear discharge, ear pain, facial swelling, hearing loss, mouth sores, nosebleeds, sneezing, tinnitus, trouble swallowing, voice change and goiter.    Eyes:  Negative for photophobia, discharge, itching and visual disturbance.   Respiratory:  Positive for cough. Negative for apnea, choking, chest tightness, shortness of breath, wheezing and stridor.    Cardiovascular:  Negative for chest pain, palpitations, leg swelling and claudication.   Gastrointestinal:  Negative for abdominal distention, abdominal pain, anal bleeding, blood in stool, change in bowel habit, constipation, diarrhea, nausea and vomiting.   Endocrine: Negative for cold intolerance, heat intolerance, polydipsia, polyphagia and polyuria.   Genitourinary:  Negative for bladder incontinence, decreased urine volume, difficulty urinating, dysuria, enuresis, flank pain, frequency, hematuria, nocturia, pelvic pain and urgency.   Musculoskeletal:  Negative for arthralgias, back pain, gait problem, joint  swelling, leg pain, myalgias, neck pain, neck stiffness and joint deformity.   Integumentary:  Negative for pallor, rash, wound, breast mass and breast tenderness.   Allergic/Immunologic: Negative for environmental allergies, food allergies and immunocompromised state.   Neurological:  Negative for dizziness, vertigo, tremors, seizures, syncope, facial asymmetry, speech difficulty, weakness, light-headedness, numbness, headaches, memory loss and coordination difficulties.   Hematological:  Negative for adenopathy. Does not bruise/bleed easily.   Psychiatric/Behavioral:  Negative for agitation, behavioral problems, confusion, decreased concentration, dysphoric mood, hallucinations, self-injury, sleep disturbance and suicidal ideas. The patient is not nervous/anxious and is not hyperactive.    Breast: Negative for mass and tenderness        Objective:      Physical Exam  Vitals reviewed.   Constitutional:       Appearance: Normal appearance.   HENT:      Head: Normocephalic and atraumatic.      Right Ear: Tympanic membrane, ear canal and external ear normal.      Left Ear: Tympanic membrane, ear canal and external ear normal.      Nose: Congestion and rhinorrhea present.      Mouth/Throat:      Mouth: Mucous membranes are moist.      Pharynx: Oropharynx is clear. Posterior oropharyngeal erythema present.   Eyes:      Extraocular Movements: Extraocular movements intact.      Conjunctiva/sclera: Conjunctivae normal.      Pupils: Pupils are equal, round, and reactive to light.   Cardiovascular:      Rate and Rhythm: Normal rate and regular rhythm.      Pulses: Normal pulses.      Heart sounds: Normal heart sounds.   Pulmonary:      Effort: Pulmonary effort is normal.      Breath sounds: Normal breath sounds.   Abdominal:      General: Bowel sounds are normal.      Palpations: Abdomen is soft.   Musculoskeletal:         General: Normal range of motion.      Cervical back: Normal range of motion and neck supple.   Skin:      General: Skin is warm and dry.   Neurological:      General: No focal deficit present.      Mental Status: She is alert. Mental status is at baseline.   Psychiatric:         Mood and Affect: Mood normal.         Behavior: Behavior normal.         Thought Content: Thought content normal.         Judgment: Judgment normal.         Assessment:       1. Sinusitis, unspecified chronicity, unspecified location    2. Exposure to viral disease        Plan:     Sinusitis, unspecified chronicity, unspecified location  -     dexAMETHasone injection 4 mg  -     azithromycin (Z-LEONARD) 250 MG tablet; Take 2 tablets by mouth on day 1; Take 1 tablet by mouth on days 2-5  Dispense: 6 tablet; Refill: 0  -     predniSONE (DELTASONE) 20 MG tablet; Take 1 tablet (20 mg total) by mouth once daily. for 5 days  Dispense: 5 tablet; Refill: 0    Exposure to viral disease  -     POCT COVID-19 Rapid Screening  -     POCT Influenza A/B Molecular  -     POCT Strep A, Molecular  -     POCT respiratory syncytial virus

## 2025-01-22 ENCOUNTER — OFFICE VISIT (OUTPATIENT)
Dept: FAMILY MEDICINE | Facility: CLINIC | Age: 30
End: 2025-01-22
Payer: MEDICAID

## 2025-01-22 VITALS
OXYGEN SATURATION: 100 % | BODY MASS INDEX: 41.64 KG/M2 | HEART RATE: 86 BPM | WEIGHT: 235 LBS | TEMPERATURE: 98 F | DIASTOLIC BLOOD PRESSURE: 77 MMHG | HEIGHT: 63 IN | SYSTOLIC BLOOD PRESSURE: 112 MMHG

## 2025-01-22 DIAGNOSIS — R52 BODY ACHES: ICD-10-CM

## 2025-01-22 DIAGNOSIS — E66.9 OBESITY (BMI 35.0-39.9 WITHOUT COMORBIDITY): ICD-10-CM

## 2025-01-22 DIAGNOSIS — R51.9 ACUTE NONINTRACTABLE HEADACHE, UNSPECIFIED HEADACHE TYPE: ICD-10-CM

## 2025-01-22 DIAGNOSIS — A08.4 VIRAL GASTROENTERITIS: Primary | ICD-10-CM

## 2025-01-22 DIAGNOSIS — R11.10 VOMITING, UNSPECIFIED VOMITING TYPE, UNSPECIFIED WHETHER NAUSEA PRESENT: ICD-10-CM

## 2025-01-22 DIAGNOSIS — R19.7 DIARRHEA, UNSPECIFIED TYPE: ICD-10-CM

## 2025-01-22 LAB
CTP QC/QA: YES
CTP QC/QA: YES
POC MOLECULAR INFLUENZA A AGN: NEGATIVE
POC MOLECULAR INFLUENZA B AGN: NEGATIVE
SARS-COV-2 RDRP RESP QL NAA+PROBE: NEGATIVE

## 2025-01-22 PROCEDURE — 3078F DIAST BP <80 MM HG: CPT | Mod: ,,, | Performed by: NURSE PRACTITIONER

## 2025-01-22 PROCEDURE — 3008F BODY MASS INDEX DOCD: CPT | Mod: ,,, | Performed by: NURSE PRACTITIONER

## 2025-01-22 PROCEDURE — 87502 INFLUENZA DNA AMP PROBE: CPT | Mod: QW,,, | Performed by: NURSE PRACTITIONER

## 2025-01-22 PROCEDURE — 3074F SYST BP LT 130 MM HG: CPT | Mod: ,,, | Performed by: NURSE PRACTITIONER

## 2025-01-22 PROCEDURE — 99213 OFFICE O/P EST LOW 20 MIN: CPT | Mod: ,,, | Performed by: NURSE PRACTITIONER

## 2025-01-22 PROCEDURE — 87635 SARS-COV-2 COVID-19 AMP PRB: CPT | Mod: QW,,, | Performed by: NURSE PRACTITIONER

## 2025-01-22 RX ORDER — ONDANSETRON 4 MG/1
4 TABLET, ORALLY DISINTEGRATING ORAL EVERY 8 HOURS PRN
Qty: 10 TABLET | Refills: 0 | Status: SHIPPED | OUTPATIENT
Start: 2025-01-22

## 2025-01-22 RX ORDER — DICYCLOMINE HYDROCHLORIDE 10 MG/1
10 CAPSULE ORAL
Qty: 30 CAPSULE | Refills: 0 | Status: SHIPPED | OUTPATIENT
Start: 2025-01-22 | End: 2025-02-21

## 2025-01-22 NOTE — PROGRESS NOTES
Jayde Cole DNP   [address] [place] [address]     PATIENT NAME: Gabriella Ulloa  : 1995  DATE: 25  MRN: [id number]      Billing Provider: Jayde Cole DNP  Level of Service: [place] OFFICE/OUTPT VISIT, EST, LEVL III, 20-29 MIN  Patient PCP Information       Provider PCP Type    Jayde Cole DNP General            Reason for Visit / Chief Complaint: Emesis, Diarrhea, Generalized Body Aches, and Headache       Update PCP  Update Chief Complaint         History of Present Illness / Problem Focused Workflow     Gabriella Ulloa presents to the clinic with Emesis, Diarrhea, Generalized Body Aches, and Headache     Emesis   Associated symptoms include diarrhea and headaches.   Diarrhea   Associated symptoms include headaches and vomiting.   Headache   Associated symptoms include vomiting.     Review of Systems     Review of Systems   Gastrointestinal:  Positive for diarrhea and vomiting.   Neurological:  Positive for headaches.      Medical / Social / Family History     Past Medical History:   Diagnosis Date    Anemia     Anxiety     Breakthrough bleeding on birth control pills 2015    Endometriosis of pelvic peritoneum 2020    cul-de-sac and sigmoid colon    Hypertension     Irritable bowel syndrome Chronic    Increased pain with bowel movements with menses       Past Surgical History:   Procedure Laterality Date     SECTION      D&C/Hysteroscopy with robotic and operative Laparoscopy  2020    DIAGNOSTIC LAPAROSCOPY N/A 2022    Procedure: LAPAROSCOPY, DIAGNOSTIC;  Surgeon: Otis Mccullough MD;  Location: [facility] OR;  Service: OB/GYN;  Laterality: N/A;    DILATION AND CURETTAGE OF UTERUS  2020    ENDOMETRIAL BIOPSY N/A 2022    Procedure: BIOPSY, ENDOMETRIUM;  Surgeon: Otis Mccullough MD;  Location: [facility] OR;  Service: OB/GYN;  Laterality: N/A;    HYSTEROSCOPY WITH DILATION AND CURETTAGE OF UTERUS N/A 2022     "Procedure: HYSTEROSCOPY, WITH DILATION AND CURETTAGE OF UTERUS;  Surgeon: Otis Mccullough MD;  Location: [facility] [place];  Service: OB/GYN;  Laterality: N/A;       Social History  Ms.  reports that she has never smoked. She has never been exposed to tobacco smoke. She has never used smokeless tobacco. She reports that she does not drink alcohol and does not use drugs.    Family History  Ms.'s family history includes Breast cancer in an other family member; Diabetes in her mother and another family member; Heart disease in her mother; Hypertension in an other family member; Liver cancer in an other family member; Prostate cancer in her maternal grandfather; Stomach cancer in an other family member.    Medications and Allergies     Medications  Outpatient Medications Marked as Taking for the 1/22/25 encounter (Office Visit) with Jayde Cole DNP   Medication Sig Dispense Refill    amitriptyline (ELAVIL) 25 MG tablet Take 1 tablet (25 mg total) by mouth every evening. 90 tablet 3    ergocalciferol (ERGOCALCIFEROL) 50,000 unit Cap Take 1 capsule (50,000 Units total) by mouth every 7 days. 12 capsule 3    losartan (COZAAR) 50 MG tablet TAKE 1 TABLET (50 MG TOTAL) BY MOUTH ONCE DAILY. 30 tablet 3    norethindrone (AYGESTIN) 5 mg Tab Take 1 tablet (5 mg total) by mouth once daily. 90 tablet 1    rizatriptan (MAXALT) 10 MG tablet Take 1 tablet (10 mg total) by mouth daily as needed for Migraine (take onset of migraine). 27 tablet 3    venlafaxine (EFFEXOR-XR) 37.5 MG 24 hr capsule Take 1 capsule (37.5 mg total) by mouth once daily. 30 capsule 0       Allergies  Review of patient's allergies indicates:   Allergen Reactions    Grass pollen-bermuda, standard     Grass pollen-natalia, standard        Physical Examination   /77 (BP Location: Left arm, Patient Position: Sitting)   Pulse 86   Temp 98.4 °F (36.9 °C) (Oral)   Ht 5' 3" (1.6 m)   Wt 106.6 kg (235 lb)   LMP 01/04/2025 (Exact Date)   SpO2 100%   " BMI 41.63 kg/m²    Physical Exam  Vitals and nursing note reviewed.   Constitutional:       Appearance: Normal appearance. She is obese. She is ill-appearing.   HENT:      Head: Normocephalic.      Nose: Nose normal.      Mouth/Throat:      Mouth: Mucous membranes are moist.   Eyes:      Pupils: Pupils are equal, round, and reactive to light.   Cardiovascular:      Rate and Rhythm: Normal rate and regular rhythm.      Pulses: Normal pulses.      Heart sounds: Normal heart sounds.   Pulmonary:      Effort: Pulmonary effort is normal.      Breath sounds: Normal breath sounds.   Abdominal:      General: Abdomen is flat. Bowel sounds are normal.      Palpations: Abdomen is soft.      Tenderness: There is abdominal tenderness.   Musculoskeletal:         General: Normal range of motion.      Cervical back: Normal range of motion and neck supple.   Skin:     General: Skin is warm and dry.      Capillary Refill: Capillary refill takes less than 2 seconds.   Neurological:      General: No focal deficit present.      Mental Status: She is alert and oriented to person, place, and time. Mental status is at baseline.      Motor: Weakness present.   Psychiatric:         Mood and Affect: Mood normal.         Behavior: Behavior normal.         Thought Content: Thought content normal.        Assessment and Plan (including Health Maintenance)      Problem List  Smart Sets  Document Outside HM   :    Plan:     Hydrate well. Can eat bananas  Brat/bland foods    There are no preventive care reminders to display for this patient.    Problem List Items Addressed This Visit          Neuro    Acute nonintractable headache    Relevant Orders    POCT COVID-19 Rapid Screening (Completed)    POCT Influenza A/B Molecular (Completed)       ID    Viral gastroenteritis - Primary       Endocrine    Obesity (BMI 35.0-39.9 without comorbidity)       GI    Diarrhea    Relevant Orders    POCT COVID-19 Rapid Screening (Completed)    POCT Influenza A/B  Molecular (Completed)    Vomiting    Relevant Orders    POCT COVID-19 Rapid Screening (Completed)    POCT Influenza A/B Molecular (Completed)       Other    Body aches    Relevant Orders    POCT COVID-19 Rapid Screening (Completed)    POCT Influenza A/B Molecular (Completed)       Health Maintenance Topics with due status: Not Due       Topic Last Completion Date    TETANUS VACCINE 03/01/2023    Cervical Cancer Screening 10/02/2023    RSV Vaccine (Age 60+ and Pregnant patients) Not Due       Future Appointments   Date Time Provider Department Center   2/20/2025  8:45 AM [facility]            Signature:  Jayde Cole, DNP      [address] [place] [address]    Date of encounter: 1/22/25

## 2025-02-10 RX ORDER — VENLAFAXINE HYDROCHLORIDE 37.5 MG/1
37.5 CAPSULE, EXTENDED RELEASE ORAL DAILY
Qty: 30 CAPSULE | Refills: 0 | Status: SHIPPED | OUTPATIENT
Start: 2025-02-10 | End: 2026-02-10

## 2025-02-10 RX ORDER — VENLAFAXINE HYDROCHLORIDE 37.5 MG/1
37.5 CAPSULE, EXTENDED RELEASE ORAL DAILY
Qty: 90 CAPSULE | Refills: 0 | Status: SHIPPED | OUTPATIENT
Start: 2025-02-10 | End: 2026-02-10

## 2025-02-21 ENCOUNTER — OFFICE VISIT (OUTPATIENT)
Dept: FAMILY MEDICINE | Facility: CLINIC | Age: 30
End: 2025-02-21
Payer: MEDICAID

## 2025-02-21 VITALS
SYSTOLIC BLOOD PRESSURE: 126 MMHG | TEMPERATURE: 99 F | HEART RATE: 82 BPM | BODY MASS INDEX: 42.52 KG/M2 | HEIGHT: 63 IN | OXYGEN SATURATION: 100 % | DIASTOLIC BLOOD PRESSURE: 84 MMHG | WEIGHT: 240 LBS | RESPIRATION RATE: 19 BRPM

## 2025-02-21 DIAGNOSIS — J02.9 SORE THROAT: ICD-10-CM

## 2025-02-21 DIAGNOSIS — J01.90 ACUTE NON-RECURRENT SINUSITIS, UNSPECIFIED LOCATION: Primary | ICD-10-CM

## 2025-02-21 DIAGNOSIS — J20.9 ACUTE BRONCHITIS, UNSPECIFIED ORGANISM: ICD-10-CM

## 2025-02-21 DIAGNOSIS — R05.1 ACUTE COUGH: ICD-10-CM

## 2025-02-21 DIAGNOSIS — R06.2 WHEEZING: ICD-10-CM

## 2025-02-21 DIAGNOSIS — R09.81 NASAL CONGESTION: ICD-10-CM

## 2025-02-21 LAB
CTP QC/QA: YES
MOLECULAR STREP A: NEGATIVE
POC MOLECULAR INFLUENZA A AGN: NEGATIVE
POC MOLECULAR INFLUENZA B AGN: NEGATIVE
SARS-COV-2 RDRP RESP QL NAA+PROBE: NEGATIVE

## 2025-02-21 RX ORDER — CEFTRIAXONE 1 G/1
1 INJECTION, POWDER, FOR SOLUTION INTRAMUSCULAR; INTRAVENOUS
Status: COMPLETED | OUTPATIENT
Start: 2025-02-21 | End: 2025-02-21

## 2025-02-21 RX ORDER — DEXAMETHASONE SODIUM PHOSPHATE 4 MG/ML
8 INJECTION, SOLUTION INTRA-ARTICULAR; INTRALESIONAL; INTRAMUSCULAR; INTRAVENOUS; SOFT TISSUE ONCE
Status: COMPLETED | OUTPATIENT
Start: 2025-02-21 | End: 2025-02-21

## 2025-02-21 RX ORDER — VITAMIN A 3000 MCG
1 CAPSULE ORAL
Qty: 30 ML | Refills: 0 | Status: SHIPPED | OUTPATIENT
Start: 2025-02-21

## 2025-02-21 RX ORDER — AZITHROMYCIN 500 MG/1
500 TABLET, FILM COATED ORAL DAILY
Qty: 5 TABLET | Refills: 0 | Status: SHIPPED | OUTPATIENT
Start: 2025-02-21 | End: 2025-02-26

## 2025-02-21 RX ORDER — ALBUTEROL SULFATE 90 UG/1
2 INHALANT RESPIRATORY (INHALATION) EVERY 6 HOURS PRN
Qty: 18 G | Refills: 0 | Status: SHIPPED | OUTPATIENT
Start: 2025-02-21 | End: 2026-02-21

## 2025-02-21 RX ORDER — PREDNISONE 20 MG/1
40 TABLET ORAL DAILY
Qty: 10 TABLET | Refills: 0 | Status: SHIPPED | OUTPATIENT
Start: 2025-02-21 | End: 2025-02-26

## 2025-02-21 RX ADMIN — DEXAMETHASONE SODIUM PHOSPHATE 8 MG: 4 INJECTION, SOLUTION INTRA-ARTICULAR; INTRALESIONAL; INTRAMUSCULAR; INTRAVENOUS; SOFT TISSUE at 05:02

## 2025-02-21 RX ADMIN — CEFTRIAXONE 1 G: 1 INJECTION, POWDER, FOR SOLUTION INTRAMUSCULAR; INTRAVENOUS at 05:02

## 2025-02-21 NOTE — PROGRESS NOTES
Subjective:       Patient ID: Gabriella Ulloa is a 29 y.o. female.    Chief Complaint: Nasal Congestion and Sore Throat    Nasal Congestion and Sore Throat      Sore Throat   Associated symptoms include congestion. Pertinent negatives include no abdominal pain, coughing, ear pain, headaches, neck pain, shortness of breath or vomiting.     Review of Systems   Constitutional:  Negative for appetite change, fatigue and fever.   HENT:  Positive for nasal congestion and sore throat. Negative for ear pain.    Eyes:  Negative for pain, discharge and itching.   Respiratory:  Negative for cough and shortness of breath.    Cardiovascular:  Negative for chest pain and leg swelling.   Gastrointestinal:  Negative for abdominal pain, change in bowel habit, nausea and vomiting.   Musculoskeletal:  Negative for back pain, gait problem and neck pain.   Integumentary:  Negative for rash and wound.   Allergic/Immunologic: Negative for immunocompromised state.   Neurological:  Negative for dizziness, weakness and headaches.   All other systems reviewed and are negative.        Objective:      Physical Exam  Vitals and nursing note reviewed.   Constitutional:       General: She is not in acute distress.     Appearance: Normal appearance. She is not ill-appearing, toxic-appearing or diaphoretic.   HENT:      Head: Normocephalic.      Right Ear: Tympanic membrane, ear canal and external ear normal.      Left Ear: Tympanic membrane, ear canal and external ear normal.      Nose: Mucosal edema and congestion present. No rhinorrhea.      Right Turbinates: Swollen.      Left Turbinates: Swollen.      Right Sinus: Maxillary sinus tenderness present.      Left Sinus: Maxillary sinus tenderness present.      Mouth/Throat:      Mouth: Mucous membranes are moist.      Pharynx: Posterior oropharyngeal erythema present. No oropharyngeal exudate.   Eyes:      General: No scleral icterus.        Right eye: No discharge.         Left eye: No  discharge.      Extraocular Movements: Extraocular movements intact.      Conjunctiva/sclera: Conjunctivae normal.      Pupils: Pupils are equal, round, and reactive to light.   Cardiovascular:      Rate and Rhythm: Normal rate and regular rhythm.      Pulses: Normal pulses.      Heart sounds: Normal heart sounds. No murmur heard.  Pulmonary:      Effort: Pulmonary effort is normal. No respiratory distress.      Breath sounds: Wheezing present. No rhonchi or rales.      Comments: Breath sounds slightly decreased  Musculoskeletal:         General: Normal range of motion.      Cervical back: Normal range of motion and neck supple. No tenderness.   Lymphadenopathy:      Cervical: No cervical adenopathy.   Skin:     General: Skin is warm and dry.      Capillary Refill: Capillary refill takes less than 2 seconds.      Findings: No rash.   Neurological:      Mental Status: She is alert and oriented to person, place, and time.   Psychiatric:         Mood and Affect: Mood normal.         Behavior: Behavior normal.         Thought Content: Thought content normal.         Judgment: Judgment normal.            Assessment:       1. Acute non-recurrent sinusitis, unspecified location    2. Sore throat    3. Nasal congestion    4. Acute bronchitis, unspecified organism    5. Acute cough    6. Wheezing        Plan:   Acute non-recurrent sinusitis, unspecified location  -     dexAMETHasone injection 8 mg  -     cefTRIAXone injection 1 g  -     azithromycin (ZITHROMAX) 500 MG tablet; Take 1 tablet (500 mg total) by mouth once daily. for 5 days  Dispense: 5 tablet; Refill: 0    Sore throat  -     POCT Strep A, Molecular    Nasal congestion  -     POCT COVID-19 Rapid Screening  -     POCT Influenza A/B Molecular  -     predniSONE (DELTASONE) 20 MG tablet; Take 2 tablets (40 mg total) by mouth once daily. for 5 days  Dispense: 10 tablet; Refill: 0  -     sodium chloride (SALINE NASAL) 0.65 % nasal spray; 1 spray by Nasal route as  needed for Congestion.  Dispense: 30 mL; Refill: 0    Acute bronchitis, unspecified organism  -     dexAMETHasone injection 8 mg  -     albuterol (VENTOLIN HFA) 90 mcg/actuation inhaler; Inhale 2 puffs into the lungs every 6 (six) hours as needed for Wheezing. Rescue  Dispense: 18 g; Refill: 0    Acute cough  -     brompheniramin-phenylephrin-DM (RYNEX DM) 1-2.5-5 mg/5 mL Soln; Take 10 mLs by mouth every 4 (four) hours as needed (cough).  Dispense: 237 mL; Refill: 0    Wheezing           Risks, benefits, and side effects were discussed with the patient. All questions were answered to the fullest satisfaction of the patient, and pt verbalized understanding and agreement to treatment plan. Pt was to call with any new or worsening symptoms, or present to the ER

## 2025-03-03 ENCOUNTER — OFFICE VISIT (OUTPATIENT)
Dept: OTOLARYNGOLOGY | Facility: CLINIC | Age: 30
End: 2025-03-03
Payer: MEDICAID

## 2025-03-03 VITALS — HEIGHT: 63 IN | BODY MASS INDEX: 42.52 KG/M2 | WEIGHT: 240 LBS

## 2025-03-03 DIAGNOSIS — J31.0 CHRONIC RHINITIS: Primary | ICD-10-CM

## 2025-03-03 DIAGNOSIS — J30.1 SEASONAL ALLERGIC RHINITIS DUE TO POLLEN: ICD-10-CM

## 2025-03-03 DIAGNOSIS — Z91.018 MULTIPLE FOOD ALLERGIES: ICD-10-CM

## 2025-03-03 PROCEDURE — 3008F BODY MASS INDEX DOCD: CPT | Mod: ,,, | Performed by: OTOLARYNGOLOGY

## 2025-03-03 PROCEDURE — 99213 OFFICE O/P EST LOW 20 MIN: CPT | Mod: PBBFAC | Performed by: OTOLARYNGOLOGY

## 2025-03-03 PROCEDURE — 99999 PR PBB SHADOW E&M-EST. PATIENT-LVL III: CPT | Mod: PBBFAC,,, | Performed by: OTOLARYNGOLOGY

## 2025-03-03 PROCEDURE — 99214 OFFICE O/P EST MOD 30 MIN: CPT | Mod: S$PBB,,, | Performed by: OTOLARYNGOLOGY

## 2025-03-03 NOTE — PROGRESS NOTES
Subjective:       Patient ID: Gabriella Ulloa is a 29 y.o. female.    Chief Complaint: Immunotherapy (Patient states she would like to restart her allergy shots.)    HPI  Review of Systems   HENT:  Positive for congestion, rhinorrhea, sinus pressure, sinus pain and sneezing.    All other systems reviewed and are negative.      Objective:      Physical Exam  General: NAD  Head: Normocephalic, atraumatic, no facial asymmetry/normal strength,  Ears: Both auricules normal in appearance, w/o deformities tympanic membranes normal external auditory canals normal  Nose: External nose w/o deformities congested  turbinates no drainage or inflammation  Oral Cavity: Lips, gums, floor of mouth, tongue hard palate, and buccal mucosa without mass/lesion  Oropharynx: Mucosa pink and moist, soft palate, posterior pharynx and oropharyngeal wall without mass/lesion  Neck: Supple, symmetric, trachea midline, no palpable mass/lesion, no palpable cervical lymphadenopathy  Skin: Warm and dry, no concerning lesions  Respiratory: Respirations even, unlabored  Assessment:       1. Chronic rhinitis    2. Seasonal allergic rhinitis due to pollen        Plan:       RAST   Restart allergy shots

## 2025-03-11 ENCOUNTER — OFFICE VISIT (OUTPATIENT)
Dept: FAMILY MEDICINE | Facility: CLINIC | Age: 30
End: 2025-03-11
Payer: MEDICAID

## 2025-03-11 VITALS
TEMPERATURE: 98 F | HEART RATE: 77 BPM | DIASTOLIC BLOOD PRESSURE: 83 MMHG | OXYGEN SATURATION: 100 % | BODY MASS INDEX: 42.64 KG/M2 | HEIGHT: 63 IN | WEIGHT: 240.63 LBS | SYSTOLIC BLOOD PRESSURE: 120 MMHG

## 2025-03-11 DIAGNOSIS — R53.83 FATIGUE, UNSPECIFIED TYPE: ICD-10-CM

## 2025-03-11 DIAGNOSIS — N92.6 IRREGULAR MENSES: ICD-10-CM

## 2025-03-11 DIAGNOSIS — F41.9 ANXIETY: ICD-10-CM

## 2025-03-11 DIAGNOSIS — K21.9 GASTROESOPHAGEAL REFLUX DISEASE, UNSPECIFIED WHETHER ESOPHAGITIS PRESENT: ICD-10-CM

## 2025-03-11 DIAGNOSIS — Z79.899 ENCOUNTER FOR LONG-TERM (CURRENT) DRUG USE: ICD-10-CM

## 2025-03-11 DIAGNOSIS — R23.2 HOT FLASHES: ICD-10-CM

## 2025-03-11 DIAGNOSIS — I10 HYPERTENSION, UNSPECIFIED TYPE: ICD-10-CM

## 2025-03-11 DIAGNOSIS — Z91.018 MULTIPLE FOOD ALLERGIES: Primary | ICD-10-CM

## 2025-03-11 LAB
25(OH)D3 SERPL-MCNC: 14.4 NG/ML (ref 30–80)
FOLATE SERPL-MCNC: 11.6 NG/ML (ref 7–31.4)
FSH SERPL-ACNC: 3.6 MIU/ML
IRON SATN MFR SERPL: 13 % (ref 20–50)
IRON SERPL-MCNC: 46 UG/DL (ref 50–170)
LH SERPL-ACNC: 7 MIU/ML
PROGEST SERPL-MCNC: <0.5 NG/ML
T4 FREE SERPL-MCNC: 0.78 NG/DL (ref 0.7–1.48)
TESTOST SERPL-MCNC: 35.6 NG/DL (ref 13.8–15.4)
TIBC SERPL-MCNC: 297 UG/DL (ref 70–310)
TIBC SERPL-MCNC: 343 UG/DL (ref 250–450)
TRANSFERRIN SERPL-MCNC: 310 MG/DL (ref 180–382)
TSH SERPL DL<=0.005 MIU/L-ACNC: 0.75 UIU/ML (ref 0.35–4.94)
VIT B12 SERPL-MCNC: 858 PG/ML (ref 213–816)

## 2025-03-11 PROCEDURE — 84443 ASSAY THYROID STIM HORMONE: CPT | Mod: ,,, | Performed by: CLINICAL MEDICAL LABORATORY

## 2025-03-11 PROCEDURE — 36415 COLL VENOUS BLD VENIPUNCTURE: CPT | Mod: ,,, | Performed by: CLINICAL MEDICAL LABORATORY

## 2025-03-11 PROCEDURE — 83002 ASSAY OF GONADOTROPIN (LH): CPT | Mod: ,,, | Performed by: CLINICAL MEDICAL LABORATORY

## 2025-03-11 PROCEDURE — 82306 VITAMIN D 25 HYDROXY: CPT | Mod: ,,, | Performed by: CLINICAL MEDICAL LABORATORY

## 2025-03-11 PROCEDURE — 83540 ASSAY OF IRON: CPT | Mod: ,,, | Performed by: CLINICAL MEDICAL LABORATORY

## 2025-03-11 PROCEDURE — 86003 ALLG SPEC IGE CRUDE XTRC EA: CPT | Mod: 90,59,, | Performed by: CLINICAL MEDICAL LABORATORY

## 2025-03-11 PROCEDURE — 82607 VITAMIN B-12: CPT | Mod: ,,, | Performed by: CLINICAL MEDICAL LABORATORY

## 2025-03-11 PROCEDURE — 82785 ASSAY OF IGE: CPT | Mod: 90,,, | Performed by: CLINICAL MEDICAL LABORATORY

## 2025-03-11 PROCEDURE — 83001 ASSAY OF GONADOTROPIN (FSH): CPT | Mod: ,,, | Performed by: CLINICAL MEDICAL LABORATORY

## 2025-03-11 PROCEDURE — 3008F BODY MASS INDEX DOCD: CPT | Mod: ,,, | Performed by: NURSE PRACTITIONER

## 2025-03-11 PROCEDURE — 99214 OFFICE O/P EST MOD 30 MIN: CPT | Mod: ,,, | Performed by: NURSE PRACTITIONER

## 2025-03-11 PROCEDURE — 86003 ALLG SPEC IGE CRUDE XTRC EA: CPT | Mod: 90,,, | Performed by: CLINICAL MEDICAL LABORATORY

## 2025-03-11 PROCEDURE — 84144 ASSAY OF PROGESTERONE: CPT | Mod: ,,, | Performed by: CLINICAL MEDICAL LABORATORY

## 2025-03-11 PROCEDURE — 3074F SYST BP LT 130 MM HG: CPT | Mod: ,,, | Performed by: NURSE PRACTITIONER

## 2025-03-11 PROCEDURE — 84439 ASSAY OF FREE THYROXINE: CPT | Mod: ,,, | Performed by: CLINICAL MEDICAL LABORATORY

## 2025-03-11 PROCEDURE — 82746 ASSAY OF FOLIC ACID SERUM: CPT | Mod: ,,, | Performed by: CLINICAL MEDICAL LABORATORY

## 2025-03-11 PROCEDURE — 84403 ASSAY OF TOTAL TESTOSTERONE: CPT | Mod: ,,, | Performed by: CLINICAL MEDICAL LABORATORY

## 2025-03-11 PROCEDURE — 83550 IRON BINDING TEST: CPT | Mod: ,,, | Performed by: CLINICAL MEDICAL LABORATORY

## 2025-03-11 RX ORDER — ERGOCALCIFEROL 1.25 MG/1
50000 CAPSULE ORAL
Qty: 12 CAPSULE | Refills: 3 | Status: SHIPPED | OUTPATIENT
Start: 2025-03-11

## 2025-03-11 RX ORDER — VENLAFAXINE HYDROCHLORIDE 150 MG/1
150 CAPSULE, EXTENDED RELEASE ORAL DAILY
Qty: 30 CAPSULE | Refills: 5 | Status: SHIPPED | OUTPATIENT
Start: 2025-03-11 | End: 2026-03-11

## 2025-03-11 RX ORDER — LOSARTAN POTASSIUM 50 MG/1
50 TABLET ORAL DAILY
Qty: 90 TABLET | Refills: 3 | Status: SHIPPED | OUTPATIENT
Start: 2025-03-11

## 2025-03-11 NOTE — PROGRESS NOTES
Jayde Cole DNP   [address] [place] [address]     PATIENT NAME: Gabriella Ulloa  : 1995  DATE: 3/11/25  MRN: [id number]      Billing Provider: Jayde Cole DNP  Level of Service: [place] OFFICE/OUTPT VISIT, EST, LEVL IV, 30-39 MIN  Patient PCP Information       Provider PCP Type    Jayde Cole DNP General            Reason for Visit / Chief Complaint: Hot Flashes       Update PCP  Update Chief Complaint         History of Present Illness / Problem Focused Workflow     Gabriella Ulloa presents to the clinic with Hot Flashes     HPI    Review of Systems     Review of Systems     Medical / Social / Family History     Past Medical History:   Diagnosis Date    Anemia     Anxiety     Breakthrough bleeding on birth control pills 2015    Endometriosis of pelvic peritoneum 2020    cul-de-sac and sigmoid colon    Hypertension     Irritable bowel syndrome Chronic    Increased pain with bowel movements with menses       Past Surgical History:   Procedure Laterality Date     SECTION      D&C/Hysteroscopy with robotic and operative Laparoscopy  2020    DIAGNOSTIC LAPAROSCOPY N/A 2022    Procedure: LAPAROSCOPY, DIAGNOSTIC;  Surgeon: Otis Mccullough MD;  Location: [facility];  Service: OB/GYN;  Laterality: N/A;    DILATION AND CURETTAGE OF UTERUS  2020    ENDOMETRIAL BIOPSY N/A 2022    Procedure: BIOPSY, ENDOMETRIUM;  Surgeon: Otis Mccullough MD;  Location: [facility] OR;  Service: OB/GYN;  Laterality: N/A;    HYSTEROSCOPY WITH DILATION AND CURETTAGE OF UTERUS N/A 2022    Procedure: HYSTEROSCOPY, WITH DILATION AND CURETTAGE OF UTERUS;  Surgeon: Otis Mccullough MD;  Location: [facility];  Service: OB/GYN;  Laterality: N/A;       Social History  Ms.  reports that she has never smoked. She has never been exposed to tobacco smoke. She has never used smokeless tobacco. She reports that she does not drink alcohol and does not use  "drugs.    Family History  Ms.'s family history includes Breast cancer in an other family member; Diabetes in her mother and another family member; Heart disease in her mother; Hypertension in an other family member; Liver cancer in an other family member; Prostate cancer in her maternal grandfather; Stomach cancer in an other family member.    Medications and Allergies     Medications  Outpatient Medications Marked as Taking for the 3/11/25 encounter (Office Visit) with Jayde Cole DNP   Medication Sig Dispense Refill    albuterol (VENTOLIN HFA) 90 mcg/actuation inhaler Inhale 2 puffs into the lungs every 6 (six) hours as needed for Wheezing. Rescue 18 g 0    EPINEPHrine (EPIPEN) 0.3 mg/0.3 mL AtIn Inject 0.3 mLs (0.3 mg total) into the muscle once. for 1 dose 0.3 mL 0    rizatriptan (MAXALT) 10 MG tablet Take 1 tablet (10 mg total) by mouth daily as needed for Migraine (take onset of migraine). 27 tablet 3    [DISCONTINUED] ergocalciferol (ERGOCALCIFEROL) 50,000 unit Cap Take 1 capsule (50,000 Units total) by mouth every 7 days. 12 capsule 3    [DISCONTINUED] losartan (COZAAR) 50 MG tablet TAKE 1 TABLET (50 MG TOTAL) BY MOUTH ONCE DAILY. 30 tablet 3    [DISCONTINUED] ondansetron (ZOFRAN-ODT) 4 MG TbDL Take 1 tablet (4 mg total) by mouth every 8 (eight) hours as needed (for nausea/vomiting). 10 tablet 0    [DISCONTINUED] venlafaxine (EFFEXOR-XR) 37.5 MG 24 hr capsule TAKE 1 CAPSULE (37.5 MG TOTAL) BY MOUTH ONCE DAILY. (Patient taking differently: Take 37.5 mg by mouth 2 (two) times a day.) 90 capsule 0       Allergies  Review of patient's allergies indicates:   Allergen Reactions    Grass pollen-bermuda, standard     Grass pollen-natalia, standard        Physical Examination   /83 (BP Location: Right arm, Patient Position: Sitting)   Pulse 77   Temp 98.1 °F (36.7 °C)   Ht 5' 3" (1.6 m)   Wt 109.1 kg (240 lb 9.6 oz)   LMP 02/27/2025   SpO2 100%   BMI 42.62 kg/m²    Physical Exam  Vitals " reviewed.   Constitutional:       Appearance: Normal appearance. She is obese.   HENT:      Mouth/Throat:      Mouth: Mucous membranes are moist.   Eyes:      Conjunctiva/sclera: Conjunctivae normal.      Pupils: Pupils are equal, round, and reactive to light.   Cardiovascular:      Rate and Rhythm: Normal rate and regular rhythm.      Pulses: Normal pulses.      Heart sounds: Normal heart sounds. No murmur heard.  Pulmonary:      Effort: Pulmonary effort is normal.      Breath sounds: Normal breath sounds.   Chest:      Chest wall: No tenderness.   Abdominal:      General: Bowel sounds are normal.      Palpations: Abdomen is soft.      Tenderness: There is no abdominal tenderness.   Musculoskeletal:      Cervical back: Normal range of motion and neck supple.   Lymphadenopathy:      Cervical: No cervical adenopathy.   Skin:     General: Skin is warm and dry.      Capillary Refill: Capillary refill takes less than 2 seconds.   Neurological:      Mental Status: She is alert.   Psychiatric:         Mood and Affect: Mood normal.         Behavior: Behavior normal.         Thought Content: Thought content normal.         Judgment: Judgment normal.        Assessment and Plan (including Health Maintenance)      Problem List  Smart [employer]  Document Outside HM   :    Plan:     Here for appt  Also having hot flashes  Labs today  Doesn't want to do birth control right now.   Increase effexor       There are no preventive care reminders to display for this patient.    Problem List Items Addressed This Visit          Psychiatric    Anxiety    Encounter for long-term (current) drug use    Relevant Orders    Vitamin D    Vitamin B12 & Folate    Thyroid Panel       Cardiac/Vascular    Hypertension - Primary       Renal/    Irregular menses    Relevant Orders    Progesterone    Testosterone    Luteinizing Hormone    Follicle Stimulating Hormone       Endocrine    BMI 40.0-44.9, adult       GI    Gastroesophageal reflux disease        Other    Fatigue    Hot flashes    Relevant Orders    Vitamin D    Vitamin B12 & Folate    Thyroid Panel    Iron and TIBC    Progesterone    Testosterone    Luteinizing Hormone    Follicle Stimulating Hormone    Multiple food allergies       Health Maintenance Topics with due status: Not Due       Topic Last Completion Date    TETANUS VACCINE 03/01/2023    Cervical Cancer Screening 10/02/2023    RSV Vaccine (Age 60+ and Pregnant patients) Not Due       Future Appointments   Date Time Provider Department Center   3/31/2025  1:45 PM Markos Genao MD [facility]   7/7/2025 11:15 AM Genie Rowe MD [facility]YN [facility]            Signature:  Jayde Cole, RERE      [address] [place] [address]    Date of encounter: 3/11/25

## 2025-03-12 ENCOUNTER — RESULTS FOLLOW-UP (OUTPATIENT)
Dept: FAMILY MEDICINE | Facility: CLINIC | Age: 30
End: 2025-03-12

## 2025-03-12 PROCEDURE — 86003 ALLG SPEC IGE CRUDE XTRC EA: CPT | Mod: 90,,, | Performed by: CLINICAL MEDICAL LABORATORY

## 2025-03-12 PROCEDURE — 36415 COLL VENOUS BLD VENIPUNCTURE: CPT | Mod: ,,, | Performed by: CLINICAL MEDICAL LABORATORY

## 2025-03-12 NOTE — PROGRESS NOTES
We can have her come back and do a few other labs if I didn't do them already like aldosterone , prolactin, and cortisol - also refer to endo at [facility] - external dr driscoll. For hyperandrogenism

## 2025-03-12 NOTE — PROGRESS NOTES
Vit d and iron low. Need both iron daily and vit d 60929 weekly.  Also testosterone is twice the normal level may be why she's having changes in cycle.

## 2025-03-13 ENCOUNTER — TELEPHONE (OUTPATIENT)
Dept: FAMILY MEDICINE | Facility: CLINIC | Age: 30
End: 2025-03-13
Payer: MEDICAID

## 2025-03-13 ENCOUNTER — TELEPHONE (OUTPATIENT)
Dept: OBSTETRICS AND GYNECOLOGY | Facility: CLINIC | Age: 30
End: 2025-03-13
Payer: MEDICAID

## 2025-03-13 DIAGNOSIS — R79.89 ELEVATED TESTOSTERONE LEVEL IN FEMALE: Primary | ICD-10-CM

## 2025-03-13 DIAGNOSIS — R23.2 HOT FLASHES: ICD-10-CM

## 2025-03-13 NOTE — TELEPHONE ENCOUNTER
Call and notified pt of labs and recommendations    Patient is going to come in the AM to have labs done   Also she said to make sure referral is done with provider who takes medicaid

## 2025-03-13 NOTE — TELEPHONE ENCOUNTER
----- Message from Ana sent at 3/13/2025  9:41 AM CDT -----  Regarding: sooner appointment  Who Called: Gabriella Inge is requesting a sooner appointment. Caller declined first available appointment listed below. Caller will not accept being placed on the waitlist and is requesting a message be sent to doctor.When is the first available appointment?n/aOptions offered (Virtual Visit, Urgent Care): n/aSymptoms:testosterone levels Preferred Method of Contact: Phone CallPatient's Preferred Phone Number on File: [phone] Best Call Back Number, if different:Additional Information:

## 2025-03-14 ENCOUNTER — CLINICAL SUPPORT (OUTPATIENT)
Dept: FAMILY MEDICINE | Facility: CLINIC | Age: 30
End: 2025-03-14
Payer: MEDICAID

## 2025-03-14 DIAGNOSIS — N92.6 IRREGULAR MENSES: ICD-10-CM

## 2025-03-14 DIAGNOSIS — R79.89 ELEVATED TESTOSTERONE LEVEL IN FEMALE: Primary | ICD-10-CM

## 2025-03-14 DIAGNOSIS — I10 HYPERTENSION, UNSPECIFIED TYPE: ICD-10-CM

## 2025-03-14 LAB
CORTIS AM PEAK SERPL-MCNC: 7.3 ΜG/DL (ref 4.5–22.7)
EST. AVERAGE GLUCOSE BLD GHB EST-MCNC: 103 MG/DL
GLUTEN IGE QN: <0.1 KU/L
HBA1C MFR BLD HPLC: 5.2 %
MAYO GENERIC ORDERABLE RESULT: NORMAL
PEANUT IGE QN: <0.1 KU/L
PECAN/HICK NUT IGE QN: <0.1 KU/L
PROLACTIN SERPL-MCNC: 11.35 NG/ML (ref 5.18–26.53)
SHRIMP IGE QN: <0.1 KU/L
WALNUT IGE QN: <0.1 KU/L

## 2025-03-14 PROCEDURE — 83036 HEMOGLOBIN GLYCOSYLATED A1C: CPT | Mod: ,,, | Performed by: CLINICAL MEDICAL LABORATORY

## 2025-03-14 PROCEDURE — 84146 ASSAY OF PROLACTIN: CPT | Mod: ,,, | Performed by: CLINICAL MEDICAL LABORATORY

## 2025-03-14 RX ORDER — METFORMIN HYDROCHLORIDE 500 MG/1
500 TABLET, EXTENDED RELEASE ORAL
Qty: 90 TABLET | Refills: 1 | Status: SHIPPED | OUTPATIENT
Start: 2025-03-14 | End: 2026-03-14

## 2025-03-14 RX ORDER — NORELGESTROMIN AND ETHINYL ESTRADIOL 35; 150 UG/MG; UG/MG
1 PATCH TRANSDERMAL WEEKLY
Qty: 4 PATCH | Refills: 3 | Status: SHIPPED | OUTPATIENT
Start: 2025-03-14 | End: 2026-03-14

## 2025-03-30 ENCOUNTER — PATIENT MESSAGE (OUTPATIENT)
Dept: FAMILY MEDICINE | Facility: CLINIC | Age: 30
End: 2025-03-30
Payer: MEDICAID

## 2025-04-01 NOTE — TELEPHONE ENCOUNTER
I think jammie may have addressed with her ? I'm not sure./ I would like her to increase the venlafaxine to bid.

## 2025-04-07 ENCOUNTER — OFFICE VISIT (OUTPATIENT)
Dept: OBSTETRICS AND GYNECOLOGY | Facility: CLINIC | Age: 30
End: 2025-04-07
Payer: MEDICAID

## 2025-04-07 VITALS
HEART RATE: 92 BPM | DIASTOLIC BLOOD PRESSURE: 91 MMHG | WEIGHT: 240 LBS | SYSTOLIC BLOOD PRESSURE: 142 MMHG | BODY MASS INDEX: 42.51 KG/M2

## 2025-04-07 DIAGNOSIS — N92.6 IRREGULAR MENSES: Primary | ICD-10-CM

## 2025-04-07 DIAGNOSIS — E28.2 PCOS (POLYCYSTIC OVARIAN SYNDROME): ICD-10-CM

## 2025-04-07 DIAGNOSIS — R23.2 HOT FLASHES: ICD-10-CM

## 2025-04-07 DIAGNOSIS — N80.9 ENDOMETRIOSIS: ICD-10-CM

## 2025-04-07 PROCEDURE — 99999 PR PBB SHADOW E&M-EST. PATIENT-LVL III: CPT | Mod: PBBFAC,,, | Performed by: STUDENT IN AN ORGANIZED HEALTH CARE EDUCATION/TRAINING PROGRAM

## 2025-04-07 PROCEDURE — 3077F SYST BP >= 140 MM HG: CPT | Mod: ,,, | Performed by: STUDENT IN AN ORGANIZED HEALTH CARE EDUCATION/TRAINING PROGRAM

## 2025-04-07 PROCEDURE — 99205 OFFICE O/P NEW HI 60 MIN: CPT | Mod: S$PBB,,, | Performed by: STUDENT IN AN ORGANIZED HEALTH CARE EDUCATION/TRAINING PROGRAM

## 2025-04-07 PROCEDURE — 3008F BODY MASS INDEX DOCD: CPT | Mod: ,,, | Performed by: STUDENT IN AN ORGANIZED HEALTH CARE EDUCATION/TRAINING PROGRAM

## 2025-04-07 PROCEDURE — 99213 OFFICE O/P EST LOW 20 MIN: CPT | Mod: PBBFAC | Performed by: STUDENT IN AN ORGANIZED HEALTH CARE EDUCATION/TRAINING PROGRAM

## 2025-04-22 DIAGNOSIS — J30.89 OTHER ALLERGIC RHINITIS: ICD-10-CM

## 2025-04-22 DIAGNOSIS — J31.0 CHRONIC RHINITIS: Primary | ICD-10-CM

## 2025-04-22 DIAGNOSIS — J30.1 NON-SEASONAL ALLERGIC RHINITIS DUE TO POLLEN: ICD-10-CM

## 2025-04-22 DIAGNOSIS — J30.1 ALLERGIC REACTION TO GRASS POLLEN: ICD-10-CM

## 2025-04-22 DIAGNOSIS — J30.1 ALLERGIC RHINITIS DUE TO GRASS POLLEN: ICD-10-CM

## 2025-04-22 DIAGNOSIS — J30.1 SEASONAL ALLERGIC RHINITIS DUE TO POLLEN: ICD-10-CM

## 2025-04-22 DIAGNOSIS — Z91.018 MULTIPLE FOOD ALLERGIES: ICD-10-CM

## 2025-04-30 NOTE — PROGRESS NOTES
Gynecology History and Physical    Assessment/Plan:   Problem List Items Addressed This Visit          Renal/    Irregular menses - Primary    Relevant Orders    17-Hydroxyprogesterone (Completed)    CBC Auto Differential (Completed)    DHEA-Sulfate (Completed)    Estrogens, fractionated (Completed)    Follicle Stimulating Hormone (Completed)    Hemoglobin A1C (Completed)    Luteinizing Hormone (Completed)    Prolactin (Completed)    TESTOSTERONE, FREE (DIALYSIS) AND TOTAL, LC/MS/MS (Completed)    TSH (Completed)       Other    Hot flashes     Other Visit Diagnoses         PCOS (polycystic ovarian syndrome)          Endometriosis                  CC:   Chief Complaint   Patient presents with    Follow-up     Pt in for a follow due to symptoms such as hot flashes. Pt is on metformin and bc the patches. Pt has been recently diagnosed with endometriosis  and  PCOS   a month ago.     HPI: Gabriella Ulloa is a 30 y.o. female who presents with complaints of hot flashes. She has been diagnosed with PCOS, endometriosis. She is currently on the patch for contraception. Also reports irregular cycle. She is taking metformin.      Review of Systems: The following ROS was otherwise negative, except as noted in the HPI:  constitutional, HEENT, respiratory, cardiovascular, gastrointestinal, genitourinary, skin, musculoskeletal, neurological, psych    Gynecologic History:   Denies  history of abnormal pap smears  Denies  history of of STIs  Menstrual history:       Obstetrical History:  OB History          3    Para   3    Term                AB        Living   3         SAB        IAB        Ectopic        Multiple        Live Births                     Past Medical History:   Past Medical History:   Diagnosis Date    Anemia     Anxiety     Breakthrough bleeding on birth control pills 2015    Endometriosis of pelvic peritoneum 2020    cul-de-sac and sigmoid colon    Hormone disorder 2025     Hypertension     Irritable bowel syndrome Chronic    Increased pain with bowel movements with menses       Medications:  Medication List with Changes/Refills   Current Medications    ALBUTEROL (VENTOLIN HFA) 90 MCG/ACTUATION INHALER    Inhale 2 puffs into the lungs every 6 (six) hours as needed for Wheezing. Rescue    AMITRIPTYLINE (ELAVIL) 25 MG TABLET    Take 1 tablet (25 mg total) by mouth every evening.    EPINEPHRINE (EPIPEN) 0.3 MG/0.3 ML ATIN    Inject 0.3 mLs (0.3 mg total) into the muscle once. for 1 dose    ERGOCALCIFEROL (ERGOCALCIFEROL) 50,000 UNIT CAP    Take 1 capsule (50,000 Units total) by mouth every 7 days.    LOSARTAN (COZAAR) 50 MG TABLET    Take 1 tablet (50 mg total) by mouth once daily.    METFORMIN (GLUCOPHAGE-XR) 500 MG ER 24HR TABLET    Take 1 tablet (500 mg total) by mouth daily with breakfast.    NORELGESTROMIN-ETHINYL ESTRADIOL 150-35 MCG/24 HR    Place 1 patch onto the skin once a week.    RIZATRIPTAN (MAXALT) 10 MG TABLET    Take 1 tablet (10 mg total) by mouth daily as needed for Migraine (take onset of migraine).    VENLAFAXINE (EFFEXOR-XR) 150 MG CP24    Take 1 capsule (150 mg total) by mouth once daily.       Allergies:  Grass pollen-bermuda, standard and Grass pollen-natalia, standard    Surgical History:  Past Surgical History:   Procedure Laterality Date     SECTION      D&C/Hysteroscopy with robotic and operative Laparoscopy  2020    DIAGNOSTIC LAPAROSCOPY N/A 2022    Procedure: LAPAROSCOPY, DIAGNOSTIC;  Surgeon: Otis Mccullough MD;  Location: [facility] OR;  Service: OB/GYN;  Laterality: N/A;    DILATION AND CURETTAGE OF UTERUS  2020    ENDOMETRIAL BIOPSY N/A 2022    Procedure: BIOPSY, ENDOMETRIUM;  Surgeon: Otis Mccullough MD;  Location: [facility] OR;  Service: OB/GYN;  Laterality: N/A;    HYSTEROSCOPY WITH DILATION AND CURETTAGE OF UTERUS N/A 2022    Procedure: HYSTEROSCOPY, WITH DILATION AND CURETTAGE OF UTERUS;  Surgeon: Otis Mccullough MD;   Location: [facility];  Service: OB/GYN;  Laterality: N/A;       Family History:  Family History   Problem Relation Name Age of Onset    Prostate cancer Maternal Grandfather      Hypertension Other      Liver cancer Other      Stomach cancer Other      Diabetes Other      Breast cancer Other Aunt     Heart disease Mother Letitia Jacobs     Diabetes Mother Letitia Jacobs     Hypertension Mother Letitia Jacobs     Cancer Father Vu Jacobs     Diabetes Father Vu Jacobs     Hypertension Father Vu Jacobs     Migraines Father Vu Jacobs     Breast cancer Maternal Aunt Destini Nielsen        Social History:  Social History     Substance and Sexual Activity   Alcohol Use Never     Social History     Substance and Sexual Activity   Drug Use Never     Tobacco Use History[1]    Physical Exam:  BP (!) 142/91 (BP Location: Left arm, Patient Position: Sitting)   Pulse 92   Wt 108.9 kg (240 lb)   LMP 02/27/2025   BMI 42.51 kg/m²     General: Alert, well appearing, no acute distress  Head: Normocephalic, atraumatic  Lungs: Unlabored respirations  Abdomen: Soft, nontender, nondistended   Pelvic: deferred  Extremities: No redness or tenderness  Skin: Well perfused, normal coloration and turgor, no lesions or rashes visualized  Neuro: Alert, oriented, normal speech, no focal deficits, moves extremities appropriately  Osteopathic: No TART changes    Labs:  Lab Visit on 04/07/2025   Component Date Value    17-Hydroxyprogesterone 04/07/2025 <40     DHEA Sulfate 04/07/2025 86.1 (L)     Estrone 04/07/2025 37     Estradiol, Mass Spectrom* 04/07/2025 12     FSH 04/07/2025 5.2     Hemoglobin A1C 04/07/2025 5.1     Estimated Average Glucose 04/07/2025 100     LH, Blood 04/07/2025 3.3     Prolactin, Blood 04/07/2025 12.72     Testosterone, Free 04/07/2025 0.19     Testosterone, Total 04/07/2025 16     TSH 04/07/2025 0.623     WBC 04/07/2025 5.95     RBC 04/07/2025 4.97     Hemoglobin 04/07/2025 12.2     Hematocrit 04/07/2025 40.2     MCV  2025 80.9     MCH 2025 24.5 (L)     MCHC 2025 30.3 (L)     RDW 2025 14.4     Platelet Count 2025 406 (H)     MPV 2025 10.1     Neutrophils % 2025 44.1 (L)     Lymphocytes % 2025 44.4 (H)     Monocytes % 2025 8.2 (H)     Eosinophils % 2025 2.2     Basophils % 2025 0.8     Immature Granulocytes % 2025 0.3     nRBC, Auto 2025 0.0     Neutrophils, Abs 2025 2.62     Lymphocytes, Absolute 2025 2.64     Monocytes, Absolute 2025 0.49     Eosinophils, Absolute 2025 0.13     Basophils, Absolute 2025 0.05     Immature Granulocytes, A* 2025 0.02     nRBC, Absolute 2025 0.00     Diff Type 2025 Auto      Labs ordered  Return to discuss    Answers submitted by the patient for this visit:  Gynecologic Exam Questionnaire  (Submitted on 2025)  Chief Complaint: Gynecologic exam  genital itching: No  genital lesions: No  genital odor: No  genital rash: No  missed menses: No  pelvic pain: No  vaginal bleeding: No  vaginal discharge: No  Chronicity: recurrent  Onset: 1 to 4 weeks ago  Frequency: constantly  Progression since onset: rapidly worsening  Pain severity: severe  Pregnant now?: No  abdominal pain: No  anorexia: Yes  back pain: No  chills: No  constipation: Yes  diarrhea: No  discolored urine: No  dysuria: No  fever: No  flank pain: No  frequency: No  headaches: No  hematuria: No  nausea: No  painful intercourse: No  rash: No  urgency: No  vomiting: No  Vaginal bleeding: no bleeding  Passing clots?: No  Passing tissue?: No  Aggravated by: nothing  treatments tried: nothing  Improvement on treatment: no relief  Sexual activity: sexually active  Partner with STD symptoms: no  Birth control: a patch  Menstrual history: irregular  STD: No  abdominal surgery: Yes   section: Yes  Ectopic pregnancy: No  Endometriosis: Yes  herpes simplex: No  gynecological surgery: No  menorrhagia: Yes  metrorrhagia:  Yes  miscarriage: Yes  ovarian cysts: Yes  perineal abscess: No  PID: No  terminated pregnancy: No  vaginosis: No         [1]   Social History  Tobacco Use   Smoking Status Never    Passive exposure: Never   Smokeless Tobacco Never

## 2025-05-01 ENCOUNTER — OFFICE VISIT (OUTPATIENT)
Dept: OBSTETRICS AND GYNECOLOGY | Facility: CLINIC | Age: 30
End: 2025-05-01
Payer: MEDICAID

## 2025-05-01 VITALS
SYSTOLIC BLOOD PRESSURE: 138 MMHG | BODY MASS INDEX: 41.81 KG/M2 | WEIGHT: 236 LBS | HEART RATE: 96 BPM | DIASTOLIC BLOOD PRESSURE: 88 MMHG

## 2025-05-01 DIAGNOSIS — R11.0 NAUSEA: ICD-10-CM

## 2025-05-01 DIAGNOSIS — N92.6 IRREGULAR MENSES: Primary | ICD-10-CM

## 2025-05-01 PROCEDURE — 99999 PR PBB SHADOW E&M-EST. PATIENT-LVL III: CPT | Mod: PBBFAC,,, | Performed by: STUDENT IN AN ORGANIZED HEALTH CARE EDUCATION/TRAINING PROGRAM

## 2025-05-01 PROCEDURE — 99213 OFFICE O/P EST LOW 20 MIN: CPT | Mod: PBBFAC | Performed by: STUDENT IN AN ORGANIZED HEALTH CARE EDUCATION/TRAINING PROGRAM

## 2025-05-01 PROCEDURE — 3008F BODY MASS INDEX DOCD: CPT | Mod: ,,, | Performed by: STUDENT IN AN ORGANIZED HEALTH CARE EDUCATION/TRAINING PROGRAM

## 2025-05-01 PROCEDURE — 99213 OFFICE O/P EST LOW 20 MIN: CPT | Mod: S$PBB,,, | Performed by: STUDENT IN AN ORGANIZED HEALTH CARE EDUCATION/TRAINING PROGRAM

## 2025-05-01 RX ORDER — ONDANSETRON 4 MG/1
4 TABLET, ORALLY DISINTEGRATING ORAL EVERY 6 HOURS PRN
Qty: 30 TABLET | Refills: 2 | Status: SHIPPED | OUTPATIENT
Start: 2025-05-01

## 2025-05-06 NOTE — PROGRESS NOTES
Return Gyn Office Visit    Assessment/Plan  Problem List Items Addressed This Visit          Renal/    Irregular menses - Primary     Other Visit Diagnoses         Nausea        Relevant Orders    hCG, Total, Quantitative (Completed)              CC:   Chief Complaint   Patient presents with    Follow-up     Pt c/o vomiting for the last past two weeks. Pt also stated she become nausea and stomach pain occurs as well        HPI:  30 y.o. who presents to office for follow up. Reports she has been vomiting since her last appt. States she had a negative UPT at home. She has been taking metformin as prescribed since March.    Review of Systems - The following ROS was otherwise negative, except as noted in the HPI:  constitutional, respiratory, cardiovascular, gastrointestinal, genitourinary    Objective:  /88 Comment: manual check with a medium cuff in the right arm by michelle  Pulse 96   Wt 107 kg (236 lb)   LMP 04/05/2025   BMI 41.81 kg/m²   General: Alert, well appearing, no acute distress  Abdomen: Soft, nontender, nondistended   Pelvic: deferred  Extremities: No redness or tenderness, neg Dianne's sign  Osteopathic: no TART changes    Quant hCG ordered  Rx for zofran sent to pharmacy  Established with Dr. Wesley, encouraged to contact office for workup  Answers submitted by the patient for this visit:  Gynecologic Exam Questionnaire  (Submitted on 4/20/2025)  Chief Complaint: Gynecologic exam  genital itching: No  genital lesions: No  genital odor: No  genital rash: No  missed menses: No  pelvic pain: No  vaginal bleeding: No  vaginal discharge: Yes  Chronicity: new  Onset: yesterday  Frequency: rarely  Progression since onset: rapidly improving  Pain severity: no pain  Affected side: both  Pregnant now?: No  abdominal pain: No  anorexia: No  back pain: No  chills: No  constipation: No  diarrhea: No  discolored urine: No  dysuria: No  fever: No  flank pain: No  frequency: No  headaches: No  hematuria:  No  nausea: No  painful intercourse: No  rash: No  urgency: No  vomiting: No  Vaginal bleeding: no bleeding  Passing clots?: No  Passing tissue?: No  Aggravated by: nothing  treatments tried: nothing  Improvement on treatment: no relief  Sexual activity: sexually active  Partner with STD symptoms: no  Birth control: a patch  Menstrual history: irregular  STD: No  abdominal surgery: No   section: Yes  Ectopic pregnancy: No  Endometriosis: Yes  herpes simplex: No  gynecological surgery: Yes  menorrhagia: Yes  metrorrhagia: Yes  miscarriage: Yes  ovarian cysts: Yes  perineal abscess: No  PID: No  terminated pregnancy: No  vaginosis: No

## 2025-05-10 ENCOUNTER — OFFICE VISIT (OUTPATIENT)
Dept: FAMILY MEDICINE | Facility: CLINIC | Age: 30
End: 2025-05-10
Payer: MEDICAID

## 2025-05-10 ENCOUNTER — HOSPITAL ENCOUNTER (OUTPATIENT)
Facility: HOSPITAL | Age: 30
Discharge: HOME | End: 2025-05-13
Attending: EMERGENCY MEDICINE | Admitting: OBSTETRICS & GYNECOLOGY
Payer: MEDICAID

## 2025-05-10 ENCOUNTER — APPOINTMENT (OUTPATIENT)
Dept: RADIOLOGY | Facility: CLINIC | Age: 30
End: 2025-05-10
Attending: FAMILY MEDICINE
Payer: MEDICAID

## 2025-05-10 VITALS
BODY MASS INDEX: 42.34 KG/M2 | SYSTOLIC BLOOD PRESSURE: 122 MMHG | HEART RATE: 90 BPM | TEMPERATURE: 98 F | RESPIRATION RATE: 18 BRPM | WEIGHT: 239 LBS | OXYGEN SATURATION: 99 % | DIASTOLIC BLOOD PRESSURE: 84 MMHG

## 2025-05-10 DIAGNOSIS — R10.9 ABDOMINAL PAIN, UNSPECIFIED ABDOMINAL LOCATION: ICD-10-CM

## 2025-05-10 DIAGNOSIS — K59.00 CONSTIPATION, UNSPECIFIED CONSTIPATION TYPE: Primary | ICD-10-CM

## 2025-05-10 DIAGNOSIS — K66.1 HEMOPERITONEUM: Primary | ICD-10-CM

## 2025-05-10 DIAGNOSIS — N83.209 HEMORRHAGIC CYST OF OVARY: ICD-10-CM

## 2025-05-10 DIAGNOSIS — R10.2 PELVIC PAIN: ICD-10-CM

## 2025-05-10 LAB
B-HCG UR QL: NEGATIVE
B-HCG UR QL: NEGATIVE
BACTERIA #/AREA URNS HPF: ABNORMAL /HPF
BASOPHILS # BLD AUTO: 0.04 K/UL (ref 0–0.2)
BASOPHILS NFR BLD AUTO: 0.4 % (ref 0–1)
BILIRUB SERPL-MCNC: NORMAL MG/DL
BILIRUB UR QL STRIP: NEGATIVE
BLOOD URINE, POC: NORMAL
CLARITY UR: ABNORMAL
CLARITY, UA: NORMAL
COLOR UR: ABNORMAL
COLOR, UA: NORMAL
CTP QC/QA: YES
CTP QC/QA: YES
DIFFERENTIAL METHOD BLD: ABNORMAL
EOSINOPHIL # BLD AUTO: 0.07 K/UL (ref 0–0.5)
EOSINOPHIL NFR BLD AUTO: 0.7 % (ref 1–4)
ERYTHROCYTE [DISTWIDTH] IN BLOOD BY AUTOMATED COUNT: 15 % (ref 11.5–14.5)
GLUCOSE UR QL STRIP: NEGATIVE
GLUCOSE UR STRIP-MCNC: NORMAL MG/DL
HCT VFR BLD AUTO: 30.9 % (ref 38–47)
HGB BLD-MCNC: 9.4 G/DL (ref 12–16)
IMM GRANULOCYTES # BLD AUTO: 0.04 K/UL (ref 0–0.04)
IMM GRANULOCYTES NFR BLD: 0.4 % (ref 0–0.4)
KETONES UR QL STRIP: NEGATIVE
KETONES UR STRIP-SCNC: NEGATIVE MG/DL
LEUKOCYTE ESTERASE UR QL STRIP: NEGATIVE
LEUKOCYTE ESTERASE URINE, POC: NEGATIVE
LYMPHOCYTES # BLD AUTO: 1.81 K/UL (ref 1–4.8)
LYMPHOCYTES NFR BLD AUTO: 17.7 % (ref 27–41)
MCH RBC QN AUTO: 24.3 PG (ref 27–31)
MCHC RBC AUTO-ENTMCNC: 30.4 G/DL (ref 32–36)
MCV RBC AUTO: 79.8 FL (ref 80–96)
MONOCYTES # BLD AUTO: 0.77 K/UL (ref 0–0.8)
MONOCYTES NFR BLD AUTO: 7.5 % (ref 2–6)
MPC BLD CALC-MCNC: 10.3 FL (ref 9.4–12.4)
MUCOUS, UA: ABNORMAL /LPF
NEUTROPHILS # BLD AUTO: 7.52 K/UL (ref 1.8–7.7)
NEUTROPHILS NFR BLD AUTO: 73.3 % (ref 53–65)
NITRITE UR QL STRIP: NEGATIVE
NITRITE, POC UA: NEGATIVE
NRBC # BLD AUTO: 0 X10E3/UL
NRBC, AUTO (.00): 0 %
PH UR STRIP: 6 PH UNITS
PH, POC UA: 6
PLATELET # BLD AUTO: 359 K/UL (ref 150–400)
PROT UR QL STRIP: 70
PROTEIN, POC: 100
RBC # BLD AUTO: 3.87 M/UL (ref 4.2–5.4)
RBC # UR STRIP: ABNORMAL /UL
RBC #/AREA URNS HPF: 11 /HPF
SP GR UR STRIP: 1.05
SPECIFIC GRAVITY, POC UA: >=1.03
SQUAMOUS #/AREA URNS LPF: ABNORMAL /HPF
UROBILINOGEN UR STRIP-ACNC: 3 MG/DL
UROBILINOGEN, POC UA: 1
WBC # BLD AUTO: 10.25 K/UL (ref 4.5–11)
WBC #/AREA URNS HPF: 5 /HPF

## 2025-05-10 PROCEDURE — 80061 LIPID PANEL: CPT | Mod: ,,, | Performed by: CLINICAL MEDICAL LABORATORY

## 2025-05-10 PROCEDURE — 83690 ASSAY OF LIPASE: CPT

## 2025-05-10 PROCEDURE — 81025 URINE PREGNANCY TEST: CPT | Mod: QW,,, | Performed by: FAMILY MEDICINE

## 2025-05-10 PROCEDURE — 99285 EMERGENCY DEPT VISIT HI MDM: CPT | Mod: 25

## 2025-05-10 PROCEDURE — 85025 COMPLETE CBC W/AUTO DIFF WBC: CPT | Mod: ,,, | Performed by: CLINICAL MEDICAL LABORATORY

## 2025-05-10 PROCEDURE — 80053 COMPREHEN METABOLIC PANEL: CPT

## 2025-05-10 PROCEDURE — 96374 THER/PROPH/DIAG INJ IV PUSH: CPT

## 2025-05-10 PROCEDURE — 80053 COMPREHEN METABOLIC PANEL: CPT | Mod: ,,, | Performed by: CLINICAL MEDICAL LABORATORY

## 2025-05-10 PROCEDURE — 36415 COLL VENOUS BLD VENIPUNCTURE: CPT

## 2025-05-10 PROCEDURE — 63600175 PHARM REV CODE 636 W HCPCS

## 2025-05-10 PROCEDURE — 96375 TX/PRO/DX INJ NEW DRUG ADDON: CPT

## 2025-05-10 PROCEDURE — 3074F SYST BP LT 130 MM HG: CPT | Mod: ,,, | Performed by: FAMILY MEDICINE

## 2025-05-10 PROCEDURE — 3008F BODY MASS INDEX DOCD: CPT | Mod: ,,, | Performed by: FAMILY MEDICINE

## 2025-05-10 PROCEDURE — 96361 HYDRATE IV INFUSION ADD-ON: CPT

## 2025-05-10 PROCEDURE — 99214 OFFICE O/P EST MOD 30 MIN: CPT | Mod: ,,, | Performed by: FAMILY MEDICINE

## 2025-05-10 PROCEDURE — 99051 MED SERV EVE/WKEND/HOLIDAY: CPT | Mod: ,,, | Performed by: FAMILY MEDICINE

## 2025-05-10 PROCEDURE — 25000003 PHARM REV CODE 250

## 2025-05-10 PROCEDURE — 74018 RADEX ABDOMEN 1 VIEW: CPT | Mod: TC,RHCUB | Performed by: FAMILY MEDICINE

## 2025-05-10 PROCEDURE — 74018 RADEX ABDOMEN 1 VIEW: CPT | Mod: 26,,, | Performed by: RADIOLOGY

## 2025-05-10 PROCEDURE — 81025 URINE PREGNANCY TEST: CPT

## 2025-05-10 PROCEDURE — 85025 COMPLETE CBC W/AUTO DIFF WBC: CPT

## 2025-05-10 PROCEDURE — 81001 URINALYSIS AUTO W/SCOPE: CPT

## 2025-05-10 RX ORDER — ONDANSETRON HYDROCHLORIDE 2 MG/ML
4 INJECTION, SOLUTION INTRAVENOUS
Status: COMPLETED | OUTPATIENT
Start: 2025-05-10 | End: 2025-05-10

## 2025-05-10 RX ORDER — POLYETHYLENE GLYCOL 3350 17 G/17G
POWDER, FOR SOLUTION ORAL
Qty: 507 G | Refills: 0 | Status: SHIPPED | OUTPATIENT
Start: 2025-05-10

## 2025-05-10 RX ORDER — MORPHINE SULFATE 4 MG/ML
4 INJECTION, SOLUTION INTRAMUSCULAR; INTRAVENOUS
Refills: 0 | Status: COMPLETED | OUTPATIENT
Start: 2025-05-10 | End: 2025-05-10

## 2025-05-10 RX ADMIN — SODIUM CHLORIDE 1000 ML: 9 INJECTION, SOLUTION INTRAVENOUS at 11:05

## 2025-05-10 RX ADMIN — MORPHINE SULFATE 4 MG: 4 INJECTION INTRAVENOUS at 11:05

## 2025-05-10 RX ADMIN — ONDANSETRON 4 MG: 2 INJECTION INTRAMUSCULAR; INTRAVENOUS at 11:05

## 2025-05-10 NOTE — PROGRESS NOTES
Subjective:       Patient ID: Gabriella Ulloa is a 30 y.o. female.    Chief Complaint: Abdominal Pain (Started about 0900 today. C/O abd distention and pain from top of abd to pubic area.)    HPI  Review of Systems      Objective:      Physical Exam    Assessment:       1. Abdominal pain, unspecified abdominal location        Plan:     Abdominal pain, unspecified abdominal location  -     POCT URINALYSIS W/O SCOPE  -     POCT urine pregnancy  -     X-Ray KUB; Future; Expected date: 05/10/2025

## 2025-05-10 NOTE — PROGRESS NOTES
Subjective:       Patient ID: Gabriella Ulloa is a 30 y.o. female.    Chief Complaint: Abdominal Pain (Started about 0900 today. C/O abd distention and pain from top of abd to pubic area.)    Generalized abdominal ttp, started two days ago, last bm was small and hard yesterday.     Abdominal Pain  Associated symptoms include nausea. Pertinent negatives include no arthralgias, constipation, diarrhea, dysuria, fever, frequency, headaches, hematuria, myalgias or vomiting.     Review of Systems   Constitutional:  Negative for activity change, appetite change, chills, diaphoresis, fatigue, fever and unexpected weight change.   HENT:  Negative for nasal congestion, dental problem, drooling, ear discharge, ear pain, facial swelling, hearing loss, mouth sores, nosebleeds, postnasal drip, rhinorrhea, sinus pressure/congestion, sneezing, sore throat, tinnitus, trouble swallowing, voice change and goiter.    Eyes:  Negative for photophobia, discharge, itching and visual disturbance.   Respiratory:  Negative for apnea, cough, choking, chest tightness, shortness of breath, wheezing and stridor.    Cardiovascular:  Negative for chest pain, palpitations, leg swelling and claudication.   Gastrointestinal:  Positive for abdominal pain and nausea. Negative for abdominal distention, anal bleeding, blood in stool, change in bowel habit, constipation, diarrhea and vomiting.   Endocrine: Negative for cold intolerance, heat intolerance, polydipsia, polyphagia and polyuria.   Genitourinary:  Negative for bladder incontinence, decreased urine volume, difficulty urinating, dysuria, enuresis, flank pain, frequency, hematuria, nocturia, pelvic pain and urgency.   Musculoskeletal:  Negative for arthralgias, back pain, gait problem, joint swelling, leg pain, myalgias, neck pain, neck stiffness and joint deformity.   Integumentary:  Negative for pallor, rash, wound, breast mass and breast tenderness.   Allergic/Immunologic: Negative for  environmental allergies, food allergies and immunocompromised state.   Neurological:  Negative for dizziness, vertigo, tremors, seizures, syncope, facial asymmetry, speech difficulty, weakness, light-headedness, numbness, headaches, coordination difficulties and memory loss.   Hematological:  Negative for adenopathy. Does not bruise/bleed easily.   Psychiatric/Behavioral:  Negative for agitation, behavioral problems, confusion, decreased concentration, dysphoric mood, hallucinations, self-injury, sleep disturbance and suicidal ideas. The patient is not nervous/anxious and is not hyperactive.    Breast: Negative for mass and tenderness        Objective:      Physical Exam  Vitals reviewed.   Constitutional:       Appearance: Normal appearance.   HENT:      Head: Normocephalic and atraumatic.      Right Ear: Tympanic membrane, ear canal and external ear normal.      Left Ear: Tympanic membrane, ear canal and external ear normal.      Nose: Nose normal.      Mouth/Throat:      Mouth: Mucous membranes are moist.      Pharynx: Oropharynx is clear.   Eyes:      Extraocular Movements: Extraocular movements intact.      Conjunctiva/sclera: Conjunctivae normal.      Pupils: Pupils are equal, round, and reactive to light.   Cardiovascular:      Rate and Rhythm: Normal rate and regular rhythm.      Pulses: Normal pulses.      Heart sounds: Normal heart sounds.   Pulmonary:      Effort: Pulmonary effort is normal.      Breath sounds: Normal breath sounds.   Abdominal:      General: Bowel sounds are normal.      Palpations: Abdomen is soft.      Tenderness: There is abdominal tenderness.      Comments: Moderate generalized abdominal ttp.    Musculoskeletal:         General: Normal range of motion.      Cervical back: Normal range of motion and neck supple.   Skin:     General: Skin is warm and dry.   Neurological:      General: No focal deficit present.      Mental Status: She is alert. Mental status is at baseline.    Psychiatric:         Mood and Affect: Mood normal.         Behavior: Behavior normal.         Thought Content: Thought content normal.         Judgment: Judgment normal.         Assessment:       1. Constipation, unspecified constipation type    2. Abdominal pain, unspecified abdominal location        Plan:     Constipation, unspecified constipation type  -     polyethylene glycol (GLYCOLAX) 17 gram/dose powder; 2 capfuls by mouth daily x 3 days then 1 capful daily  Dispense: 507 g; Refill: 0  -     linaCLOtide (LINZESS) 290 mcg Cap capsule; Take 1 capsule (290 mcg total) by mouth daily as needed.  Dispense: 20 capsule; Refill: 0    Abdominal pain, unspecified abdominal location  -     POCT URINALYSIS W/O SCOPE  -     POCT urine pregnancy  -     X-Ray KUB; Future; Expected date: 05/10/2025  -     CBC Auto Differential; Future; Expected date: 05/10/2025  -     Comprehensive Metabolic Panel; Future; Expected date: 05/10/2025  -     Lipid Panel; Future; Expected date: 05/10/2025  -     Ambulatory referral/consult to Gastroenterology; Future; Expected date: 05/17/2025         Likely constipation causing symptoms, instructed if symptoms worsen to go to ED.

## 2025-05-10 NOTE — LETTER
May 10, 2025      [facility]  [address]  [place] [address]  Phone: [phone]  Fax: [phone]       Patient: Gabriella Ulloa   YOB: 1995  Date of Visit: 05/10/2025    To Whom It May Concern:    Elle Ulloa  was at [facility] on 05/10/2025. The patient may return to work/school on 05/13/2025 with no restrictions. If you have any questions or concerns, or if I can be of further assistance, please do not hesitate to contact me.    Sincerely,    Otilio Jolley II, DO

## 2025-05-11 LAB
ALBUMIN SERPL BCP-MCNC: 3.1 G/DL (ref 3.5–5)
ALBUMIN SERPL BCP-MCNC: 3.4 G/DL (ref 3.5–5)
ALBUMIN/GLOB SERPL: 0.8 {RATIO}
ALBUMIN/GLOB SERPL: 0.9 {RATIO}
ALP SERPL-CCNC: 68 U/L (ref 40–150)
ALP SERPL-CCNC: 73 U/L (ref 40–150)
ALT SERPL W P-5'-P-CCNC: 7 U/L
ALT SERPL W P-5'-P-CCNC: <7 U/L
ANION GAP SERPL CALCULATED.3IONS-SCNC: 12 MMOL/L (ref 7–16)
ANION GAP SERPL CALCULATED.3IONS-SCNC: 14 MMOL/L (ref 7–16)
AST SERPL W P-5'-P-CCNC: 15 U/L (ref 11–45)
AST SERPL W P-5'-P-CCNC: 16 U/L (ref 11–45)
BASOPHILS # BLD AUTO: 0.02 K/UL (ref 0–0.2)
BASOPHILS # BLD AUTO: 0.04 K/UL (ref 0–0.2)
BASOPHILS # BLD AUTO: 0.09 K/UL (ref 0–0.2)
BASOPHILS NFR BLD AUTO: 0.2 % (ref 0–1)
BASOPHILS NFR BLD AUTO: 0.4 % (ref 0–1)
BASOPHILS NFR BLD AUTO: 1 % (ref 0–1)
BILIRUB SERPL-MCNC: 0.3 MG/DL
BILIRUB SERPL-MCNC: 0.5 MG/DL
BUN SERPL-MCNC: 10 MG/DL (ref 7–19)
BUN SERPL-MCNC: 11 MG/DL (ref 7–19)
BUN/CREAT SERPL: 13 (ref 6–20)
BUN/CREAT SERPL: 14 (ref 6–20)
CALCIUM SERPL-MCNC: 8.5 MG/DL (ref 8.4–10.2)
CALCIUM SERPL-MCNC: 8.9 MG/DL (ref 8.4–10.2)
CHLORIDE SERPL-SCNC: 105 MMOL/L (ref 98–107)
CHLORIDE SERPL-SCNC: 106 MMOL/L (ref 98–107)
CHOLEST SERPL-MCNC: 192 MG/DL
CHOLEST/HDLC SERPL: 2.8 {RATIO}
CO2 SERPL-SCNC: 22 MMOL/L (ref 22–29)
CO2 SERPL-SCNC: 23 MMOL/L (ref 22–29)
CREAT SERPL-MCNC: 0.78 MG/DL (ref 0.55–1.02)
CREAT SERPL-MCNC: 0.8 MG/DL (ref 0.55–1.02)
DIFFERENTIAL METHOD BLD: ABNORMAL
EGFR (NO RACE VARIABLE) (RUSH/TITUS): 101 ML/MIN/1.73M2
EGFR (NO RACE VARIABLE) (RUSH/TITUS): 104 ML/MIN/1.73M2
EOSINOPHIL # BLD AUTO: 0.01 K/UL (ref 0–0.5)
EOSINOPHIL # BLD AUTO: 0.01 K/UL (ref 0–0.5)
EOSINOPHIL # BLD AUTO: 0.13 K/UL (ref 0–0.5)
EOSINOPHIL NFR BLD AUTO: 0.1 % (ref 1–4)
EOSINOPHIL NFR BLD AUTO: 0.1 % (ref 1–4)
EOSINOPHIL NFR BLD AUTO: 1.4 % (ref 1–4)
ERYTHROCYTE [DISTWIDTH] IN BLOOD BY AUTOMATED COUNT: 14.9 % (ref 11.5–14.5)
ERYTHROCYTE [DISTWIDTH] IN BLOOD BY AUTOMATED COUNT: 15 % (ref 11.5–14.5)
ERYTHROCYTE [DISTWIDTH] IN BLOOD BY AUTOMATED COUNT: 15.1 % (ref 11.5–14.5)
GLOBULIN SER-MCNC: 3.7 G/DL (ref 2–4)
GLOBULIN SER-MCNC: 3.9 G/DL (ref 2–4)
GLUCOSE SERPL-MCNC: 107 MG/DL (ref 74–100)
GLUCOSE SERPL-MCNC: 85 MG/DL (ref 74–100)
HCT VFR BLD AUTO: 27.2 % (ref 38–47)
HCT VFR BLD AUTO: 27.4 % (ref 38–47)
HCT VFR BLD AUTO: 27.8 % (ref 38–47)
HCT VFR BLD AUTO: 27.9 % (ref 38–47)
HCT VFR BLD AUTO: 35.5 % (ref 38–47)
HDLC SERPL-MCNC: 68 MG/DL (ref 35–60)
HGB BLD-MCNC: 10.8 G/DL (ref 12–16)
HGB BLD-MCNC: 8.1 G/DL (ref 12–16)
HGB BLD-MCNC: 8.3 G/DL (ref 12–16)
HGB BLD-MCNC: 8.4 G/DL (ref 12–16)
HGB BLD-MCNC: 8.6 G/DL (ref 12–16)
IMM GRANULOCYTES # BLD AUTO: 0.02 K/UL (ref 0–0.04)
IMM GRANULOCYTES # BLD AUTO: 0.04 K/UL (ref 0–0.04)
IMM GRANULOCYTES # BLD AUTO: 0.04 K/UL (ref 0–0.04)
IMM GRANULOCYTES NFR BLD: 0.2 % (ref 0–0.4)
IMM GRANULOCYTES NFR BLD: 0.4 % (ref 0–0.4)
IMM GRANULOCYTES NFR BLD: 0.4 % (ref 0–0.4)
LDLC SERPL CALC-MCNC: 106 MG/DL
LDLC/HDLC SERPL: 1.6 {RATIO}
LIPASE SERPL-CCNC: 8 U/L
LYMPHOCYTES # BLD AUTO: 1.07 K/UL (ref 1–4.8)
LYMPHOCYTES # BLD AUTO: 1.75 K/UL (ref 1–4.8)
LYMPHOCYTES # BLD AUTO: 2.7 K/UL (ref 1–4.8)
LYMPHOCYTES NFR BLD AUTO: 11.2 % (ref 27–41)
LYMPHOCYTES NFR BLD AUTO: 16.7 % (ref 27–41)
LYMPHOCYTES NFR BLD AUTO: 29.9 % (ref 27–41)
MCH RBC QN AUTO: 24 PG (ref 27–31)
MCH RBC QN AUTO: 24.2 PG (ref 27–31)
MCH RBC QN AUTO: 24.3 PG (ref 27–31)
MCHC RBC AUTO-ENTMCNC: 29.8 G/DL (ref 32–36)
MCHC RBC AUTO-ENTMCNC: 30.1 G/DL (ref 32–36)
MCHC RBC AUTO-ENTMCNC: 30.4 G/DL (ref 32–36)
MCV RBC AUTO: 79.4 FL (ref 80–96)
MCV RBC AUTO: 80.5 FL (ref 80–96)
MCV RBC AUTO: 80.9 FL (ref 80–96)
MONOCYTES # BLD AUTO: 0.59 K/UL (ref 0–0.8)
MONOCYTES # BLD AUTO: 0.63 K/UL (ref 0–0.8)
MONOCYTES # BLD AUTO: 0.71 K/UL (ref 0–0.8)
MONOCYTES NFR BLD AUTO: 5.6 % (ref 2–6)
MONOCYTES NFR BLD AUTO: 7 % (ref 2–6)
MONOCYTES NFR BLD AUTO: 7.4 % (ref 2–6)
MPC BLD CALC-MCNC: 10.1 FL (ref 9.4–12.4)
MPC BLD CALC-MCNC: 10.4 FL (ref 9.4–12.4)
MPC BLD CALC-MCNC: 10.7 FL (ref 9.4–12.4)
NEUTROPHILS # BLD AUTO: 5.44 K/UL (ref 1.8–7.7)
NEUTROPHILS # BLD AUTO: 7.74 K/UL (ref 1.8–7.7)
NEUTROPHILS # BLD AUTO: 8.05 K/UL (ref 1.8–7.7)
NEUTROPHILS NFR BLD AUTO: 60.3 % (ref 53–65)
NEUTROPHILS NFR BLD AUTO: 77 % (ref 53–65)
NEUTROPHILS NFR BLD AUTO: 80.7 % (ref 53–65)
NONHDLC SERPL-MCNC: 124 MG/DL
NRBC # BLD AUTO: 0 X10E3/UL
NRBC, AUTO (.00): 0 %
PLATELET # BLD AUTO: 367 K/UL (ref 150–400)
PLATELET # BLD AUTO: 389 K/UL (ref 150–400)
PLATELET # BLD AUTO: 400 K/UL (ref 150–400)
POTASSIUM SERPL-SCNC: 3.7 MMOL/L (ref 3.5–5.1)
POTASSIUM SERPL-SCNC: 4.1 MMOL/L (ref 3.5–5.1)
PROT SERPL-MCNC: 6.8 G/DL (ref 6.4–8.3)
PROT SERPL-MCNC: 7.3 G/DL (ref 6.4–8.3)
RBC # BLD AUTO: 3.38 M/UL (ref 4.2–5.4)
RBC # BLD AUTO: 3.45 M/UL (ref 4.2–5.4)
RBC # BLD AUTO: 4.47 M/UL (ref 4.2–5.4)
SODIUM SERPL-SCNC: 137 MMOL/L (ref 136–145)
SODIUM SERPL-SCNC: 137 MMOL/L (ref 136–145)
TRIGL SERPL-MCNC: 91 MG/DL (ref 37–140)
VLDLC SERPL-MCNC: 18 MG/DL
WBC # BLD AUTO: 10.46 K/UL (ref 4.5–11)
WBC # BLD AUTO: 9.03 K/UL (ref 4.5–11)
WBC # BLD AUTO: 9.59 K/UL (ref 4.5–11)

## 2025-05-11 PROCEDURE — G0378 HOSPITAL OBSERVATION PER HR: HCPCS

## 2025-05-11 PROCEDURE — 25000003 PHARM REV CODE 250: Performed by: EMERGENCY MEDICINE

## 2025-05-11 PROCEDURE — 36415 COLL VENOUS BLD VENIPUNCTURE: CPT | Performed by: EMERGENCY MEDICINE

## 2025-05-11 PROCEDURE — 63600175 PHARM REV CODE 636 W HCPCS: Performed by: EMERGENCY MEDICINE

## 2025-05-11 PROCEDURE — 25000003 PHARM REV CODE 250: Performed by: OBSTETRICS & GYNECOLOGY

## 2025-05-11 PROCEDURE — 36415 COLL VENOUS BLD VENIPUNCTURE: CPT | Performed by: OBSTETRICS & GYNECOLOGY

## 2025-05-11 PROCEDURE — 85014 HEMATOCRIT: CPT | Performed by: EMERGENCY MEDICINE

## 2025-05-11 PROCEDURE — 63600175 PHARM REV CODE 636 W HCPCS: Performed by: OBSTETRICS & GYNECOLOGY

## 2025-05-11 PROCEDURE — 96361 HYDRATE IV INFUSION ADD-ON: CPT

## 2025-05-11 PROCEDURE — 96375 TX/PRO/DX INJ NEW DRUG ADDON: CPT

## 2025-05-11 PROCEDURE — 85025 COMPLETE CBC W/AUTO DIFF WBC: CPT | Performed by: OBSTETRICS & GYNECOLOGY

## 2025-05-11 PROCEDURE — 85018 HEMOGLOBIN: CPT | Performed by: EMERGENCY MEDICINE

## 2025-05-11 PROCEDURE — 96372 THER/PROPH/DIAG INJ SC/IM: CPT | Performed by: EMERGENCY MEDICINE

## 2025-05-11 PROCEDURE — 96376 TX/PRO/DX INJ SAME DRUG ADON: CPT

## 2025-05-11 RX ORDER — IBUPROFEN 600 MG/1
600 TABLET, FILM COATED ORAL EVERY 6 HOURS PRN
Status: DISCONTINUED | OUTPATIENT
Start: 2025-05-11 | End: 2025-05-13 | Stop reason: HOSPADM

## 2025-05-11 RX ORDER — SODIUM CHLORIDE 0.9 % (FLUSH) 0.9 %
10 SYRINGE (ML) INJECTION
Status: DISCONTINUED | OUTPATIENT
Start: 2025-05-11 | End: 2025-05-13 | Stop reason: HOSPADM

## 2025-05-11 RX ORDER — ONDANSETRON 4 MG/1
8 TABLET, ORALLY DISINTEGRATING ORAL EVERY 8 HOURS PRN
Status: DISCONTINUED | OUTPATIENT
Start: 2025-05-11 | End: 2025-05-13 | Stop reason: HOSPADM

## 2025-05-11 RX ORDER — OXYCODONE AND ACETAMINOPHEN 7.5; 325 MG/1; MG/1
1 TABLET ORAL EVERY 4 HOURS PRN
Refills: 0 | Status: DISCONTINUED | OUTPATIENT
Start: 2025-05-11 | End: 2025-05-13 | Stop reason: HOSPADM

## 2025-05-11 RX ORDER — ACETAMINOPHEN 500 MG
1000 TABLET ORAL
Status: COMPLETED | OUTPATIENT
Start: 2025-05-11 | End: 2025-05-11

## 2025-05-11 RX ORDER — HYDROMORPHONE HYDROCHLORIDE 2 MG/ML
0.5 INJECTION, SOLUTION INTRAMUSCULAR; INTRAVENOUS; SUBCUTANEOUS
Status: COMPLETED | OUTPATIENT
Start: 2025-05-11 | End: 2025-05-11

## 2025-05-11 RX ORDER — HYDROMORPHONE HYDROCHLORIDE 2 MG/ML
0.5 INJECTION, SOLUTION INTRAMUSCULAR; INTRAVENOUS; SUBCUTANEOUS EVERY 4 HOURS PRN
Refills: 0 | Status: DISCONTINUED | OUTPATIENT
Start: 2025-05-11 | End: 2025-05-13 | Stop reason: HOSPADM

## 2025-05-11 RX ORDER — ONDANSETRON HYDROCHLORIDE 2 MG/ML
4 INJECTION, SOLUTION INTRAVENOUS
Status: COMPLETED | OUTPATIENT
Start: 2025-05-11 | End: 2025-05-11

## 2025-05-11 RX ORDER — DIPHENHYDRAMINE HCL 25 MG
25 CAPSULE ORAL EVERY 6 HOURS PRN
Status: DISCONTINUED | OUTPATIENT
Start: 2025-05-11 | End: 2025-05-13 | Stop reason: HOSPADM

## 2025-05-11 RX ORDER — DICYCLOMINE HYDROCHLORIDE 10 MG/ML
20 INJECTION INTRAMUSCULAR
Status: COMPLETED | OUTPATIENT
Start: 2025-05-11 | End: 2025-05-11

## 2025-05-11 RX ORDER — HYDROMORPHONE HYDROCHLORIDE 2 MG/ML
1 INJECTION, SOLUTION INTRAMUSCULAR; INTRAVENOUS; SUBCUTANEOUS
Refills: 0 | Status: COMPLETED | OUTPATIENT
Start: 2025-05-11 | End: 2025-05-11

## 2025-05-11 RX ADMIN — ONDANSETRON 4 MG: 2 INJECTION INTRAMUSCULAR; INTRAVENOUS at 11:05

## 2025-05-11 RX ADMIN — HYDROMORPHONE HYDROCHLORIDE 0.5 MG: 2 INJECTION, SOLUTION INTRAMUSCULAR; INTRAVENOUS; SUBCUTANEOUS at 04:05

## 2025-05-11 RX ADMIN — HYDROMORPHONE HYDROCHLORIDE 1 MG: 2 INJECTION, SOLUTION INTRAMUSCULAR; INTRAVENOUS; SUBCUTANEOUS at 10:05

## 2025-05-11 RX ADMIN — ACETAMINOPHEN 1000 MG: 500 TABLET ORAL at 04:05

## 2025-05-11 RX ADMIN — OXYCODONE HYDROCHLORIDE AND ACETAMINOPHEN 1 TABLET: 7.5; 325 TABLET ORAL at 09:05

## 2025-05-11 RX ADMIN — SODIUM CHLORIDE, POTASSIUM CHLORIDE, SODIUM LACTATE AND CALCIUM CHLORIDE 1000 ML: 600; 310; 30; 20 INJECTION, SOLUTION INTRAVENOUS at 09:05

## 2025-05-11 RX ADMIN — HYDROMORPHONE HYDROCHLORIDE 0.5 MG: 2 INJECTION, SOLUTION INTRAMUSCULAR; INTRAVENOUS; SUBCUTANEOUS at 07:05

## 2025-05-11 RX ADMIN — DICYCLOMINE HYDROCHLORIDE 20 MG: 10 INJECTION, SOLUTION INTRAMUSCULAR at 03:05

## 2025-05-11 RX ADMIN — ONDANSETRON 8 MG: 4 TABLET, ORALLY DISINTEGRATING ORAL at 08:05

## 2025-05-11 RX ADMIN — DIPHENHYDRAMINE HYDROCHLORIDE 25 MG: 25 CAPSULE ORAL at 09:05

## 2025-05-11 NOTE — PHARMACY MED REC
"Admission Medication History     The home medication history was taken by Fior Colón.    You may go to "Admission" then "Reconcile Home Medications" tabs to review and/or act upon these items.     The home medication list has been updated by the Pharmacy department.   Please read ALL comments highlighted in yellow.   Please address this information as you see fit.    Feel free to contact us if you have any questions or require assistance.      The medications listed below were removed from the home medication list. Please reorder if appropriate:  Albuterol 90 mcg inh  Amitriptyline 25 mg  Epipen 0.3 mg/0.3 mL    Medications listed below were obtained from: [employer] and Medical records  PTA Medications   Medication Sig    ergocalciferol (ERGOCALCIFEROL) 50,000 unit Cap Take 1 capsule (50,000 Units total) by mouth every 7 days.    losartan (COZAAR) 50 MG tablet Take 1 tablet (50 mg total) by mouth once daily.    metFORMIN (GLUCOPHAGE-XR) 500 MG ER 24hr tablet Take 1 tablet (500 mg total) by mouth daily with breakfast.    norelgestromin-ethinyl estradiol 150-35 mcg/24 hr Place 1 patch onto the skin once a week.    ondansetron (ZOFRAN-ODT) 4 MG TbDL Take 1 tablet (4 mg total) by mouth every 6 (six) hours as needed.    polyethylene glycol (GLYCOLAX) 17 gram/dose powder 2 capfuls by mouth daily x 3 days then 1 capful daily    venlafaxine (EFFEXOR-XR) 150 MG Cp24 Take 1 capsule (150 mg total) by mouth once daily.    linaCLOtide (LINZESS) 290 mcg Cap capsule Take 1 capsule (290 mcg total) by mouth daily as needed.    rizatriptan (MAXALT) 10 MG tablet Take 1 tablet (10 mg total) by mouth daily as needed for Migraine (take onset of migraine).         Current Outpatient Medications on File Prior to Encounter   Medication Sig Dispense Refill Last Dose/Taking    ergocalciferol (ERGOCALCIFEROL) 50,000 unit Cap Take 1 capsule (50,000 Units total) by mouth every 7 days. 12 capsule 3 Taking    losartan (COZAAR) " 50 MG tablet Take 1 tablet (50 mg total) by mouth once daily. 90 tablet 3 Taking    metFORMIN (GLUCOPHAGE-XR) 500 MG ER 24hr tablet Take 1 tablet (500 mg total) by mouth daily with breakfast. 90 tablet 1 Taking    norelgestromin-ethinyl estradiol 150-35 mcg/24 hr Place 1 patch onto the skin once a week. 4 patch 3 Taking    ondansetron (ZOFRAN-ODT) 4 MG TbDL Take 1 tablet (4 mg total) by mouth every 6 (six) hours as needed. 30 tablet 2 Taking As Needed    polyethylene glycol (GLYCOLAX) 17 gram/dose powder 2 capfuls by mouth daily x 3 days then 1 capful daily 507 g 0 Taking    venlafaxine (EFFEXOR-XR) 150 MG Cp24 Take 1 capsule (150 mg total) by mouth once daily. 30 capsule 5 Taking    linaCLOtide (LINZESS) 290 mcg Cap capsule Take 1 capsule (290 mcg total) by mouth daily as needed. 20 capsule 0     rizatriptan (MAXALT) 10 MG tablet Take 1 tablet (10 mg total) by mouth daily as needed for Migraine (take onset of migraine). 27 tablet 3 Unknown    [DISCONTINUED] albuterol (VENTOLIN HFA) 90 mcg/actuation inhaler Inhale 2 puffs into the lungs every 6 (six) hours as needed for Wheezing. Rescue 18 g 0     [DISCONTINUED] amitriptyline (ELAVIL) 25 MG tablet Take 1 tablet (25 mg total) by mouth every evening. (Patient not taking: Reported on 3/11/2025) 90 tablet 3     [DISCONTINUED] EPINEPHrine (EPIPEN) 0.3 mg/0.3 mL AtIn Inject 0.3 mLs (0.3 mg total) into the muscle once. for 1 dose 0.3 mL 0        Potential issues to be addressed PRIOR TO DISCHARGE  N/A    Fior Colón  EXT [id number]                 .

## 2025-05-11 NOTE — H&P
[facility]  Obstetrics & Gynecology  History & Physical    Patient Name: Gabriella Ulloa  MRN: [id number]  Admission Date: 5/10/2025  Primary Care Provider: Jayde Cole DNP    Subjective:     Chief Complaint/Reason for Admission: abdominal pelvic pain    History of Present Illness: 29 Y/O  with hx of irregular periods admitted for pelvic and abdominal pain. Seen by Dr. Laguna 2 weeks ago for abdominal pain with n/v and then by her PCP yesterday for constipation. She states her pain worsened overnight and went to the ED. Pain is generalized but worse on the right and is sharp. Had CT and ultrasound done which showed findings c/w hemoperitoneum. She had a negative pregnancy test and is on the patch for birth control. No large cysts seen and the fluid is seen more on the right than left. Her hgb was 8.6 when she came in and at 0700 it went down to 8.3 after IV fluids. She has a significant hx of PCOS and endometriosis. Also GI issues.    No current facility-administered medications on file prior to encounter.     Current Outpatient Medications on File Prior to Encounter   Medication Sig    ergocalciferol (ERGOCALCIFEROL) 50,000 unit Cap Take 1 capsule (50,000 Units total) by mouth every 7 days.    losartan (COZAAR) 50 MG tablet Take 1 tablet (50 mg total) by mouth once daily.    metFORMIN (GLUCOPHAGE-XR) 500 MG ER 24hr tablet Take 1 tablet (500 mg total) by mouth daily with breakfast.    norelgestromin-ethinyl estradiol 150-35 mcg/24 hr Place 1 patch onto the skin once a week.    ondansetron (ZOFRAN-ODT) 4 MG TbDL Take 1 tablet (4 mg total) by mouth every 6 (six) hours as needed.    polyethylene glycol (GLYCOLAX) 17 gram/dose powder 2 capfuls by mouth daily x 3 days then 1 capful daily    venlafaxine (EFFEXOR-XR) 150 MG Cp24 Take 1 capsule (150 mg total) by mouth once daily.    linaCLOtide (LINZESS) 290 mcg Cap capsule Take 1 capsule (290 mcg total) by mouth daily as needed.     rizatriptan (MAXALT) 10 MG tablet Take 1 tablet (10 mg total) by mouth daily as needed for Migraine (take onset of migraine).    [DISCONTINUED] albuterol (VENTOLIN HFA) 90 mcg/actuation inhaler Inhale 2 puffs into the lungs every 6 (six) hours as needed for Wheezing. Rescue    [DISCONTINUED] amitriptyline (ELAVIL) 25 MG tablet Take 1 tablet (25 mg total) by mouth every evening. (Patient not taking: Reported on 3/11/2025)    [DISCONTINUED] EPINEPHrine (EPIPEN) 0.3 mg/0.3 mL AtIn Inject 0.3 mLs (0.3 mg total) into the muscle once. for 1 dose       Review of patient's allergies indicates:   Allergen Reactions    Grass pollen-bermuda, standard     Grass pollen-natalia, standard        Past Medical History:   Diagnosis Date    Anemia     Anxiety     Breakthrough bleeding on birth control pills 2015    Endometriosis of pelvic peritoneum 2020    cul-de-sac and sigmoid colon    Hormone disorder 2025    Hypertension     Irritable bowel syndrome Chronic    Increased pain with bowel movements with menses     OB History    Para Term  AB Living   3 3    3   SAB IAB Ectopic Multiple Live Births             # Outcome Date GA Lbr Shmuel/2nd Weight Sex Type Anes PTL Lv   3 Para            2 Para            1 Para              Past Surgical History:   Procedure Laterality Date     SECTION      D&C/Hysteroscopy with robotic and operative Laparoscopy  2020    DIAGNOSTIC LAPAROSCOPY N/A 2022    Procedure: LAPAROSCOPY, DIAGNOSTIC;  Surgeon: Otis Mccullough MD;  Location: [facility] OR;  Service: OB/GYN;  Laterality: N/A;    DILATION AND CURETTAGE OF UTERUS  2020    ENDOMETRIAL BIOPSY N/A 2022    Procedure: BIOPSY, ENDOMETRIUM;  Surgeon: Otis Mccullough MD;  Location: [facility] OR;  Service: OB/GYN;  Laterality: N/A;    HYSTEROSCOPY WITH DILATION AND CURETTAGE OF UTERUS N/A 2022    Procedure: HYSTEROSCOPY, WITH DILATION AND CURETTAGE OF UTERUS;  Surgeon: Otis Mccullough MD;   Location: [facility];  Service: OB/GYN;  Laterality: N/A;     Family History       Problem Relation (Age of Onset)    Breast cancer Other, Maternal Aunt    Cancer Father    Diabetes Other, Mother, Father    Heart disease Mother    Hypertension Other, Mother, Father    Liver cancer Other    Migraines Father    Prostate cancer Maternal Grandfather    Stomach cancer Other    Thyroid disease Maternal Aunt, Maternal Aunt          Tobacco Use    Smoking status: Never     Passive exposure: Never    Smokeless tobacco: Never   Substance and Sexual Activity    Alcohol use: Never    Drug use: Never    Sexual activity: Yes     Partners: Male     Birth control/protection: Patch     Review of Systems   Constitutional: Negative.    Respiratory: Negative.     Cardiovascular: Negative.    Gastrointestinal:  Positive for abdominal pain, constipation, nausea and vomiting.   Genitourinary: Negative.    Neurological: Negative.    Psychiatric/Behavioral: Negative.       Objective:     Vital Signs (Most Recent):  Temp: 97.6 °F (36.4 °C) (05/11/25 1145)  Pulse: 86 (05/11/25 1145)  Resp: 20 (05/11/25 1145)  BP: 128/84 (05/11/25 1145)  SpO2: 97 % (05/11/25 1145) Vital Signs (24h Range):  Temp:  [97.6 °F (36.4 °C)-98.2 °F (36.8 °C)] 97.6 °F (36.4 °C)  Pulse:  [] 86  Resp:  [16-20] 20  SpO2:  [95 %-100 %] 97 %  BP: ()/(55-84) 128/84     Weight: 105.7 kg (233 lb)  Body mass index is 41.27 kg/m².  Patient's last menstrual period was 04/05/2025.    Physical Exam:   Constitutional: She is oriented to person, place, and time. She appears well-developed and well-nourished. No distress.    HENT:   Head: Atraumatic.      Cardiovascular:  Normal rate and regular rhythm.             Pulmonary/Chest: Effort normal. No respiratory distress.        Abdominal: Soft. There is abdominal tenderness.                 Neurological: She is alert and oriented to person, place, and time.     Psychiatric: She has a normal mood and affect.        Laboratory:  I have personallly reviewed all pertinent lab results from the last 24 hours.    Diagnostic Results:  CT and ultrasound reviewed     Assessment/Plan:   Abdominal and pelvic pain  Most likely hemorrhagic cyst with bleeding in peritoneum  Stable currently but will observe overnight for serial CBC's and pain control.   Will hand over to Dr. Laguna in the am.            Claudine Billingsley MD  Obstetrics & Gynecology  [facility]

## 2025-05-11 NOTE — ED PROVIDER NOTES
Encounter Date: 5/10/2025       History     Chief Complaint   Patient presents with    Abdominal Pain     Pov to er - c/o abd pain c n/v/d all day - weakness now - was seen at clinic today and told she was constipated      SW is a 29 y/o AAF     Pateint has a PMHx of anemia, Anxiety, IBS, HTN, and Endometriosis    Patient presents to the ED POV with c/o abdominal pain. Patient stated that her pain started around 0900 this morning. Patient was seen and evaluated in the clinic this morning and diagnosed with constipation. Patient stated that she took a Dulcolax suppository at home with no results. Patient stated that her pain has worsened as the day has progressed. Patient endorses nausea with no vomiting.     The history is provided by the patient.   Abdominal Pain  The current episode started today. The onset of the illness was gradual. The problem has not changed since onset.The abdominal pain is located in the RLQ and epigastric region. The abdominal pain does not radiate. The severity of the abdominal pain is 10/10. The abdominal pain is relieved by nothing. The other symptoms of the illness include nausea and vomiting. The other symptoms of the illness do not include fever, fatigue, jaundice, diarrhea, dysuria, hematemesis, hematochezia, vaginal discharge or vaginal bleeding.   Nausea began today.     Review of patient's allergies indicates:   Allergen Reactions    Grass pollen-bermuda, standard     Grass pollen-natalia, standard      Past Medical History:   Diagnosis Date    Anemia     Anxiety     Breakthrough bleeding on birth control pills 2015    Endometriosis of pelvic peritoneum 2020    cul-de-sac and sigmoid colon    Hormone disorder 2025    Hypertension     Irritable bowel syndrome Chronic    Increased pain with bowel movements with menses     Past Surgical History:   Procedure Laterality Date     SECTION      D&C/Hysteroscopy with robotic and operative Laparoscopy  2020     DIAGNOSTIC LAPAROSCOPY N/A 2/8/2022    Procedure: LAPAROSCOPY, DIAGNOSTIC;  Surgeon: Otis Mccullough MD;  Location: [facility] OR;  Service: OB/GYN;  Laterality: N/A;    DILATION AND CURETTAGE OF UTERUS  06/25/2020    ENDOMETRIAL BIOPSY N/A 2/8/2022    Procedure: BIOPSY, ENDOMETRIUM;  Surgeon: Otis Mccullough MD;  Location: [facility] OR;  Service: OB/GYN;  Laterality: N/A;    HYSTEROSCOPY WITH DILATION AND CURETTAGE OF UTERUS N/A 2/8/2022    Procedure: HYSTEROSCOPY, WITH DILATION AND CURETTAGE OF UTERUS;  Surgeon: Otis Mccullough MD;  Location: [facility] OR;  Service: OB/GYN;  Laterality: N/A;     Family History   Problem Relation Name Age of Onset    Prostate cancer Maternal Grandfather      Hypertension Other      Liver cancer Other      Stomach cancer Other      Diabetes Other      Breast cancer Other Aunt     Heart disease Mother Letitia Jacobs     Diabetes Mother Letitia Jacobs     Hypertension Mother Letitia Brown     Cancer Father Vu Brown     Diabetes Father Vu Jacobs     Hypertension Father Vu Jacobs     Migraines Father Vu Jacobs     Breast cancer Maternal Aunt Destini Nielsen     Thyroid disease Maternal Aunt Neva Ulloa     Thyroid disease Maternal Aunt Ruthann Ulloa      Social History[1]  Review of Systems   Constitutional: Negative.  Negative for fatigue and fever.   HENT: Negative.     Eyes: Negative.    Respiratory: Negative.     Cardiovascular: Negative.    Gastrointestinal:  Positive for abdominal pain, nausea and vomiting. Negative for diarrhea, hematemesis, hematochezia and jaundice.   Endocrine: Negative.    Genitourinary: Negative.  Negative for dysuria, vaginal bleeding and vaginal discharge.   Musculoskeletal: Negative.    Skin: Negative.    Allergic/Immunologic: Negative.    Neurological: Negative.    Hematological: Negative.    Psychiatric/Behavioral: Negative.         Physical Exam     Initial Vitals [05/10/25 2226]   BP Pulse Resp Temp SpO2   99/71 88 20 98 °F (36.7 °C) 99 %       MAP       --         Physical Exam    Nursing note and vitals reviewed.  Constitutional: Vital signs are normal. She appears well-developed and well-nourished. She is cooperative. She appears ill.   Cardiovascular:  Normal rate, regular rhythm, S1 normal, S2 normal, normal heart sounds, intact distal pulses and normal pulses.           Pulmonary/Chest: Effort normal and breath sounds normal.   Abdominal: Abdomen is soft and flat. Bowel sounds are decreased. There is abdominal tenderness in the right lower quadrant and epigastric area. No hernia.     There is no rebound and no guarding.     Lymphadenopathy:     She has no cervical adenopathy.     She has no axillary adenopathy.   Neurological: She is alert and oriented to person, place, and time. She has normal strength and normal reflexes. She displays normal reflexes. No cranial nerve deficit or sensory deficit. GCS eye subscore is 4. GCS verbal subscore is 5. GCS motor subscore is 6.   Skin: Skin is warm, dry and intact. Capillary refill takes less than 2 seconds. No rash noted.   Psychiatric: She has a normal mood and affect. Her speech is normal and behavior is normal. Judgment and thought content normal. Cognition and memory are normal.         Medical Screening Exam   See Full Note    ED Course   Procedures  Labs Reviewed   COMPREHENSIVE METABOLIC PANEL - Abnormal       Result Value    Sodium 137      Potassium 3.7      Chloride 105      CO2 22      Anion Gap 14      Glucose 107 (*)     BUN 11      Creatinine 0.78      BUN/Creatinine Ratio 14      Calcium 8.5      Total Protein 6.8      Albumin 3.1 (*)     Globulin 3.7      A/G Ratio 0.8      Bilirubin, Total 0.5      Alk Phos 68      ALT <7      AST 16      eGFR 104     CBC WITH DIFFERENTIAL - Abnormal    WBC 10.25      RBC 3.87 (*)     Hemoglobin 9.4 (*)     Hematocrit 30.9 (*)     MCV 79.8 (*)     MCH 24.3 (*)     MCHC 30.4 (*)     RDW 15.0 (*)     Platelet Count 359      MPV 10.3      Neutrophils % 73.3  (*)     Lymphocytes % 17.7 (*)     Monocytes % 7.5 (*)     Eosinophils % 0.7 (*)     Basophils % 0.4      Immature Granulocytes % 0.4      nRBC, Auto 0.0      Neutrophils, Abs 7.52      Lymphocytes, Absolute 1.81      Monocytes, Absolute 0.77      Eosinophils, Absolute 0.07      Basophils, Absolute 0.04      Immature Granulocytes, Absolute 0.04      nRBC, Absolute 0.00      Diff Type Auto     URINALYSIS, REFLEX TO URINE CULTURE - Abnormal    Color, UA Orange (*)     Clarity, UA Turbid      pH, UA 6.0      Leukocytes, UA Negative      Nitrites, UA Negative      Protein, UA 70 (*)     Glucose, UA Normal      Ketones, UA Negative      Urobilinogen, UA 3 (*)     Bilirubin, UA Negative      Blood, UA Large (*)     Specific Gravity, UA 1.047 (*)    URINALYSIS, MICROSCOPIC - Abnormal    WBC, UA 5      RBC, UA 11 (*)     Bacteria, UA Few (*)     Squamous Epithelial Cells, UA Moderate (*)     Mucous Moderate (*)    HEMOGLOBIN AND HEMATOCRIT, BLOOD - Abnormal    Hemoglobin 8.6 (*)     Hematocrit 27.8 (*)    HEMOGLOBIN AND HEMATOCRIT, BLOOD - Abnormal    Hemoglobin 8.3 (*)     Hematocrit 27.4 (*)    LIPASE - Normal    Lipase 8     CBC W/ AUTO DIFFERENTIAL    Narrative:     The following orders were created for panel order CBC auto differential.  Procedure                               Abnormality         Status                     ---------                               -----------         ------                     CBC with Differential[[id number]]       Abnormal            Final result                 Please view results for these tests on the individual orders.   EXTRA TUBES    Narrative:     The following orders were created for panel order EXTRA TUBES.  Procedure                               Abnormality         Status                     ---------                               -----------         ------                     Light Blue Top Hold[[id number]]                             In process                 Gold Top  Hold[[id number]]                                   In process                   Please view results for these tests on the individual orders.   LIGHT BLUE TOP HOLD   GOLD TOP HOLD   EXTRA TUBES    Narrative:     The following orders were created for panel order EXTRA TUBES.  Procedure                               Abnormality         Status                     ---------                               -----------         ------                     Light Blue Top Hold[[id number]]                             In process                   Please view results for these tests on the individual orders.   LIGHT BLUE TOP HOLD   POCT URINE PREGNANCY    POC Preg Test, Ur Negative       Acceptable Yes            Imaging Results              US Pelvis Comp with Transvag NON-OB (xpd (Final result)  Result time 05/11/25 10:18:17   Procedure changed from US Pelvis Complete Non OB     Final result by Priti Martinez MD (05/11/25 10:18:17)                   Impression:      Complex free fluid the pelvis suggesting hemoperitoneum.    Final read    Stat read concordant      Electronically signed by: Priti Martinez MD  Date:    05/11/2025  Time:    10:18               Narrative:    EXAMINATION:  US PELVIS COMP WITH TRANSVAG NON-OB (XPD)    CLINICAL HISTORY:  Vaginal bleeding;    TECHNIQUE:  Transabdominal sonography of the pelvis was performed, followed by transvaginal sonography to better evaluate the uterus and ovaries.    COMPARISON:  None.    FINDINGS:  Uterus:    Size: 10.5 x 4.8 x 5.6 cm    Masses: None.  Small nabothian cysts noted    Endometrium: Normal in this pre menopausal patient, measuring 12 mm.    There is complex appearing fluid in the uterus appearing greater on the right.  Ovaries are not well defined separate from the complex fluid.  There is 12 x 7 x 8 mm slightly septated cystic structure left adnexa suggesting small left ovarian cyst.  Ovaries not grossly enlarged with right estimated at 4.3 x 3.1  x 4.1 cm on left and 3.1 x 2 x 2.8 cm.    :                                       CT Renal Stone Study ABD Pelvis WO (Final result)  Result time 05/11/25 10:01:56      Final result by Priti Martinez MD (05/11/25 10:01:56)                   Impression:      Appearance suspicious for hemoperitoneum.    Final read    Stat read concordant      Electronically signed by: Priti Martinez MD  Date:    05/11/2025  Time:    10:01               Narrative:    EXAMINATION:  CT RENAL STONE STUDY ABD PELVIS WO    CLINICAL HISTORY:  Flank pain, kidney stone suspected (pregnant);    TECHNIQUE:  Low dose axial images, sagittal and coronal reformations were obtained from the lung bases to the pubic symphysis.  Contrast was not administered.    COMPARISON:  None    FINDINGS:  Visualized lung bases.  Mild bibasilar opacification which could represent hypoventilatory change and/or infiltrate    Lack of IV contrast does decrease the sensitivity exam limiting evaluation particular vascular structures and solid organs.  There is high density material within the pelvis particularly seen along the anterior aspect of the uterus and cephalad to the uterus and extending into the left paracolic gutter and on the right extending into Flor's pouch between liver and right kidney..  Density slightly heterogeneous but predominantly high density.    Liver, spleen, adrenal glands, pancreas, kidneys unremarkable appearance.  No calcified stones the gallbladder or CT findings of acute cholecystitis or biliary duct dilatation.  Abdominal aorta tapers without aneurysmal dilatation.  No free intraperitoneal air.  Urinary bladder decompressed and unremarkable appearance    Uterus and ovaries not reliably distinguish separate from the high density material suspected bleb within the pelvis.  Uterus appearing prominent size.    Recommend correlation clinically.  History is of suspected pregnancy and if pregnancy of concern would be ruptured ectopic  pregnancy                                       Medications   sodium chloride 0.9% flush 10 mL (has no administration in time range)   ondansetron disintegrating tablet 8 mg (has no administration in time range)   ibuprofen tablet 600 mg (has no administration in time range)   HYDROmorphone (PF) injection 0.5 mg (has no administration in time range)   sodium chloride 0.9% bolus 1,000 mL 1,000 mL (0 mLs Intravenous Stopped 5/11/25 0025)   morphine injection 4 mg (4 mg Intravenous Given 5/10/25 2325)   ondansetron injection 4 mg (4 mg Intravenous Given 5/10/25 2327)   dicyclomine injection 20 mg (20 mg Intramuscular Given 5/11/25 0308)   acetaminophen tablet 1,000 mg (1,000 mg Oral Given 5/11/25 0427)   HYDROmorphone (PF) injection 0.5 mg (0.5 mg Intravenous Given 5/11/25 0729)   HYDROmorphone (PF) injection 1 mg (1 mg Intravenous Given 5/11/25 1022)   ondansetron injection 4 mg (4 mg Intravenous Given 5/11/25 1104)     Medical Decision Making  Amount and/or Complexity of Data Reviewed  Labs: ordered.  Radiology: ordered.    Risk  OTC drugs.  Prescription drug management.               ED Course as of 05/11/25 1522   Sun May 11, 2025   0624 Pelvic ultrasound showed:  Large amount of complex free fluid in the pelvis with ovarian cyst.  I talked to Dr. Garg (Ob hospitalist). [HK]   9271 Care transferred to Dr. Vizcarra [HK]   6187 Discussed also with the patient.  Patient reports pain starting yesterday in the right lower quadrant.  Pain is severe in the lower abdomen.  Patient was seen by urgent care yesterday was said to be constipated but patient does not agree with the diagnosis.  She seems to be having bowel movements.  She has seen GI previously for gastritis but typically that has not have lower abdominal pain.  She has 3 children the youngest is 8 years old.  Her former gynecologist had recommended hysterectomy.  She has started seeing Dr. Laguna recently.  Patient says her cycles are not particularly heavy  but she has been anemic previously.  Her last cycle started 6 days ago and she is still spotting some now.  CT does show findings indicative of hemoperitoneum in the pelvis.  Hemoglobin has dropped mildly.  No trends of hypotension or tachycardia.  Patient nontoxic appearing on exam.  Also patient no longer takes iron.  She has been diagnosed with PCOS [PK]   1021 Discussed with gynecologist will observe for suspected hemorrhagic cyst.  Decreasing hemoglobin.  Minimal change in the past 4 hours [PK]      ED Course User Index  [HK] Josh Walls MD  [PK] Sedrick Vizcarra MD                           Clinical Impression:   Final diagnoses:  [R10.2] Pelvic pain  [K66.1] Hemoperitoneum (Primary)        ED Disposition Condition    Observation                     [1]   Social History  Tobacco Use    Smoking status: Never     Passive exposure: Never    Smokeless tobacco: Never   Substance Use Topics    Alcohol use: Never    Drug use: Never        Sedrick Vizcarra MD  05/11/25 0332

## 2025-05-12 ENCOUNTER — ANESTHESIA (OUTPATIENT)
Dept: SURGERY | Facility: HOSPITAL | Age: 30
End: 2025-05-12
Payer: MEDICAID

## 2025-05-12 ENCOUNTER — ANESTHESIA EVENT (OUTPATIENT)
Dept: SURGERY | Facility: HOSPITAL | Age: 30
End: 2025-05-12
Payer: MEDICAID

## 2025-05-12 PROBLEM — K66.1 HEMOPERITONEUM: Status: ACTIVE | Noted: 2025-05-12

## 2025-05-12 LAB
ABO + RH BLD: NORMAL
ABO + RH BLD: NORMAL
BASOPHILS # BLD AUTO: 0.02 K/UL (ref 0–0.2)
BASOPHILS # BLD AUTO: 0.02 K/UL (ref 0–0.2)
BASOPHILS # BLD AUTO: 0.03 K/UL (ref 0–0.2)
BASOPHILS # BLD AUTO: 0.04 K/UL (ref 0–0.2)
BASOPHILS NFR BLD AUTO: 0.2 % (ref 0–1)
BASOPHILS NFR BLD AUTO: 0.2 % (ref 0–1)
BASOPHILS NFR BLD AUTO: 0.3 % (ref 0–1)
BASOPHILS NFR BLD AUTO: 0.4 % (ref 0–1)
BLD PROD TYP BPU: NORMAL
BLD PROD TYP BPU: NORMAL
BLOOD UNIT EXPIRATION DATE: NORMAL
BLOOD UNIT EXPIRATION DATE: NORMAL
BLOOD UNIT TYPE CODE: 5100
BLOOD UNIT TYPE CODE: 5100
CROSSMATCH INTERPRETATION: NORMAL
CROSSMATCH INTERPRETATION: NORMAL
DIFFERENTIAL METHOD BLD: ABNORMAL
DISPENSE STATUS: NORMAL
DISPENSE STATUS: NORMAL
EOSINOPHIL # BLD AUTO: 0 K/UL (ref 0–0.5)
EOSINOPHIL # BLD AUTO: 0 K/UL (ref 0–0.5)
EOSINOPHIL # BLD AUTO: 0.05 K/UL (ref 0–0.5)
EOSINOPHIL # BLD AUTO: 0.06 K/UL (ref 0–0.5)
EOSINOPHIL NFR BLD AUTO: 0 % (ref 1–4)
EOSINOPHIL NFR BLD AUTO: 0 % (ref 1–4)
EOSINOPHIL NFR BLD AUTO: 0.6 % (ref 1–4)
EOSINOPHIL NFR BLD AUTO: 0.7 % (ref 1–4)
ERYTHROCYTE [DISTWIDTH] IN BLOOD BY AUTOMATED COUNT: 14.6 % (ref 11.5–14.5)
ERYTHROCYTE [DISTWIDTH] IN BLOOD BY AUTOMATED COUNT: 14.9 % (ref 11.5–14.5)
ERYTHROCYTE [DISTWIDTH] IN BLOOD BY AUTOMATED COUNT: 14.9 % (ref 11.5–14.5)
ERYTHROCYTE [DISTWIDTH] IN BLOOD BY AUTOMATED COUNT: 15.1 % (ref 11.5–14.5)
HCG SERPL-ACNC: <2 MIU/ML
HCT VFR BLD AUTO: 20.7 % (ref 38–47)
HCT VFR BLD AUTO: 22.7 % (ref 38–47)
HCT VFR BLD AUTO: 29.9 % (ref 38–47)
HCT VFR BLD AUTO: 30 % (ref 38–47)
HGB BLD-MCNC: 6.4 G/DL (ref 12–16)
HGB BLD-MCNC: 6.7 G/DL (ref 12–16)
HGB BLD-MCNC: 9.3 G/DL (ref 12–16)
HGB BLD-MCNC: 9.8 G/DL (ref 12–16)
IMM GRANULOCYTES # BLD AUTO: 0.03 K/UL (ref 0–0.04)
IMM GRANULOCYTES # BLD AUTO: 0.03 K/UL (ref 0–0.04)
IMM GRANULOCYTES # BLD AUTO: 0.08 K/UL (ref 0–0.04)
IMM GRANULOCYTES # BLD AUTO: 0.1 K/UL (ref 0–0.04)
IMM GRANULOCYTES NFR BLD: 0.3 % (ref 0–0.4)
IMM GRANULOCYTES NFR BLD: 0.3 % (ref 0–0.4)
IMM GRANULOCYTES NFR BLD: 0.6 % (ref 0–0.4)
IMM GRANULOCYTES NFR BLD: 0.8 % (ref 0–0.4)
INDIRECT COOMBS: NORMAL
LYMPHOCYTES # BLD AUTO: 1.14 K/UL (ref 1–4.8)
LYMPHOCYTES # BLD AUTO: 1.76 K/UL (ref 1–4.8)
LYMPHOCYTES # BLD AUTO: 2.07 K/UL (ref 1–4.8)
LYMPHOCYTES # BLD AUTO: 2.66 K/UL (ref 1–4.8)
LYMPHOCYTES NFR BLD AUTO: 14.1 % (ref 27–41)
LYMPHOCYTES NFR BLD AUTO: 22.7 % (ref 27–41)
LYMPHOCYTES NFR BLD AUTO: 30.8 % (ref 27–41)
LYMPHOCYTES NFR BLD AUTO: 9.4 % (ref 27–41)
MCH RBC QN AUTO: 23.8 PG (ref 27–31)
MCH RBC QN AUTO: 25.1 PG (ref 27–31)
MCH RBC QN AUTO: 26.1 PG (ref 27–31)
MCH RBC QN AUTO: 26.6 PG (ref 27–31)
MCHC RBC AUTO-ENTMCNC: 29.5 G/DL (ref 32–36)
MCHC RBC AUTO-ENTMCNC: 30.9 G/DL (ref 32–36)
MCHC RBC AUTO-ENTMCNC: 31.1 G/DL (ref 32–36)
MCHC RBC AUTO-ENTMCNC: 32.7 G/DL (ref 32–36)
MCV RBC AUTO: 80.8 FL (ref 80–96)
MCV RBC AUTO: 81.2 FL (ref 80–96)
MCV RBC AUTO: 81.3 FL (ref 80–96)
MCV RBC AUTO: 83.8 FL (ref 80–96)
MONOCYTES # BLD AUTO: 0.57 K/UL (ref 0–0.8)
MONOCYTES # BLD AUTO: 0.77 K/UL (ref 0–0.8)
MONOCYTES # BLD AUTO: 0.83 K/UL (ref 0–0.8)
MONOCYTES # BLD AUTO: 0.85 K/UL (ref 0–0.8)
MONOCYTES NFR BLD AUTO: 4.7 % (ref 2–6)
MONOCYTES NFR BLD AUTO: 6.8 % (ref 2–6)
MONOCYTES NFR BLD AUTO: 8.4 % (ref 2–6)
MONOCYTES NFR BLD AUTO: 9.6 % (ref 2–6)
MPC BLD CALC-MCNC: 10.4 FL (ref 9.4–12.4)
MPC BLD CALC-MCNC: 10.4 FL (ref 9.4–12.4)
MPC BLD CALC-MCNC: 10.6 FL (ref 9.4–12.4)
MPC BLD CALC-MCNC: 10.7 FL (ref 9.4–12.4)
NEUTROPHILS # BLD AUTO: 10.32 K/UL (ref 1.8–7.7)
NEUTROPHILS # BLD AUTO: 5.03 K/UL (ref 1.8–7.7)
NEUTROPHILS # BLD AUTO: 6.15 K/UL (ref 1.8–7.7)
NEUTROPHILS # BLD AUTO: 9.78 K/UL (ref 1.8–7.7)
NEUTROPHILS NFR BLD AUTO: 58.4 % (ref 53–65)
NEUTROPHILS NFR BLD AUTO: 67.5 % (ref 53–65)
NEUTROPHILS NFR BLD AUTO: 78.3 % (ref 53–65)
NEUTROPHILS NFR BLD AUTO: 84.9 % (ref 53–65)
NRBC # BLD AUTO: 0 X10E3/UL
NRBC # BLD AUTO: 0.02 X10E3/UL
NRBC, AUTO (.00): 0 %
NRBC, AUTO (.00): 0.2 %
PLATELET # BLD AUTO: 274 K/UL (ref 150–400)
PLATELET # BLD AUTO: 278 K/UL (ref 150–400)
PLATELET # BLD AUTO: 297 K/UL (ref 150–400)
PLATELET # BLD AUTO: 310 K/UL (ref 150–400)
RBC # BLD AUTO: 2.55 M/UL (ref 4.2–5.4)
RBC # BLD AUTO: 2.81 M/UL (ref 4.2–5.4)
RBC # BLD AUTO: 3.57 M/UL (ref 4.2–5.4)
RBC # BLD AUTO: 3.69 M/UL (ref 4.2–5.4)
RH BLD: NORMAL
SPECIMEN OUTDATE: NORMAL
UNIT NUMBER: NORMAL
UNIT NUMBER: NORMAL
WBC # BLD AUTO: 12.15 K/UL (ref 4.5–11)
WBC # BLD AUTO: 12.49 K/UL (ref 4.5–11)
WBC # BLD AUTO: 8.63 K/UL (ref 4.5–11)
WBC # BLD AUTO: 9.12 K/UL (ref 4.5–11)

## 2025-05-12 PROCEDURE — 84702 CHORIONIC GONADOTROPIN TEST: CPT | Performed by: STUDENT IN AN ORGANIZED HEALTH CARE EDUCATION/TRAINING PROGRAM

## 2025-05-12 PROCEDURE — 88342 IMHCHEM/IMCYTCHM 1ST ANTB: CPT | Mod: TC,59,SUR | Performed by: STUDENT IN AN ORGANIZED HEALTH CARE EDUCATION/TRAINING PROGRAM

## 2025-05-12 PROCEDURE — 36000708 HC OR TIME LEV III 1ST 15 MIN: Performed by: STUDENT IN AN ORGANIZED HEALTH CARE EDUCATION/TRAINING PROGRAM

## 2025-05-12 PROCEDURE — 27100025 HC TUBING, SET FLUID WARMER: Performed by: ANESTHESIOLOGY

## 2025-05-12 PROCEDURE — 63600175 PHARM REV CODE 636 W HCPCS: Performed by: ANESTHESIOLOGY

## 2025-05-12 PROCEDURE — 86850 RBC ANTIBODY SCREEN: CPT | Performed by: STUDENT IN AN ORGANIZED HEALTH CARE EDUCATION/TRAINING PROGRAM

## 2025-05-12 PROCEDURE — 86923 COMPATIBILITY TEST ELECTRIC: CPT | Performed by: STUDENT IN AN ORGANIZED HEALTH CARE EDUCATION/TRAINING PROGRAM

## 2025-05-12 PROCEDURE — 37000009 HC ANESTHESIA EA ADD 15 MINS: Performed by: STUDENT IN AN ORGANIZED HEALTH CARE EDUCATION/TRAINING PROGRAM

## 2025-05-12 PROCEDURE — 71000033 HC RECOVERY, INTIAL HOUR: Performed by: STUDENT IN AN ORGANIZED HEALTH CARE EDUCATION/TRAINING PROGRAM

## 2025-05-12 PROCEDURE — 36000709 HC OR TIME LEV III EA ADD 15 MIN: Performed by: STUDENT IN AN ORGANIZED HEALTH CARE EDUCATION/TRAINING PROGRAM

## 2025-05-12 PROCEDURE — D9220A PRA ANESTHESIA: Mod: CRNA,,, | Performed by: NURSE ANESTHETIST, CERTIFIED REGISTERED

## 2025-05-12 PROCEDURE — 27202344 HC EYESHIELD: Performed by: ANESTHESIOLOGY

## 2025-05-12 PROCEDURE — 25000003 PHARM REV CODE 250: Performed by: OBSTETRICS & GYNECOLOGY

## 2025-05-12 PROCEDURE — 36415 COLL VENOUS BLD VENIPUNCTURE: CPT | Performed by: STUDENT IN AN ORGANIZED HEALTH CARE EDUCATION/TRAINING PROGRAM

## 2025-05-12 PROCEDURE — 27000165 HC TUBE, ETT CUFFED: Performed by: ANESTHESIOLOGY

## 2025-05-12 PROCEDURE — 25000003 PHARM REV CODE 250: Performed by: NURSE ANESTHETIST, CERTIFIED REGISTERED

## 2025-05-12 PROCEDURE — 27000689 HC BLADE LARYNGOSCOPE ANY SIZE: Performed by: ANESTHESIOLOGY

## 2025-05-12 PROCEDURE — 27000655: Performed by: ANESTHESIOLOGY

## 2025-05-12 PROCEDURE — G0378 HOSPITAL OBSERVATION PER HR: HCPCS

## 2025-05-12 PROCEDURE — D9220A PRA ANESTHESIA: Mod: ANES,,, | Performed by: ANESTHESIOLOGY

## 2025-05-12 PROCEDURE — 88305 TISSUE EXAM BY PATHOLOGIST: CPT | Mod: 26,,, | Performed by: PATHOLOGY

## 2025-05-12 PROCEDURE — 58661 LAPAROSCOPY REMOVE ADNEXA: CPT | Mod: RT,,, | Performed by: STUDENT IN AN ORGANIZED HEALTH CARE EDUCATION/TRAINING PROGRAM

## 2025-05-12 PROCEDURE — P9016 RBC LEUKOCYTES REDUCED: HCPCS | Performed by: STUDENT IN AN ORGANIZED HEALTH CARE EDUCATION/TRAINING PROGRAM

## 2025-05-12 PROCEDURE — 27201423 OPTIME MED/SURG SUP & DEVICES STERILE SUPPLY: Performed by: STUDENT IN AN ORGANIZED HEALTH CARE EDUCATION/TRAINING PROGRAM

## 2025-05-12 PROCEDURE — 36415 COLL VENOUS BLD VENIPUNCTURE: CPT | Performed by: OBSTETRICS & GYNECOLOGY

## 2025-05-12 PROCEDURE — 63600175 PHARM REV CODE 636 W HCPCS: Performed by: NURSE ANESTHETIST, CERTIFIED REGISTERED

## 2025-05-12 PROCEDURE — 85025 COMPLETE CBC W/AUTO DIFF WBC: CPT | Mod: 91 | Performed by: OBSTETRICS & GYNECOLOGY

## 2025-05-12 PROCEDURE — 96361 HYDRATE IV INFUSION ADD-ON: CPT

## 2025-05-12 PROCEDURE — 25000003 PHARM REV CODE 250: Performed by: STUDENT IN AN ORGANIZED HEALTH CARE EDUCATION/TRAINING PROGRAM

## 2025-05-12 PROCEDURE — 88342 IMHCHEM/IMCYTCHM 1ST ANTB: CPT | Mod: 26,,, | Performed by: PATHOLOGY

## 2025-05-12 PROCEDURE — 37000008 HC ANESTHESIA 1ST 15 MINUTES: Performed by: STUDENT IN AN ORGANIZED HEALTH CARE EDUCATION/TRAINING PROGRAM

## 2025-05-12 PROCEDURE — 27000716 HC OXISENSOR PROBE, ANY SIZE: Performed by: ANESTHESIOLOGY

## 2025-05-12 RX ORDER — PROPOFOL 10 MG/ML
VIAL (ML) INTRAVENOUS
Status: DISCONTINUED | OUTPATIENT
Start: 2025-05-12 | End: 2025-05-12

## 2025-05-12 RX ORDER — OXYCODONE AND ACETAMINOPHEN 7.5; 325 MG/1; MG/1
1 TABLET ORAL EVERY 6 HOURS PRN
Qty: 10 TABLET | Refills: 0 | Status: SHIPPED | OUTPATIENT
Start: 2025-05-12 | End: 2025-05-13 | Stop reason: SDUPTHER

## 2025-05-12 RX ORDER — CEFAZOLIN SODIUM 1 G/3ML
INJECTION, POWDER, FOR SOLUTION INTRAMUSCULAR; INTRAVENOUS
Status: DISCONTINUED | OUTPATIENT
Start: 2025-05-12 | End: 2025-05-12

## 2025-05-12 RX ORDER — SODIUM CHLORIDE, SODIUM LACTATE, POTASSIUM CHLORIDE, CALCIUM CHLORIDE 600; 310; 30; 20 MG/100ML; MG/100ML; MG/100ML; MG/100ML
125 INJECTION, SOLUTION INTRAVENOUS CONTINUOUS
Status: DISCONTINUED | OUTPATIENT
Start: 2025-05-12 | End: 2025-05-12

## 2025-05-12 RX ORDER — IPRATROPIUM BROMIDE AND ALBUTEROL SULFATE 2.5; .5 MG/3ML; MG/3ML
3 SOLUTION RESPIRATORY (INHALATION)
Status: DISCONTINUED | OUTPATIENT
Start: 2025-05-12 | End: 2025-05-12 | Stop reason: HOSPADM

## 2025-05-12 RX ORDER — KETOROLAC TROMETHAMINE 30 MG/ML
30 INJECTION, SOLUTION INTRAMUSCULAR; INTRAVENOUS EVERY 6 HOURS PRN
Status: DISCONTINUED | OUTPATIENT
Start: 2025-05-12 | End: 2025-05-13 | Stop reason: HOSPADM

## 2025-05-12 RX ORDER — ONDANSETRON HYDROCHLORIDE 2 MG/ML
INJECTION, SOLUTION INTRAVENOUS
Status: DISCONTINUED | OUTPATIENT
Start: 2025-05-12 | End: 2025-05-12

## 2025-05-12 RX ORDER — HYDROMORPHONE HYDROCHLORIDE 2 MG/ML
0.5 INJECTION, SOLUTION INTRAMUSCULAR; INTRAVENOUS; SUBCUTANEOUS EVERY 5 MIN PRN
Status: DISCONTINUED | OUTPATIENT
Start: 2025-05-12 | End: 2025-05-12 | Stop reason: HOSPADM

## 2025-05-12 RX ORDER — MORPHINE SULFATE 10 MG/ML
4 INJECTION INTRAMUSCULAR; INTRAVENOUS; SUBCUTANEOUS EVERY 5 MIN PRN
Status: DISCONTINUED | OUTPATIENT
Start: 2025-05-12 | End: 2025-05-12 | Stop reason: HOSPADM

## 2025-05-12 RX ORDER — FENTANYL CITRATE 50 UG/ML
INJECTION, SOLUTION INTRAMUSCULAR; INTRAVENOUS
Status: DISCONTINUED | OUTPATIENT
Start: 2025-05-12 | End: 2025-05-12

## 2025-05-12 RX ORDER — DEXAMETHASONE SODIUM PHOSPHATE 4 MG/ML
INJECTION, SOLUTION INTRA-ARTICULAR; INTRALESIONAL; INTRAMUSCULAR; INTRAVENOUS; SOFT TISSUE
Status: DISCONTINUED | OUTPATIENT
Start: 2025-05-12 | End: 2025-05-12

## 2025-05-12 RX ORDER — POLYETHYLENE GLYCOL 3350 17 G/17G
17 POWDER, FOR SOLUTION ORAL 2 TIMES DAILY PRN
Qty: 30 EACH | Refills: 0 | Status: SHIPPED | OUTPATIENT
Start: 2025-05-12 | End: 2025-05-13 | Stop reason: SDUPTHER

## 2025-05-12 RX ORDER — MIDAZOLAM HYDROCHLORIDE 1 MG/ML
INJECTION INTRAMUSCULAR; INTRAVENOUS
Status: DISCONTINUED | OUTPATIENT
Start: 2025-05-12 | End: 2025-05-12

## 2025-05-12 RX ORDER — FERROUS SULFATE 325(65) MG
325 TABLET ORAL 2 TIMES DAILY
Qty: 60 TABLET | Refills: 0 | Status: SHIPPED | OUTPATIENT
Start: 2025-05-12 | End: 2025-05-13 | Stop reason: SDUPTHER

## 2025-05-12 RX ORDER — PHENYLEPHRINE HYDROCHLORIDE 10 MG/ML
INJECTION INTRAVENOUS
Status: DISCONTINUED | OUTPATIENT
Start: 2025-05-12 | End: 2025-05-12

## 2025-05-12 RX ORDER — LIDOCAINE HYDROCHLORIDE 20 MG/ML
INJECTION, SOLUTION EPIDURAL; INFILTRATION; INTRACAUDAL; PERINEURAL
Status: DISCONTINUED | OUTPATIENT
Start: 2025-05-12 | End: 2025-05-12

## 2025-05-12 RX ORDER — ESMOLOL HYDROCHLORIDE 10 MG/ML
INJECTION INTRAVENOUS
Status: DISCONTINUED | OUTPATIENT
Start: 2025-05-12 | End: 2025-05-12

## 2025-05-12 RX ORDER — HYDROCODONE BITARTRATE AND ACETAMINOPHEN 500; 5 MG/1; MG/1
TABLET ORAL
Status: DISCONTINUED | OUTPATIENT
Start: 2025-05-12 | End: 2025-05-12 | Stop reason: HOSPADM

## 2025-05-12 RX ORDER — DIPHENHYDRAMINE HYDROCHLORIDE 50 MG/ML
25 INJECTION, SOLUTION INTRAMUSCULAR; INTRAVENOUS EVERY 6 HOURS PRN
Status: DISCONTINUED | OUTPATIENT
Start: 2025-05-12 | End: 2025-05-12 | Stop reason: HOSPADM

## 2025-05-12 RX ORDER — MEPERIDINE HYDROCHLORIDE 25 MG/ML
25 INJECTION INTRAMUSCULAR; INTRAVENOUS; SUBCUTANEOUS EVERY 10 MIN PRN
Status: DISCONTINUED | OUTPATIENT
Start: 2025-05-12 | End: 2025-05-12 | Stop reason: HOSPADM

## 2025-05-12 RX ORDER — ROCURONIUM BROMIDE 10 MG/ML
INJECTION, SOLUTION INTRAVENOUS
Status: DISCONTINUED | OUTPATIENT
Start: 2025-05-12 | End: 2025-05-12

## 2025-05-12 RX ORDER — ONDANSETRON HYDROCHLORIDE 2 MG/ML
4 INJECTION, SOLUTION INTRAVENOUS DAILY PRN
Status: DISCONTINUED | OUTPATIENT
Start: 2025-05-12 | End: 2025-05-12 | Stop reason: HOSPADM

## 2025-05-12 RX ADMIN — ONDANSETRON 8 MG: 4 TABLET, ORALLY DISINTEGRATING ORAL at 08:05

## 2025-05-12 RX ADMIN — ONDANSETRON 8 MG: 4 TABLET, ORALLY DISINTEGRATING ORAL at 06:05

## 2025-05-12 RX ADMIN — ONDANSETRON 4 MG: 2 INJECTION INTRAMUSCULAR; INTRAVENOUS at 09:05

## 2025-05-12 RX ADMIN — MIDAZOLAM HYDROCHLORIDE 2 MG: 1 INJECTION, SOLUTION INTRAMUSCULAR; INTRAVENOUS at 09:05

## 2025-05-12 RX ADMIN — OXYCODONE HYDROCHLORIDE AND ACETAMINOPHEN 1 TABLET: 7.5; 325 TABLET ORAL at 01:05

## 2025-05-12 RX ADMIN — FENTANYL CITRATE 50 MCG: 50 INJECTION, SOLUTION INTRAMUSCULAR; INTRAVENOUS at 10:05

## 2025-05-12 RX ADMIN — SUGAMMADEX 200 MG: 100 INJECTION, SOLUTION INTRAVENOUS at 11:05

## 2025-05-12 RX ADMIN — SODIUM CHLORIDE, POTASSIUM CHLORIDE, SODIUM LACTATE AND CALCIUM CHLORIDE 125 ML/HR: 600; 310; 30; 20 INJECTION, SOLUTION INTRAVENOUS at 12:05

## 2025-05-12 RX ADMIN — PROPOFOL 160 MG: 10 INJECTION, EMULSION INTRAVENOUS at 09:05

## 2025-05-12 RX ADMIN — CEFAZOLIN 2 G: 1 INJECTION, POWDER, FOR SOLUTION INTRAMUSCULAR; INTRAVENOUS; PARENTERAL at 10:05

## 2025-05-12 RX ADMIN — OXYCODONE HYDROCHLORIDE AND ACETAMINOPHEN 1 TABLET: 7.5; 325 TABLET ORAL at 06:05

## 2025-05-12 RX ADMIN — PHENYLEPHRINE HYDROCHLORIDE 200 MCG: 10 INJECTION INTRAVENOUS at 10:05

## 2025-05-12 RX ADMIN — DEXAMETHASONE SODIUM PHOSPHATE 4 MG: 4 INJECTION, SOLUTION INTRA-ARTICULAR; INTRALESIONAL; INTRAMUSCULAR; INTRAVENOUS; SOFT TISSUE at 10:05

## 2025-05-12 RX ADMIN — FENTANYL CITRATE 50 MCG: 50 INJECTION, SOLUTION INTRAMUSCULAR; INTRAVENOUS at 09:05

## 2025-05-12 RX ADMIN — ESMOLOL HYDROCHLORIDE 20 MG: 100 INJECTION, SOLUTION INTRAVENOUS at 09:05

## 2025-05-12 RX ADMIN — OXYCODONE HYDROCHLORIDE AND ACETAMINOPHEN 1 TABLET: 7.5; 325 TABLET ORAL at 04:05

## 2025-05-12 RX ADMIN — LIDOCAINE HYDROCHLORIDE 100 MG: 20 INJECTION, SOLUTION EPIDURAL; INFILTRATION; INTRACAUDAL; PERINEURAL at 09:05

## 2025-05-12 RX ADMIN — ROCURONIUM BROMIDE 50 MG: 10 INJECTION, SOLUTION INTRAVENOUS at 09:05

## 2025-05-12 RX ADMIN — SODIUM CHLORIDE: 9 INJECTION, SOLUTION INTRAVENOUS at 09:05

## 2025-05-12 NOTE — OP NOTE
[facility]  Surgery Department  Operative Note    SUMMARY     Date of Procedure: 5/12/2025     Procedure: Procedure(s) (LRB):  LAPAROSCOPY, DIAGNOSTIC (N/A)  SALPINGO-OOPHORECTOMY, LAPAROSCOPIC (Right)     Surgeons and Role:     * Venu Laguna DO - Primary    Assisting Surgeon: None    Pre-Operative Diagnosis: Hemorrhagic cyst of ovary [N83.209], hemoperitoneum    Post-Operative Diagnosis: Post-Op Diagnosis Codes:     * Hemorrhagic cyst of ovary [N83.209]    Anesthesia: General    Operative Findings (including complications, if any): significant hemoperitoneum of approx 1L, bilateral normal appearing tubes, left ovary with adhesions to posterior cul de sac, right ovary appears distorted with no obvious mass, appears to have been adhered to posterior cul de sac and bleeding from site of adhesions    Description of Technical Procedures:     The patient was taken to the operating room with IV fluids running and pneumatic compression stockings placed on the lower extremities and turned on prior to the beginning of the procedure. General anesthesia was obtained without difficulty. The patient was then prepped and draped in the dorsal lithotomy position with Shane-type stirrups.     A time-out was then performed with Venu Laguna DO present. The bladder was emptied and a speculum was placed into the vagina. The anterior  lip of the cervix was then grasped with a single-tooth tenaculum. The cervix was sequentially dilated. A uterine manipulator was introduced.    A vertical skin incision was then made within the umbilical fold. A 5 mm trocar was then placed into the abdomen under direct visualization using the Optiview technique. An intraabdominal placement  was confirmed with the Laparoscope.   Pneumoperitoneum was established with carbon dioxide gas to a  pressure of 15 mmHg. Intraabdominal survey revealed the above findings.    A second and third 5mm trocars were placed in LLQ  and RLQ, respectively under direct visualization. The bowel was removed from the pelvis. The LLQ trocar was exchanged for an 11mm trocar under direct visualization.    The hemoperitoneum was evacuated using the suction. The adnexae were then inspected with the above findings. The ligasure was introduced and the right IP ligament was coagulated and cut then continued along the mesosalpinx to the cornu. The right utero-ovarian ligament was coagulated and cut.    An endocatch bag was inserted into the abdomen and the specimen was removed and sent to pathology.  The 11mm trocar was removed and the fascia was closed with 0 vicryl with assistance from the Alexander-Caridad device.    Tissell and hemoblast were placed over posterior cul de sac and right IP ligament.    Insufflation was released and the trocars were removed. All instruments were removed from the abdomen. The port sites were closed with 4-0 monocryl and covered with dermabond.    Sponge and instrument counts were correct x2. Patient was taken to recovery in stable condition.      Significant Surgical Tasks Conducted by the Assistant(s), if Applicable: NA    Estimated Blood Loss (EBL): 15 mL           Implants: * No implants in log *    Specimens:   Specimen (24h ago, onward)       Start     Ordered    05/12/25 1114  Surgical Pathology  RELEASE UPON ORDERING         05/12/25 1114                   ID Type Source Tests Collected by Time Destination   1 : RIGHT FALLOPIAN TUBE AND OVARY Tissue Fallopian Tube, Right SURGICAL PATHOLOGY Venu Laguna,  5/12/2025 1114               Condition: Good    Disposition: PACU - hemodynamically stable.

## 2025-05-12 NOTE — OR NURSING
1158-pt received from OR via stretcher. Connected to monitors, VSS. Pt respirations even and shallow at this time requiring 8l simple face mask. Pt drowsy, but responds to calling of name. IV's intact to bilateral forearms with fluids infusing per order to gravity. 3 lap sites to abdomen are noted to be clean and dry, open to air with dermabond intact. Denies pain.     1208-switched to room air. Pt awake and alert.    1240- pt returned to room 461 and bedside report given toe Jeannette/ALPA Patel. /69, HR98, temp 99.1, o2 94% on room air, RR 16. Denies pain. Family at bedside.

## 2025-05-12 NOTE — PLAN OF CARE
Problem: Adult Inpatient Plan of Care  Goal: Plan of Care Review  Outcome: Progressing  Goal: Absence of Hospital-Acquired Illness or Injury  Outcome: Progressing  Goal: Optimal Comfort and Wellbeing  Outcome: Progressing  Goal: Readiness for Transition of Care  Outcome: Progressing     Problem: Wound  Goal: Optimal Coping  Outcome: Progressing  Goal: Optimal Functional Ability  Outcome: Progressing  Goal: Optimal Pain Control and Function  Outcome: Progressing

## 2025-05-12 NOTE — NURSING
22:34- Patient states pain has decreased to a 5.  Offered Ibuprofen and she says she can't take any NSAIDs because they cause gastritis.  Put NSAIDs on her allergy list. She states she is fine right now and will wait until she can get another Percocet.     21:16- Patient states she is not taking anymore Dilaudid because it makes her sick despite taking Zofran with it and she really does not want to take the Ibuprofen because it is not strong enough.  Patient is nauseated.  PRN Zofran offered and she accepted it.  Patient is requesting iv fluids because she says she can't tolerate liquids and she does not want to get dehydrated.  Sent secure chat to Dr. Laguna and did not get a response so I added Dr. Billingsley and Dr. Garg to the chat and did not get any responses so I called Dr. Laguna and she told me to call Dr. Billingsley.  Called Dr. Billingsley and let her know of everything going on and the patient's requests and she gave me a telephone order for 1L bolus of LR to run @ 125mL, NPO except meds @ midnight, Percocet 7.5 PO Q4H PRN for pain, and Benadryl PO 25mg Q6H PRN to help her sleep.      21:32- Educated patient of new orders and gave them and of order for SCDs and explained to her what they are for and she refused them d/t generalized pain.

## 2025-05-12 NOTE — ANESTHESIA POSTPROCEDURE EVALUATION
Anesthesia Post Evaluation    Patient: Gabriella Ullao    Procedure(s) Performed: Procedure(s) (LRB):  LAPAROSCOPY, DIAGNOSTIC (N/A)  SALPINGO-OOPHORECTOMY, LAPAROSCOPIC (Right)    Final Anesthesia Type: general      Patient location during evaluation: PACU  Patient participation: Yes- Able to Participate  Level of consciousness: awake and sedated  Post-procedure vital signs: reviewed and stable  Pain management: adequate  Airway patency: patent    PONV status at discharge: No PONV  Anesthetic complications: no      Cardiovascular status: blood pressure returned to baseline  Respiratory status: unassisted  Hydration status: euvolemic  Follow-up not needed.              Vitals Value Taken Time   /77 05/12/25 12:40   Temp 37.3 °C (99.1 °F) 05/12/25 12:59   Pulse 106 05/12/25 12:59   Resp 16 05/12/25 13:49   SpO2 94 % 05/12/25 12:59   Vitals shown include unfiled device data.      Event Time   Out of Recovery 05/12/2025 01:00:00         Pain/Geovanna Score: Pain Rating Prior to Med Admin: 10 (5/12/2025  1:49 PM)  Pain Rating Post Med Admin: 3 (5/12/2025  5:06 AM)  Geovanna Score: 10 (5/12/2025 12:30 PM)

## 2025-05-12 NOTE — ANESTHESIA PROCEDURE NOTES
Intubation    Date/Time: 5/12/2025 9:52 AM    Performed by: Milady Pabon CRNA  Authorized by: Yan Huerta MD    Intubation:     Induction:  Intravenous    Intubated:  Postinduction    Mask Ventilation:  Easy mask    Attempts:  1    Attempted By:  CRNA    Method of Intubation:  Direct    Blade:  Chay 4    Laryngeal View Grade: Grade IIA - cords partially seen      Difficult Airway Encountered?: No      Complications:  None    Airway Device:  Oral endotracheal tube    Airway Device Size:  7.0    Style/Cuff Inflation:  Cuffed (inflated to minimal occlusive pressure)    Inflation Amount (mL):  10    Tube secured:  22    Placement Verified By:  Capnometry    Complicating Factors:  None    Findings Post-Intubation:  BS equal bilateral and atraumatic/condition of teeth unchanged

## 2025-05-12 NOTE — NURSING
Dr. Laguna inquired if someone could add this patient to surgery for exploratory lap. RN contacted surgery and was informed that the physician must post the surgery in order for patient to be placed on schedule. RN notified MD.   Upon review of the In Basket request we were able to locate, review, and update the patient chart as requested for Diabetic Eye Exam.    Any additional questions or concerns should be emailed to the Practice Liaisons via the appropriate education email address, please do not reply via In Basket.    Thank you  Dyana Mcmahon   [facility]

## 2025-05-12 NOTE — TRANSFER OF CARE
"Anesthesia Transfer of Care Note    Patient: Gabriella Ulloa    Procedure(s) Performed: Procedure(s) (LRB):  LAPAROSCOPY, DIAGNOSTIC (N/A)  SALPINGO-OOPHORECTOMY, LAPAROSCOPIC (Right)    Patient location: PACU    Anesthesia Type: general    Transport from OR: Transported from OR on 6-10 L/min O2 by face mask with adequate spontaneous ventilation    Post pain: adequate analgesia    Post assessment: no apparent anesthetic complications    Post vital signs: stable    Level of consciousness: responds to stimulation, awake and alert    Nausea/Vomiting: no nausea/vomiting    Complications: none    Transfer of care protocol was followed      Last vitals: Visit Vitals  /69   Pulse (!) 114   Temp 36.7 °C (98.1 °F)   Resp 20   Ht 5' 3" (1.6 m)   Wt 108.4 kg (239 lb)   LMP 04/05/2025   SpO2 100%   Breastfeeding No   BMI 42.34 kg/m²     "

## 2025-05-12 NOTE — ANESTHESIA PREPROCEDURE EVALUATION
05/12/2025  Gabriella Ulloa is a 30 y.o., female.      Pre-op Assessment    I have reviewed the Patient Summary Reports.     I have reviewed the Nursing Notes. I have reviewed the NPO Status.   I have reviewed the Medications.     Review of Systems  Anesthesia Hx:  No problems with previous Anesthesia                Social:  Non-Smoker, No Alcohol Use       Hematology/Oncology:    Oncology Normal    -- Anemia:                                  EENT/Dental:  EENT/Dental Normal           Cardiovascular:     Hypertension                                          Pulmonary:  Pulmonary Normal                       Renal/:  Renal/ Normal                 Hepatic/GI:     GERD                Musculoskeletal:  Musculoskeletal Normal                Neurological:      Headaches                                 Endocrine:  Endocrine Normal          Obesity / BMI > 30  Dermatological:  Skin Normal    Psych:   anxiety                 Physical Exam  General: Well nourished    Airway:  Mallampati: III / III  Mouth Opening: Normal  TM Distance: > 6 cm  Tongue: Normal  Neck ROM: Normal ROM    Chest/Lungs:  Clear to auscultation, Normal Respiratory Rate    Heart:  Rate: Normal  Rhythm: Regular Rhythm        Anesthesia Plan  Type of Anesthesia, risks & benefits discussed:    Anesthesia Type: Gen ETT  Intra-op Monitoring Plan: Standard ASA Monitors  Post Op Pain Control Plan: multimodal analgesia  Induction:  IV  Informed Consent: Informed consent signed with the Patient and all parties understand the risks and agree with anesthesia plan.  All questions answered. Patient consented to blood products? Yes  ASA Score: 2  Day of Surgery Review of History & Physical: H&P Update referred to the surgeon/provider.I have interviewed and examined the patient. I have reviewed the patient's H&P dated:     Ready For Surgery From Anesthesia  Perspective.     .

## 2025-05-12 NOTE — NURSING
Sent a secure chat to Dr. Laguna letting her know that patient's H&H has dropped to 6.7 and 22.7 and that I just had patient sign a consent for blood.  Awaiting her response.

## 2025-05-13 ENCOUNTER — PATIENT MESSAGE (OUTPATIENT)
Dept: OBSTETRICS AND GYNECOLOGY | Facility: CLINIC | Age: 30
End: 2025-05-13
Payer: MEDICAID

## 2025-05-13 VITALS
BODY MASS INDEX: 42.35 KG/M2 | WEIGHT: 239 LBS | RESPIRATION RATE: 18 BRPM | OXYGEN SATURATION: 100 % | DIASTOLIC BLOOD PRESSURE: 82 MMHG | HEIGHT: 63 IN | SYSTOLIC BLOOD PRESSURE: 127 MMHG | HEART RATE: 97 BPM | TEMPERATURE: 99 F

## 2025-05-13 DIAGNOSIS — K66.1 HEMOPERITONEUM: ICD-10-CM

## 2025-05-13 DIAGNOSIS — K29.00 ACUTE SUPERFICIAL GASTRITIS WITHOUT HEMORRHAGE: Primary | ICD-10-CM

## 2025-05-13 LAB
BASOPHILS # BLD AUTO: 0.02 K/UL (ref 0–0.2)
BASOPHILS # BLD AUTO: 0.04 K/UL (ref 0–0.2)
BASOPHILS NFR BLD AUTO: 0.2 % (ref 0–1)
BASOPHILS NFR BLD AUTO: 0.4 % (ref 0–1)
DHEA SERPL-MCNC: NORMAL
DIFFERENTIAL METHOD BLD: ABNORMAL
DIFFERENTIAL METHOD BLD: ABNORMAL
EOSINOPHIL # BLD AUTO: 0 K/UL (ref 0–0.5)
EOSINOPHIL # BLD AUTO: 0.03 K/UL (ref 0–0.5)
EOSINOPHIL NFR BLD AUTO: 0 % (ref 1–4)
EOSINOPHIL NFR BLD AUTO: 0.3 % (ref 1–4)
ERYTHROCYTE [DISTWIDTH] IN BLOOD BY AUTOMATED COUNT: 15 % (ref 11.5–14.5)
ERYTHROCYTE [DISTWIDTH] IN BLOOD BY AUTOMATED COUNT: 15.1 % (ref 11.5–14.5)
ESTROGEN SERPL-MCNC: NORMAL PG/ML
HCT VFR BLD AUTO: 26.9 % (ref 38–47)
HCT VFR BLD AUTO: 29.7 % (ref 38–47)
HGB BLD-MCNC: 8.2 G/DL (ref 12–16)
HGB BLD-MCNC: 9.3 G/DL (ref 12–16)
IMM GRANULOCYTES # BLD AUTO: 0.05 K/UL (ref 0–0.04)
IMM GRANULOCYTES # BLD AUTO: 0.06 K/UL (ref 0–0.04)
IMM GRANULOCYTES NFR BLD: 0.4 % (ref 0–0.4)
IMM GRANULOCYTES NFR BLD: 0.5 % (ref 0–0.4)
INSULIN SERPL-ACNC: NORMAL U[IU]/ML
LAB AP GROSS DESCRIPTION: NORMAL
LAB AP LABORATORY NOTES: NORMAL
LYMPHOCYTES # BLD AUTO: 2.37 K/UL (ref 1–4.8)
LYMPHOCYTES # BLD AUTO: 3.05 K/UL (ref 1–4.8)
LYMPHOCYTES NFR BLD AUTO: 21.3 % (ref 27–41)
LYMPHOCYTES NFR BLD AUTO: 26.8 % (ref 27–41)
MCH RBC QN AUTO: 25.5 PG (ref 27–31)
MCH RBC QN AUTO: 25.5 PG (ref 27–31)
MCHC RBC AUTO-ENTMCNC: 30.5 G/DL (ref 32–36)
MCHC RBC AUTO-ENTMCNC: 31.3 G/DL (ref 32–36)
MCV RBC AUTO: 81.6 FL (ref 80–96)
MCV RBC AUTO: 83.8 FL (ref 80–96)
MONOCYTES # BLD AUTO: 1.04 K/UL (ref 0–0.8)
MONOCYTES # BLD AUTO: 1.13 K/UL (ref 0–0.8)
MONOCYTES NFR BLD AUTO: 9.4 % (ref 2–6)
MONOCYTES NFR BLD AUTO: 9.9 % (ref 2–6)
MPC BLD CALC-MCNC: 10.1 FL (ref 9.4–12.4)
MPC BLD CALC-MCNC: 10.6 FL (ref 9.4–12.4)
NEUTROPHILS # BLD AUTO: 7.09 K/UL (ref 1.8–7.7)
NEUTROPHILS # BLD AUTO: 7.63 K/UL (ref 1.8–7.7)
NEUTROPHILS NFR BLD AUTO: 62.2 % (ref 53–65)
NEUTROPHILS NFR BLD AUTO: 68.6 % (ref 53–65)
NRBC # BLD AUTO: 0 X10E3/UL
NRBC # BLD AUTO: 0.02 X10E3/UL
NRBC, AUTO (.00): 0 %
NRBC, AUTO (.00): 0.2 %
PLATELET # BLD AUTO: 234 K/UL (ref 150–400)
PLATELET # BLD AUTO: 261 K/UL (ref 150–400)
RBC # BLD AUTO: 3.21 M/UL (ref 4.2–5.4)
RBC # BLD AUTO: 3.64 M/UL (ref 4.2–5.4)
T3RU NFR SERPL: NORMAL %
WBC # BLD AUTO: 11.12 K/UL (ref 4.5–11)
WBC # BLD AUTO: 11.39 K/UL (ref 4.5–11)

## 2025-05-13 PROCEDURE — 85025 COMPLETE CBC W/AUTO DIFF WBC: CPT | Mod: 91 | Performed by: OBSTETRICS & GYNECOLOGY

## 2025-05-13 PROCEDURE — G0378 HOSPITAL OBSERVATION PER HR: HCPCS

## 2025-05-13 PROCEDURE — 25000003 PHARM REV CODE 250: Performed by: OBSTETRICS & GYNECOLOGY

## 2025-05-13 PROCEDURE — 99024 POSTOP FOLLOW-UP VISIT: CPT | Mod: ,,, | Performed by: STUDENT IN AN ORGANIZED HEALTH CARE EDUCATION/TRAINING PROGRAM

## 2025-05-13 PROCEDURE — 63600175 PHARM REV CODE 636 W HCPCS: Mod: JZ,TB | Performed by: OBSTETRICS & GYNECOLOGY

## 2025-05-13 PROCEDURE — 36415 COLL VENOUS BLD VENIPUNCTURE: CPT | Performed by: OBSTETRICS & GYNECOLOGY

## 2025-05-13 RX ORDER — FERROUS SULFATE 325(65) MG
325 TABLET ORAL 2 TIMES DAILY
Qty: 60 TABLET | Refills: 0 | Status: SHIPPED | OUTPATIENT
Start: 2025-05-13

## 2025-05-13 RX ORDER — OXYCODONE AND ACETAMINOPHEN 7.5; 325 MG/1; MG/1
1 TABLET ORAL EVERY 6 HOURS PRN
Qty: 15 TABLET | Refills: 0 | Status: SHIPPED | OUTPATIENT
Start: 2025-05-13

## 2025-05-13 RX ORDER — POLYETHYLENE GLYCOL 3350 17 G/17G
17 POWDER, FOR SOLUTION ORAL 2 TIMES DAILY PRN
Qty: 30 EACH | Refills: 0 | Status: SHIPPED | OUTPATIENT
Start: 2025-05-13 | End: 2025-06-12

## 2025-05-13 RX ADMIN — ONDANSETRON 8 MG: 4 TABLET, ORALLY DISINTEGRATING ORAL at 07:05

## 2025-05-13 RX ADMIN — KETOROLAC TROMETHAMINE 30 MG: 30 INJECTION, SOLUTION INTRAMUSCULAR at 02:05

## 2025-05-13 NOTE — PLAN OF CARE
Problem: Adult Inpatient Plan of Care  Goal: Absence of Hospital-Acquired Illness or Injury  Outcome: Progressing  Goal: Optimal Comfort and Wellbeing  Outcome: Progressing     Problem: Wound  Goal: Optimal Coping  Outcome: Progressing  Goal: Optimal Functional Ability  Outcome: Progressing  Goal: Absence of Infection Signs and Symptoms  Outcome: Progressing  Goal: Improved Oral Intake  Outcome: Progressing  Goal: Optimal Pain Control and Function  Outcome: Progressing  Goal: Optimal Wound Healing  Outcome: Progressing     Problem: Skin Injury Risk Increased  Goal: Skin Health and Integrity  Outcome: Progressing     Problem: Fall Injury Risk  Goal: Absence of Fall and Fall-Related Injury  Outcome: Progressing     Problem: Pain Acute  Goal: Optimal Pain Control and Function  Outcome: Progressing

## 2025-05-13 NOTE — NURSING
Went over discharge instructions and answered questions. IVs removed, no heart monitor. NADN and  in room. Asked patient to call out when she is dressed and ready for transport.

## 2025-05-13 NOTE — HPI
Reports some nausea, otherwise doing well. Reports pain controlled with medications. She is ambulating and voiding spontaneously.

## 2025-05-13 NOTE — ASSESSMENT & PLAN NOTE
POD0 s/p dx lap with RSO, evacuation of hemoperitoneum  Hgb stable  Will observe overnight for pain control  Plan to dc in am

## 2025-05-13 NOTE — PROGRESS NOTES
[facility]  Obstetrics & Gynecology  Progress Note    Patient Name: Gabriella Ulloa  MRN: [id number]  Admission Date: 5/10/2025  Primary Care Provider: Jayde Cole DNP  Principal Problem: <principal problem not specified>    Subjective:     HPI:  Reports some nausea, otherwise doing well. Reports pain controlled with medications. She is ambulating and voiding spontaneously.    Interval History: POD0 s/o dx lap with RSO, evacuation of hemoperitoneum    Scheduled Meds:  Continuous Infusions:   lactated ringers  125 mL/hr Intravenous Continuous 125 mL/hr at 05/12/25 1223 125 mL/hr at 05/12/25 1223     PRN Meds:  Current Facility-Administered Medications:     diphenhydrAMINE, 25 mg, Oral, Q6H PRN    HYDROmorphone, 0.5 mg, Intravenous, Q4H PRN    ibuprofen, 600 mg, Oral, Q6H PRN    ondansetron, 8 mg, Oral, Q8H PRN    oxyCODONE-acetaminophen, 1 tablet, Oral, Q4H PRN    sodium chloride 0.9%, 10 mL, Intravenous, PRN    Review of patient's allergies indicates:   Allergen Reactions    Lortab [hydrocodone-acetaminophen] Hives    Nsaids (non-steroidal anti-inflammatory drug) Other (See Comments)     Patient states NSAIDs cause gastritis and she can't take them.    Grass pollen-bermuda, standard     Grass pollen-natalia, standard        Objective:     Vital Signs (Most Recent):  Temp: 99.1 °F (37.3 °C) (05/12/25 1259)  Pulse: 106 (05/12/25 1259)  Resp: 18 (05/12/25 1815)  BP: 122/77 (05/12/25 1240)  SpO2: (!) 94 % (05/12/25 1259) Vital Signs (24h Range):  Temp:  [98 °F (36.7 °C)-99.1 °F (37.3 °C)] 99.1 °F (37.3 °C)  Pulse:  [] 106  Resp:  [16-32] 18  SpO2:  [92 %-100 %] 94 %  BP: ()/() 122/77     Weight: 108.4 kg (239 lb)  Body mass index is 42.34 kg/m².  Patient's last menstrual period was 04/05/2025.    I&O (Last 24H):    Intake/Output Summary (Last 24 hours) at 5/12/2025 1934  Last data filed at 5/12/2025 1154  Gross per 24 hour   Intake 1938 ml   Output 50 ml   Net 1888 ml          Laboratory:  CBC:   Recent Labs   Lab 05/12/25  1610   WBC 12.15*   RBC 3.69*   HGB 9.8*   HCT 30.0*      MCV 81.3   MCH 26.6*   MCHC 32.7     Recent Lab Results  (Last 5 results in the past 24 hours)        05/12/25  1610   05/12/25  1132   05/12/25  0841   05/12/25  0802   05/12/25  0159        Unit Blood Type Code     5100  [P]                5100  [P]           Unit Expiration     [id number]  [P]                [id number]  [P]           Baso # 0.02       0.04   0.03       Basophil % 0.2       0.4   0.3       Crossmatch Interpretation     Compatible  [P]                Compatible  [P]           Differential Method Auto       Auto   Auto       DISPENSE STATUS     Issued  [P]                Issued  [P]           Eos # 0.00       0.06   0.05       Eos % 0.0       0.7   0.6       Group & Rh     O POS           Beta HCG Quant   <2  Comment: Reference Ranges:    Males <5.0 mIU/mL    Non-pregnant females:  18Y-41Y pre-menopausal.....<2.4 mIU/mL  42Y-55Y bessie-menopausal....<=4.9 mIU/mL  55Y post-menopausal...........<=7.6 mIU/mL    Pregnancy:  Weeks post-LMP..........Range (mIU/mL)  1-10  weeks..................202 - 231,000  11-15 weeks.................22,536 - 234,990  16-22 weeks.................8,007 - 50,064  23-40 weeks.................1,600 - 49,413    Note:  This assay is not FDA approved for tumor screening, diagnosis, or monitoring.             Hematocrit 30.0       20.7   22.7       Hemoglobin 9.8       6.4   6.7       Immature Grans (Abs) 0.10       0.03   0.03       Immature Granulocytes 0.8       0.3   0.3       INDIRECT JOEY     NEG           Lymph # 1.14       2.07   2.66       Lymph % 9.4       22.7   30.8       MCH 26.6       25.1   23.8       MCHC 32.7       30.9   29.5       MCV 81.3       81.2   80.8       Mono # 0.57       0.77   0.83       Mono % 4.7       8.4   9.6       MPV 10.4       10.6   10.7       Neutrophils, Abs 10.32       6.15   5.03       Neutrophils Relative 84.9        67.5   58.4       nRBC 0.0       0.0   0.0       NUCLEATED RBC ABSOLUTE 0.00       0.00   0.00       Platelet Count 274       297   310       Product Code     [id number]  [P]                [id number]  [P]           RBC 3.69       2.55   2.81       RDW 14.6       15.1   14.9       Specimen Outdate     05/15/2025 23:59           Unit ABO/Rh     O POS  [P]                O POS  [P]           UNIT NUMBER     [id number]  [P]                [id number]  [P]           WBC 12.15       9.12   8.63                               [P] - Preliminary Result             I have personallly reviewed all pertinent lab results from the last 24 hours.    Diagnostic Results:  Labs: Reviewed     Physical Exam:   Constitutional: She is oriented to person, place, and time. She appears well-developed and well-nourished.    HENT:   Head: Normocephalic and atraumatic.      Cardiovascular:  Normal rate.             Pulmonary/Chest: Effort normal.        Abdominal: Soft. There is no abdominal tenderness.     Genitourinary:    Uterus normal.             Musculoskeletal: Normal range of motion.       Neurological: She is alert and oriented to person, place, and time.    Skin: Skin is warm and dry.    Psychiatric: She has a normal mood and affect. Her behavior is normal. Judgment and thought content normal.        Review of Systems   All other systems reviewed and are negative.      Assessment/Plan:     Hemoperitoneum  POD0 s/p dx lap with RSO, evacuation of hemoperitoneum  Hgb stable  Will observe overnight for pain control  Plan to dc in wes Main DO  Obstetrics & Gynecology  [facility]

## 2025-05-13 NOTE — HOSPITAL COURSE
Patient presented to the ED on 5/10 with abdominal pain and possible hemoperitoneum. She was observed overnight with serial CBCs. Hgb dropped 2 points on 5/12 am. She was taken to the OR for a diagnostic laparoscopy with RSO and evacuation of hemoperitoneum. See op note for details.    Postop course was uncomplicated. She received 2u pRBCs. She was stable for discharge on POD1 with close outpatient follow up.

## 2025-05-13 NOTE — SUBJECTIVE & OBJECTIVE
Interval History: POD0 s/o dx lap with RSO, evacuation of hemoperitoneum    Scheduled Meds:  Continuous Infusions:   lactated ringers  125 mL/hr Intravenous Continuous 125 mL/hr at 05/12/25 1223 125 mL/hr at 05/12/25 1223     PRN Meds:  Current Facility-Administered Medications:     diphenhydrAMINE, 25 mg, Oral, Q6H PRN    HYDROmorphone, 0.5 mg, Intravenous, Q4H PRN    ibuprofen, 600 mg, Oral, Q6H PRN    ondansetron, 8 mg, Oral, Q8H PRN    oxyCODONE-acetaminophen, 1 tablet, Oral, Q4H PRN    sodium chloride 0.9%, 10 mL, Intravenous, PRN    Review of patient's allergies indicates:   Allergen Reactions    Lortab [hydrocodone-acetaminophen] Hives    Nsaids (non-steroidal anti-inflammatory drug) Other (See Comments)     Patient states NSAIDs cause gastritis and she can't take them.    Grass pollen-bermuda, standard     Grass pollen-natalia, standard        Objective:     Vital Signs (Most Recent):  Temp: 99.1 °F (37.3 °C) (05/12/25 1259)  Pulse: 106 (05/12/25 1259)  Resp: 18 (05/12/25 1815)  BP: 122/77 (05/12/25 1240)  SpO2: (!) 94 % (05/12/25 1259) Vital Signs (24h Range):  Temp:  [98 °F (36.7 °C)-99.1 °F (37.3 °C)] 99.1 °F (37.3 °C)  Pulse:  [] 106  Resp:  [16-32] 18  SpO2:  [92 %-100 %] 94 %  BP: ()/() 122/77     Weight: 108.4 kg (239 lb)  Body mass index is 42.34 kg/m².  Patient's last menstrual period was 04/05/2025.    I&O (Last 24H):    Intake/Output Summary (Last 24 hours) at 5/12/2025 1934  Last data filed at 5/12/2025 1154  Gross per 24 hour   Intake 1938 ml   Output 50 ml   Net 1888 ml         Laboratory:  CBC:   Recent Labs   Lab 05/12/25  1610   WBC 12.15*   RBC 3.69*   HGB 9.8*   HCT 30.0*      MCV 81.3   MCH 26.6*   MCHC 32.7     Recent Lab Results  (Last 5 results in the past 24 hours)        05/12/25  1610   05/12/25  1132   05/12/25  0841   05/12/25  0802   05/12/25  0159        Unit Blood Type Code     5100  [P]                5100  [P]           Unit Expiration      [id number]  [P]                [id number]  [P]           Baso # 0.02       0.04   0.03       Basophil % 0.2       0.4   0.3       Crossmatch Interpretation     Compatible  [P]                Compatible  [P]           Differential Method Auto       Auto   Auto       DISPENSE STATUS     Issued  [P]                Issued  [P]           Eos # 0.00       0.06   0.05       Eos % 0.0       0.7   0.6       Group & Rh     O POS           Beta HCG Quant   <2  Comment: Reference Ranges:    Males <5.0 mIU/mL    Non-pregnant females:  18Y-41Y pre-menopausal.....<2.4 mIU/mL  42Y-55Y bessie-menopausal....<=4.9 mIU/mL  55Y post-menopausal...........<=7.6 mIU/mL    Pregnancy:  Weeks post-LMP..........Range (mIU/mL)  1-10  weeks..................202 - 231,000  11-15 weeks.................22,536 - 234,990  16-22 weeks.................8,007 - 50,064  23-40 weeks.................1,600 - 49,413    Note:  This assay is not FDA approved for tumor screening, diagnosis, or monitoring.             Hematocrit 30.0       20.7   22.7       Hemoglobin 9.8       6.4   6.7       Immature Grans (Abs) 0.10       0.03   0.03       Immature Granulocytes 0.8       0.3   0.3       INDIRECT JOEY     NEG           Lymph # 1.14       2.07   2.66       Lymph % 9.4       22.7   30.8       MCH 26.6       25.1   23.8       MCHC 32.7       30.9   29.5       MCV 81.3       81.2   80.8       Mono # 0.57       0.77   0.83       Mono % 4.7       8.4   9.6       MPV 10.4       10.6   10.7       Neutrophils, Abs 10.32       6.15   5.03       Neutrophils Relative 84.9       67.5   58.4       nRBC 0.0       0.0   0.0       NUCLEATED RBC ABSOLUTE 0.00       0.00   0.00       Platelet Count 274       297   310       Product Code     [id number]  [P]                [id number]  [P]           RBC 3.69       2.55   2.81       RDW 14.6       15.1   14.9       Specimen Outdate     05/15/2025 23:59           Unit ABO/Rh     O POS  [P]                O POS  [P]           UNIT  NUMBER     [id number]  [P]                [id number]  [P]           WBC 12.15       9.12   8.63                               [P] - Preliminary Result             I have personallly reviewed all pertinent lab results from the last 24 hours.    Diagnostic Results:  Labs: Reviewed     Physical Exam:   Constitutional: She is oriented to person, place, and time. She appears well-developed and well-nourished.    HENT:   Head: Normocephalic and atraumatic.      Cardiovascular:  Normal rate.             Pulmonary/Chest: Effort normal.        Abdominal: Soft. There is no abdominal tenderness.     Genitourinary:    Uterus normal.             Musculoskeletal: Normal range of motion.       Neurological: She is alert and oriented to person, place, and time.    Skin: Skin is warm and dry.    Psychiatric: She has a normal mood and affect. Her behavior is normal. Judgment and thought content normal.        Review of Systems   All other systems reviewed and are negative.

## 2025-05-13 NOTE — DISCHARGE SUMMARY
[facility]  Obstetrics & Gynecology  Discharge Summary    Patient Name: Gabriella Ulloa  MRN: [id number]  Admission Date: 5/10/2025  Hospital Length of Stay: 0 days  Discharge Date and Time: 5/13/2025 10:01 AM  Attending Physician: No att. providers found   Discharging Provider: Venu Main DO  Primary Care Provider: Jayde Cole DNP    HPI:  Reports some nausea, otherwise doing well. Reports pain controlled with medications. She is ambulating and voiding spontaneously.    Hospital Course:  Patient presented to the ED on 5/10 with abdominal pain and possible hemoperitoneum. She was observed overnight with serial CBCs. Hgb dropped 2 points on 5/12 am. She was taken to the OR for a diagnostic laparoscopy with RSO and evacuation of hemoperitoneum. See op note for details.    Postop course was uncomplicated. She received 2u pRBCs. She was stable for discharge on POD1 with close outpatient follow up.    Goals of Care Treatment Preferences:  Code Status: Full Code      Procedure(s) (LRB):  LAPAROSCOPY, DIAGNOSTIC (N/A)  SALPINGO-OOPHORECTOMY, LAPAROSCOPIC (Right)     Consults (From admission, onward)          Status Ordering Provider     Inpatient consult to Obstetrics / Gynecology  Once        Provider:  Kuldip Garg MD    Acknowledged CARL PLATA            Significant Diagnostic Studies: Labs: CBC   Recent Labs   Lab 05/12/25 2035 05/13/25  0153 05/13/25  0836   WBC 12.49* 11.12* 11.39*   HGB 9.3* 8.2* 9.3*   HCT 29.9* 26.9* 29.7*    234 261    and All labs within the past 24 hours have been reviewed      Pending Diagnostic Studies:       Procedure Component Value Units Date/Time    EXTRA TUBES [[id number]] Collected: 05/12/25 0847    Order Status: Sent Lab Status: In process Updated: 05/12/25 0847    Specimen: Blood, Venous     Narrative:      The following orders were created for panel order EXTRA TUBES.  Procedure                               Abnormality          Status                     ---------                               -----------         ------                     Light Green Top Hold[[id number]]                            In process                   Please view results for these tests on the individual orders.    EXTRA TUBES [[id number]] Collected: 05/11/25 0337    Order Status: Sent Lab Status: In process Updated: 05/11/25 0343    Specimen: Blood, Venous     Narrative:      The following orders were created for panel order EXTRA TUBES.  Procedure                               Abnormality         Status                     ---------                               -----------         ------                     Light Blue Top Hold[[id number]]                             In process                   Please view results for these tests on the individual orders.    EXTRA TUBES [[id number]] Collected: 05/10/25 2306    Order Status: Sent Lab Status: In process Updated: 05/10/25 2318    Specimen: Blood, Venous     Narrative:      The following orders were created for panel order EXTRA TUBES.  Procedure                               Abnormality         Status                     ---------                               -----------         ------                     Light Blue Top Hold[[id number]]                             In process                 Gold Top Hold[[id number]]                                   In process                   Please view results for these tests on the individual orders.          Final Active Diagnoses:    Diagnosis Date Noted POA    PRINCIPAL PROBLEM:  Hemoperitoneum [K66.1] 05/12/2025 Yes      Problems Resolved During this Admission:        Discharged Condition: good    Disposition: Home or Self Care    Follow Up:   Follow-up Information       Venu Laguna DO Follow up on 5/22/2025.    Specialty: Obstetrics and Gynecology  Why: surgery follow up  Contact information:  [address] [place] [id number] [phone]                            Patient Instructions:   No discharge procedures on file.  Medications:  Reconciled Home Medications:      Medication List        START taking these medications      ferrous sulfate 325 mg (65 mg iron) Tab tablet  Commonly known as: FeosoL  Take 1 tablet (325 mg total) by mouth 2 (two) times daily.     oxyCODONE-acetaminophen 7.5-325 mg per tablet  Commonly known as: PERCOCET  Take 1 tablet by mouth every 6 (six) hours as needed.            CHANGE how you take these medications      * polyethylene glycol 17 gram/dose powder  Commonly known as: GLYCOLAX  2 capfuls by mouth daily x 3 days then 1 capful daily  What changed: Another medication with the same name was added. Make sure you understand how and when to take each.     * polyethylene glycol 17 gram Pwpk  Commonly known as: MIRALAX  Take 17 g by mouth 2 (two) times daily as needed.  What changed: You were already taking a medication with the same name, and this prescription was added. Make sure you understand how and when to take each.           * This list has 2 medication(s) that are the same as other medications prescribed for you. Read the directions carefully, and ask your doctor or other care provider to review them with you.                CONTINUE taking these medications      ergocalciferol 50,000 unit Cap  Commonly known as: ERGOCALCIFEROL  Take 1 capsule (50,000 Units total) by mouth every 7 days.     linaCLOtide 290 mcg Cap capsule  Commonly known as: LINZESS  Take 1 capsule (290 mcg total) by mouth daily as needed.     losartan 50 MG tablet  Commonly known as: COZAAR  Take 1 tablet (50 mg total) by mouth once daily.     metFORMIN 500 MG ER 24hr tablet  Commonly known as: GLUCOPHAGE-XR  Take 1 tablet (500 mg total) by mouth daily with breakfast.     norelgestromin-ethinyl estradiol 150-35 mcg/24 hr  Place 1 patch onto the skin once a week.     ondansetron 4 MG Tbdl  Commonly known as: ZOFRAN-ODT  Take 1 tablet (4 mg total) by mouth  every 6 (six) hours as needed.     rizatriptan 10 MG tablet  Commonly known as: MAXALT  Take 1 tablet (10 mg total) by mouth daily as needed for Migraine (take onset of migraine).     venlafaxine 150 MG Cp24  Commonly known as: EFFEXOR-XR  Take 1 capsule (150 mg total) by mouth once daily.              Venu Main,   Obstetrics & Gynecology  [facility]

## 2025-05-13 NOTE — PLAN OF CARE
Problem: Adult Inpatient Plan of Care  Goal: Plan of Care Review  Outcome: Met  Goal: Patient-Specific Goal (Individualized)  Outcome: Met  Goal: Absence of Hospital-Acquired Illness or Injury  Outcome: Met  Goal: Optimal Comfort and Wellbeing  Outcome: Met  Goal: Readiness for Transition of Care  Outcome: Met     Problem: Bariatric Environmental Safety  Goal: Safety Maintained with Care  Outcome: Met     Problem: Wound  Goal: Optimal Coping  Outcome: Met  Goal: Optimal Functional Ability  Outcome: Met  Goal: Absence of Infection Signs and Symptoms  Outcome: Met  Goal: Improved Oral Intake  Outcome: Met  Goal: Optimal Pain Control and Function  Outcome: Met  Goal: Skin Health and Integrity  Outcome: Met  Goal: Optimal Wound Healing  Outcome: Met     Problem: Skin Injury Risk Increased  Goal: Skin Health and Integrity  Outcome: Met     Problem: Fall Injury Risk  Goal: Absence of Fall and Fall-Related Injury  Outcome: Met     Problem: Pain Acute  Goal: Optimal Pain Control and Function  Outcome: Met

## 2025-05-15 ENCOUNTER — OFFICE VISIT (OUTPATIENT)
Dept: OBSTETRICS AND GYNECOLOGY | Facility: CLINIC | Age: 30
End: 2025-05-15
Payer: MEDICAID

## 2025-05-15 VITALS
BODY MASS INDEX: 42.16 KG/M2 | WEIGHT: 238 LBS | SYSTOLIC BLOOD PRESSURE: 129 MMHG | HEART RATE: 96 BPM | DIASTOLIC BLOOD PRESSURE: 87 MMHG

## 2025-05-15 DIAGNOSIS — Z48.89 ENCOUNTER FOR POST SURGICAL WOUND CHECK: Primary | ICD-10-CM

## 2025-05-15 PROCEDURE — 99213 OFFICE O/P EST LOW 20 MIN: CPT | Mod: PBBFAC | Performed by: STUDENT IN AN ORGANIZED HEALTH CARE EDUCATION/TRAINING PROGRAM

## 2025-05-15 PROCEDURE — 3074F SYST BP LT 130 MM HG: CPT | Mod: ,,, | Performed by: STUDENT IN AN ORGANIZED HEALTH CARE EDUCATION/TRAINING PROGRAM

## 2025-05-15 PROCEDURE — 99999 PR PBB SHADOW E&M-EST. PATIENT-LVL III: CPT | Mod: PBBFAC,,, | Performed by: STUDENT IN AN ORGANIZED HEALTH CARE EDUCATION/TRAINING PROGRAM

## 2025-05-15 PROCEDURE — 99024 POSTOP FOLLOW-UP VISIT: CPT | Mod: ,,, | Performed by: STUDENT IN AN ORGANIZED HEALTH CARE EDUCATION/TRAINING PROGRAM

## 2025-05-15 RX ORDER — ONDANSETRON 4 MG/1
4 TABLET, ORALLY DISINTEGRATING ORAL EVERY 6 HOURS PRN
Qty: 30 TABLET | Refills: 2 | Status: SHIPPED | OUTPATIENT
Start: 2025-05-15

## 2025-05-15 NOTE — PROGRESS NOTES
Subjective:       Gabriella Ulloa is a 30 y.o. female who presents to the clinic 3 days status post right oophorectomy, right salpingectomy, and diagnostic for hemoperitoneum. Eating a regular diet without difficulty. Occasional constipation. Continues to have nausea, vomiting. Pain is controlled without any medications.    The following portions of the patient's history were reviewed and updated as appropriate: allergies, current medications, past family history, past medical history, past social history, past surgical history, and problem list.    Review of Systems  Pertinent items are noted in HPI.      Objective:      /87   Pulse 96   Wt 108 kg (238 lb)   LMP  (LMP Unknown)   BMI 42.16 kg/m²   General:  alert, appears stated age, and cooperative   Abdomen: soft, non-tender   Incision:   healing well, no drainage, no erythema, no hernia, no seroma, no swelling, no dehiscence, incision well approximated       Assessment:      Doing well postoperatively.  Operative findings again reviewed. Pathology report discussed.      Plan:      1. Continue any current medications.  2. Wound care discussed.  3. Activity restrictions: none  4. Anticipated return to work: now.  5. Follow up: 2 weeks.

## 2025-05-16 ENCOUNTER — PATIENT MESSAGE (OUTPATIENT)
Dept: OBSTETRICS AND GYNECOLOGY | Facility: CLINIC | Age: 30
End: 2025-05-16
Payer: MEDICAID

## 2025-05-20 ENCOUNTER — TELEPHONE (OUTPATIENT)
Dept: OBSTETRICS AND GYNECOLOGY | Facility: CLINIC | Age: 30
End: 2025-05-20
Payer: MEDICAID

## 2025-05-20 RX ORDER — PROMETHAZINE HYDROCHLORIDE 25 MG/1
25 TABLET ORAL EVERY 4 HOURS
Qty: 20 TABLET | Refills: 0 | Status: SHIPPED | OUTPATIENT
Start: 2025-05-20

## 2025-05-21 ENCOUNTER — OFFICE VISIT (OUTPATIENT)
Dept: OBSTETRICS AND GYNECOLOGY | Facility: CLINIC | Age: 30
End: 2025-05-21
Payer: MEDICAID

## 2025-05-21 ENCOUNTER — OFFICE VISIT (OUTPATIENT)
Dept: GASTROENTEROLOGY | Facility: CLINIC | Age: 30
End: 2025-05-21
Payer: MEDICAID

## 2025-05-21 VITALS
SYSTOLIC BLOOD PRESSURE: 145 MMHG | HEART RATE: 77 BPM | OXYGEN SATURATION: 100 % | BODY MASS INDEX: 42.2 KG/M2 | HEIGHT: 63 IN | WEIGHT: 238.19 LBS | DIASTOLIC BLOOD PRESSURE: 94 MMHG

## 2025-05-21 VITALS
HEART RATE: 89 BPM | WEIGHT: 237 LBS | BODY MASS INDEX: 41.98 KG/M2 | DIASTOLIC BLOOD PRESSURE: 60 MMHG | SYSTOLIC BLOOD PRESSURE: 128 MMHG

## 2025-05-21 DIAGNOSIS — R10.9 ABDOMINAL PAIN, UNSPECIFIED ABDOMINAL LOCATION: ICD-10-CM

## 2025-05-21 DIAGNOSIS — R11.2 NAUSEA AND VOMITING, UNSPECIFIED VOMITING TYPE: Primary | ICD-10-CM

## 2025-05-21 DIAGNOSIS — Z48.89 ENCOUNTER FOR POST SURGICAL WOUND CHECK: Primary | ICD-10-CM

## 2025-05-21 DIAGNOSIS — K59.04 CHRONIC IDIOPATHIC CONSTIPATION: ICD-10-CM

## 2025-05-21 PROCEDURE — 3074F SYST BP LT 130 MM HG: CPT | Mod: ,,, | Performed by: STUDENT IN AN ORGANIZED HEALTH CARE EDUCATION/TRAINING PROGRAM

## 2025-05-21 PROCEDURE — 99215 OFFICE O/P EST HI 40 MIN: CPT | Mod: PBBFAC | Performed by: NURSE PRACTITIONER

## 2025-05-21 PROCEDURE — 3077F SYST BP >= 140 MM HG: CPT | Mod: ,,, | Performed by: NURSE PRACTITIONER

## 2025-05-21 PROCEDURE — 99214 OFFICE O/P EST MOD 30 MIN: CPT | Mod: S$PBB,,, | Performed by: NURSE PRACTITIONER

## 2025-05-21 PROCEDURE — 3078F DIAST BP <80 MM HG: CPT | Mod: ,,, | Performed by: STUDENT IN AN ORGANIZED HEALTH CARE EDUCATION/TRAINING PROGRAM

## 2025-05-21 PROCEDURE — 99213 OFFICE O/P EST LOW 20 MIN: CPT | Mod: PBBFAC | Performed by: STUDENT IN AN ORGANIZED HEALTH CARE EDUCATION/TRAINING PROGRAM

## 2025-05-21 PROCEDURE — 3008F BODY MASS INDEX DOCD: CPT | Mod: ,,, | Performed by: NURSE PRACTITIONER

## 2025-05-21 PROCEDURE — 3008F BODY MASS INDEX DOCD: CPT | Mod: ,,, | Performed by: STUDENT IN AN ORGANIZED HEALTH CARE EDUCATION/TRAINING PROGRAM

## 2025-05-21 PROCEDURE — 99024 POSTOP FOLLOW-UP VISIT: CPT | Mod: S$PBB,,, | Performed by: STUDENT IN AN ORGANIZED HEALTH CARE EDUCATION/TRAINING PROGRAM

## 2025-05-21 PROCEDURE — 99999 PR PBB SHADOW E&M-EST. PATIENT-LVL III: CPT | Mod: PBBFAC,,, | Performed by: STUDENT IN AN ORGANIZED HEALTH CARE EDUCATION/TRAINING PROGRAM

## 2025-05-21 PROCEDURE — 99999 PR PBB SHADOW E&M-EST. PATIENT-LVL V: CPT | Mod: PBBFAC,,, | Performed by: NURSE PRACTITIONER

## 2025-05-21 NOTE — PROGRESS NOTES
Gabriella Ulloa is a 30 y.o. female here for Nausea (Over a month, constant - vomiting )        PCP: Jayde Cole  Referring Provider: Otilio Jolley Ii, Do  [address] [place] [address],  [place] [phone]     HPI:  Presents for report of nausea and vomiting. Patient is reporting daily nausea and vomiting that has been ongoing for over 2 months. Vomiting is worse after eating. Emesis is sometimes undigested food.  Endorses early satiety.  Denies dysphagia.  Last EGD was 02/02/2023, mucosa of the esophagus was normal.  She also had a colonoscopy on 04/2023, no colon polyps, recommendation to repeat in 10 years.  Patient had CT abdomen and pelvis on 05/11/2025 performed in the ER due to abdominal pain.  No gallstones or cholecystitis.  The patient did have a hemoperitoneum.  She did require surgery on 05/12/2025, laparoscopic procedure was performed.  Patient is 1 week postop and is due to follow-up today with OB gyn.  Patient continues to report some abdominal soreness and discomfort.  She does have chronic constipation.  States that she was previously prescribed Linzess but has not taken recently because she is concerned about taking it post surgery.  I did recommend that she take MiraLax or stool softeners to improve constipation.  No hematochezia or melena.  Labs on 05/13/2025 reviewed, HGB 9.3 and 29.7.  Liver enzymes normal.  Patient is currently taking oral iron 325 mg daily.    Nausea  Associated symptoms include nausea and vomiting. Pertinent negatives include no abdominal pain, change in bowel habit, fatigue or fever.         ROS:  Review of Systems   Constitutional:  Negative for appetite change, fatigue, fever and unexpected weight change.   HENT:  Negative for trouble swallowing.    Gastrointestinal:  Positive for constipation, nausea and vomiting. Negative for abdominal pain, anal bleeding, blood in stool, change in bowel habit, diarrhea and reflux.   Musculoskeletal:  Negative for gait problem.    Integumentary:  Negative for pallor.   Psychiatric/Behavioral:  The patient is not nervous/anxious.           PMHX:  has a past medical history of Anemia, Anxiety, Breakthrough bleeding on birth control pills (2015), Endometriosis of pelvic peritoneum (2020), Hormone disorder (2025), Hypertension, and Irritable bowel syndrome (Chronic).    PSHX:  has a past surgical history that includes  section; D&C/Hysteroscopy with robotic and operative Laparoscopy (2020); Dilation and curettage of uterus (2020); Endometrial biopsy (N/A, 2022); Hysteroscopy with dilation and curettage of uterus (N/A, 2022); Diagnostic laparoscopy (N/A, 2022); Diagnostic laparoscopy (N/A, 2025); and Laparoscopic salpingo-oophorectomy (Right, 2025).    PFHX: family history includes Breast cancer in her maternal aunt and another family member; Cancer in her father; Diabetes in her father, mother, and another family member; Heart disease in her mother; Hypertension in her father, mother, and another family member; Liver cancer in an other family member; Migraines in her father; Prostate cancer in her maternal grandfather; Stomach cancer in an other family member; Thyroid disease in her maternal aunt and maternal aunt.    PSlHX:  reports that she has never smoked. She has never been exposed to tobacco smoke. She has never used smokeless tobacco. She reports that she does not drink alcohol and does not use drugs.        Review of patient's allergies indicates:   Allergen Reactions    Lortab [hydrocodone-acetaminophen] Hives    Nsaids (non-steroidal anti-inflammatory drug) Other (See Comments)     Patient states NSAIDs cause gastritis and she can't take them.    Grass pollen-bermuda, standard     Grass pollen-natalia, standard        Medication List with Changes/Refills   Current Medications    ERGOCALCIFEROL (ERGOCALCIFEROL) 50,000 UNIT CAP    Take 1 capsule (50,000 Units total) by mouth every  "7 days.    FERROUS SULFATE (FEOSOL) 325 MG (65 MG IRON) TAB TABLET    Take 1 tablet (325 mg total) by mouth 2 (two) times daily.    LINACLOTIDE (LINZESS) 290 MCG CAP CAPSULE    Take 1 capsule (290 mcg total) by mouth daily as needed.    LOSARTAN (COZAAR) 50 MG TABLET    Take 1 tablet (50 mg total) by mouth once daily.    METFORMIN (GLUCOPHAGE-XR) 500 MG ER 24HR TABLET    Take 1 tablet (500 mg total) by mouth daily with breakfast.    NORELGESTROMIN-ETHINYL ESTRADIOL 150-35 MCG/24 HR    Place 1 patch onto the skin once a week.    ONDANSETRON (ZOFRAN-ODT) 4 MG TBDL    Take 1 tablet (4 mg total) by mouth every 6 (six) hours as needed.    OXYCODONE-ACETAMINOPHEN (PERCOCET) 7.5-325 MG PER TABLET    Take 1 tablet by mouth every 6 (six) hours as needed for Pain.    POLYETHYLENE GLYCOL (GLYCOLAX) 17 GRAM/DOSE POWDER    2 capfuls by mouth daily x 3 days then 1 capful daily    POLYETHYLENE GLYCOL (MIRALAX) 17 GRAM PWPK    Take 17 g by mouth 2 (two) times daily as needed for Constipation.    PROMETHAZINE (PHENERGAN) 25 MG TABLET    Take 1 tablet (25 mg total) by mouth every 4 (four) hours.    RIZATRIPTAN (MAXALT) 10 MG TABLET    Take 1 tablet (10 mg total) by mouth daily as needed for Migraine (take onset of migraine).    VENLAFAXINE (EFFEXOR-XR) 150 MG CP24    Take 1 capsule (150 mg total) by mouth once daily.        Objective Findings:  Vital Signs:  BP (!) 145/94   Pulse 77   Ht 5' 3" (1.6 m)   Wt 108 kg (238 lb 3.2 oz)   LMP 05/05/2025 (Approximate)   SpO2 100%   BMI 42.20 kg/m²  Body mass index is 42.2 kg/m².    Physical Exam:  Physical Exam  Vitals and nursing note reviewed.   Constitutional:       General: She is not in acute distress.     Appearance: Normal appearance. She is not ill-appearing.   HENT:      Mouth/Throat:      Mouth: Mucous membranes are moist.   Cardiovascular:      Rate and Rhythm: Normal rate.   Pulmonary:      Effort: Pulmonary effort is normal.   Abdominal:      General: Bowel sounds are " normal. There is no distension.      Palpations: Abdomen is soft. There is no mass.      Tenderness: There is abdominal tenderness.   Skin:     General: Skin is warm and dry.      Coloration: Skin is not jaundiced or pale.   Neurological:      Mental Status: She is alert and oriented to person, place, and time.   Psychiatric:         Mood and Affect: Mood normal.          Labs:  Lab Results   Component Value Date    WBC 11.39 (H) 05/13/2025    HGB 9.3 (L) 05/13/2025    HCT 29.7 (L) 05/13/2025    MCV 81.6 05/13/2025    RDW 15.0 (H) 05/13/2025     05/13/2025    LYMPH 26.8 (L) 05/13/2025    LYMPH 3.05 05/13/2025    MONO 9.9 (H) 05/13/2025    EOS 0.03 05/13/2025    BASO 0.04 05/13/2025     Lab Results   Component Value Date     05/10/2025    K 3.7 05/10/2025     05/10/2025    CO2 22 05/10/2025     (H) 05/10/2025    BUN 11 05/10/2025    CREATININE 0.78 05/10/2025    CALCIUM 8.5 05/10/2025    PROT 6.8 05/10/2025    ALBUMIN 3.1 (L) 05/10/2025    BILITOT 0.5 05/10/2025    ALKPHOS 68 05/10/2025    AST 16 05/10/2025    ALT <7 05/10/2025         Imaging: US Pelvis Comp with Transvag NON-OB (xpd  Result Date: 5/11/2025  EXAMINATION: US PELVIS COMP WITH TRANSVAG NON-OB (XPD) CLINICAL HISTORY: Vaginal bleeding; TECHNIQUE: Transabdominal sonography of the pelvis was performed, followed by transvaginal sonography to better evaluate the uterus and ovaries. COMPARISON: None. FINDINGS: Uterus: Size: 10.5 x 4.8 x 5.6 cm Masses: None.  Small nabothian cysts noted Endometrium: Normal in this pre menopausal patient, measuring 12 mm. There is complex appearing fluid in the uterus appearing greater on the right.  Ovaries are not well defined separate from the complex fluid.  There is 12 x 7 x 8 mm slightly septated cystic structure left adnexa suggesting small left ovarian cyst.  Ovaries not grossly enlarged with right estimated at 4.3 x 3.1 x 4.1 cm on left and 3.1 x 2 x 2.8 cm. :     Complex free fluid the pelvis  suggesting hemoperitoneum. Final read Stat read concordant Electronically signed by: Priti Martinez MD Date:    05/11/2025 Time:    10:18    CT Renal Stone Study ABD Pelvis WO  Result Date: 5/11/2025  EXAMINATION: CT RENAL STONE STUDY ABD PELVIS WO CLINICAL HISTORY: Flank pain, kidney stone suspected (pregnant); TECHNIQUE: Low dose axial images, sagittal and coronal reformations were obtained from the lung bases to the pubic symphysis.  Contrast was not administered. COMPARISON: None FINDINGS: Visualized lung bases.  Mild bibasilar opacification which could represent hypoventilatory change and/or infiltrate Lack of IV contrast does decrease the sensitivity exam limiting evaluation particular vascular structures and solid organs.  There is high density material within the pelvis particularly seen along the anterior aspect of the uterus and cephalad to the uterus and extending into the left paracolic gutter and on the right extending into Flor's pouch between liver and right kidney..  Density slightly heterogeneous but predominantly high density. Liver, spleen, adrenal glands, pancreas, kidneys unremarkable appearance.  No calcified stones the gallbladder or CT findings of acute cholecystitis or biliary duct dilatation.  Abdominal aorta tapers without aneurysmal dilatation.  No free intraperitoneal air.  Urinary bladder decompressed and unremarkable appearance Uterus and ovaries not reliably distinguish separate from the high density material suspected bleb within the pelvis.  Uterus appearing prominent size. Recommend correlation clinically.  History is of suspected pregnancy and if pregnancy of concern would be ruptured ectopic pregnancy     Appearance suspicious for hemoperitoneum. Final read Stat read concordant Electronically signed by: Priti Martinez MD Date:    05/11/2025 Time:    10:01    X-Ray KUB  Result Date: 5/10/2025  EXAMINATION: XR KUB CLINICAL HISTORY: Unspecified abdominal pain TECHNIQUE: Single  "AP supine view of the abdomen (KUB) was performed COMPARISON: July 7, 2016 FINDINGS: Bowel gas pattern is nonspecific.  No organomegaly.  No abnormal calcifications seen.  No acute bone abnormality.     No acute intra-abdominal process seen. Electronically signed by: Noel Enriquez Date:    05/10/2025 Time:    19:16        Assessment:  Gabriella Ulloa is a 30 y.o. female here with:  1. Nausea and vomiting, unspecified vomiting type    2. Abdominal pain, unspecified abdominal location    3. Chronic idiopathic constipation          Recommendations:  1. Schedule HIDA scan  2. Schedule gastric emptying study  3. Schedule EGD  4. Do not lay down within 3 hours of eating.  Avoid spicy, greasy foods  Eat 6-8 small meals per day  Avoid raw fruits and vegetables  Small amount of protein and fiber at one time  5. Patient is scheduled to see gyn today  6. MiraLax powder 17 g daily in 8 oz of liquid      Portions of this note may have been created with voice recognition software.  Occasional wrong word or "sound a like substitutions may have occurred due to inherent limitations of voice recognition software.  Please read the note carefully and recognize using contexts, where substitutions have occurred.    Diagnosis, risks, benefits, and side effects of any medications and treatment plan were discussed with the patient.  All questions were answered to the satisfaction of the patient, and patient verbalized understanding and agreement to the treatment plan.        Follow up in about 3 months (around 8/21/2025).      Order summary:  Orders Placed This Encounter    NM Hepatobiliary Imaging with GB (HIDA)    NM Gastric Emptying    EGD       Thank you for allowing me to participate in the care of Gabriella Ulloa.      ANGÉLICA Mccord          "

## 2025-05-22 ENCOUNTER — TELEPHONE (OUTPATIENT)
Dept: OBSTETRICS AND GYNECOLOGY | Facility: CLINIC | Age: 30
End: 2025-05-22
Payer: MEDICAID

## 2025-05-23 ENCOUNTER — HOSPITAL ENCOUNTER (OUTPATIENT)
Dept: GASTROENTEROLOGY | Facility: HOSPITAL | Age: 30
Discharge: HOME | End: 2025-05-23
Attending: NURSE PRACTITIONER | Admitting: INTERNAL MEDICINE
Payer: MEDICAID

## 2025-05-23 ENCOUNTER — ANESTHESIA EVENT (OUTPATIENT)
Dept: GASTROENTEROLOGY | Facility: HOSPITAL | Age: 30
End: 2025-05-23
Payer: MEDICAID

## 2025-05-23 ENCOUNTER — ANESTHESIA (OUTPATIENT)
Dept: GASTROENTEROLOGY | Facility: HOSPITAL | Age: 30
End: 2025-05-23
Payer: MEDICAID

## 2025-05-23 VITALS
HEART RATE: 76 BPM | BODY MASS INDEX: 42.17 KG/M2 | TEMPERATURE: 98 F | WEIGHT: 238 LBS | SYSTOLIC BLOOD PRESSURE: 132 MMHG | DIASTOLIC BLOOD PRESSURE: 83 MMHG | RESPIRATION RATE: 12 BRPM | HEIGHT: 63 IN | OXYGEN SATURATION: 99 %

## 2025-05-23 DIAGNOSIS — R11.2 NAUSEA AND VOMITING, UNSPECIFIED VOMITING TYPE: ICD-10-CM

## 2025-05-23 DIAGNOSIS — K29.00 ACUTE SUPERFICIAL GASTRITIS WITHOUT HEMORRHAGE: Primary | ICD-10-CM

## 2025-05-23 LAB — POC URINE HCG: NEGATIVE

## 2025-05-23 PROCEDURE — 43239 EGD BIOPSY SINGLE/MULTIPLE: CPT | Mod: ,,, | Performed by: INTERNAL MEDICINE

## 2025-05-23 PROCEDURE — 43239 EGD BIOPSY SINGLE/MULTIPLE: CPT | Performed by: INTERNAL MEDICINE

## 2025-05-23 PROCEDURE — 37000008 HC ANESTHESIA 1ST 15 MINUTES

## 2025-05-23 PROCEDURE — 88342 IMHCHEM/IMCYTCHM 1ST ANTB: CPT | Mod: 26,,, | Performed by: PATHOLOGY

## 2025-05-23 PROCEDURE — 27201423 OPTIME MED/SURG SUP & DEVICES STERILE SUPPLY

## 2025-05-23 PROCEDURE — 37000009 HC ANESTHESIA EA ADD 15 MINS

## 2025-05-23 PROCEDURE — 88342 IMHCHEM/IMCYTCHM 1ST ANTB: CPT | Mod: TC,59,SUR | Performed by: INTERNAL MEDICINE

## 2025-05-23 PROCEDURE — 63600175 PHARM REV CODE 636 W HCPCS: Performed by: NURSE ANESTHETIST, CERTIFIED REGISTERED

## 2025-05-23 PROCEDURE — 88305 TISSUE EXAM BY PATHOLOGIST: CPT | Mod: 26,,, | Performed by: PATHOLOGY

## 2025-05-23 RX ORDER — LIDOCAINE HYDROCHLORIDE 20 MG/ML
INJECTION, SOLUTION EPIDURAL; INFILTRATION; INTRACAUDAL; PERINEURAL
Status: DISCONTINUED | OUTPATIENT
Start: 2025-05-23 | End: 2025-05-23

## 2025-05-23 RX ORDER — PROPOFOL 10 MG/ML
VIAL (ML) INTRAVENOUS
Status: DISCONTINUED | OUTPATIENT
Start: 2025-05-23 | End: 2025-05-23

## 2025-05-23 RX ORDER — SODIUM CHLORIDE 0.9 % (FLUSH) 0.9 %
10 SYRINGE (ML) INJECTION EVERY 6 HOURS PRN
Status: DISCONTINUED | OUTPATIENT
Start: 2025-05-23 | End: 2025-05-24 | Stop reason: HOSPADM

## 2025-05-23 RX ORDER — SODIUM CHLORIDE, SODIUM LACTATE, POTASSIUM CHLORIDE, CALCIUM CHLORIDE 600; 310; 30; 20 MG/100ML; MG/100ML; MG/100ML; MG/100ML
INJECTION, SOLUTION INTRAVENOUS CONTINUOUS
Status: DISCONTINUED | OUTPATIENT
Start: 2025-05-23 | End: 2025-05-24 | Stop reason: HOSPADM

## 2025-05-23 RX ORDER — PANTOPRAZOLE SODIUM 40 MG/1
40 TABLET, DELAYED RELEASE ORAL DAILY
Qty: 90 TABLET | Refills: 3 | Status: SHIPPED | OUTPATIENT
Start: 2025-05-23 | End: 2026-05-23

## 2025-05-23 RX ADMIN — LIDOCAINE HYDROCHLORIDE 50 MG: 20 INJECTION, SOLUTION EPIDURAL; INFILTRATION; INTRACAUDAL; PERINEURAL at 09:05

## 2025-05-23 RX ADMIN — PROPOFOL 50 MG: 10 INJECTION, EMULSION INTRAVENOUS at 09:05

## 2025-05-23 NOTE — ANESTHESIA PREPROCEDURE EVALUATION
05/23/2025  Gabriella Ulloa is a 30 y.o., female.      Pre-op Assessment    I have reviewed the Patient Summary Reports.     I have reviewed the Nursing Notes. I have reviewed the NPO Status.   I have reviewed the Medications.     Review of Systems  Anesthesia Hx:  No problems with previous Anesthesia             Denies Family Hx of Anesthesia complications.    Denies Personal Hx of Anesthesia complications.                    Social:  Non-Smoker       Hematology/Oncology:    Oncology Normal    -- Anemia:                                  EENT/Dental:  EENT/Dental Normal           Cardiovascular:     Hypertension              ECG has been reviewed.                      Hypertension         Pulmonary:  Pulmonary Normal                       Renal/:  Renal/ Normal                 Hepatic/GI:     GERD         Gerd          Musculoskeletal:  Musculoskeletal Normal                Neurological:    Neuromuscular Disease,  Headaches      Dx of Headaches                         Neuromuscular Disease   Endocrine:        Obesity / BMI > 30  Dermatological:  Skin Normal    Psych:   anxiety             Physical Exam  General: Well nourished    Airway:  Mallampati: II   Mouth Opening: Normal  TM Distance: Normal  Tongue: Normal  Neck ROM: Normal ROM    Dental:  Intact    Anesthesia Plan  Type of Anesthesia, risks & benefits discussed:    Anesthesia Type: MAC  Intra-op Monitoring Plan: Standard ASA Monitors  Post Op Pain Control Plan: multimodal analgesia  Induction:  IV  Informed Consent: Informed consent signed with the Patient and all parties understand the risks and agree with anesthesia plan.  All questions answered. Patient consented to blood products? Yes  ASA Score: 3  Day of Surgery Review of History & Physical: H&P Update referred to the surgeon/provider.I have interviewed and examined the patient. I have  reviewed the patient's H&P dated: There are no significant changes.     Ready For Surgery From Anesthesia Perspective.   .

## 2025-05-23 NOTE — ANESTHESIA POSTPROCEDURE EVALUATION
Anesthesia Post Evaluation    Patient: Gabriella Ulloa    Procedure(s) Performed: * No procedures listed *    Final Anesthesia Type: MAC      Patient location during evaluation: GI PACU  Patient participation: Yes- Able to Participate  Level of consciousness: awake and alert and oriented  Post-procedure vital signs: reviewed and stable  Pain management: adequate  Airway patency: patent    PONV status at discharge: No PONV  Anesthetic complications: no      Cardiovascular status: blood pressure returned to baseline and stable  Respiratory status: unassisted, spontaneous ventilation and room air  Hydration status: euvolemic  Follow-up not needed.  Comments: Refer to nursing note for pain/shireen score upon discharge from recovery.           Vitals Value Taken Time   /81 05/23/25 09:43   Temp 36.6 °C (97.9 °F) 05/23/25 09:43   Pulse 91 05/23/25 09:43   Resp 20 05/23/25 09:43   SpO2 100 % 05/23/25 09:43         No case tracking events are documented in the log.      Pain/Shireen Score: No data recorded

## 2025-05-23 NOTE — DISCHARGE INSTRUCTIONS
Result Text  Procedure Date  5/23/25     Impression  Overall Impression:   Performed forceps biopsies in the stomach  Erythematous mucosa in the fundus of the stomach and antrum; performed cold forceps biopsy  Of the esophagus was normal-appearing.  The stomach had erythema mainly involving the fundus and biopsies were obtained from the antrum and fundus.  The pyloric channel was patent.  Mucosa of the duodenal bulb through 3rd portion was normal-appearing.     Recommendation  Await pathology results, due: 5/27/2025      Disposition: Discharge patient to home in stable condition.  Resume diet.  No driving until tomorrow.  Avoid nonsteroidal anti-inflammatories.  Add Protonix 40 mg per day.  Keep appointments for abdominal ultrasound and HIDA scan.  Follow up pathology results next week.  Follow up with PCP as scheduled, return to GI clinic after ultrasound and HIDA scan have been done.     Indication  Nausea and vomiting, unspecified vomiting type

## 2025-05-23 NOTE — H&P
[facility]  Gastroenterology  H&P    Patient Name: Gabriella Ulloa  MRN: [id number]  Admission Date: 2025  Code Status: Prior    Attending Provider: Tigist Flores FNP   Primary Care Physician: Jayde Cole DNP  Principal Problem:<principal problem not specified>    Subjective:     History of Present Illness:  This patient is a 30-year-old female with nausea, vomiting and anemia.  She recently had surgery for hemoperitoneum related to gyn problems.  She presents for EGD.      Past Medical History:   Diagnosis Date    Anemia     Anxiety     Breakthrough bleeding on birth control pills 2015    Endometriosis of pelvic peritoneum 2020    cul-de-sac and sigmoid colon    Hormone disorder 2025    Hypertension     Irritable bowel syndrome Chronic    Increased pain with bowel movements with menses       Past Surgical History:   Procedure Laterality Date     SECTION      D&C/Hysteroscopy with robotic and operative Laparoscopy  2020    DIAGNOSTIC LAPAROSCOPY N/A 2022    Procedure: LAPAROSCOPY, DIAGNOSTIC;  Surgeon: Otis Mccullough MD;  Location: [facility];  Service: OB/GYN;  Laterality: N/A;    DIAGNOSTIC LAPAROSCOPY N/A 2025    Procedure: LAPAROSCOPY, DIAGNOSTIC;  Surgeon: Venu Laguna DO;  Location: [facility];  Service: OB/GYN;  Laterality: N/A;    DILATION AND CURETTAGE OF UTERUS  2020    ENDOMETRIAL BIOPSY N/A 2022    Procedure: BIOPSY, ENDOMETRIUM;  Surgeon: Otis Mccullough MD;  Location: [facility];  Service: OB/GYN;  Laterality: N/A;    HYSTEROSCOPY WITH DILATION AND CURETTAGE OF UTERUS N/A 2022    Procedure: HYSTEROSCOPY, WITH DILATION AND CURETTAGE OF UTERUS;  Surgeon: Otis Mccullough MD;  Location: [facility];  Service: OB/GYN;  Laterality: N/A;    LAPAROSCOPIC SALPINGO-OOPHORECTOMY Right 2025    Procedure: SALPINGO-OOPHORECTOMY, LAPAROSCOPIC;  Surgeon: Venu Laguna DO;  Location: [facility];  Service:  OB/GYN;  Laterality: Right;       Review of patient's allergies indicates:   Allergen Reactions    Lortab [hydrocodone-acetaminophen] Hives    Nsaids (non-steroidal anti-inflammatory drug) Other (See Comments)     Patient states NSAIDs cause gastritis and she can't take them.    Grass pollen-bermuda, standard     Grass pollen-natalia, standard      Family History       Problem Relation (Age of Onset)    Breast cancer Other, Maternal Aunt    Cancer Father    Diabetes Other, Mother, Father    Heart disease Mother    Hypertension Other, Mother, Father    Liver cancer Other    Migraines Father    Prostate cancer Maternal Grandfather    Stomach cancer Other    Thyroid disease Maternal Aunt, Maternal Aunt          Tobacco Use    Smoking status: Never     Passive exposure: Never    Smokeless tobacco: Never   Substance and Sexual Activity    Alcohol use: Never    Drug use: Never    Sexual activity: Yes     Partners: Male     Birth control/protection: Patch     Review of Systems   Constitutional: Negative.    HENT: Negative.     Respiratory: Negative.     Cardiovascular: Negative.    Gastrointestinal:  Positive for nausea and vomiting.     Objective:     Vital Signs (Most Recent):  Temp: 98.1 °F (36.7 °C) (05/23/25 0820)  Pulse: 81 (05/23/25 0820)  Resp: 20 (05/23/25 0820)  BP: 122/78 (05/23/25 0820)  SpO2: 99 % (05/23/25 0820) Vital Signs (24h Range):  Temp:  [98.1 °F (36.7 °C)] 98.1 °F (36.7 °C)  Pulse:  [81] 81  Resp:  [20] 20  SpO2:  [99 %] 99 %  BP: (122)/(78) 122/78     Weight: 108 kg (238 lb) (05/23/25 0820)  Body mass index is 42.16 kg/m².    No intake or output data in the 24 hours ending 05/23/25 0927    Lines/Drains/Airways       Peripheral Intravenous Line  Duration                  Peripheral IV - Single Lumen 05/23/25 0820 24 G Anterior;Right Forearm <1 day                    Physical Exam  Vitals reviewed.   Constitutional:       General: She is not in acute distress.     Appearance: Normal appearance. She is  "well-developed. She is not ill-appearing.   HENT:      Head: Normocephalic and atraumatic.      Nose: Nose normal.   Eyes:      Pupils: Pupils are equal, round, and reactive to light.   Cardiovascular:      Rate and Rhythm: Normal rate and regular rhythm.   Pulmonary:      Effort: Pulmonary effort is normal.      Breath sounds: Normal breath sounds. No wheezing.   Abdominal:      General: Abdomen is flat. Bowel sounds are normal. There is no distension.      Palpations: Abdomen is soft.      Tenderness: There is no abdominal tenderness. There is no guarding.   Skin:     General: Skin is warm and dry.      Coloration: Skin is not jaundiced.   Neurological:      Mental Status: She is alert.   Psychiatric:         Attention and Perception: Attention normal.         Mood and Affect: Affect normal.         Speech: Speech normal.         Behavior: Behavior is cooperative.      Comments: Pt was calm while speaking.         Significant Labs:  CBC: No results for input(s): "WBC", "HGB", "HCT", "PLT" in the last 48 hours.  CMP: No results for input(s): "GLU", "CALCIUM", "ALBUMIN", "PROT", "NA", "K", "CO2", "CL", "BUN", "CREATININE", "ALKPHOS", "ALT", "AST", "BILITOT" in the last 48 hours.    Significant Imaging:  Imaging results within the past 24 hours have been reviewed.    Assessment/Plan:     There are no hospital problems to display for this patient.        Impression:  Nausea with vomiting; anemia due to blood loss  Plan: EGD today    Bronson Wesley MD  Gastroenterology  [facility]  "

## 2025-05-23 NOTE — TRANSFER OF CARE
"Anesthesia Transfer of Care Note    Patient: Gabriella Ulloa    Procedure(s) Performed: * No procedures listed *    Patient location: GI    Anesthesia Type: MAC    Transport from OR: Transported from OR on room air with adequate spontaneous ventilation. Continuous ECG monitoring in transport. Continuous SpO2 monitoring in transport    Post pain: adequate analgesia    Post assessment: no apparent anesthetic complications    Post vital signs: stable    Level of consciousness: responds to stimulation, awake and sedated    Nausea/Vomiting: no nausea/vomiting    Complications: none    Transfer of care protocol was followed    Last vitals: Visit Vitals  BP (!) 144/81 (BP Location: Right arm, Patient Position: Lying)   Pulse 91   Temp 36.6 °C (97.9 °F) (Oral)   Resp 20   Ht 5' 3" (1.6 m)   Wt 108 kg (238 lb)   LMP 05/05/2025 (Approximate)   SpO2 100%   Breastfeeding No   BMI 42.16 kg/m²     "

## 2025-05-27 ENCOUNTER — RESULTS FOLLOW-UP (OUTPATIENT)
Dept: GASTROENTEROLOGY | Facility: HOSPITAL | Age: 30
End: 2025-05-27

## 2025-05-27 NOTE — PROGRESS NOTES
EGD biopsy showed mild chronic gastritis.  Recommendation:  Continue Protonix, keep appointment for radiology tests and return to GI clinic.

## 2025-06-01 NOTE — PROGRESS NOTES
Return Gyn Office Visit    Assessment/Plan  Problem List Items Addressed This Visit    None  Visit Diagnoses         Encounter for post surgical wound check    -  Primary              CC:   Chief Complaint   Patient presents with    Follow-up     Pt in for a post-op exam and pt c/o nausea and pt stated she has busing that's occurred in the stomach area.pt has been experiencing off balance motions and dizziness      HPI:  30 y.o. who presents to office for follow up. Continues to have nausea, vomiting. States she is slowly getting better.  Review of Systems - The following ROS was otherwise negative, except as noted in the HPI:  constitutional, respiratory, cardiovascular, gastrointestinal, genitourinary    Objective:  /60   Pulse 89   Wt 107.5 kg (237 lb)   LMP  (LMP Unknown)   BMI 41.98 kg/m²   General: Alert, well appearing, no acute distress  Abdomen: Soft, nontender, nondistended   Pelvic: deferred  Extremities: No redness or tenderness, neg Dianne's sign  Osteopathic: no TART changes    Return in 4 weeks

## 2025-06-02 ENCOUNTER — PATIENT MESSAGE (OUTPATIENT)
Dept: OBSTETRICS AND GYNECOLOGY | Facility: CLINIC | Age: 30
End: 2025-06-02
Payer: MEDICAID

## 2025-06-03 ENCOUNTER — OFFICE VISIT (OUTPATIENT)
Dept: FAMILY MEDICINE | Facility: CLINIC | Age: 30
End: 2025-06-03
Payer: MEDICAID

## 2025-06-03 VITALS
WEIGHT: 235.38 LBS | HEIGHT: 63 IN | DIASTOLIC BLOOD PRESSURE: 74 MMHG | HEART RATE: 78 BPM | OXYGEN SATURATION: 99 % | TEMPERATURE: 98 F | SYSTOLIC BLOOD PRESSURE: 112 MMHG | BODY MASS INDEX: 41.71 KG/M2

## 2025-06-03 DIAGNOSIS — R58 BLOOD LOSS: ICD-10-CM

## 2025-06-03 DIAGNOSIS — N92.6 IRREGULAR MENSES: ICD-10-CM

## 2025-06-03 DIAGNOSIS — N92.1 MENORRHAGIA WITH IRREGULAR CYCLE: Primary | ICD-10-CM

## 2025-06-03 PROBLEM — R05.9 COUGH: Status: RESOLVED | Noted: 2021-09-09 | Resolved: 2025-06-03

## 2025-06-03 PROBLEM — K29.00 ACUTE SUPERFICIAL GASTRITIS WITHOUT HEMORRHAGE: Status: RESOLVED | Noted: 2025-05-23 | Resolved: 2025-06-03

## 2025-06-03 PROBLEM — E66.9 OBESE: Status: RESOLVED | Noted: 2022-11-15 | Resolved: 2025-06-03

## 2025-06-03 PROBLEM — N93.9 ABNORMAL UTERINE BLEEDING: Status: RESOLVED | Noted: 2021-10-15 | Resolved: 2025-06-03

## 2025-06-03 PROBLEM — A08.4 VIRAL GASTROENTERITIS: Status: RESOLVED | Noted: 2022-11-15 | Resolved: 2025-06-03

## 2025-06-03 PROBLEM — R09.81 NASAL CONGESTION: Status: RESOLVED | Noted: 2023-04-20 | Resolved: 2025-06-03

## 2025-06-03 PROBLEM — Z79.899 ENCOUNTER FOR LONG-TERM (CURRENT) DRUG USE: Status: RESOLVED | Noted: 2023-03-01 | Resolved: 2025-06-03

## 2025-06-03 PROBLEM — M54.9 BACK PAIN: Status: RESOLVED | Noted: 2024-11-08 | Resolved: 2025-06-03

## 2025-06-03 PROBLEM — R73.9 HYPERGLYCEMIA: Status: RESOLVED | Noted: 2023-03-01 | Resolved: 2025-06-03

## 2025-06-03 PROBLEM — Z20.822 EXPOSURE TO COVID-19 VIRUS: Status: RESOLVED | Noted: 2022-01-05 | Resolved: 2025-06-03

## 2025-06-03 PROBLEM — E66.9 OBESITY (BMI 35.0-39.9 WITHOUT COMORBIDITY): Status: RESOLVED | Noted: 2021-04-27 | Resolved: 2025-06-03

## 2025-06-03 PROBLEM — R23.2 HOT FLASHES: Status: RESOLVED | Noted: 2024-04-01 | Resolved: 2025-06-03

## 2025-06-03 PROBLEM — R10.9 ABDOMINAL PAIN: Status: RESOLVED | Noted: 2025-05-21 | Resolved: 2025-06-03

## 2025-06-03 PROBLEM — R13.10 DYSPHAGIA: Status: RESOLVED | Noted: 2023-09-13 | Resolved: 2025-06-03

## 2025-06-03 PROBLEM — Z91.018 MULTIPLE FOOD ALLERGIES: Status: RESOLVED | Noted: 2025-03-11 | Resolved: 2025-06-03

## 2025-06-03 PROBLEM — M54.32 SCIATICA OF LEFT SIDE: Status: RESOLVED | Noted: 2023-04-05 | Resolved: 2025-06-03

## 2025-06-03 PROBLEM — R45.86 MOOD SWINGS: Status: RESOLVED | Noted: 2021-06-03 | Resolved: 2025-06-03

## 2025-06-03 PROBLEM — R53.83 FATIGUE: Status: RESOLVED | Noted: 2024-08-01 | Resolved: 2025-06-03

## 2025-06-03 PROBLEM — Z23 NEED FOR VACCINATION: Status: RESOLVED | Noted: 2023-03-01 | Resolved: 2025-06-03

## 2025-06-03 PROBLEM — J01.90 ACUTE NON-RECURRENT SINUSITIS: Status: RESOLVED | Noted: 2021-09-09 | Resolved: 2025-06-03

## 2025-06-03 PROBLEM — D64.9 ANEMIA: Status: RESOLVED | Noted: 2023-01-27 | Resolved: 2025-06-03

## 2025-06-03 PROBLEM — M54.2 NECK PAIN: Status: RESOLVED | Noted: 2023-09-13 | Resolved: 2025-06-03

## 2025-06-03 PROBLEM — J02.9 SORE THROAT: Status: RESOLVED | Noted: 2022-01-05 | Resolved: 2025-06-03

## 2025-06-03 PROBLEM — K57.30 DIVERTICULOSIS OF COLON: Status: RESOLVED | Noted: 2023-04-20 | Resolved: 2025-06-03

## 2025-06-03 PROBLEM — N80.30 ENDOMETRIOSIS OF PELVIC PERITONEUM: Status: RESOLVED | Noted: 2021-04-27 | Resolved: 2025-06-03

## 2025-06-03 PROBLEM — M25.511 ACUTE PAIN OF RIGHT SHOULDER: Status: RESOLVED | Noted: 2024-01-23 | Resolved: 2025-06-03

## 2025-06-03 PROBLEM — S46.911A STRAIN OF RIGHT SHOULDER: Status: RESOLVED | Noted: 2024-01-23 | Resolved: 2025-06-03

## 2025-06-03 PROBLEM — R06.2 WHEEZING: Status: RESOLVED | Noted: 2025-02-21 | Resolved: 2025-06-03

## 2025-06-03 PROBLEM — J20.9 ACUTE BRONCHITIS: Status: RESOLVED | Noted: 2025-02-21 | Resolved: 2025-06-03

## 2025-06-03 PROBLEM — R51.9 ACUTE NONINTRACTABLE HEADACHE: Status: RESOLVED | Noted: 2024-08-01 | Resolved: 2025-06-03

## 2025-06-03 PROBLEM — R53.1 LEFT-SIDED WEAKNESS: Status: RESOLVED | Noted: 2024-08-01 | Resolved: 2025-06-03

## 2025-06-03 PROBLEM — R19.7 DIARRHEA: Status: RESOLVED | Noted: 2022-11-15 | Resolved: 2025-06-03

## 2025-06-03 PROBLEM — N91.2 AMENORRHEA: Status: RESOLVED | Noted: 2024-05-17 | Resolved: 2025-06-03

## 2025-06-03 PROBLEM — Z11.3 SCREENING EXAMINATION FOR STD (SEXUALLY TRANSMITTED DISEASE): Status: RESOLVED | Noted: 2021-08-31 | Resolved: 2025-06-03

## 2025-06-03 PROBLEM — Z20.828 EXPOSURE TO INFLUENZA: Status: RESOLVED | Noted: 2023-04-17 | Resolved: 2025-06-03

## 2025-06-03 PROBLEM — K59.00 CONSTIPATION: Status: RESOLVED | Noted: 2023-03-27 | Resolved: 2025-06-03

## 2025-06-03 PROBLEM — K52.9 GASTROENTERITIS: Status: RESOLVED | Noted: 2024-06-04 | Resolved: 2025-06-03

## 2025-06-03 PROBLEM — R52 BODY ACHES: Status: RESOLVED | Noted: 2025-01-22 | Resolved: 2025-06-03

## 2025-06-03 PROBLEM — R11.10 VOMITING: Status: RESOLVED | Noted: 2022-11-15 | Resolved: 2025-06-03

## 2025-06-03 PROBLEM — R82.90 ABNORMAL URINE: Status: RESOLVED | Noted: 2024-01-24 | Resolved: 2025-06-03

## 2025-06-03 PROBLEM — R00.2 HEART PALPITATIONS: Status: RESOLVED | Noted: 2024-01-24 | Resolved: 2025-06-03

## 2025-06-03 PROBLEM — N94.6 DYSMENORRHEA: Status: RESOLVED | Noted: 2021-04-27 | Resolved: 2025-06-03

## 2025-06-03 PROBLEM — M25.552 LEFT HIP PAIN: Status: RESOLVED | Noted: 2023-04-05 | Resolved: 2025-06-03

## 2025-06-03 PROBLEM — Z11.59 SCREENING FOR VIRAL DISEASE: Status: RESOLVED | Noted: 2023-03-01 | Resolved: 2025-06-03

## 2025-06-03 PROBLEM — K66.1 HEMOPERITONEUM: Status: RESOLVED | Noted: 2025-05-12 | Resolved: 2025-06-03

## 2025-06-03 LAB
ALBUMIN SERPL BCP-MCNC: 3.5 G/DL (ref 3.5–5)
ALBUMIN/GLOB SERPL: 0.9 {RATIO}
ALP SERPL-CCNC: 73 U/L (ref 40–150)
ALT SERPL W P-5'-P-CCNC: 12 U/L
ANION GAP SERPL CALCULATED.3IONS-SCNC: 9 MMOL/L (ref 7–16)
AST SERPL W P-5'-P-CCNC: 17 U/L (ref 11–45)
BASOPHILS # BLD AUTO: 0.05 K/UL (ref 0–0.2)
BASOPHILS NFR BLD AUTO: 1 % (ref 0–1)
BILIRUB SERPL-MCNC: 0.2 MG/DL
BUN SERPL-MCNC: 8 MG/DL (ref 7–19)
BUN/CREAT SERPL: 11 (ref 6–20)
CALCIUM SERPL-MCNC: 8.9 MG/DL (ref 8.4–10.2)
CHLORIDE SERPL-SCNC: 107 MMOL/L (ref 98–107)
CO2 SERPL-SCNC: 27 MMOL/L (ref 22–29)
CREAT SERPL-MCNC: 0.72 MG/DL (ref 0.55–1.02)
DIFFERENTIAL METHOD BLD: ABNORMAL
EGFR (NO RACE VARIABLE) (RUSH/TITUS): 115 ML/MIN/1.73M2
EOSINOPHIL # BLD AUTO: 0.22 K/UL (ref 0–0.5)
EOSINOPHIL NFR BLD AUTO: 4.5 % (ref 1–4)
ERYTHROCYTE [DISTWIDTH] IN BLOOD BY AUTOMATED COUNT: 17.2 % (ref 11.5–14.5)
GLOBULIN SER-MCNC: 3.9 G/DL (ref 2–4)
GLUCOSE SERPL-MCNC: 66 MG/DL (ref 74–100)
HCT VFR BLD AUTO: 40.1 % (ref 38–47)
HGB BLD-MCNC: 11.6 G/DL (ref 12–16)
HGB, POC: 13.7 G/DL (ref 12–16)
IMM GRANULOCYTES # BLD AUTO: 0.01 K/UL (ref 0–0.04)
IMM GRANULOCYTES NFR BLD: 0.2 % (ref 0–0.4)
IRON SATN MFR SERPL: 7 % (ref 20–50)
IRON SERPL-MCNC: 26 UG/DL (ref 50–170)
LYMPHOCYTES # BLD AUTO: 1.98 K/UL (ref 1–4.8)
LYMPHOCYTES NFR BLD AUTO: 40.7 % (ref 27–41)
MCH RBC QN AUTO: 25.8 PG (ref 27–31)
MCHC RBC AUTO-ENTMCNC: 28.9 G/DL (ref 32–36)
MCV RBC AUTO: 89.1 FL (ref 80–96)
MONOCYTES # BLD AUTO: 0.37 K/UL (ref 0–0.8)
MONOCYTES NFR BLD AUTO: 7.6 % (ref 2–6)
MPC BLD CALC-MCNC: 10.4 FL (ref 9.4–12.4)
NEUTROPHILS # BLD AUTO: 2.24 K/UL (ref 1.8–7.7)
NEUTROPHILS NFR BLD AUTO: 46 % (ref 53–65)
NRBC # BLD AUTO: 0 X10E3/UL
NRBC, AUTO (.00): 0 %
PLATELET # BLD AUTO: 426 K/UL (ref 150–400)
POTASSIUM SERPL-SCNC: 3.8 MMOL/L (ref 3.5–5.1)
PROT SERPL-MCNC: 7.4 G/DL (ref 6.4–8.3)
RBC # BLD AUTO: 4.5 M/UL (ref 4.2–5.4)
SODIUM SERPL-SCNC: 139 MMOL/L (ref 136–145)
TIBC SERPL-MCNC: 359 UG/DL (ref 70–310)
TIBC SERPL-MCNC: 385 UG/DL (ref 250–450)
TRANSFERRIN SERPL-MCNC: 362 MG/DL (ref 180–382)
WBC # BLD AUTO: 4.87 K/UL (ref 4.5–11)

## 2025-06-03 PROCEDURE — 83550 IRON BINDING TEST: CPT | Mod: ,,, | Performed by: CLINICAL MEDICAL LABORATORY

## 2025-06-03 PROCEDURE — 85025 COMPLETE CBC W/AUTO DIFF WBC: CPT | Mod: ,,, | Performed by: CLINICAL MEDICAL LABORATORY

## 2025-06-03 PROCEDURE — 80053 COMPREHEN METABOLIC PANEL: CPT | Mod: ,,, | Performed by: CLINICAL MEDICAL LABORATORY

## 2025-06-03 PROCEDURE — 83540 ASSAY OF IRON: CPT | Mod: ,,, | Performed by: CLINICAL MEDICAL LABORATORY

## 2025-06-04 ENCOUNTER — HOSPITAL ENCOUNTER (OUTPATIENT)
Dept: RADIOLOGY | Facility: HOSPITAL | Age: 30
Discharge: HOME | End: 2025-06-04
Attending: NURSE PRACTITIONER
Payer: MEDICAID

## 2025-06-04 DIAGNOSIS — R11.2 NAUSEA AND VOMITING, UNSPECIFIED VOMITING TYPE: ICD-10-CM

## 2025-06-04 PROCEDURE — 78264 GASTRIC EMPTYING IMG STUDY: CPT | Mod: TC

## 2025-06-04 PROCEDURE — A9541 TC99M SULFUR COLLOID: HCPCS | Performed by: NURSE PRACTITIONER

## 2025-06-04 PROCEDURE — 78264 GASTRIC EMPTYING IMG STUDY: CPT | Mod: 26,,, | Performed by: RADIOLOGY

## 2025-06-04 RX ORDER — TECHNETIUM TC 99M SULFUR COLLOID 2 MG
475 KIT MISCELLANEOUS
Status: COMPLETED | OUTPATIENT
Start: 2025-06-04 | End: 2025-06-04

## 2025-06-04 RX ADMIN — TECHNETIUM TC 99M SULFUR COLLOID KIT 0.47 MILLICURIE: KIT at 09:06

## 2025-06-05 ENCOUNTER — RESULTS FOLLOW-UP (OUTPATIENT)
Dept: GASTROENTEROLOGY | Facility: CLINIC | Age: 30
End: 2025-06-05

## 2025-06-05 ENCOUNTER — RESULTS FOLLOW-UP (OUTPATIENT)
Dept: FAMILY MEDICINE | Facility: CLINIC | Age: 30
End: 2025-06-05

## 2025-06-05 RX ORDER — NORELGESTROMIN AND ETHINYL ESTRADIOL 35; 150 UG/MG; UG/MG
1 PATCH TRANSDERMAL WEEKLY
Qty: 4 PATCH | Refills: 3 | Status: SHIPPED | OUTPATIENT
Start: 2025-06-05 | End: 2026-06-05

## 2025-06-09 ENCOUNTER — HOSPITAL ENCOUNTER (OUTPATIENT)
Dept: RADIOLOGY | Facility: HOSPITAL | Age: 30
Discharge: HOME | End: 2025-06-09
Attending: NURSE PRACTITIONER
Payer: MEDICAID

## 2025-06-09 DIAGNOSIS — R11.2 NAUSEA WITH VOMITING: ICD-10-CM

## 2025-06-09 DIAGNOSIS — R11.2 NAUSEA AND VOMITING, UNSPECIFIED VOMITING TYPE: ICD-10-CM

## 2025-06-09 PROCEDURE — 78227 HEPATOBIL SYST IMAGE W/DRUG: CPT | Mod: TC

## 2025-06-09 PROCEDURE — 78227 HEPATOBIL SYST IMAGE W/DRUG: CPT | Mod: 26,,, | Performed by: NUCLEAR MEDICINE

## 2025-06-09 PROCEDURE — A9537 TC99M MEBROFENIN: HCPCS | Performed by: NURSE PRACTITIONER

## 2025-06-09 RX ORDER — KIT FOR THE PREPARATION OF TECHNETIUM TC 99M MEBROFENIN 45 MG/10ML
4.6 INJECTION, POWDER, LYOPHILIZED, FOR SOLUTION INTRAVENOUS
Status: COMPLETED | OUTPATIENT
Start: 2025-06-09 | End: 2025-06-09

## 2025-06-09 RX ADMIN — MEBROFENIN 4.6 MILLICURIE: 45 INJECTION, POWDER, LYOPHILIZED, FOR SOLUTION INTRAVENOUS at 08:06

## 2025-06-12 DIAGNOSIS — R11.2 NAUSEA AND VOMITING, UNSPECIFIED VOMITING TYPE: Primary | ICD-10-CM

## 2025-06-19 ENCOUNTER — OFFICE VISIT (OUTPATIENT)
Dept: SURGERY | Facility: CLINIC | Age: 30
End: 2025-06-19
Attending: SURGERY
Payer: MEDICAID

## 2025-06-19 VITALS — WEIGHT: 235 LBS | BODY MASS INDEX: 40.12 KG/M2 | HEIGHT: 64 IN

## 2025-06-19 DIAGNOSIS — R11.2 NAUSEA AND VOMITING, UNSPECIFIED VOMITING TYPE: ICD-10-CM

## 2025-06-19 DIAGNOSIS — K82.8 BILIARY DYSKINESIA: Primary | ICD-10-CM

## 2025-06-19 PROCEDURE — 3008F BODY MASS INDEX DOCD: CPT | Mod: ,,, | Performed by: SURGERY

## 2025-06-19 PROCEDURE — 99203 OFFICE O/P NEW LOW 30 MIN: CPT | Mod: S$PBB,,, | Performed by: SURGERY

## 2025-06-19 PROCEDURE — 99215 OFFICE O/P EST HI 40 MIN: CPT | Mod: PBBFAC | Performed by: SURGERY

## 2025-06-19 PROCEDURE — 99999 PR PBB SHADOW E&M-EST. PATIENT-LVL V: CPT | Mod: PBBFAC,,, | Performed by: SURGERY

## 2025-06-19 RX ORDER — CEFAZOLIN SODIUM 2 G/50ML
2 SOLUTION INTRAVENOUS
OUTPATIENT
Start: 2025-06-19

## 2025-06-19 RX ORDER — SODIUM CHLORIDE 9 MG/ML
INJECTION, SOLUTION INTRAVENOUS CONTINUOUS
OUTPATIENT
Start: 2025-06-19

## 2025-06-19 NOTE — H&P (VIEW-ONLY)
General Surgery History and Physical      Patient ID: Gabriella Ulloa is a 30 y.o. female.    Chief Complaint: Gall Bladder Problem      HPI:  30-year-old female few month history of nausea vomiting and epigastric discomfort worse after eating especially greasy fatty foods.  She had a HIDA scan which was borderline and had postprandial nausea and vomiting jd evening.  Sister requiring gallbladder removal.  She has been getting worse and not able to eat very well.  Gastric emptying study was essentially normal.  Her few weeks ago patient came in with a ruptured ovarian cyst with hemorrhage had have an urgent laparoscopic oophorectomy.  She has been back to normal by now in her no issues overall.  Still has the other ovary    Review of Systems   Constitutional:  Negative for activity change, appetite change, fatigue and fever.   HENT:  Negative for trouble swallowing.    Respiratory:  Negative for cough and shortness of breath.    Cardiovascular:  Negative for chest pain and palpitations.   Gastrointestinal:  Positive for abdominal pain, nausea and vomiting. Negative for abdominal distention, blood in stool, constipation and diarrhea.   Genitourinary:  Negative for flank pain.   Musculoskeletal:  Negative for neck pain and neck stiffness.   Neurological:  Negative for weakness.       Current Medications[1]    Review of patient's allergies indicates:   Allergen Reactions    Lortab [hydrocodone-acetaminophen] Hives    Nsaids (non-steroidal anti-inflammatory drug) Other (See Comments)     Patient states NSAIDs cause gastritis and she can't take them.    Grass pollen-bermuda, standard     Grass pollen-natalia, standard        Past Medical History:   Diagnosis Date    Anemia     Anxiety     Breakthrough bleeding on birth control pills 09/28/2015    Endometriosis of pelvic peritoneum 06/25/2020    cul-de-sac and sigmoid colon     Hormone disorder 2025    Hypertension     Irritable bowel syndrome Chronic    Increased pain with bowel movements with menses       Past Surgical History:   Procedure Laterality Date     SECTION      D&C/Hysteroscopy with robotic and operative Laparoscopy  2020    DIAGNOSTIC LAPAROSCOPY N/A 2022    Procedure: LAPAROSCOPY, DIAGNOSTIC;  Surgeon: Otis Mccullough MD;  Location: [facility];  Service: OB/GYN;  Laterality: N/A;    DIAGNOSTIC LAPAROSCOPY N/A 2025    Procedure: LAPAROSCOPY, DIAGNOSTIC;  Surgeon: Venu Laguna DO;  Location: [facility];  Service: OB/GYN;  Laterality: N/A;    DILATION AND CURETTAGE OF UTERUS  2020    ENDOMETRIAL BIOPSY N/A 2022    Procedure: BIOPSY, ENDOMETRIUM;  Surgeon: Otis Mccullough MD;  Location: [facility];  Service: OB/GYN;  Laterality: N/A;    HYSTEROSCOPY WITH DILATION AND CURETTAGE OF UTERUS N/A 2022    Procedure: HYSTEROSCOPY, WITH DILATION AND CURETTAGE OF UTERUS;  Surgeon: Otis Mccullough MD;  Location: [facility];  Service: OB/GYN;  Laterality: N/A;    LAPAROSCOPIC SALPINGO-OOPHORECTOMY Right 2025    Procedure: SALPINGO-OOPHORECTOMY, LAPAROSCOPIC;  Surgeon: Venu Laguna DO;  Location: [facility];  Service: OB/GYN;  Laterality: Right;       Family History   Problem Relation Name Age of Onset    Prostate cancer Maternal Grandfather      Hypertension Other      Liver cancer Other      Stomach cancer Other      Diabetes Other      Breast cancer Other Aunt     Heart disease Mother Letitia Brown     Diabetes Mother Letitia Brown     Hypertension Mother Letitia Brown     Cancer Father Vu Brown     Diabetes Father Vu Brown     Hypertension Father Vu Brown     Migraines Father Vu Brown     Breast cancer Maternal Aunt Destini Nielsen     Thyroid disease Maternal Aunt Neva Ulloa     Thyroid disease Maternal Aunt Ruthann Ulloa        Social History[2]    There were no vitals filed for this  visit.    Physical Exam  Constitutional:       General: She is not in acute distress.  HENT:      Head: Normocephalic.   Cardiovascular:      Rate and Rhythm: Normal rate and regular rhythm.      Pulses: Normal pulses.   Pulmonary:      Effort: Pulmonary effort is normal. No respiratory distress.      Breath sounds: Normal breath sounds.   Abdominal:      General: Abdomen is flat. There is no distension.      Palpations: Abdomen is soft.      Tenderness: There is no abdominal tenderness.   Musculoskeletal:         General: Normal range of motion.   Skin:     General: Skin is warm.   Neurological:      General: No focal deficit present.      Mental Status: She is oriented to person, place, and time.         Assessment & Plan:    Biliary dyskinesia  -     Place in Outpatient; Standing  -     Case Request Operating Room: CHOLECYSTECTOMY, LAPAROSCOPIC  -     Vital signs; Standing  -     Insert peripheral IV; Standing  -     Diet NPO; Standing  -     Place CLAU hose; Standing  -     Pulse Oximetry Q4H; Standing  -     Full code; Standing  -     POCT urine pregnancy    Nausea and vomiting, unspecified vomiting type  -     Ambulatory referral/consult to General Surgery    Other orders  -     0.9% NaCl infusion  -     IP VTE LOW RISK PATIENT; Standing  -     cefazolin (ANCEF) 2 gram in dextrose 5% 50 mL IVPB (premix)        Patient go to the operating room next Tuesday for laparoscopic cholecystectomy.  Risks and benefits explained including risk of bleeding, infection, persistent pain, injury to surrounding organs, possible open operation, possible need for additional operations or procedures.  All questions were answered            [1]   Current Outpatient Medications   Medication Sig Dispense Refill    ergocalciferol (ERGOCALCIFEROL) 50,000 unit Cap Take 1 capsule (50,000 Units total) by mouth every 7 days. 12 capsule 3    ferrous sulfate (FEOSOL) 325 mg (65 mg iron) Tab tablet Take 1 tablet (325 mg total) by mouth 2  (two) times daily. 60 tablet 0    linaCLOtide (LINZESS) 290 mcg Cap capsule Take 1 capsule (290 mcg total) by mouth daily as needed. 20 capsule 0    losartan (COZAAR) 50 MG tablet Take 1 tablet (50 mg total) by mouth once daily. 90 tablet 3    norelgestromin-ethinyl estradiol 150-35 mcg/24 hr Place 1 patch onto the skin once a week. 4 patch 3    ondansetron (ZOFRAN-ODT) 4 MG TbDL Take 1 tablet (4 mg total) by mouth every 6 (six) hours as needed. 30 tablet 2    pantoprazole (PROTONIX) 40 MG tablet Take 1 tablet (40 mg total) by mouth once daily. 90 tablet 3    polyethylene glycol (GLYCOLAX) 17 gram/dose powder 2 capfuls by mouth daily x 3 days then 1 capful daily 507 g 0    promethazine (PHENERGAN) 25 MG tablet Take 1 tablet (25 mg total) by mouth every 4 (four) hours. 20 tablet 0    rizatriptan (MAXALT) 10 MG tablet Take 1 tablet (10 mg total) by mouth daily as needed for Migraine (take onset of migraine). 27 tablet 3    venlafaxine (EFFEXOR-XR) 150 MG Cp24 Take 1 capsule (150 mg total) by mouth once daily. 30 capsule 5    metFORMIN (GLUCOPHAGE-XR) 500 MG ER 24hr tablet Take 1 tablet (500 mg total) by mouth daily with breakfast. (Patient not taking: Reported on 6/19/2025) 90 tablet 1    oxyCODONE-acetaminophen (PERCOCET) 7.5-325 mg per tablet Take 1 tablet by mouth every 6 (six) hours as needed for Pain. (Patient not taking: Reported on 6/19/2025) 15 tablet 0     No current facility-administered medications for this visit.   [2]   Social History  Socioeconomic History    Marital status: Single   Tobacco Use    Smoking status: Never     Passive exposure: Never    Smokeless tobacco: Never   Substance and Sexual Activity    Alcohol use: Never    Drug use: Never    Sexual activity: Yes     Partners: Male     Birth control/protection: Patch     Social Drivers of Health     Financial Resource Strain: Low Risk  (5/11/2025)    Overall Financial Resource Strain (CARDIA)     Difficulty of Paying Living Expenses: Not hard at  all   Food Insecurity: No Food Insecurity (5/11/2025)    Hunger Vital Sign     Worried About Running Out of Food in the Last Year: Never true     Ran Out of Food in the Last Year: Never true   Transportation Needs: No Transportation Needs (5/11/2025)    PRAPARE - Transportation     Lack of Transportation (Medical): No     Lack of Transportation (Non-Medical): No   Stress: No Stress Concern Present (5/11/2025)    [employer] - Occupational Stress Questionnaire     Feeling of Stress : Not at all   Housing Stability: Low Risk  (5/11/2025)    Housing Stability Vital Sign     Unable to Pay for Housing in the Last Year: No     Homeless in the Last Year: No

## 2025-06-19 NOTE — PATIENT INSTRUCTIONS
[facility]  Instructions for surgery        Your surgery is scheduled for 6/24/2025 at [facility] on the 1st floor of [facility].Your arrival time is 9  a.m   You will be notified the day before  surgery verifying your arrival time for surgery.                                                                                                                                                                                                                                                                                                                                                                              Day of Surgery Instructions      Bring a list of all your medications with you the day of your surgery. You can also give the list to your doctor or nurse during your final clinic appointment before surgery.      Do not eat any solid foods or drink any liquids after 12:00 AM (midnight). This includes gum, hard candy, mints, and chewing tobacco.  Medications: Take any medications specified with a small sip of water the morning of your surgery.  Brush your teeth: You may brush your teeth and rinse your mouth. Do not swallow any water or toothpaste.  Clothing: A button front shirt and loose-fitting clothes are the most comfortable before and after surgery. We also recommend low-heeled shoes.  Hair: Avoid buns, ponytails, or hairpieces at the back of the head. Remove or avoid any clips, pins or bands that bind hair. Do not use hairspray. Before going to surgery, you will need to remove any wigs or hairpieces.  We will cover your hair during surgery. Your privacy regarding personal appearance will be respected.  Fingernails: Please be sure to remove all nail polish before you arrive for surgery. We understand that tips, wraps, gels, etc., are expensive; however, we ask these products to be removed from at least one finger on each hand. Your fingertips are  used to accurately monitor your oxygen level during surgery by a device called an oximeter.  Glasses and Contact Lenses: Wear glasses when possible. If contact lenses must be worn, bring a lens case and solution. If glasses are worn, bring a case for them.  Hearing Aids: If you rely on a hearing aid, wear it to the hospital on the day of surgery. This will ensure you can hear and understand everything we need to communicate with you.  Valuables: Jewelry, including body piercings, Dentures, money, and credit cards should be left at home. [facility] is not responsible for valuables that are not secured in our surgery center.  Makeup, Perfume, Creams, Lotions and Deodorants: Do not use any of these products on the day of surgery. Remove false eyelashes prior to surgery.  Implanted Medical Devices: If you have an implanted device, such as a pacemaker or AICD, bring the device information card (if you have it) with you.  Medical Equipment: If you have been fitted for a brace to wear after surgery or you have been given crutches, bring those with you to the surgery center.  Ankle Monitor: Ankle monitors MUST be removed prior to surgery.  Shower: Take a shower with Hibiclens® (chlorhexidine) (available over the counter). This reduces the chance of infection. PLEASE USE CHLORHEXIDINE WASH THE NIGHT BEFORE SURGERY AND THE MORNING OF SURGERY.  ONLY 2 VISITORS ARE ALLOWED WITH YOU ON DAY OF SURGERY.        Medication instructions:  You may take blood pressure medication with a small drink of water the morning of surgery.      IF YOU ARE ON ANY OF THESE BLOOD THINNERS, MAKE SURE YOUR PHYSICIAN IS AWARE.  Eliquis/Apixaban            Wafarin/Coumadin,Jantoven  Xarelto/Rivaroxaban      Pletal/Cilostazol  Plavix/Clopidogrel          Pradaxa/Dibigatran      If you are diabetic      Follow the diabetic medicine instructions you received during your pre-operative visit.  DO NOT take your insulin or diabetic medications the morning of  surgery.  When you arrive at the surgical center, be sure to tell the nurse you are diabetic.    The following blood sugar medications have to be stopped prior to surgery:    Hold 24 hours prior to surgery:    Libraglutide - Saxenda, Victoza  Lixisenatide --Adlxyin  Exenatide  --  Byetta  Empaglifozin--Jardiance  Sitaglitin--Januvia    Hold 1 week prior to surgery:    Semaglutide - Ozempic, Wegouy, Rybelsus  Dulaglutide - Trulicity  Tirzepatide - Mounjaro  Exenatide (extended release inj)-- Bydureon BCise      Hold 48 hours prior to surgery:    Metformin, Glucovance, Metaglip, Fortamet, Glucophage, Riomet, Avandamet, Glimepiride            Other Items to bring with you and know    Insurance card  Identification card such as 's license, passport, or other picture ID  Copy of your advance directives  List of medications and allergies, if not already provided  Name and phone number of person to contact if your condition changes significantly. YOU CANNOT DRIVE YOURSELF HOME FROM THE HOSPITAL THE DAY OF SURGERY.  PLEASE UNDERSTAND THAT OUR OFFICE DOES NOT GIVE PATHOLOGY RESULTS OR TEST RESULTS OVER THE PHONE. THIS WILL BE DISCUSSED WITH YOU ON YOUR FOLLOW UP APPOINTMENT.  IF YOU SUBMIT FMLA FORMS, IT WILL TAKE 3-7 DAYS TO COMPLETE THESE          Alcohol and Surgery  We want to help you prepare for and recover from surgery as quickly and safely as possible. Be open and honest with your provider about how many drinks you have per day. Excessive alcohol use is defined as drinking more than three drinks per day. It can affect the outcome of your surgery. Binge drinking (consuming large amounts of alcohol infrequently, such as on weekends) can also affect the outcome of your surgery.  Alcohol withdrawal  If you drink more than three drinks a day, you could have a complication, called alcohol withdrawal, after surgery.  Alcohol withdrawal is a set of symptoms that people have when they suddenly stop drinking after using  alcohol  for a long time. During withdrawal, a person's central nervous system overreacts. This can cause mild symptoms such as shakiness, sweating or hallucinating. It can also cause other more serious side effects. If not treated properly, alcohol withdrawal can cause potentially life-threatening complications after surgery. This can include tremors, seizures, hallucinations, delirium tremors, and even death. Untreated alcohol withdrawal often leads to a longer stay in the hospital, potentially in the Intensive Care Unit.  Chronic heavy drinking also can interfere with several organ systems and biochemical processes in the body.  This interference can cause serious, even life-threatening complications.  Your care team can offer alcohol withdrawal treatment to help:  Decrease the risk of seizures and delirium tremors after surgery  Decrease the risk we will need to restrain you for your own safety or the safety of others  Decrease your risk of falling after surgery  Reduce the use of potent sedative medications  Reduce the time you stay in the hospital after surgery  Reduce the time you might spend on a mechanical ventilator to help you breathe  Lower incidence of organ failure and biochemical complications  Talk to a member of your care team or your primary care physician about your alcohol use if you feel you may be at risk of any of these complications.        Smoking and Surgery  Quitting smoking is extremely important for a successful surgery and recovery. Cigarette smoking compromises your immune system. This increases your risk of an infection after surgery. Quitting the habit before surgery will decrease the surgical risks associated with smoking.

## 2025-06-19 NOTE — PROGRESS NOTES
General Surgery History and Physical      Patient ID: Gabriella Ulloa is a 30 y.o. female.    Chief Complaint: Gall Bladder Problem      HPI:  30-year-old female few month history of nausea vomiting and epigastric discomfort worse after eating especially greasy fatty foods.  She had a HIDA scan which was borderline and had postprandial nausea and vomiting jd evening.  Sister requiring gallbladder removal.  She has been getting worse and not able to eat very well.  Gastric emptying study was essentially normal.  Her few weeks ago patient came in with a ruptured ovarian cyst with hemorrhage had have an urgent laparoscopic oophorectomy.  She has been back to normal by now in her no issues overall.  Still has the other ovary    Review of Systems   Constitutional:  Negative for activity change, appetite change, fatigue and fever.   HENT:  Negative for trouble swallowing.    Respiratory:  Negative for cough and shortness of breath.    Cardiovascular:  Negative for chest pain and palpitations.   Gastrointestinal:  Positive for abdominal pain, nausea and vomiting. Negative for abdominal distention, blood in stool, constipation and diarrhea.   Genitourinary:  Negative for flank pain.   Musculoskeletal:  Negative for neck pain and neck stiffness.   Neurological:  Negative for weakness.       Current Medications[1]    Review of patient's allergies indicates:   Allergen Reactions    Lortab [hydrocodone-acetaminophen] Hives    Nsaids (non-steroidal anti-inflammatory drug) Other (See Comments)     Patient states NSAIDs cause gastritis and she can't take them.    Grass pollen-bermuda, standard     Grass pollen-natalia, standard        Past Medical History:   Diagnosis Date    Anemia     Anxiety     Breakthrough bleeding on birth control pills 09/28/2015    Endometriosis of pelvic peritoneum 06/25/2020    cul-de-sac and sigmoid colon     Hormone disorder 2025    Hypertension     Irritable bowel syndrome Chronic    Increased pain with bowel movements with menses       Past Surgical History:   Procedure Laterality Date     SECTION      D&C/Hysteroscopy with robotic and operative Laparoscopy  2020    DIAGNOSTIC LAPAROSCOPY N/A 2022    Procedure: LAPAROSCOPY, DIAGNOSTIC;  Surgeon: Otis Mccullough MD;  Location: [facility];  Service: OB/GYN;  Laterality: N/A;    DIAGNOSTIC LAPAROSCOPY N/A 2025    Procedure: LAPAROSCOPY, DIAGNOSTIC;  Surgeon: Venu Laguna DO;  Location: [facility];  Service: OB/GYN;  Laterality: N/A;    DILATION AND CURETTAGE OF UTERUS  2020    ENDOMETRIAL BIOPSY N/A 2022    Procedure: BIOPSY, ENDOMETRIUM;  Surgeon: Otis Mccullough MD;  Location: [facility];  Service: OB/GYN;  Laterality: N/A;    HYSTEROSCOPY WITH DILATION AND CURETTAGE OF UTERUS N/A 2022    Procedure: HYSTEROSCOPY, WITH DILATION AND CURETTAGE OF UTERUS;  Surgeon: Otis Mccullough MD;  Location: [facility];  Service: OB/GYN;  Laterality: N/A;    LAPAROSCOPIC SALPINGO-OOPHORECTOMY Right 2025    Procedure: SALPINGO-OOPHORECTOMY, LAPAROSCOPIC;  Surgeon: Venu Laguna DO;  Location: [facility];  Service: OB/GYN;  Laterality: Right;       Family History   Problem Relation Name Age of Onset    Prostate cancer Maternal Grandfather      Hypertension Other      Liver cancer Other      Stomach cancer Other      Diabetes Other      Breast cancer Other Aunt     Heart disease Mother Letitia Brown     Diabetes Mother Letitia Brown     Hypertension Mother Letitia Brown     Cancer Father Vu Brown     Diabetes Father Vu Brown     Hypertension Father Vu Brown     Migraines Father Vu Brown     Breast cancer Maternal Aunt Destini Nielsen     Thyroid disease Maternal Aunt Neva Ulloa     Thyroid disease Maternal Aunt Ruthann Ulloa        Social History[2]    There were no vitals filed for this  visit.    Physical Exam  Constitutional:       General: She is not in acute distress.  HENT:      Head: Normocephalic.   Cardiovascular:      Rate and Rhythm: Normal rate and regular rhythm.      Pulses: Normal pulses.   Pulmonary:      Effort: Pulmonary effort is normal. No respiratory distress.      Breath sounds: Normal breath sounds.   Abdominal:      General: Abdomen is flat. There is no distension.      Palpations: Abdomen is soft.      Tenderness: There is no abdominal tenderness.   Musculoskeletal:         General: Normal range of motion.   Skin:     General: Skin is warm.   Neurological:      General: No focal deficit present.      Mental Status: She is oriented to person, place, and time.         Assessment & Plan:    Biliary dyskinesia  -     Place in Outpatient; Standing  -     Case Request Operating Room: CHOLECYSTECTOMY, LAPAROSCOPIC  -     Vital signs; Standing  -     Insert peripheral IV; Standing  -     Diet NPO; Standing  -     Place CLAU hose; Standing  -     Pulse Oximetry Q4H; Standing  -     Full code; Standing  -     POCT urine pregnancy    Nausea and vomiting, unspecified vomiting type  -     Ambulatory referral/consult to General Surgery    Other orders  -     0.9% NaCl infusion  -     IP VTE LOW RISK PATIENT; Standing  -     cefazolin (ANCEF) 2 gram in dextrose 5% 50 mL IVPB (premix)        Patient go to the operating room next Tuesday for laparoscopic cholecystectomy.  Risks and benefits explained including risk of bleeding, infection, persistent pain, injury to surrounding organs, possible open operation, possible need for additional operations or procedures.  All questions were answered            [1]   Current Outpatient Medications   Medication Sig Dispense Refill    ergocalciferol (ERGOCALCIFEROL) 50,000 unit Cap Take 1 capsule (50,000 Units total) by mouth every 7 days. 12 capsule 3    ferrous sulfate (FEOSOL) 325 mg (65 mg iron) Tab tablet Take 1 tablet (325 mg total) by mouth 2  (two) times daily. 60 tablet 0    linaCLOtide (LINZESS) 290 mcg Cap capsule Take 1 capsule (290 mcg total) by mouth daily as needed. 20 capsule 0    losartan (COZAAR) 50 MG tablet Take 1 tablet (50 mg total) by mouth once daily. 90 tablet 3    norelgestromin-ethinyl estradiol 150-35 mcg/24 hr Place 1 patch onto the skin once a week. 4 patch 3    ondansetron (ZOFRAN-ODT) 4 MG TbDL Take 1 tablet (4 mg total) by mouth every 6 (six) hours as needed. 30 tablet 2    pantoprazole (PROTONIX) 40 MG tablet Take 1 tablet (40 mg total) by mouth once daily. 90 tablet 3    polyethylene glycol (GLYCOLAX) 17 gram/dose powder 2 capfuls by mouth daily x 3 days then 1 capful daily 507 g 0    promethazine (PHENERGAN) 25 MG tablet Take 1 tablet (25 mg total) by mouth every 4 (four) hours. 20 tablet 0    rizatriptan (MAXALT) 10 MG tablet Take 1 tablet (10 mg total) by mouth daily as needed for Migraine (take onset of migraine). 27 tablet 3    venlafaxine (EFFEXOR-XR) 150 MG Cp24 Take 1 capsule (150 mg total) by mouth once daily. 30 capsule 5    metFORMIN (GLUCOPHAGE-XR) 500 MG ER 24hr tablet Take 1 tablet (500 mg total) by mouth daily with breakfast. (Patient not taking: Reported on 6/19/2025) 90 tablet 1    oxyCODONE-acetaminophen (PERCOCET) 7.5-325 mg per tablet Take 1 tablet by mouth every 6 (six) hours as needed for Pain. (Patient not taking: Reported on 6/19/2025) 15 tablet 0     No current facility-administered medications for this visit.   [2]   Social History  Socioeconomic History    Marital status: Single   Tobacco Use    Smoking status: Never     Passive exposure: Never    Smokeless tobacco: Never   Substance and Sexual Activity    Alcohol use: Never    Drug use: Never    Sexual activity: Yes     Partners: Male     Birth control/protection: Patch     Social Drivers of Health     Financial Resource Strain: Low Risk  (5/11/2025)    Overall Financial Resource Strain (CARDIA)     Difficulty of Paying Living Expenses: Not hard at  all   Food Insecurity: No Food Insecurity (5/11/2025)    Hunger Vital Sign     Worried About Running Out of Food in the Last Year: Never true     Ran Out of Food in the Last Year: Never true   Transportation Needs: No Transportation Needs (5/11/2025)    PRAPARE - Transportation     Lack of Transportation (Medical): No     Lack of Transportation (Non-Medical): No   Stress: No Stress Concern Present (5/11/2025)    [employer] [employer] - Occupational Stress Questionnaire     Feeling of Stress : Not at all   Housing Stability: Low Risk  (5/11/2025)    Housing Stability Vital Sign     Unable to Pay for Housing in the Last Year: No     Homeless in the Last Year: No

## 2025-06-23 ENCOUNTER — TELEPHONE (OUTPATIENT)
Dept: SURGERY | Facility: CLINIC | Age: 30
End: 2025-06-23
Payer: MEDICAID

## 2025-06-23 NOTE — TELEPHONE ENCOUNTER
----- Message from Ariadna sent at 6/20/2025  2:31 PM CDT -----  Regarding: PT requesting call back  Good Afternoon,        Pt called wanting a call back from Dr Mejia nurse regarding surgery instructions.

## 2025-06-24 ENCOUNTER — ANESTHESIA (OUTPATIENT)
Dept: SURGERY | Facility: HOSPITAL | Age: 30
End: 2025-06-24
Payer: MEDICAID

## 2025-06-24 ENCOUNTER — HOSPITAL ENCOUNTER (OUTPATIENT)
Facility: HOSPITAL | Age: 30
Discharge: HOME | End: 2025-06-24
Attending: SURGERY | Admitting: SURGERY
Payer: MEDICAID

## 2025-06-24 ENCOUNTER — ANESTHESIA EVENT (OUTPATIENT)
Dept: SURGERY | Facility: HOSPITAL | Age: 30
End: 2025-06-24
Payer: MEDICAID

## 2025-06-24 VITALS
HEIGHT: 63 IN | TEMPERATURE: 99 F | OXYGEN SATURATION: 97 % | DIASTOLIC BLOOD PRESSURE: 70 MMHG | SYSTOLIC BLOOD PRESSURE: 122 MMHG | BODY MASS INDEX: 41.82 KG/M2 | RESPIRATION RATE: 18 BRPM | HEART RATE: 86 BPM | WEIGHT: 236 LBS

## 2025-06-24 DIAGNOSIS — K82.8 BILIARY DYSKINESIA: Primary | ICD-10-CM

## 2025-06-24 LAB
GLUCOSE SERPL-MCNC: 111 MG/DL (ref 70–105)
GLUCOSE SERPL-MCNC: 87 MG/DL (ref 70–105)

## 2025-06-24 PROCEDURE — 27000510 HC BLANKET BAIR HUGGER ANY SIZE: Performed by: ANESTHESIOLOGY

## 2025-06-24 PROCEDURE — D9220A PRA ANESTHESIA: Mod: CRNA,,, | Performed by: NURSE ANESTHETIST, CERTIFIED REGISTERED

## 2025-06-24 PROCEDURE — 36000709 HC OR TIME LEV III EA ADD 15 MIN: Performed by: SURGERY

## 2025-06-24 PROCEDURE — 36000708 HC OR TIME LEV III 1ST 15 MIN: Performed by: SURGERY

## 2025-06-24 PROCEDURE — 63600175 PHARM REV CODE 636 W HCPCS: Performed by: SURGERY

## 2025-06-24 PROCEDURE — 71000016 HC POSTOP RECOV ADDL HR: Performed by: SURGERY

## 2025-06-24 PROCEDURE — 37000008 HC ANESTHESIA 1ST 15 MINUTES: Performed by: SURGERY

## 2025-06-24 PROCEDURE — 25000003 PHARM REV CODE 250: Performed by: SURGERY

## 2025-06-24 PROCEDURE — 27201423 OPTIME MED/SURG SUP & DEVICES STERILE SUPPLY: Performed by: SURGERY

## 2025-06-24 PROCEDURE — 27000655: Performed by: ANESTHESIOLOGY

## 2025-06-24 PROCEDURE — 47562 LAPAROSCOPIC CHOLECYSTECTOMY: CPT | Mod: ,,, | Performed by: SURGERY

## 2025-06-24 PROCEDURE — D9220A PRA ANESTHESIA: Mod: ANES,,, | Performed by: ANESTHESIOLOGY

## 2025-06-24 PROCEDURE — 25000003 PHARM REV CODE 250: Performed by: ANESTHESIOLOGY

## 2025-06-24 PROCEDURE — 82962 GLUCOSE BLOOD TEST: CPT

## 2025-06-24 PROCEDURE — 88304 TISSUE EXAM BY PATHOLOGIST: CPT | Mod: TC,SUR | Performed by: SURGERY

## 2025-06-24 PROCEDURE — 27000165 HC TUBE, ETT CUFFED: Performed by: ANESTHESIOLOGY

## 2025-06-24 PROCEDURE — 27202344 HC EYESHIELD: Performed by: ANESTHESIOLOGY

## 2025-06-24 PROCEDURE — 63600175 PHARM REV CODE 636 W HCPCS: Performed by: NURSE ANESTHETIST, CERTIFIED REGISTERED

## 2025-06-24 PROCEDURE — 27000716 HC OXISENSOR PROBE, ANY SIZE: Performed by: ANESTHESIOLOGY

## 2025-06-24 PROCEDURE — 25000003 PHARM REV CODE 250: Performed by: NURSE ANESTHETIST, CERTIFIED REGISTERED

## 2025-06-24 PROCEDURE — 71000033 HC RECOVERY, INTIAL HOUR: Performed by: SURGERY

## 2025-06-24 PROCEDURE — 37000009 HC ANESTHESIA EA ADD 15 MINS: Performed by: SURGERY

## 2025-06-24 PROCEDURE — 63600175 PHARM REV CODE 636 W HCPCS: Performed by: ANESTHESIOLOGY

## 2025-06-24 PROCEDURE — 27000509 HC TUBE SALEM SUMP ANY SIZE: Performed by: ANESTHESIOLOGY

## 2025-06-24 PROCEDURE — 71000015 HC POSTOP RECOV 1ST HR: Performed by: SURGERY

## 2025-06-24 PROCEDURE — 27000689 HC BLADE LARYNGOSCOPE ANY SIZE: Performed by: ANESTHESIOLOGY

## 2025-06-24 RX ORDER — ONDANSETRON HYDROCHLORIDE 2 MG/ML
4 INJECTION, SOLUTION INTRAVENOUS DAILY PRN
Status: DISCONTINUED | OUTPATIENT
Start: 2025-06-24 | End: 2025-06-24 | Stop reason: HOSPADM

## 2025-06-24 RX ORDER — CEFAZOLIN SODIUM 1 G/3ML
INJECTION, POWDER, FOR SOLUTION INTRAMUSCULAR; INTRAVENOUS
Status: DISCONTINUED | OUTPATIENT
Start: 2025-06-24 | End: 2025-06-24

## 2025-06-24 RX ORDER — IPRATROPIUM BROMIDE AND ALBUTEROL SULFATE 2.5; .5 MG/3ML; MG/3ML
3 SOLUTION RESPIRATORY (INHALATION)
Status: DISCONTINUED | OUTPATIENT
Start: 2025-06-24 | End: 2025-06-24 | Stop reason: HOSPADM

## 2025-06-24 RX ORDER — FENTANYL CITRATE 50 UG/ML
INJECTION, SOLUTION INTRAMUSCULAR; INTRAVENOUS
Status: DISCONTINUED | OUTPATIENT
Start: 2025-06-24 | End: 2025-06-24

## 2025-06-24 RX ORDER — MEPERIDINE HYDROCHLORIDE 25 MG/ML
25 INJECTION INTRAMUSCULAR; INTRAVENOUS; SUBCUTANEOUS EVERY 10 MIN PRN
Status: DISCONTINUED | OUTPATIENT
Start: 2025-06-24 | End: 2025-06-24 | Stop reason: HOSPADM

## 2025-06-24 RX ORDER — CEFAZOLIN 2 G/1
2 INJECTION, POWDER, FOR SOLUTION INTRAMUSCULAR; INTRAVENOUS
Status: DISCONTINUED | OUTPATIENT
Start: 2025-06-24 | End: 2025-06-24 | Stop reason: HOSPADM

## 2025-06-24 RX ORDER — SODIUM CHLORIDE 9 MG/ML
INJECTION, SOLUTION INTRAVENOUS CONTINUOUS
Status: DISCONTINUED | OUTPATIENT
Start: 2025-06-24 | End: 2025-06-24 | Stop reason: HOSPADM

## 2025-06-24 RX ORDER — DIMENHYDRINATE 50 MG
50 TABLET ORAL ONCE
Status: COMPLETED | OUTPATIENT
Start: 2025-06-24 | End: 2025-06-24

## 2025-06-24 RX ORDER — LIDOCAINE HYDROCHLORIDE 20 MG/ML
INJECTION, SOLUTION EPIDURAL; INFILTRATION; INTRACAUDAL; PERINEURAL
Status: DISCONTINUED | OUTPATIENT
Start: 2025-06-24 | End: 2025-06-24

## 2025-06-24 RX ORDER — ROCURONIUM BROMIDE 10 MG/ML
INJECTION, SOLUTION INTRAVENOUS
Status: DISCONTINUED | OUTPATIENT
Start: 2025-06-24 | End: 2025-06-24

## 2025-06-24 RX ORDER — DEXAMETHASONE SODIUM PHOSPHATE 4 MG/ML
INJECTION, SOLUTION INTRA-ARTICULAR; INTRALESIONAL; INTRAMUSCULAR; INTRAVENOUS; SOFT TISSUE
Status: DISCONTINUED | OUTPATIENT
Start: 2025-06-24 | End: 2025-06-24

## 2025-06-24 RX ORDER — ONDANSETRON HYDROCHLORIDE 2 MG/ML
INJECTION, SOLUTION INTRAVENOUS
Status: DISCONTINUED | OUTPATIENT
Start: 2025-06-24 | End: 2025-06-24

## 2025-06-24 RX ORDER — DIPHENHYDRAMINE HYDROCHLORIDE 50 MG/ML
25 INJECTION, SOLUTION INTRAMUSCULAR; INTRAVENOUS EVERY 6 HOURS PRN
Status: DISCONTINUED | OUTPATIENT
Start: 2025-06-24 | End: 2025-06-24 | Stop reason: HOSPADM

## 2025-06-24 RX ORDER — MIDAZOLAM HYDROCHLORIDE 1 MG/ML
INJECTION INTRAMUSCULAR; INTRAVENOUS
Status: DISCONTINUED | OUTPATIENT
Start: 2025-06-24 | End: 2025-06-24

## 2025-06-24 RX ORDER — HYDROMORPHONE HYDROCHLORIDE 2 MG/ML
0.5 INJECTION, SOLUTION INTRAMUSCULAR; INTRAVENOUS; SUBCUTANEOUS EVERY 5 MIN PRN
Status: DISCONTINUED | OUTPATIENT
Start: 2025-06-24 | End: 2025-06-24 | Stop reason: HOSPADM

## 2025-06-24 RX ORDER — MORPHINE SULFATE 10 MG/ML
4 INJECTION INTRAMUSCULAR; INTRAVENOUS; SUBCUTANEOUS EVERY 5 MIN PRN
Status: DISCONTINUED | OUTPATIENT
Start: 2025-06-24 | End: 2025-06-24 | Stop reason: HOSPADM

## 2025-06-24 RX ORDER — SODIUM CHLORIDE, SODIUM LACTATE, POTASSIUM CHLORIDE, CALCIUM CHLORIDE 600; 310; 30; 20 MG/100ML; MG/100ML; MG/100ML; MG/100ML
125 INJECTION, SOLUTION INTRAVENOUS CONTINUOUS
Status: DISCONTINUED | OUTPATIENT
Start: 2025-06-24 | End: 2025-06-24 | Stop reason: HOSPADM

## 2025-06-24 RX ORDER — BUPIVACAINE HYDROCHLORIDE 2.5 MG/ML
INJECTION, SOLUTION EPIDURAL; INFILTRATION; INTRACAUDAL; PERINEURAL
Status: DISCONTINUED | OUTPATIENT
Start: 2025-06-24 | End: 2025-06-24 | Stop reason: HOSPADM

## 2025-06-24 RX ORDER — HYDROCODONE BITARTRATE AND ACETAMINOPHEN 7.5; 325 MG/1; MG/1
1 TABLET ORAL EVERY 6 HOURS PRN
Qty: 15 TABLET | Refills: 0 | Status: SHIPPED | OUTPATIENT
Start: 2025-06-24

## 2025-06-24 RX ORDER — PROPOFOL 10 MG/ML
VIAL (ML) INTRAVENOUS
Status: DISCONTINUED | OUTPATIENT
Start: 2025-06-24 | End: 2025-06-24

## 2025-06-24 RX ADMIN — SODIUM CHLORIDE: 9 INJECTION, SOLUTION INTRAVENOUS at 06:06

## 2025-06-24 RX ADMIN — CEFAZOLIN 2 G: 1 INJECTION, POWDER, FOR SOLUTION INTRAMUSCULAR; INTRAVENOUS; PARENTERAL at 07:06

## 2025-06-24 RX ADMIN — SUGAMMADEX 200 MG: 100 INJECTION, SOLUTION INTRAVENOUS at 08:06

## 2025-06-24 RX ADMIN — ONDANSETRON 4 MG: 2 INJECTION INTRAMUSCULAR; INTRAVENOUS at 07:06

## 2025-06-24 RX ADMIN — DEXAMETHASONE SODIUM PHOSPHATE 10 MG: 4 INJECTION, SOLUTION INTRA-ARTICULAR; INTRALESIONAL; INTRAMUSCULAR; INTRAVENOUS; SOFT TISSUE at 07:06

## 2025-06-24 RX ADMIN — HYDROMORPHONE HYDROCHLORIDE 0.5 MG: 2 INJECTION INTRAMUSCULAR; INTRAVENOUS; SUBCUTANEOUS at 09:06

## 2025-06-24 RX ADMIN — DIMENHYDRINATE 50 MG: 50 TABLET ORAL at 06:06

## 2025-06-24 RX ADMIN — PROPOFOL 150 MG: 10 INJECTION, EMULSION INTRAVENOUS at 07:06

## 2025-06-24 RX ADMIN — ROCURONIUM BROMIDE 40 MG: 10 INJECTION, SOLUTION INTRAVENOUS at 07:06

## 2025-06-24 RX ADMIN — MIDAZOLAM HYDROCHLORIDE 2 MG: 1 INJECTION, SOLUTION INTRAMUSCULAR; INTRAVENOUS at 07:06

## 2025-06-24 RX ADMIN — SODIUM CHLORIDE, POTASSIUM CHLORIDE, SODIUM LACTATE AND CALCIUM CHLORIDE 125 ML/HR: 600; 310; 30; 20 INJECTION, SOLUTION INTRAVENOUS at 09:06

## 2025-06-24 RX ADMIN — FENTANYL CITRATE 100 MCG: 50 INJECTION, SOLUTION INTRAMUSCULAR; INTRAVENOUS at 07:06

## 2025-06-24 RX ADMIN — LIDOCAINE HYDROCHLORIDE 100 MG: 20 INJECTION, SOLUTION EPIDURAL; INFILTRATION; INTRACAUDAL; PERINEURAL at 07:06

## 2025-06-24 NOTE — DISCHARGE SUMMARY
[facility]  Discharge Note  Short Stay    Procedure(s) (LRB):  CHOLECYSTECTOMY, LAPAROSCOPIC (N/A)      OUTCOME: Patient tolerated treatment/procedure well without complication and is now ready for discharge.    DISPOSITION: Home or Self Care    FINAL DIAGNOSIS:  biliary dyskinesia      FOLLOWUP: In clinic    DISCHARGE INSTRUCTIONS:    Discharge Procedure Orders   Diet Adult Regular     Remove dressing in 24 hours     Notify your health care provider if you experience any of the following:  temperature >100.4     Notify your health care provider if you experience any of the following:  persistent nausea and vomiting or diarrhea     Notify your health care provider if you experience any of the following:  severe uncontrolled pain     Notify your health care provider if you experience any of the following:  redness, tenderness, or signs of infection (pain, swelling, redness, odor or green/yellow discharge around incision site)     Notify your health care provider if you experience any of the following:  difficulty breathing or increased cough     Notify your health care provider if you experience any of the following:  severe persistent headache     Notify your health care provider if you experience any of the following:  worsening rash     Notify your health care provider if you experience any of the following:  persistent dizziness, light-headedness, or visual disturbances     Notify your health care provider if you experience any of the following:  increased confusion or weakness     Notify your health care provider if you experience any of the following:     Shower on day dressing removed (No bath)        TIME SPENT ON DISCHARGE: 5 minutes

## 2025-06-24 NOTE — TRANSFER OF CARE
"Anesthesia Transfer of Care Note    Patient: Gabriella Ulloa    Procedure(s) Performed: Procedure(s) (LRB):  CHOLECYSTECTOMY, LAPAROSCOPIC (N/A)    Patient location: PACU    Anesthesia Type: general    Transport from OR: Transported from OR on room air with adequate spontaneous ventilation    Post pain: adequate analgesia    Post assessment: no apparent anesthetic complications    Post vital signs: stable    Level of consciousness: lethargic    Nausea/Vomiting: no nausea/vomiting    Complications: none    Transfer of care protocol was followed      Last vitals: Visit Vitals  /73   Pulse 93   Temp 37 °C (98.6 °F) (Oral)   Resp (!) 23   Ht 5' 3" (1.6 m)   Wt 107 kg (236 lb)   LMP 05/26/2025 (Approximate)   SpO2 97%   Breastfeeding No   BMI 41.81 kg/m²     "

## 2025-06-24 NOTE — ANESTHESIA PREPROCEDURE EVALUATION
06/24/2025  Gabriella Ulloa is a 30 y.o., female.      Pre-op Assessment    I have reviewed the Patient Summary Reports.     I have reviewed the Nursing Notes. I have reviewed the NPO Status.   I have reviewed the Medications.     Review of Systems  Anesthesia Hx:  No problems with previous Anesthesia                Social:  Non-Smoker, No Alcohol Use       Hematology/Oncology:  Hematology Normal   Oncology Normal                                   EENT/Dental:  EENT/Dental Normal           Cardiovascular:     Hypertension                                          Pulmonary:  Pulmonary Normal                       Renal/:  Renal/ Normal                 Hepatic/GI:     GERD                Musculoskeletal:  Musculoskeletal Normal                Neurological:  Neurology Normal                                      Endocrine:  Endocrine Normal          Obesity / BMI > 30  Dermatological:  Skin Normal    Psych:   anxiety                 Physical Exam  General: Well nourished    Airway:  Mallampati: III / III  Mouth Opening: Normal  TM Distance: > 6 cm  Tongue: Normal  Neck ROM: Normal ROM    Chest/Lungs:  Clear to auscultation, Normal Respiratory Rate    Heart:  Rate: Normal  Rhythm: Regular Rhythm        Anesthesia Plan  Type of Anesthesia, risks & benefits discussed:    Anesthesia Type: Gen ETT  Intra-op Monitoring Plan: Standard ASA Monitors  Post Op Pain Control Plan: multimodal analgesia  Induction:  IV  Informed Consent: Informed consent signed with the Patient and all parties understand the risks and agree with anesthesia plan.  All questions answered. Patient consented to blood products? Yes  ASA Score: 2  Day of Surgery Review of History & Physical: H&P Update referred to the surgeon/provider.I have interviewed and examined the patient. I have reviewed the patient's H&P dated:     Ready For Surgery  From Anesthesia Perspective.     .

## 2025-06-24 NOTE — PROGRESS NOTES
Report given and care released to ALPA Galindo. VSS- 99% RA, HR 87, /77, resp 19. Drsg clean, dry and intact with scant amount of old drainage noted. NO active bleeding noted. NADN. Resp even and unlabored. S/o at bedside.

## 2025-06-24 NOTE — OP NOTE
[facility]  Surgery Department  Operative Note    SUMMARY     Date of Procedure: 6/24/2025     Procedure: Procedure(s) (LRB):  CHOLECYSTECTOMY, LAPAROSCOPIC (N/A)     Surgeons and Role:     * Enio Mejia MD - Primary    Assisting Surgeon: None    Pre-Operative Diagnosis: Biliary dyskinesia [K82.8]    Post-Operative Diagnosis: Post-Op Diagnosis Codes:     * Biliary dyskinesia [K82.8]    Anesthesia: General    Procedures Performed: laparoscopic cholecystectomy    Significant Findings of the Procedure:     Procedure in Detail: After informed consent was obtained patient was brought to the OR and prepped and draped in the usual sterile fashion. We started off by optically inserting a 5 mm trocar at the umbilical area. Pneumoperitoneum was obtained up to 15 cm of mercury of pressure. We then under direct visualization placed an additional 11 mm port in the subxiphoid area and 2 additional 5 mm ports in the right upper quadrant. We then retracted the gallbladder over the liver and began dissecting out the cystic structures. The cystic duct and the artery were the only 2 structures entering the gallbladder and these were clipped twice distally once proximally and cut between. We then used the hook cautery to remove the gallbladder from the gallbladder fossa. No holes were made in the gallbladder nor the liver. We then used an Endo Catch bag to remove the gallbladder. We then inspected our bed and clips and they were all intact and hemostatic. We watched our ports come out.  We then discontinued pneumoperitoneum. The skin was closed with a 4-0 Monocryl in a subcuticular manner and patient tolerated the procedure well.      Complications: No    Estimated Blood Loss (EBL):5 cc           Implants: * No implants in log *    Specimens:   Specimen (24h ago, onward)       Start     Ordered    06/24/25 0824  Surgical Pathology  RELEASE UPON ORDERING         06/24/25 0824                             Condition: Good    Disposition: PACU - hemodynamically stable.    Attestation: I was present and scrubbed for the entire procedure.

## 2025-06-24 NOTE — ANESTHESIA PROCEDURE NOTES
Intubation    Date/Time: 6/24/2025 7:47 AM    Performed by: Hossein Durham CRNA  Authorized by: Yan Huerta MD    Intubation:     Induction:  Intravenous    Intubated:  Postinduction    Mask Ventilation:  Easy mask    Attempts:  1    Attempted By:  CRNA    Method of Intubation:  Direct    Blade:  Chay 4    Laryngeal View Grade: Grade I - full view of cords      Difficult Airway Encountered?: No      Complications:  None    Airway Device:  Oral endotracheal tube    Airway Device Size:  7.5    Style/Cuff Inflation:  Cuffed (inflated to minimal occlusive pressure)    Inflation Amount (mL):  7    Tube secured:  21    Placement Verified By:  Capnometry    Complicating Factors:  Small mouth, anterior larynx, obesity and short neck    Findings Post-Intubation:  BS equal bilateral and atraumatic/condition of teeth unchanged

## 2025-06-24 NOTE — ANESTHESIA POSTPROCEDURE EVALUATION
Anesthesia Post Evaluation    Patient: Gabriella Ulloa    Procedure(s) Performed: Procedure(s) (LRB):  CHOLECYSTECTOMY, LAPAROSCOPIC (N/A)    Final Anesthesia Type: general      Patient location during evaluation: PACU  Patient participation: Yes- Able to Participate  Level of consciousness: awake and sedated  Post-procedure vital signs: reviewed and stable  Pain management: adequate  Airway patency: patent    PONV status at discharge: No PONV  Anesthetic complications: no      Cardiovascular status: blood pressure returned to baseline  Respiratory status: unassisted  Hydration status: euvolemic  Follow-up not needed.              Vitals Value Taken Time   /70 06/24/25 11:16   Temp 37 °C (98.6 °F) 06/24/25 08:53   Pulse 85 06/24/25 11:17   Resp 18 06/24/25 11:15   SpO2 96 % 06/24/25 11:17   Vitals shown include unfiled device data.      Event Time   Out of Recovery 09:25:00         Pain/Geovanna Score: Pain Rating Prior to Med Admin: 7 (6/24/2025  9:08 AM)  Pain Rating Post Med Admin: 0 (6/24/2025  9:13 AM)  Geovanna Score: 10 (6/24/2025 11:30 AM)  Modified Geovanna Score: 20 (6/24/2025 11:30 AM)

## 2025-07-08 ENCOUNTER — OFFICE VISIT (OUTPATIENT)
Dept: SURGERY | Facility: CLINIC | Age: 30
End: 2025-07-08
Payer: MEDICAID

## 2025-07-08 VITALS — BODY MASS INDEX: 41.82 KG/M2 | HEIGHT: 63 IN | WEIGHT: 236 LBS

## 2025-07-08 DIAGNOSIS — Z90.49 STATUS POST LAPAROSCOPIC CHOLECYSTECTOMY: Primary | ICD-10-CM

## 2025-07-08 PROCEDURE — 99024 POSTOP FOLLOW-UP VISIT: CPT | Mod: ,,, | Performed by: NURSE PRACTITIONER

## 2025-07-08 PROCEDURE — 99213 OFFICE O/P EST LOW 20 MIN: CPT | Mod: PBBFAC | Performed by: NURSE PRACTITIONER

## 2025-07-08 PROCEDURE — 99999 PR PBB SHADOW E&M-EST. PATIENT-LVL III: CPT | Mod: PBBFAC,,, | Performed by: NURSE PRACTITIONER

## 2025-07-10 NOTE — PROGRESS NOTES
Patient presents to general surgery clinic for 2 week post op follow up of laparoscopic cholecystectomy per Dr. Mejia  . Patient is doing well.  Post op incision sites are noted to be healing.  There is no redness or edema.  No drainage.  Patient is tolerating regular diet.  Patient has not had any nausea or vomiting.  Reports regular bowel movements without diarrhea.  Patient is encouraged to resume regular daily activities as tolerated.  Instructed to follow up with General surgery clinic as needed.  Patient verbalizes understanding and agrees with plan of care.

## 2025-07-16 ENCOUNTER — OFFICE VISIT (OUTPATIENT)
Dept: OBSTETRICS AND GYNECOLOGY | Facility: CLINIC | Age: 30
End: 2025-07-16
Payer: MEDICAID

## 2025-07-16 VITALS
SYSTOLIC BLOOD PRESSURE: 143 MMHG | DIASTOLIC BLOOD PRESSURE: 86 MMHG | BODY MASS INDEX: 42.34 KG/M2 | WEIGHT: 239 LBS | HEART RATE: 87 BPM

## 2025-07-16 DIAGNOSIS — Z97.5 NEXPLANON IN PLACE: Primary | ICD-10-CM

## 2025-07-16 PROCEDURE — 99213 OFFICE O/P EST LOW 20 MIN: CPT | Mod: S$PBB,,, | Performed by: STUDENT IN AN ORGANIZED HEALTH CARE EDUCATION/TRAINING PROGRAM

## 2025-07-16 PROCEDURE — 99213 OFFICE O/P EST LOW 20 MIN: CPT | Mod: PBBFAC | Performed by: STUDENT IN AN ORGANIZED HEALTH CARE EDUCATION/TRAINING PROGRAM

## 2025-07-16 PROCEDURE — 3077F SYST BP >= 140 MM HG: CPT | Mod: ,,, | Performed by: STUDENT IN AN ORGANIZED HEALTH CARE EDUCATION/TRAINING PROGRAM

## 2025-07-16 PROCEDURE — 99999 PR PBB SHADOW E&M-EST. PATIENT-LVL III: CPT | Mod: PBBFAC,,, | Performed by: STUDENT IN AN ORGANIZED HEALTH CARE EDUCATION/TRAINING PROGRAM

## 2025-07-16 PROCEDURE — 3008F BODY MASS INDEX DOCD: CPT | Mod: ,,, | Performed by: STUDENT IN AN ORGANIZED HEALTH CARE EDUCATION/TRAINING PROGRAM

## 2025-07-18 ENCOUNTER — OFFICE VISIT (OUTPATIENT)
Dept: FAMILY MEDICINE | Facility: CLINIC | Age: 30
End: 2025-07-18
Payer: MEDICAID

## 2025-07-18 VITALS
BODY MASS INDEX: 40.74 KG/M2 | DIASTOLIC BLOOD PRESSURE: 82 MMHG | OXYGEN SATURATION: 97 % | HEART RATE: 82 BPM | SYSTOLIC BLOOD PRESSURE: 124 MMHG | TEMPERATURE: 98 F | HEIGHT: 64 IN | WEIGHT: 238.63 LBS | RESPIRATION RATE: 18 BRPM

## 2025-07-18 DIAGNOSIS — I10 PRIMARY HYPERTENSION: Primary | ICD-10-CM

## 2025-07-18 DIAGNOSIS — G44.201 ACUTE INTRACTABLE TENSION-TYPE HEADACHE: ICD-10-CM

## 2025-07-18 DIAGNOSIS — F41.9 ANXIETY: ICD-10-CM

## 2025-07-18 PROBLEM — R58 BLOOD LOSS: Status: RESOLVED | Noted: 2025-06-03 | Resolved: 2025-07-18

## 2025-07-18 PROBLEM — N92.1 MENORRHAGIA WITH IRREGULAR CYCLE: Status: RESOLVED | Noted: 2025-06-03 | Resolved: 2025-07-18

## 2025-07-18 PROBLEM — N92.6 IRREGULAR MENSES: Status: RESOLVED | Noted: 2024-05-17 | Resolved: 2025-07-18

## 2025-07-18 NOTE — PROGRESS NOTES
Jayde Cole DNP   [address] [place] [address]     PATIENT NAME: Gabriella Ulloa  : 1995  DATE: 25  MRN: [id number]      Billing Provider: Jayde Cole DNP  Level of Service: HI OFFICE/OUTPT VISIT, EST, LEVL III, 20-29 MIN  Patient PCP Information       Provider PCP Type    Jayde Cole DNP General            Reason for Visit / Chief Complaint: Hypertension       Update PCP  Update Chief Complaint         History of Present Illness / Problem Focused Workflow     Gabriella Ulloa presents to the clinic with Hypertension     Hypertension    Review of Systems     Review of Systems     Medical / Social / Family History     Past Medical History:   Diagnosis Date    Anemia     Anxiety     Breakthrough bleeding on birth control pills 2015    Encounter for blood transfusion 2025    Endometriosis of pelvic peritoneum 2020    cul-de-sac and sigmoid colon    Hormone disorder 2025    Hypertension     Irritable bowel syndrome Chronic    Increased pain with bowel movements with menses       Past Surgical History:   Procedure Laterality Date     SECTION      D&C/Hysteroscopy with robotic and operative Laparoscopy  2020    DIAGNOSTIC LAPAROSCOPY N/A 2022    Procedure: LAPAROSCOPY, DIAGNOSTIC;  Surgeon: Otis Mccullough MD;  Location[facility];  Service: OB/GYN;  Laterality: N/A;    DIAGNOSTIC LAPAROSCOPY N/A 2025    Procedure: LAPAROSCOPY, DIAGNOSTIC;  Surgeon: Venu Laguna DO;  Location: [facility] OR;  Service: OB/GYN;  Laterality: N/A;    DILATION AND CURETTAGE OF UTERUS  2020    ENDOMETRIAL BIOPSY N/A 2022    Procedure: BIOPSY, ENDOMETRIUM;  Surgeon: Otis Mccullough MD;  Location: [facility] OR;  Service: OB/GYN;  Laterality: N/A;    HYSTEROSCOPY WITH DILATION AND CURETTAGE OF UTERUS N/A 2022    Procedure: HYSTEROSCOPY, WITH DILATION AND CURETTAGE OF UTERUS;  Surgeon: Otis Mccullough MD;   Location: [facility] OR;  Service: OB/GYN;  Laterality: N/A;    LAPAROSCOPIC CHOLECYSTECTOMY N/A 6/24/2025    Procedure: CHOLECYSTECTOMY, LAPAROSCOPIC;  Surgeon: Enio Mejia MD;  Location: [facility] OR;  Service: General;  Laterality: N/A;    LAPAROSCOPIC SALPINGO-OOPHORECTOMY Right 5/12/2025    Procedure: SALPINGO-OOPHORECTOMY, LAPAROSCOPIC;  Surgeon: Venu Laguna DO;  Location: [facility] OR;  Service: OB/GYN;  Laterality: Right;       Social History  Ms.  reports that she has never smoked. She has never been exposed to tobacco smoke. She has never used smokeless tobacco. She reports that she does not drink alcohol and does not use drugs.    Family History  Ms.'s family history includes Breast cancer in her maternal aunt and another family member; Cancer in her father; Diabetes in her father, mother, and another family member; Heart disease in her mother; Hypertension in her father, mother, and another family member; Liver cancer in an other family member; Migraines in her father; Prostate cancer in her maternal grandfather; Stomach cancer in an other family member; Thyroid disease in her maternal aunt and maternal aunt.    Medications and Allergies     Medications  Outpatient Medications Marked as Taking for the 7/18/25 encounter (Office Visit) with Jayde Cole DNP   Medication Sig Dispense Refill    ergocalciferol (ERGOCALCIFEROL) 50,000 unit Cap Take 1 capsule (50,000 Units total) by mouth every 7 days. 12 capsule 3    ferrous sulfate (FEOSOL) 325 mg (65 mg iron) Tab tablet Take 1 tablet (325 mg total) by mouth 2 (two) times daily. 60 tablet 0    linaCLOtide (LINZESS) 290 mcg Cap capsule Take 1 capsule (290 mcg total) by mouth daily as needed. 20 capsule 0    losartan (COZAAR) 50 MG tablet Take 1 tablet (50 mg total) by mouth once daily. 90 tablet 3    metFORMIN (GLUCOPHAGE-XR) 500 MG ER 24hr tablet Take 1 tablet (500 mg total) by mouth daily with breakfast. 90 tablet 1     "norelgestromin-ethinyl estradiol 150-35 mcg/24 hr Place 1 patch onto the skin once a week. 4 patch 3    pantoprazole (PROTONIX) 40 MG tablet Take 1 tablet (40 mg total) by mouth once daily. 90 tablet 3    venlafaxine (EFFEXOR-XR) 150 MG Cp24 Take 1 capsule (150 mg total) by mouth once daily. 30 capsule 5       Allergies  Review of patient's allergies indicates:   Allergen Reactions    Nsaids (non-steroidal anti-inflammatory drug) Other (See Comments)     Patient states NSAIDs cause gastritis and she can't take them.    Grass pollen-bermuda, standard     Grass pollen-natalia, standard        Physical Examination   /82 (BP Location: Left arm, Patient Position: Sitting)   Pulse 82   Temp 98.3 °F (36.8 °C) (Oral)   Resp 18   Ht 5' 3.5" (1.613 m)   Wt 108.2 kg (238 lb 9.6 oz)   LMP 07/02/2025   SpO2 97%   BMI 41.60 kg/m²    Physical Exam  Vitals and nursing note reviewed.   Constitutional:       Appearance: Normal appearance. She is obese.   HENT:      Head: Normocephalic.      Nose: Nose normal.      Mouth/Throat:      Mouth: Mucous membranes are moist.   Eyes:      Pupils: Pupils are equal, round, and reactive to light.   Cardiovascular:      Rate and Rhythm: Normal rate and regular rhythm.      Pulses: Normal pulses.      Heart sounds: Normal heart sounds.   Pulmonary:      Effort: Pulmonary effort is normal.      Breath sounds: Normal breath sounds.   Abdominal:      General: Abdomen is flat. Bowel sounds are normal.      Palpations: Abdomen is soft.   Musculoskeletal:         General: Normal range of motion.      Cervical back: Normal range of motion and neck supple.   Skin:     General: Skin is warm and dry.      Capillary Refill: Capillary refill takes less than 2 seconds.   Neurological:      General: No focal deficit present.      Mental Status: She is alert and oriented to person, place, and time. Mental status is at baseline.   Psychiatric:         Mood and Affect: Mood normal.         " Behavior: Behavior normal.         Thought Content: Thought content normal.         Judgment: Judgment normal.        Assessment and Plan (including Health Maintenance)      Problem List  Smart Sets  Document Outside HM   :    Plan:   Here for eval of elevated bp. States seen several doctors lately and recently had GB removed. She's had 3 children at home all summer and states she's been under a lot of stress. But medicine is working now since increasing at last visit. Effexor 150 - states no longer having hot flashes either and feeling calm. States at her most recent doctor appt her bp was 140s systolic - and didn't take her meds that morning and then she got home and felt worse as she checked her bp and was in 150s systolic. States she did take her meds this morning and today in clinic bp is back to normal with medication on board. Discussed compliance with her medications same time first thing every morning to avoid unncessary headaches from elevated bp.   Pt agrees with plan and will see back at routine appt. Had labs recently no change in plan today          There are no preventive care reminders to display for this patient.    Problem List Items Addressed This Visit          Neuro    Acute intractable tension-type headache       Psychiatric    Anxiety       Cardiac/Vascular    Hypertension - Primary       Endocrine    BMI 40.0-44.9, adult       Health Maintenance Topics with due status: Not Due       Topic Last Completion Date    TETANUS VACCINE 03/01/2023    Cervical Cancer Screening 10/02/2023    Influenza Vaccine 11/20/2024    RSV Vaccine (Age 60+ and Pregnant patients) Not Due       Future Appointments   Date Time Provider Department Center   8/22/2025 10:00 AM Tigist Flores FNP [facility]   10/22/2025  8:20 AM Sanjay Palmer MD [facility]   1/15/2026 10:45 AM Venu Laguna DO [facility]            Signature:  Jayde Cole, DNP      [address] [place] [address]    Date of encounter: 7/18/25

## 2025-08-12 RX ORDER — NORELGESTROMIN AND ETHINYL ESTRADIOL 35; 150 UG/MG; UG/MG
1 PATCH TRANSDERMAL WEEKLY
Qty: 4 PATCH | Refills: 3 | Status: SHIPPED | OUTPATIENT
Start: 2025-08-12 | End: 2026-08-12

## 2025-08-19 RX ORDER — NORELGESTROMIN AND ETHINYL ESTRADIOL 35; 150 UG/MG; UG/MG
PATCH TRANSDERMAL
Qty: 4 PATCH | Refills: 3 | Status: SHIPPED | OUTPATIENT
Start: 2025-08-19

## 2025-08-22 ENCOUNTER — OFFICE VISIT (OUTPATIENT)
Dept: GASTROENTEROLOGY | Facility: CLINIC | Age: 30
End: 2025-08-22
Payer: MEDICAID

## 2025-08-22 VITALS
OXYGEN SATURATION: 99 % | HEIGHT: 64 IN | SYSTOLIC BLOOD PRESSURE: 152 MMHG | HEART RATE: 77 BPM | DIASTOLIC BLOOD PRESSURE: 83 MMHG | BODY MASS INDEX: 41.6 KG/M2

## 2025-08-22 DIAGNOSIS — K21.9 GASTROESOPHAGEAL REFLUX DISEASE, UNSPECIFIED WHETHER ESOPHAGITIS PRESENT: ICD-10-CM

## 2025-08-22 DIAGNOSIS — R11.2 NAUSEA AND VOMITING, UNSPECIFIED VOMITING TYPE: Primary | ICD-10-CM

## 2025-08-22 PROCEDURE — 99214 OFFICE O/P EST MOD 30 MIN: CPT | Mod: PBBFAC | Performed by: NURSE PRACTITIONER

## 2025-08-22 PROCEDURE — 99999 PR PBB SHADOW E&M-EST. PATIENT-LVL IV: CPT | Mod: PBBFAC,,, | Performed by: NURSE PRACTITIONER

## 2025-08-25 ENCOUNTER — OFFICE VISIT (OUTPATIENT)
Dept: FAMILY MEDICINE | Facility: CLINIC | Age: 30
End: 2025-08-25
Payer: MEDICAID

## 2025-08-25 VITALS
WEIGHT: 238.81 LBS | TEMPERATURE: 99 F | DIASTOLIC BLOOD PRESSURE: 85 MMHG | OXYGEN SATURATION: 100 % | SYSTOLIC BLOOD PRESSURE: 121 MMHG | HEIGHT: 63 IN | HEART RATE: 86 BPM | BODY MASS INDEX: 42.31 KG/M2

## 2025-08-25 DIAGNOSIS — J02.9 SORE THROAT: ICD-10-CM

## 2025-08-25 DIAGNOSIS — R09.81 NASAL CONGESTION: ICD-10-CM

## 2025-08-25 DIAGNOSIS — R43.2 LOSS OF TASTE: ICD-10-CM

## 2025-08-25 DIAGNOSIS — J01.00 ACUTE NON-RECURRENT MAXILLARY SINUSITIS: Primary | ICD-10-CM

## 2025-08-25 DIAGNOSIS — R51.9 ACUTE NONINTRACTABLE HEADACHE, UNSPECIFIED HEADACHE TYPE: ICD-10-CM

## 2025-08-25 PROBLEM — K21.9 GASTROESOPHAGEAL REFLUX DISEASE: Status: RESOLVED | Noted: 2023-01-27 | Resolved: 2025-08-25

## 2025-08-25 PROBLEM — G44.201 ACUTE INTRACTABLE TENSION-TYPE HEADACHE: Status: RESOLVED | Noted: 2025-07-18 | Resolved: 2025-08-25

## 2025-08-25 PROBLEM — R11.2 NAUSEA AND VOMITING: Status: RESOLVED | Noted: 2022-11-15 | Resolved: 2025-08-25

## 2025-08-25 LAB
CTP QC/QA: YES
CTP QC/QA: YES
MOLECULAR STREP A: NEGATIVE
SARS-COV-2 RDRP RESP QL NAA+PROBE: NEGATIVE

## 2025-08-25 PROCEDURE — 87651 STREP A DNA AMP PROBE: CPT | Mod: QW,,, | Performed by: NURSE PRACTITIONER

## 2025-08-25 PROCEDURE — 99213 OFFICE O/P EST LOW 20 MIN: CPT | Mod: 25,,, | Performed by: NURSE PRACTITIONER

## 2025-08-25 PROCEDURE — 96372 THER/PROPH/DIAG INJ SC/IM: CPT | Mod: ,,, | Performed by: NURSE PRACTITIONER

## 2025-08-25 PROCEDURE — 87635 SARS-COV-2 COVID-19 AMP PRB: CPT | Mod: QW,,, | Performed by: NURSE PRACTITIONER

## 2025-08-25 PROCEDURE — 3074F SYST BP LT 130 MM HG: CPT | Mod: ,,, | Performed by: NURSE PRACTITIONER

## 2025-08-25 PROCEDURE — 3008F BODY MASS INDEX DOCD: CPT | Mod: ,,, | Performed by: NURSE PRACTITIONER

## 2025-08-25 RX ORDER — BETAMETHASONE SODIUM PHOSPHATE AND BETAMETHASONE ACETATE 3; 3 MG/ML; MG/ML
6 INJECTION, SUSPENSION INTRA-ARTICULAR; INTRALESIONAL; INTRAMUSCULAR; SOFT TISSUE
Status: COMPLETED | OUTPATIENT
Start: 2025-08-25 | End: 2025-08-25

## 2025-08-25 RX ORDER — FLUTICASONE PROPIONATE 50 MCG
1 SPRAY, SUSPENSION (ML) NASAL DAILY
Qty: 11.1 ML | Refills: 0 | Status: SHIPPED | OUTPATIENT
Start: 2025-08-25

## 2025-08-25 RX ORDER — AZITHROMYCIN 250 MG/1
TABLET, FILM COATED ORAL
Qty: 6 TABLET | Refills: 0 | Status: SHIPPED | OUTPATIENT
Start: 2025-08-25

## 2025-08-25 RX ORDER — LEVOCETIRIZINE DIHYDROCHLORIDE 5 MG/1
5 TABLET, FILM COATED ORAL NIGHTLY
Qty: 30 TABLET | Refills: 1 | Status: SHIPPED | OUTPATIENT
Start: 2025-08-25

## 2025-08-25 RX ADMIN — BETAMETHASONE SODIUM PHOSPHATE AND BETAMETHASONE ACETATE 6 MG: 3; 3 INJECTION, SUSPENSION INTRA-ARTICULAR; INTRALESIONAL; INTRAMUSCULAR; SOFT TISSUE at 09:08

## 2025-09-05 ENCOUNTER — TELEPHONE (OUTPATIENT)
Dept: FAMILY MEDICINE | Facility: CLINIC | Age: 30
End: 2025-09-05
Payer: MEDICAID

## (undated) DEVICE — CANNULA LAP SEAL Z THRD 5X100

## (undated) DEVICE — UTERINE MANIPULATOR HUMI 6003

## (undated) DEVICE — SEALER LIGASURE MARYLAND 37CM

## (undated) DEVICE — SUTURE MONOCRYL 4-0 18 PS-2

## (undated) DEVICE — GLOVE SENSICARE PI GRN 6.5

## (undated) DEVICE — DISSECTOR KITTNER 5MM T 45CM S

## (undated) DEVICE — DAVINCI 5 X 8 UNIVERSAL SEAL

## (undated) DEVICE — WARMER BLUE HEAT SCOPE 3-12MM

## (undated) DEVICE — GLOVE SENSICARE PI GRN 7.5

## (undated) DEVICE — GLOVE SENSICARE PI SURG 6.5

## (undated) DEVICE — GLOVE SURGICAL PROTEXIS PI SIZE 7.5

## (undated) DEVICE — SET PNEUMOCLEAR HEAT HUM SE HF

## (undated) DEVICE — SOL NACL IRR 1000ML BTL

## (undated) DEVICE — APPLICATOR HEMOBLAST LAPSCP

## (undated) DEVICE — GLOVE SENSICARE PI SURG 7

## (undated) DEVICE — TROCAR BLADELESS Z-THREAD 5MM X 100MM

## (undated) DEVICE — GLOVE SURGICAL PROTEXIS PI SIZE 6.5

## (undated) DEVICE — TROCAR ENDO Z THREAD KII 5X100

## (undated) DEVICE — ADHESIVE MASTISOL VIAL 48/BX

## (undated) DEVICE — ACCESSORY TIP COVER

## (undated) DEVICE — DAVINCI ARM DRAPE

## (undated) DEVICE — WARMER SCOPE DISP

## (undated) DEVICE — ELECTRODE LAPSCP L HOOK 36CM

## (undated) DEVICE — Device

## (undated) DEVICE — GLOVE SURGICAL PROTEXIS PI BLUE SIZE 7.0

## (undated) DEVICE — BELLOW CANN HEMOBLAST 1.65GR

## (undated) DEVICE — SUT VICRYL+ 27 UR-6 VIOL

## (undated) DEVICE — APPLIER CLIP ENDO MED/LG 10MM

## (undated) DEVICE — KIT GYN LAPAROSCOPY RUSH

## (undated) DEVICE — GLOVE SURGICAL PROTEXIS PI SIZE 7

## (undated) DEVICE — SCISSOR 5MMX35CM DIRECT DRIVE

## (undated) DEVICE — IRRIGATOR ENDOSCOPY DISP.

## (undated) DEVICE — SYR 10CC LUER LOCK

## (undated) DEVICE — BAG TISS RETRV MONARCH 10MM

## (undated) DEVICE — TROCAR KII FIOS ZTHREAD 11X100

## (undated) DEVICE — SUT MONOCYRL 4-0 PS2 UND

## (undated) DEVICE — CDS ROBOTIC

## (undated) DEVICE — KIT PROCEDURE STER INLET CLOSU

## (undated) DEVICE — COVER MAYO STAND W/PAD 23X54IN

## (undated) DEVICE — DAVINICI OPTICAL 8MM BLADLESS OBTURATOR

## (undated) DEVICE — GLOVE SURGICAL PROTEXIS PI BLUE SIZE 7.5

## (undated) DEVICE — GOWN SURGICAL SMARTGOWN LEVEL 4 / EXTRA LARGE STERILE

## (undated) DEVICE — SUTURE VICRYL 0 UR-6 27 IN VIOLET

## (undated) DEVICE — CORD LAP 10 DISP

## (undated) DEVICE — TAPE STRIPS F/DRAPE 4.5X27IN

## (undated) DEVICE — GOWN NONREINF SET-IN SLV 2XL

## (undated) DEVICE — STRIP MEDI WND CLSR 1/2X4IN